# Patient Record
Sex: MALE | Race: WHITE | URBAN - METROPOLITAN AREA
[De-identification: names, ages, dates, MRNs, and addresses within clinical notes are randomized per-mention and may not be internally consistent; named-entity substitution may affect disease eponyms.]

---

## 2017-12-04 ENCOUNTER — NEW PATIENT (OUTPATIENT)
Dept: URBAN - METROPOLITAN AREA CLINIC 44 | Facility: CLINIC | Age: 83
End: 2017-12-04
Payer: MEDICARE

## 2017-12-04 DIAGNOSIS — H35.3211 EXUDATIVE MACULAR DEGENERATION, WITH ACTIVE CHOROIDAL NEOVASCULARIZATION, RIGHT EYE: Primary | ICD-10-CM

## 2017-12-04 DIAGNOSIS — H35.3122 NONEXUDATIVE MACULAR DEGENERATION, INTERMEDIATE DRY STAGE, LEFT EYE: ICD-10-CM

## 2017-12-04 PROCEDURE — 92134 CPTRZ OPH DX IMG PST SGM RTA: CPT | Performed by: OPHTHALMOLOGY

## 2017-12-04 PROCEDURE — 92004 COMPRE OPH EXAM NEW PT 1/>: CPT | Performed by: OPHTHALMOLOGY

## 2017-12-04 ASSESSMENT — INTRAOCULAR PRESSURE
OD: 13
OS: 14

## 2018-07-24 ENCOUNTER — OFFICE VISIT (OUTPATIENT)
Dept: NEUROLOGY | Facility: CLINIC | Age: 83
End: 2018-07-24
Payer: MEDICARE

## 2018-07-24 VITALS
DIASTOLIC BLOOD PRESSURE: 88 MMHG | HEART RATE: 70 BPM | HEIGHT: 72 IN | BODY MASS INDEX: 25.47 KG/M2 | WEIGHT: 188 LBS | RESPIRATION RATE: 16 BRPM | SYSTOLIC BLOOD PRESSURE: 138 MMHG

## 2018-07-24 DIAGNOSIS — G47.33 OBSTRUCTIVE SLEEP APNEA SYNDROME: Primary | ICD-10-CM

## 2018-07-24 PROCEDURE — 99214 OFFICE O/P EST MOD 30 MIN: CPT | Mod: PO | Performed by: NURSE PRACTITIONER

## 2018-07-24 PROCEDURE — 99214 OFFICE O/P EST MOD 30 MIN: CPT | Performed by: NURSE PRACTITIONER

## 2018-07-24 ASSESSMENT — ENCOUNTER SYMPTOMS
SORE THROAT: 0
CHILLS: 0
ABDOMINAL PAIN: 0
COUGH: 0
DIZZINESS: 1
PHOTOPHOBIA: 1
DYSPHORIC MOOD: 1
SHORTNESS OF BREATH: 1
HYPERACTIVE: 1
FATIGUE: 1
SLEEP DISTURBANCE: 1
APNEA: 1
ARTHRALGIAS: 1
PALPITATIONS: 0
FEVER: 0
BRUISES/BLEEDS EASILY: 1
BACK PAIN: 1
EYE PAIN: 0
ACTIVITY CHANGE: 1

## 2018-07-24 NOTE — PROGRESS NOTES
Sleep Progress Note    07/24/18  11:24 AM    Chief Complaint   Patient presents with   • Sleep Apnea     Gets supplies from [facility].    HPI:  Sleep study date:  July 15, 2016  Severity: Moderate to Severe  AHI: 29.2  Minimum O2 saturation: 69%  Machine type: CPAP  Mask: Nasal Pillows with a chinstrap    Joseph Wong is a 83 y.o. male who returns to the clinic today in follow-up for obstructive sleep apnea. It is noted that the patient underwent initial sleep study secondary to complaints of excessive daytime fatigability, somnolence, and nonrestorative sleep.  He states that he would nap on a daily basis secondary to fatigue.  He has a known past medical history positive for macular degeneration, coronary artery disease, GERD, hypertension, and vertigo.  Sleep study was carried out on July 15, 2016 and demonstrated moderate to severe obstructive sleep apnea with an AHI of 29.2 and a minimum oxygen saturation of 69%. Following sleep study the patient was placed on CPAP with a set pressure of 10 cm H2O and 2 L of oxygen at night.    At today’s appointment the patient denies any complaints. He states that he is tolerating CPAP well. He is currently utilizing nasal pillows with the use of a chinstrap. He denies complaint with this.  He reports that he is going to bed at approximately 8:30 PM and awakening around 6 AM.  He reports he is sleeping soundly throughout the night.  Daytime fatigue is under good control.  He will however take an occasional afternoon nap for about an hour.  His compliance report demonstrates 97% usage over 4 hours. On average he is using his CPAP 8 hours and 22 minutes a night. AHI is well controlled at 1.9.  Overall, from a sleep standpoint the patient is doing well. His questions were addressed and answered.     Of note, [place] Sleepiness Scale was completed in the office today with a total score of 12/24.    Histories:    Medical:    Past Medical History:   Diagnosis Date   • CAD (coronary  artery disease)    • Elevated cholesterol    • GERD (gastroesophageal reflux disease)    • Hiatal hernia    • Hypertension    • Macular degeneration    • Metabolic syndrome    • Vertigo          Family:    Family History   Problem Relation Age of Onset   • Hypertension Father    • Stroke Father    • Sleep apnea Brother          Social:    Social History     Social History   • Marital status:      Spouse name: N/A   • Number of children: N/A   • Years of education: N/A     Occupational History   • Not on file.     Social History Main Topics   • Smoking status: Former Smoker     Quit date: 1973   • Smokeless tobacco: Never Used   • Alcohol use Not on file   • Drug use: Unknown   • Sexual activity: Not on file     Other Topics Concern   • Not on file     Social History Narrative   • No narrative on file       Allergies:  No Known Allergies    Current Medications:    Current Outpatient Prescriptions   Medication Sig Dispense Refill   • amLODIPine (NORVASC) 5 mg tablet 1 tab daily 90 0   • atorvastatin (LIPITOR) 10 mg tablet 1 tab daily 90 0   • losartan-hydrochlorothiazide (HYZAAR) 100-25 mg per tablet 1 tab daily 90 0   • potassium chloride (KLOR-CON) 10 mEq CR tablet 1 tab  0     No current facility-administered medications for this visit.        Review of Systems   Constitutional: Positive for activity change ('decreased stamina') and fatigue (mild). Negative for chills and fever.   HENT: Positive for tinnitus. Negative for ear pain and sore throat.    Eyes: Positive for photophobia and visual disturbance (secondary to cataracts). Negative for pain.   Respiratory: Positive for apnea (well-controlled on CPAP) and shortness of breath (with exertion). Negative for cough.         Sleep:  Denies snoring, unusual movements, or abnormal dreaming.  Denies issues with restless leg symptomology. Reports normal breathing.  Mild residual daytime fatigue.   Cardiovascular: Negative for chest pain, palpitations and  leg swelling.   Gastrointestinal: Negative for abdominal pain.   Musculoskeletal: Positive for arthralgias (Knee pain), back pain (secondary to shingles) and gait problem.   Neurological: Positive for dizziness (Known history of vertigo). Negative for syncope.   Hematological: Bruises/bleeds easily.   Psychiatric/Behavioral: Positive for dysphoric mood and sleep disturbance. The patient is hyperactive.    All other systems reviewed and are negative.      OBJECTIVE    VS/Weight:  /88 (BP Location: Left arm, Patient Position: Sitting, Cuff Size: Reg)   Pulse 70   Resp 16   Ht 1.829 m (6')   Wt 85.3 kg (188 lb)   BMI 25.50 kg/m²        Results of Compliance Report:  Pressure: 10cm H2O  AHI: 1.9  Usage over 4 hours: 97%  Average hourly usage: 8:22   O2: Yes 2L      Physical Exam   Constitutional: He is oriented to person, place, and time. He appears well-developed and well-nourished. No distress.   HENT:   Head: Normocephalic and atraumatic.   Eyes: Pupils are equal, round, and reactive to light.   Neck: Neck supple.   Cardiovascular: Normal rate, regular rhythm and normal heart sounds.    No murmur heard.  Pulmonary/Chest: Effort normal and breath sounds normal.   Neurological: He is alert and oriented to person, place, and time.   Skin: He is not diaphoretic.       Assessment:  1.  Patient underwent sleep study on July 15, 2016 and was found to have moderate to severe obstructive sleep apnea with an AHI of 29.2 and a minimum oxygen saturation of 69%.  He is currently on CPAP with a set pressure of 10cm H2O and 2L of oxygen at noc.    2.  [place] Sleepiness Scale was completed in the office today with a total score of 12/24.  3.  Macular degeneration.  4.  Coronary artery disease.  5.  GERD.  6.  Hypertension.  7.  History of vertigo.    Plan:  1.  Patient is currently using and benefiting from current CPAP and oxygen therapy.  He tolerates CPAP well.  There is no need to make any changes in regards to his  settings.  He is noted to be compliant with 97% usage over 4 hours.  AHI is well-controlled at 1.9. Overall, from a sleep standpoint the patient is noted to be stable.  2.  He was counseled on what to look for if pressure is too high or too low.  3.  The patient will be provided with a prescription to obtain new CPAP supplies today.  4.  He will follow-up with his primary care provider for general medical needs.  5.  He is to stay active both physically and mentally.  6.  He will follow-up in 1 year, sooner as needed.  His questions were addressed and answered.  7.  The diagnostic assessment has been reviewed.  Patient has expressed a good understanding of the assessment and recommendations from today's visit.  There are no apparent barriers to understanding this information.  His questions were addressed.  He has been advised to contact this office or the answering service with questions or concerns that may arise.  Patient has been advised to follow-up with his primary care provider for general medical needs.  A total of 30 minutes was required for this visit.  Greater than 50% of this time was spent in direct face-to-face communication with the patient in order to provide counseling and coordination of care in regards to the treatment and management of obstructive sleep apnea.      A voice recognition program was used to aid in documentation of the record. Sometimes words are not printed exactly as they were spoken. While efforts were made to carefully edit and correct any inaccuracies, some may be present; please take these into context. Please contact the provider if areas are identified.       EMILY CROWLEY, CNP

## 2018-10-02 ENCOUNTER — HOSPITAL ENCOUNTER (OUTPATIENT)
Dept: FLUOROSCOPY | Facility: CLINIC | Age: 83
Discharge: HOME | End: 2018-10-02
Attending: ANESTHESIOLOGY

## 2018-10-02 ENCOUNTER — OFFICE VISIT (OUTPATIENT)
Dept: PAIN MEDICINE | Facility: CLINIC | Age: 83
End: 2018-10-02
Attending: ANESTHESIOLOGY
Payer: MEDICARE

## 2018-10-02 VITALS
OXYGEN SATURATION: 92 % | SYSTOLIC BLOOD PRESSURE: 151 MMHG | DIASTOLIC BLOOD PRESSURE: 88 MMHG | RESPIRATION RATE: 18 BRPM | HEART RATE: 76 BPM

## 2018-10-02 DIAGNOSIS — M47.816 FACET ARTHROPATHY, LUMBAR: Primary | ICD-10-CM

## 2018-10-02 PROCEDURE — 6360000200 HC RX 636 W HCPCS (ALT 250 FOR IP)

## 2018-10-02 PROCEDURE — 2550000100 HC RX 255

## 2018-10-02 PROCEDURE — 64493 INJ PARAVERT F JNT L/S 1 LEV: CPT | Mod: 50

## 2018-10-02 PROCEDURE — 64494 INJ PARAVERT F JNT L/S 2 LEV: CPT | Mod: 50

## 2018-10-02 RX ORDER — TRIAMCINOLONE ACETONIDE 40 MG/ML
40 INJECTION, SUSPENSION INTRA-ARTICULAR; INTRAMUSCULAR ONCE
Status: COMPLETED | OUTPATIENT
Start: 2018-10-02 | End: 2018-10-02

## 2018-10-02 RX ORDER — LIDOCAINE HYDROCHLORIDE 10 MG/ML
30 INJECTION, SOLUTION EPIDURAL; INFILTRATION; INTRACAUDAL; PERINEURAL ONCE
Status: COMPLETED | OUTPATIENT
Start: 2018-10-02 | End: 2018-10-02

## 2018-10-02 RX ORDER — ASPIRIN 81 MG/1
81 TABLET ORAL DAILY
COMMUNITY
End: 2022-09-02 | Stop reason: ALTCHOICE

## 2018-10-02 RX ADMIN — TRIAMCINOLONE ACETONIDE 40 MG: 40 INJECTION, SUSPENSION INTRA-ARTICULAR; INTRAMUSCULAR at 15:28

## 2018-10-02 RX ADMIN — LIDOCAINE HYDROCHLORIDE 12 ML: 10 INJECTION, SOLUTION EPIDURAL; INFILTRATION; INTRACAUDAL; PERINEURAL at 15:25

## 2018-10-02 NOTE — PATIENT INSTRUCTIONS
Patient Education     Facet Syndrome  Facet syndrome is a condition in which joints (facet joints) that connect the bones of the spine (vertebrae) become damaged. Facet joints help the spine move, and they usually wear down (degenerate) or become inflamed as you age. This can cause pain and stiffness in the neck (cervical facet syndrome) or in the lower back (lumbar facet syndrome).  When a facet joint becomes damaged, a vertebra may slip forward, out of its normal place in the spine. Damage to a facet joint can also damage nerves near the spine, which can cause tingling or weakness in the arms or legs. Facet syndrome can make it difficult to turn the head or bend backward without pain. This condition typically gets worse over time.  What are the causes?  Common causes of this condition include:  · Age-related inflammation of the facet joints (arthritis) that may create extra bone on the joint surface (bone spurs).  · Age-related decrease in space between the vertebrae (disk degeneration and cartilage degeneration).  · Repetitive stress on the spine, such as repetitive twisting of the back.  · Injury (trauma) to the back or neck.    What increases the risk?  The following factors may make you more likely to develop this condition:  · Playing contact sports.  · Doing activities or sports that involve repetitive twisting motions or repetitive heavy lifting.  · Having poor back strength and flexibility.  · Having another back or spine condition, such as scoliosis.    What are the signs or symptoms?  Symptoms of facet syndrome may include:  · An ache in the neck or lower back. This may get worse when you twist or arch your back, or when you look up.  · Stiffness in the neck or lower back.  · Numbness, tingling, or weakness in the arms or legs.    Symptoms of cervical facet syndrome may include:  · Headache.  · Pain at the back of the head.  · Pain in the shoulder blades.    Symptoms of lumbar facet syndrome may  include pain in any of the following areas:  · Groin.  · Thighs.  · Lower back.  · Buttocks.  · Hips.    How is this diagnosed?  This condition may be diagnosed based on:  · Your symptoms.  · Your medical history.  · A physical exam.  · Imaging tests, such as:  ? X-rays.  ? MRI.  · A procedure in which medicines to numb the area (local anesthetic) and medicines to reduce inflammation (steroids) are injected into your affected joint (facet joint block).    How is this treated?  Treatment for this condition may include:  · Stopping or modifying activities that make your condition worse.  · Medicines that help reduce pain and inflammation.  · Steroid injections to help reduce severe pain.  · Physical therapy.  · Radiofrequency ablation. This is a surgical procedure that uses high-frequency radio waves to block signals from affected nerves.  · Surgery to stabilize your spine or to take pressure off your nerves. This is rare.    Follow these instructions at home:  Activity  · Rest your neck and back as told by your health care provider.  · Return to your normal activities as told by your health care provider. Ask your health care provider what activities are safe for you.  · If physical therapy was prescribed, do exercises as told by your health care provider.  General instructions  · Take over-the-counter and prescription medicines only as told by your health care provider.  · Do not drive or operate heavy machinery while taking prescription pain medicines.  · Do not use any tobacco products, such as cigarettes, chewing tobacco, and e-cigarettes. Tobacco can delay bone healing. If you need help quitting, ask your health care provider.  · Use good posture throughout your daily activities. Good posture means that your spine is in its natural S-curve position (your spine is neutral), your shoulders are pulled back slightly, and your head is not forward.  · Keep all follow-up visits as told by your health care provider.  This is important.  Contact a health care provider if:  · You have symptoms that get worse or do not improve in 2-4 weeks of treatment.  · You have numbness or weakness in any part of your body.  · You lose control over your bladder or bowel function.  This information is not intended to replace advice given to you by your health care provider. Make sure you discuss any questions you have with your health care provider.  Document Released: 12/18/2006 Document Revised: 01/18/2017 Document Reviewed: 08/29/2016  [employer] Interactive Patient Education © 2018 [employer]       Patient Education     Facet Joint Block  The facet joints connect the bones of the spine (vertebrae). They make it possible for you to bend, twist, and make other movements with your spine. They also keep you from bending too far, twisting too far, and making other excessive movements.  A facet joint block is a procedure where a numbing medicine (anesthetic) is injected into a facet joint. Often, a type of anti-inflammatory medicine called a steroid is also injected. A facet joint block may be done to diagnose neck or back pain. If the pain gets better after a facet joint block, it means the pain is probably coming from the facet joint. If the pain does not get better, it means the pain is probably not coming from the facet joint. A facet joint block may also be done to relieve neck or back pain caused by an inflamed facet joint. A facet joint block is only done to relieve pain if the pain does not improve with other methods, such as medicine, exercise programs, and physical therapy.  Tell a health care provider about:  · Any allergies you have.  · All medicines you are taking, including vitamins, herbs, eye drops, creams, and over-the-counter medicines.  · Any problems you or family members have had with anesthetic medicines.  · Any blood disorders you have.  · Any surgeries you have had.  · Any medical conditions you have.  · Whether you are  pregnant or may be pregnant.  What are the risks?  Generally, this is a safe procedure. However, problems may occur, including:  · Bleeding.  · Injury to a nerve near the injection site.  · Pain at the injection site.  · Weakness or numbness in areas controlled by nerves near the injection site.  · Infection.  · Temporary fluid retention.  · Allergic reactions to medicines or dyes.  · Injury to other structures or organs near the injection site.    What happens before the procedure?  · Follow instructions from your health care provider about eating or drinking restrictions.  · Ask your health care provider about:  ? Changing or stopping your regular medicines. This is especially important if you are taking diabetes medicines or blood thinners.  ? Taking medicines such as aspirin and ibuprofen. These medicines can thin your blood. Do not take these medicines before your procedure if your health care provider instructs you not to.  · Do not take any new dietary supplements or medicines without asking your health care provider first.  · Plan to have someone take you home after the procedure.  What happens during the procedure?  · You may need to remove your clothing and dress in an open-back gown.  · The procedure will be done while you are lying on an X-ray table. You will most likely be asked to lie on your stomach, but you may be asked to lie in a different position if an injection will be made in your neck.  · Machines will be used to monitor your oxygen levels, heart rate, and blood pressure.  · If an injection will be made in your neck, an IV tube will be inserted into one of your veins. Fluids and medicine will flow directly into your body through the IV tube.  · The area over the facet joint where the injection will be made will be cleaned with soap. The surrounding skin will be covered with clean drapes.  · A numbing medicine (local anesthetic) will be applied to your skin. Your skin may sting or burn for a  moment.  · A video X-ray machine (fluoroscopy) will be used to locate the joint. In some cases, a CT scan may be used.  · A contrast dye may be injected into the facet joint area to help locate the joint.  · When the joint is located, an anesthetic will be injected into the joint through the needle.  · Your health care provider will ask you whether you feel pain relief. If you do feel relief, a steroid may be injected to provide pain relief for a longer period of time. If you do not feel relief or feel only partial relief, additional injections of an anesthetic may be made in other facet joints.  · The needle will be removed.  · Your skin will be cleaned.  · A bandage (dressing) will be applied over each injection site.  The procedure may vary among health care providers and hospitals.  What happens after the procedure?  · You will be observed for 15-30 minutes before being allowed to go home.  This information is not intended to replace advice given to you by your health care provider. Make sure you discuss any questions you have with your health care provider.  Document Released: 05/08/2008 Document Revised: 01/18/2017 Document Reviewed: 09/12/2016  [employer] Interactive Patient Education © 2018 [employer]       Patient Education     Facet Joint Block, Care After  Refer to this sheet in the next few weeks. These instructions provide you with information about caring for yourself after your procedure. Your health care provider may also give you more specific instructions. Your treatment has been planned according to current medical practices, but problems sometimes occur. Call your health care provider if you have any problems or questions after your procedure.  What can I expect after the procedure?  After the procedure, it is common to have:  · Some tenderness over the injection sites for 2 days after the procedure.  · A temporary increase in blood sugar if you have diabetes.    Follow these instructions at  home:  · Keep track of the amount of pain relief you feel and how long it lasts.  · Take over-the-counter and prescription medicines only as told by your health care provider. You may need to limit pain medicine within the first 4-6 hours after the procedure.  · Remove your bandages (dressings) the morning after the procedure.  · For the first 24 hours after the procedure:  ? Do not apply heat near or over the injection sites.  ? Do not take a bath or soak in water, such as in a pool or lake.  ? Do not drive or operate heavy machinery unless approved by your health care provider.  ? Avoid activities that require a lot of energy.  · If the injection site is tender, try applying ice to the area. To do this:  ? Put ice in a plastic bag.  ? Place a towel between your skin and the bag.  ? Leave the ice on for 20 minutes, 2-3 times a day.  · Keep all follow-up visits as told by your health care provider. This is important.  Contact a health care provider if:  · Fluid is coming from an injection site.  · There is significant bleeding or swelling at an injection site.  · You have diabetes and your blood sugar is above 180 mg/dL.  Get help right away if:  · You have a fever.  · You have worsening pain or swelling around an injection site.  · There are red streaks around an injection site.  · You develop severe pain that is not controlled by your medicines.  · You develop a headache, stiff neck, nausea, or vomiting.  · Your eyes become very sensitive to light.  · You have weakness, paralysis, or tingling in your arms or legs that was not present before the procedure.  · You have difficulty urinating or breathing.  This information is not intended to replace advice given to you by your health care provider. Make sure you discuss any questions you have with your health care provider.  Document Released: 12/04/2013 Document Revised: 05/03/2017 Document Reviewed: 09/12/2016  [employer] Interactive Patient Education © 2018 [employer]

## 2018-10-02 NOTE — PROGRESS NOTES
Procedures  Date of service: 10/2/2018    Procedure: Bilateral lumbar 4, 5, sacral 1 medial branch nerve block    Preprocedure diagnosis:  1.  DJD lumbosacral spine  2.  Lumbar facet syndrome    Postprocedure diagnosis:  1.  Same  2.  Same    Complications: None    Findings: Successful block under direct fluroscopy    Anesthesia: Local    Risk and benefits were gone over with the patient including risk of infection, bleeding, or nerve irritation of the spine.  Alternatives include surgical options, physical therapy, psychotherapy and medication management.          MRI/x-ray  Exam:   MRI of the lumbosacral spine without contrast from 09/04/2018.     Clinical History:  Left sciatic nerve pain;          Comparison(s):  Lumbar spine x-rays from 8/22/2014     Technique:   Sagittal and axial T2 and T1 and sagittal STIR sequences were obtained through the lumbosacral spine.     Findings:   No fractures are identified. There is degenerative spondylosis at L2-3. Conus medullaris ends in normal position. The cysts are present in both kidneys. These are not well evaluated.     T12-L1: Mild disc space narrowing is present. Small amount of disc material extends into the left ventrolateral spinal canal and causes a mild spinal stenosis.     At L1-2, mild degenerative disc and mild facet changes are present but no significant spinal or foraminal stenosis.     At L2-3, disc space narrowings present with degenerative endplate changes and mild degenerative facet changes. These contribute to a mild spinal stenosis and a mild bilateral foraminal narrowing. This may be contacting the exiting L2 nerve roots bilaterally.     At L3-4, mild disc space narrowing and mild facet arthrosis is present. Contributing to a mild spinal and a mild bilateral foraminal narrowing.     At L4-5, mild degenerative disc and facet changes but no significant spinal or foraminal stenosis.     At L5-S1, no significant spinal or foraminal  stenosis.     IMPRESSION:  IMPRESSION:  1. Multilevel degenerative disc changes with mild degrees of spinal and foraminal stenosis. These are best seen at T12-L1, L1-L2 and L2-L3.            RN Assessment:  Pain Score: 2 (9/2)  Pain Type: Chronic pain  Pain Location: Back  Pain Radiating Towards: groin right  Pain Descriptors: Pounding  Pain Frequency: Intermittent  Pain Onset: Sudden  Clinical Progression: Not changed  Effect of Pain on Daily Activities: standing up  Pain Interventions: Home medication, Cold applied, Rest    History: Patient is a 83-year-old gentleman with 3-month history of chronic low back pain.  The picture is somewhat confusing is that he had shingles in the same area wrapping around his right groin, that resolved and he thought this was pain from the shingles.  Further workup by primary care shows he does have arthritic changes consistent with facet disease.  He is referred for medial nerve branch block see if he may be a candidate for radiofrequency.    He has not done physical therapy yet.  He will decide whether he wants to do that as he is not sure.    Over-the-counter medications including aspirin has been tried.  Ice and heat.    Patient going to [place] for the winter in a month.  I told him we will do our best to try and get a second injection.  Also he can consider physical therapy while in [place].    He states when he stands up for prolonged periods of time it is a burning pain in the small of the back.  Does not radiate down the legs.  Affects all quality of life.  Is retired, hard for him to enjoy social activity and do his chores at home.  Is moderate severe nature.    Physical exam:  Heart Rate:  [78] 78  Resp:  [20] 20  BP: (150)/(89) 150/89  Breath sounds clear to auscultation  Heart rate and rhythm are regular  Paravertebral tenderness and lumbar 5 sacral 1 junction.  Worse with extension.  Negative straight leg raises bilaterally          Procedure: Consent obtained.   Patient brought to the fluoroscopy suite and positioned in a prone position.  Timeout procedure completed      Betadine prepped and draped in a sterile fashion.  Utilizing fluoroscopy and an AP view the target zone for the bilateral lumbar 4, 5, sacral 1 medial branch nerve was identified.  Utilizing a 22-gauge 3-1/2 inch spinal needle the target areas were entered.  Contrast (omnipaque) demonstrated adequate spread and no intravascular uptake.  A diagnostic solution consisting of 40 mg of Kenalog and 3 milliliters of 1% lidocaine was divided equally between the injection sites.     Patient tolerated procedure very well.  20 minutes post procedure patient reports greater than 90 percent relief of pain.  On physical examination patient has 0 pain to palpation and with hyperextension.    Plan: Follow-up within approximately 1 month for repeat second diagnostic test block, levels bilateral lumbar 4, lumbar 5, sacral 1

## 2018-10-08 DIAGNOSIS — M47.816 SPONDYLOSIS OF LUMBAR REGION WITHOUT MYELOPATHY OR RADICULOPATHY: Primary | ICD-10-CM

## 2018-10-18 ENCOUNTER — HOSPITAL ENCOUNTER (OUTPATIENT)
Dept: FLUOROSCOPY | Facility: CLINIC | Age: 83
Discharge: HOME | End: 2018-10-18

## 2018-10-18 ENCOUNTER — PROCEDURE VISIT (OUTPATIENT)
Dept: PAIN MEDICINE | Facility: CLINIC | Age: 83
End: 2018-10-18
Attending: ANESTHESIOLOGY
Payer: MEDICARE

## 2018-10-18 VITALS
OXYGEN SATURATION: 93 % | DIASTOLIC BLOOD PRESSURE: 99 MMHG | SYSTOLIC BLOOD PRESSURE: 170 MMHG | HEART RATE: 66 BPM | RESPIRATION RATE: 16 BRPM

## 2018-10-18 DIAGNOSIS — M47.816 SPONDYLOSIS OF LUMBAR REGION WITHOUT MYELOPATHY OR RADICULOPATHY: ICD-10-CM

## 2018-10-18 PROCEDURE — 6360000200 HC RX 636 W HCPCS (ALT 250 FOR IP)

## 2018-10-18 PROCEDURE — 64494 INJ PARAVERT F JNT L/S 2 LEV: CPT | Mod: 50

## 2018-10-18 PROCEDURE — 2550000100 HC RX 255

## 2018-10-18 PROCEDURE — 64493 INJ PARAVERT F JNT L/S 1 LEV: CPT | Mod: 50

## 2018-10-18 RX ORDER — BUPIVACAINE HYDROCHLORIDE 5 MG/ML
30 INJECTION, SOLUTION EPIDURAL; INTRACAUDAL ONCE
Status: COMPLETED | OUTPATIENT
Start: 2018-10-18 | End: 2018-10-18

## 2018-10-18 RX ORDER — TRIAMCINOLONE ACETONIDE 40 MG/ML
40 INJECTION, SUSPENSION INTRA-ARTICULAR; INTRAMUSCULAR ONCE
Status: COMPLETED | OUTPATIENT
Start: 2018-10-18 | End: 2018-10-18

## 2018-10-18 RX ORDER — LIDOCAINE HYDROCHLORIDE 10 MG/ML
30 INJECTION, SOLUTION EPIDURAL; INFILTRATION; INTRACAUDAL; PERINEURAL ONCE
Status: COMPLETED | OUTPATIENT
Start: 2018-10-18 | End: 2018-10-18

## 2018-10-18 RX ADMIN — LIDOCAINE HYDROCHLORIDE 30 ML: 10 INJECTION, SOLUTION EPIDURAL; INFILTRATION; INTRACAUDAL; PERINEURAL at 09:04

## 2018-10-18 RX ADMIN — TRIAMCINOLONE ACETONIDE 40 MG: 40 INJECTION, SUSPENSION INTRA-ARTICULAR; INTRAMUSCULAR at 09:10

## 2018-10-18 RX ADMIN — BUPIVACAINE HYDROCHLORIDE 30 ML: 5 INJECTION, SOLUTION EPIDURAL; INTRACAUDAL at 09:09

## 2018-10-18 ASSESSMENT — PAIN - FUNCTIONAL ASSESSMENT: PAIN_FUNCTIONAL_ASSESSMENT: 0-10

## 2018-10-18 ASSESSMENT — PAIN DESCRIPTION - DESCRIPTORS: DESCRIPTORS: SHARP

## 2018-10-18 NOTE — PROGRESS NOTES
Date of service:10/18/2018    Procedure: Bilateral Lumbar L4, L5, and S1 (anatomic needle location) medial branch nerve block    Preprocedure diagnosis:  1. Lumbar Facet Syndrome  2. Lumbar DJD    Postprocedure diagnosis:  1. Lumbar Facet Syndrome  2. Lumbar DJD    Complications: None    Findings: Successful block    Anesthesia: Local    History: Joseph Wong is a very pleasant 83-year-old male who presents to the clinic today with long-standing low back and hip pain which has significantly benefited from his prior facet injections dating, 10/2/2018.  There is somewhat confused from reporting his pain relief, and is only able to relate to the amount of pain that he has now as compared to what he used to have which she states is approximately 50%.  However when asked about how his pain is the rest of the day he states that nearly 100% of his pain is gone throughout the rest of the day and it used to be an 8 or 9 out of 10.  When this is taken into account, Joseph does meet the criteria for 80% pain relief.  More so later on in the day, Josehp is able to participate in his usual activities including walking for durations greater than 20 minutes and standing for durations greater than 10 minutes.  He is able to transfer on and off the toilet and stand long enough to prepare a meal for himself.  Although some of the relief has faded, Joseph does report excellent relief from his last facet injections at at least 50%.  Joseph would like to proceed with further facet injections and start up with physical therapy for lumbar stabilization when possible.  He would like to see Joseph through the end of his radiofrequency ablation procedures before prescribing any new physical therapy.    Prior imaging was reviewed.  A summary of the most recent MRI lumbar spine dating 8/28/2018, is as follows:    Impression:  1.  Extensive degenerative disc disease with endplate irregularities at L2-3.  2.  Possible nerve root compression on the  right at L2.  3.  Mild facet arthrosis throughout the lumbar spine    Procedure: Consent obtained.  Patient brought to the fluoroscopy suite and positioned in a prone position.  Timeout procedure completed.  Back Betadine prepped and draped in a sterile fashion.  Utilizing fluoroscopy and an AP view the target zone for the Bilateral Lumbar L4 medial branch nerve was identified.  Utilizing a 22-gauge 3-1/2 inch spinal needle the target areas were entered.  Contrast demonstrated adequate spread and no intravascular uptake.  A diagnostic solution consisting of 40 mg of Kenalog and 2 milliliters of 0.5% bupivacaine was divided equally between the injection sites.  Patient tolerated procedure very well.  20 minutes post procedure patient reports greater than 80 percent relief of pain.  On physical examination patient has decreased pain to palpation and with hyperextension.    Plan: Follow up in 2-3 weeks for RFA letter with nurse practitioner.  Given the amount of relief that Joseph has had from today's injection and the prior injection, please verify that he wants to proceed with radiofrequency ablation.

## 2018-10-18 NOTE — PATIENT INSTRUCTIONS
"Call when you return to set up an appointment to talk with a nurse Practitioner about Radiofrequency.  Please check your blood pressure when you get home and call your primary  Doctor if it is high. Have a happy birthday and a great time \"[place]\" :)    Patient Education     Facet Joint Block  The facet joints connect the bones of the spine (vertebrae). They make it possible for you to bend, twist, and make other movements with your spine. They also keep you from bending too far, twisting too far, and making other excessive movements.  A facet joint block is a procedure where a numbing medicine (anesthetic) is injected into a facet joint. Often, a type of anti-inflammatory medicine called a steroid is also injected. A facet joint block may be done to diagnose neck or back pain. If the pain gets better after a facet joint block, it means the pain is probably coming from the facet joint. If the pain does not get better, it means the pain is probably not coming from the facet joint. A facet joint block may also be done to relieve neck or back pain caused by an inflamed facet joint. A facet joint block is only done to relieve pain if the pain does not improve with other methods, such as medicine, exercise programs, and physical therapy.  Tell a health care provider about:  · Any allergies you have.  · All medicines you are taking, including vitamins, herbs, eye drops, creams, and over-the-counter medicines.  · Any problems you or family members have had with anesthetic medicines.  · Any blood disorders you have.  · Any surgeries you have had.  · Any medical conditions you have.  · Whether you are pregnant or may be pregnant.  What are the risks?  Generally, this is a safe procedure. However, problems may occur, including:  · Bleeding.  · Injury to a nerve near the injection site.  · Pain at the injection site.  · Weakness or numbness in areas controlled by nerves near the injection " site.  · Infection.  · Temporary fluid retention.  · Allergic reactions to medicines or dyes.  · Injury to other structures or organs near the injection site.    What happens before the procedure?  · Follow instructions from your health care provider about eating or drinking restrictions.  · Ask your health care provider about:  ? Changing or stopping your regular medicines. This is especially important if you are taking diabetes medicines or blood thinners.  ? Taking medicines such as aspirin and ibuprofen. These medicines can thin your blood. Do not take these medicines before your procedure if your health care provider instructs you not to.  · Do not take any new dietary supplements or medicines without asking your health care provider first.  · Plan to have someone take you home after the procedure.  What happens during the procedure?  · You may need to remove your clothing and dress in an open-back gown.  · The procedure will be done while you are lying on an X-ray table. You will most likely be asked to lie on your stomach, but you may be asked to lie in a different position if an injection will be made in your neck.  · Machines will be used to monitor your oxygen levels, heart rate, and blood pressure.  · If an injection will be made in your neck, an IV tube will be inserted into one of your veins. Fluids and medicine will flow directly into your body through the IV tube.  · The area over the facet joint where the injection will be made will be cleaned with soap. The surrounding skin will be covered with clean drapes.  · A numbing medicine (local anesthetic) will be applied to your skin. Your skin may sting or burn for a moment.  · A video X-ray machine (fluoroscopy) will be used to locate the joint. In some cases, a CT scan may be used.  · A contrast dye may be injected into the facet joint area to help locate the joint.  · When the joint is located, an anesthetic will be injected into the joint through the  needle.  · Your health care provider will ask you whether you feel pain relief. If you do feel relief, a steroid may be injected to provide pain relief for a longer period of time. If you do not feel relief or feel only partial relief, additional injections of an anesthetic may be made in other facet joints.  · The needle will be removed.  · Your skin will be cleaned.  · A bandage (dressing) will be applied over each injection site.  The procedure may vary among health care providers and hospitals.  What happens after the procedure?  · You will be observed for 15-30 minutes before being allowed to go home.  This information is not intended to replace advice given to you by your health care provider. Make sure you discuss any questions you have with your health care provider.  Document Released: 05/08/2008 Document Revised: 01/18/2017 Document Reviewed: 09/12/2016  [employer] Interactive Patient Education © 2018 [employer]       Patient Education     Facet Joint Block, Care After  Refer to this sheet in the next few weeks. These instructions provide you with information about caring for yourself after your procedure. Your health care provider may also give you more specific instructions. Your treatment has been planned according to current medical practices, but problems sometimes occur. Call your health care provider if you have any problems or questions after your procedure.  What can I expect after the procedure?  After the procedure, it is common to have:  · Some tenderness over the injection sites for 2 days after the procedure.  · A temporary increase in blood sugar if you have diabetes.    Follow these instructions at home:  · Keep track of the amount of pain relief you feel and how long it lasts.  · Take over-the-counter and prescription medicines only as told by your health care provider. You may need to limit pain medicine within the first 4-6 hours after the procedure.  · Remove your bandages (dressings)  the morning after the procedure.  · For the first 24 hours after the procedure:  ? Do not apply heat near or over the injection sites.  ? Do not take a bath or soak in water, such as in a pool or lake.  ? Do not drive or operate heavy machinery unless approved by your health care provider.  ? Avoid activities that require a lot of energy.  · If the injection site is tender, try applying ice to the area. To do this:  ? Put ice in a plastic bag.  ? Place a towel between your skin and the bag.  ? Leave the ice on for 20 minutes, 2-3 times a day.  · Keep all follow-up visits as told by your health care provider. This is important.  Contact a health care provider if:  · Fluid is coming from an injection site.  · There is significant bleeding or swelling at an injection site.  · You have diabetes and your blood sugar is above 180 mg/dL.  Get help right away if:  · You have a fever.  · You have worsening pain or swelling around an injection site.  · There are red streaks around an injection site.  · You develop severe pain that is not controlled by your medicines.  · You develop a headache, stiff neck, nausea, or vomiting.  · Your eyes become very sensitive to light.  · You have weakness, paralysis, or tingling in your arms or legs that was not present before the procedure.  · You have difficulty urinating or breathing.  This information is not intended to replace advice given to you by your health care provider. Make sure you discuss any questions you have with your health care provider.  Document Released: 12/04/2013 Document Revised: 05/03/2017 Document Reviewed: 09/12/2016  [employer] Interactive Patient Education © 2018 [employer]

## 2019-08-20 ENCOUNTER — OFFICE VISIT (OUTPATIENT)
Dept: NEUROLOGY | Facility: CLINIC | Age: 84
End: 2019-08-20
Payer: MEDICARE

## 2019-08-20 VITALS
HEART RATE: 59 BPM | DIASTOLIC BLOOD PRESSURE: 88 MMHG | SYSTOLIC BLOOD PRESSURE: 158 MMHG | BODY MASS INDEX: 26.18 KG/M2 | WEIGHT: 193 LBS

## 2019-08-20 DIAGNOSIS — G47.33 OBSTRUCTIVE SLEEP APNEA SYNDROME: Primary | ICD-10-CM

## 2019-08-20 PROCEDURE — 99214 OFFICE O/P EST MOD 30 MIN: CPT | Performed by: NURSE PRACTITIONER

## 2019-08-20 PROCEDURE — G0463 HOSPITAL OUTPT CLINIC VISIT: HCPCS | Performed by: NURSE PRACTITIONER

## 2019-08-20 ASSESSMENT — ENCOUNTER SYMPTOMS
BRUISES/BLEEDS EASILY: 1
SHORTNESS OF BREATH: 1
EYE PAIN: 0
DIAPHORESIS: 0
LIGHT-HEADEDNESS: 1
FEVER: 0
SEIZURES: 0
HEADACHES: 0
SORE THROAT: 0
NERVOUS/ANXIOUS: 0
UNEXPECTED WEIGHT CHANGE: 0
APNEA: 1
WEAKNESS: 0
BACK PAIN: 1
FATIGUE: 1
COUGH: 0
TREMORS: 0
SPEECH DIFFICULTY: 0
ARTHRALGIAS: 1
HYPERACTIVE: 1
DIZZINESS: 1
CHILLS: 0
DYSPHORIC MOOD: 1
PALPITATIONS: 0
CONFUSION: 1
SINUS PRESSURE: 0
AGITATION: 0
HALLUCINATIONS: 0
ABDOMINAL PAIN: 0

## 2019-08-20 NOTE — PROGRESS NOTES
Sleep Progress Note    08/20/19  2:01 PM    Chief Complaint   Patient presents with   • Sleep Apnea     Gets supplies from [facility].    HPI:  Sleep study date:  July 15, 2016  Severity: Moderate to Severe  AHI: 29.2  Minimum O2 saturation: 69%  Machine type: CPAP  Mask: Nasal Pillows with a chinstrap    Joseph Wong is a 84 y.o. male who returns to the clinic today in follow-up for obstructive sleep apnea. It is noted that the patient underwent initial sleep study secondary to complaints of excessive daytime fatigability, somnolence, and nonrestorative sleep.  He states that he would nap on a daily basis secondary to fatigue.  He has a known past medical history positive for macular degeneration, coronary artery disease, GERD, hypertension, and vertigo.  Sleep study was carried out on July 15, 2016 and demonstrated moderate to severe obstructive sleep apnea with an AHI of 29.2 and a minimum oxygen saturation of 69%. Following sleep study the patient was placed on CPAP with a set pressure of 10 cm H2O and 2 L of oxygen at night.    At today’s appointment the patient denies any complaints. He states that he is tolerating CPAP well. He is currently utilizing nasal pillows with the use of a chinstrap. He denies complaint with this.  He reports that he is going to bed at approximately 8:30 PM and awakening around 5:30 AM.  He reports he is sleeping soundly throughout the night.  Daytime fatigue is under good control.  He will however take an occasional afternoon nap for about an hour.  His compliance report demonstrates 100% usage over 4 hours. On average he is using his CPAP 9 hours and 24 minutes a night. AHI is well controlled at 3.6.  Overall, from a sleep standpoint the patient is doing well. His questions were addressed and answered.     Of note, [place] Sleepiness Scale was completed in the office today with a total score of 7/24.    Histories:    Medical:    Past Medical History:   Diagnosis Date   • CAD (coronary  artery disease)    • Elevated cholesterol    • GERD (gastroesophageal reflux disease)    • Hiatal hernia    • History of shingles    • Hypertension    • Macular degeneration    • Metabolic syndrome    • Vertigo          Family:    Family History   Problem Relation Age of Onset   • Hypertension Father    • Stroke Father    • Sleep apnea Brother          Social:    Social History     Socioeconomic History   • Marital status:      Spouse name: Not on file   • Number of children: Not on file   • Years of education: Not on file   • Highest education level: Not on file   Occupational History   • Not on file   Social Needs   • Financial resource strain: Not on file   • Food insecurity:     Worry: Not on file     Inability: Not on file   • Transportation needs:     Medical: Not on file     Non-medical: Not on file   Tobacco Use   • Smoking status: Former Smoker     Last attempt to quit: 1973     Years since quittin.6   • Smokeless tobacco: Never Used   Substance and Sexual Activity   • Alcohol use: Not on file   • Drug use: Not on file   • Sexual activity: Not on file   Lifestyle   • Physical activity:     Days per week: Not on file     Minutes per session: Not on file   • Stress: Not on file   Relationships   • Social connections:     Talks on phone: Not on file     Gets together: Not on file     Attends Anabaptism service: Not on file     Active member of club or organization: Not on file     Attends meetings of clubs or organizations: Not on file     Relationship status: Not on file   • Intimate partner violence:     Fear of current or ex partner: Not on file     Emotionally abused: Not on file     Physically abused: Not on file     Forced sexual activity: Not on file   Other Topics Concern   • Not on file   Social History Narrative   • Not on file       Allergies:  No Known Allergies    Current Medications:    Current Outpatient Medications   Medication Sig Dispense Refill   • aspirin 81 mg EC tablet Take 81  mg by mouth daily.     • amLODIPine (NORVASC) 5 mg tablet 1 tab daily 90 0   • atorvastatin (LIPITOR) 10 mg tablet 1 tab daily 90 0   • losartan-hydrochlorothiazide (HYZAAR) 100-25 mg per tablet 1 tab daily 90 0   • potassium chloride (KLOR-CON) 10 mEq CR tablet 1 tab  0     No current facility-administered medications for this visit.        Review of Systems   Constitutional: Positive for fatigue (mild). Negative for chills, diaphoresis, fever and unexpected weight change.   HENT: Positive for tinnitus. Negative for congestion, ear pain, hearing loss, postnasal drip, sinus pressure and sore throat.    Eyes: Positive for visual disturbance (secondary to cataracts). Negative for pain.        Macular degeneration   Respiratory: Positive for apnea (well-controlled on CPAP) and shortness of breath (with exertion). Negative for cough.         Sleep:  Denies snoring, unusual movements, or abnormal dreaming.  Denies issues with restless leg symptomology. Reports normal breathing.  Mild residual daytime fatigue.   Cardiovascular: Negative for chest pain, palpitations and leg swelling.   Gastrointestinal: Negative for abdominal pain.   Musculoskeletal: Positive for arthralgias (Knee pain) and back pain (currently doing injections).   Neurological: Positive for dizziness (Known history of vertigo) and light-headedness. Negative for tremors, seizures, syncope, speech difficulty, weakness and headaches.   Hematological: Bruises/bleeds easily.   Psychiatric/Behavioral: Positive for confusion and dysphoric mood. Negative for agitation and hallucinations. The patient is hyperactive. The patient is not nervous/anxious.    All other systems reviewed and are negative.      OBJECTIVE    VS/Weight:  /88 (BP Location: Right arm, Patient Position: Sitting, Cuff Size: Reg)   Pulse 59   Wt 87.5 kg (193 lb)   BMI 26.18 kg/m²       Results of Compliance Report:  Pressure: 10cm H2O  AHI: 3.6  Usage over 4 hours: 100%  Average  hourly usage: 9:24   O2: Yes 2L      Physical Exam  Constitutional:       General: He is not in acute distress.     Appearance: He is well-developed. He is not diaphoretic.   HENT:      Head: Normocephalic and atraumatic.   Eyes:      Pupils: Pupils are equal, round, and reactive to light.   Neck:      Musculoskeletal: Neck supple.   Cardiovascular:      Rate and Rhythm: Normal rate and regular rhythm.      Heart sounds: Normal heart sounds. No murmur.   Pulmonary:      Effort: Pulmonary effort is normal.      Breath sounds: Normal breath sounds.   Neurological:      Mental Status: He is alert and oriented to person, place, and time.       Assessment:  1.  Patient underwent sleep study on July 15, 2016 and was found to have moderate to severe obstructive sleep apnea with an AHI of 29.2 and a minimum oxygen saturation of 69%.  He is currently on CPAP with a set pressure of 10cm H2O and 2L of oxygen at noc.    2.  [place] Sleepiness Scale was completed in the office today with a total score of 7/24.  3.  Macular degeneration.  4.  Coronary artery disease.  5.  GERD.  6.  Hypertension.  7.  History of vertigo.    Plan:  1.  Patient is currently using and benefiting from current CPAP and oxygen therapy.  He tolerates CPAP well.  There is no need to make any changes in regards to his settings.  He is noted to be compliant with 100% usage over 4 hours.  AHI is well-controlled at 3.6. Overall, from a sleep standpoint the patient is noted to be stable.  2.  He was counseled on what to look for if pressure is too high or too low.  3.  The patient will be provided with a prescription to obtain new CPAP supplies today.  4.  He will follow-up with his primary care provider for general medical needs.  5.  He is to stay active both physically and mentally.  6.  He will follow-up in 1 year, sooner as needed.  His questions were addressed and answered.  7.  The diagnostic assessment has been reviewed.  Patient has expressed a good  understanding of the assessment and recommendations from today's visit.  There are no apparent barriers to understanding this information.  His questions were addressed.  He has been advised to contact this office or the answering service with questions or concerns that may arise.  Patient has been advised to follow-up with his primary care provider for general medical needs.  A total of 30 minutes was required for this visit.  Greater than 50% of this time was spent in direct face-to-face communication with the patient in order to provide counseling and coordination of care in regards to the treatment and management of obstructive sleep apnea.      A voice recognition program was used to aid in documentation of the record. Sometimes words are not printed exactly as they were spoken. While efforts were made to carefully edit and correct any inaccuracies, some may be present; please take these into context. Please contact the provider if areas are identified.       EMILY CROWLEY, CNP

## 2020-08-21 ENCOUNTER — TELEPHONE - BILLABLE (OUTPATIENT)
Dept: NEUROLOGY | Facility: CLINIC | Age: 85
End: 2020-08-21
Payer: MEDICARE

## 2020-08-21 DIAGNOSIS — G47.33 OBSTRUCTIVE SLEEP APNEA SYNDROME: Primary | ICD-10-CM

## 2020-08-21 PROCEDURE — 99442 *INACTIVE DO NOT USE* PR PHYS/QHP TELEPHONE EVALUATION 11-20 MIN: CPT | Mod: 95 | Performed by: NURSE PRACTITIONER

## 2020-08-21 RX ORDER — METOPROLOL SUCCINATE 25 MG/1
25 TABLET, EXTENDED RELEASE ORAL EVERY MORNING
Status: ON HOLD | COMMUNITY
End: 2024-01-09

## 2020-08-21 RX ORDER — IRBESARTAN AND HYDROCHLOROTHIAZIDE 300; 12.5 MG/1; MG/1
1 TABLET, FILM COATED ORAL DAILY
COMMUNITY
Start: 2020-06-24 | End: 2023-03-02 | Stop reason: HOSPADM

## 2020-08-21 ASSESSMENT — ENCOUNTER SYMPTOMS
FATIGUE: 1
SHORTNESS OF BREATH: 1
WEAKNESS: 0
DIAPHORESIS: 0
ARTHRALGIAS: 1
AGITATION: 0
APNEA: 1
CHILLS: 0
FEVER: 0
SPEECH DIFFICULTY: 0
SORE THROAT: 0
TREMORS: 0
LIGHT-HEADEDNESS: 1
UNEXPECTED WEIGHT CHANGE: 0
SEIZURES: 0
EYE PAIN: 0
BRUISES/BLEEDS EASILY: 1
BACK PAIN: 1
MYALGIAS: 1
NERVOUS/ANXIOUS: 0
ABDOMINAL PAIN: 0
SINUS PRESSURE: 0
DIZZINESS: 1
HEADACHES: 0
CONFUSION: 1
COUGH: 0
DYSPHORIC MOOD: 1
HYPERACTIVE: 1
HALLUCINATIONS: 0
PALPITATIONS: 0

## 2020-08-21 NOTE — PROGRESS NOTES
Sleep Progress Note    08/21/20  9:23 AM    Per discussion with EMILY CROWLEY CNP, Joseph, has verbally consented to be treated via a telephone based visit: Yes.  I spent 20 minutes with the patient today with greater than 50% in counseling and/or coordination of care in regards to the treatment and management of obstructive sleep apnea.  Patient Location: Home  Provider Location: Clinic  Technology used by Provider: Phone       Chief Complaint   Patient presents with   • Sleep Apnea       Gets supplies from [facility].    HPI:  Sleep study date:  July 15, 2016  Severity: Moderate to Severe  AHI: 29.2  Minimum O2 saturation: 69%  Machine type: CPAP  Mask: Nasal Pillows     Joseph Wong is a 85 y.o. male whom I spoke on the phone with in regards to his known diagnosis of obstructive sleep apnea. It is noted that the patient was initially referred to sleep medicine secondary to complaints of excessive daytime fatigability, somnolence, and nonrestorative sleep.  He states that he would nap on a daily basis secondary to fatigue.  He has a known past medical history positive for macular degeneration, coronary artery disease, GERD, hypertension, and vertigo.  He underwent polysomnogram on July 15, 2016 which demonstrated moderate to severe obstructive sleep apnea with an AHI of 29.2 and a minimum oxygen saturation of 69%. Following sleep study the patient was placed on CPAP with a set pressure of 10 cm H2O and 2 L of oxygen at night.  Of note, the patient and his wife decided to self-discontinue his oxygen.  They didn't feel as though it was neceassary. We did have a long discussion about this today and they were educated on the importance of using the CPAP machine in conujction to oxygen therapy to keep his oxygen levels above 88% while sleeping.  They report understanding and are planning on re-initiating oxygen therapy tonight.       Joseph reports that he tolerates CPAP therapy well. He is currently utilizing nasal  pillows, he denies complaint with this.  He reports that he is going to bed at approximately 8:30 PM and awakening around 6:30 AM.  He reports he is sleeping soundly throughout the night.  Daytime fatigue is under good control.  He will however take an occasional afternoon nap for about an hour.  His compliance report demonstrates 100% usage over 4 hours. On average he is using his CPAP 9 hours and 1 minute a night. AHI is well controlled at 3.  Overall, from a sleep standpoint the patient is doing well. His questions were addressed and answered.     Of note, [place] Sleepiness Scale was completed in the office today with a total score of 5/24.    Histories:    Medical:    Past Medical History:   Diagnosis Date   • CAD (coronary artery disease)    • Elevated cholesterol    • GERD (gastroesophageal reflux disease)    • Hiatal hernia    • History of shingles    • Hypertension    • Macular degeneration    • Metabolic syndrome    • Vertigo          Family:    Family History   Problem Relation Age of Onset   • Hypertension Father    • Stroke Father    • Sleep apnea Brother          Social:    Social History     Socioeconomic History   • Marital status:      Spouse name: Not on file   • Number of children: Not on file   • Years of education: Not on file   • Highest education level: Not on file   Occupational History   • Not on file   Social Needs   • Financial resource strain: Not on file   • Food insecurity     Worry: Not on file     Inability: Not on file   • Transportation needs     Medical: Not on file     Non-medical: Not on file   Tobacco Use   • Smoking status: Former Smoker     Quit date:      Years since quittin.6   • Smokeless tobacco: Never Used   Substance and Sexual Activity   • Alcohol use: Not on file   • Drug use: Not on file   • Sexual activity: Not on file   Lifestyle   • Physical activity     Days per week: Not on file     Minutes per session: Not on file   • Stress: Not on file    Relationships   • Social connections     Talks on phone: Not on file     Gets together: Not on file     Attends Sabianist service: Not on file     Active member of club or organization: Not on file     Attends meetings of clubs or organizations: Not on file     Relationship status: Not on file   • Intimate partner violence     Fear of current or ex partner: Not on file     Emotionally abused: Not on file     Physically abused: Not on file     Forced sexual activity: Not on file   Other Topics Concern   • Not on file   Social History Narrative   • Not on file       Allergies:  No Known Allergies    Current Medications:    Current Outpatient Medications   Medication Sig Dispense Refill   • irbesartan-hydrochlorothiazide (AVALIDE) 300-12.5 mg per tablet Take 1 tablet by mouth daily     • aspirin 81 mg EC tablet Take 81 mg by mouth daily.     • amLODIPine (NORVASC) 5 mg tablet 1 tab daily 90 0   • atorvastatin (LIPITOR) 10 mg tablet 1 tab daily 90 0   • potassium chloride (KLOR-CON) 10 mEq CR tablet 1 tab  0     No current facility-administered medications for this visit.        Review of Systems   Constitutional: Positive for fatigue (mild). Negative for chills, diaphoresis, fever and unexpected weight change.   HENT: Positive for tinnitus. Negative for congestion, ear pain, hearing loss, postnasal drip, sinus pressure and sore throat.    Eyes: Positive for visual disturbance (secondary to cataracts). Negative for pain.        Macular degeneration   Respiratory: Positive for apnea (well-controlled on CPAP) and shortness of breath (with exertion). Negative for cough.         Sleep:  Denies snoring, unusual movements, or abnormal dreaming.  Denies issues with restless leg symptomology. Reports normal breathing.  Mild residual daytime fatigue.   Cardiovascular: Negative for chest pain, palpitations and leg swelling.   Gastrointestinal: Negative for abdominal pain.   Musculoskeletal: Positive for arthralgias (Knee  pain), back pain and myalgias.   Neurological: Positive for dizziness (Known history of vertigo) and light-headedness. Negative for tremors, seizures, syncope, speech difficulty, weakness and headaches.   Hematological: Bruises/bleeds easily.   Psychiatric/Behavioral: Positive for confusion and dysphoric mood. Negative for agitation and hallucinations. The patient is hyperactive. The patient is not nervous/anxious.    All other systems reviewed and are negative.      OBJECTIVE    Results of Compliance Report:  Pressure: 10cm H2O  AHI: 3.0  Usage over 4 hours: 100%  Average hourly usage: 9:1   O2: Yes 2L      Assessment:  1.  Patient underwent sleep study on July 15, 2016 and was found to have moderate to severe obstructive sleep apnea with an AHI of 29.2 and a minimum oxygen saturation of 69%.  He is currently on CPAP with a set pressure of 10cm H2O and 2L of oxygen at [facility].    2.  [place] Sleepiness Scale was completed in the office today with a total score of 5/24.  3.  Macular degeneration.  4.  Coronary artery disease.  5.  GERD.  6.  Hypertension.  7.  History of vertigo.    Plan:  1.  Patient is currently using and benefiting from current CPAP and oxygen therapy.  He tolerates CPAP well.  There is no need to make any changes in regards to his settings.  He is noted to be compliant with 100% usage over 4 hours.  AHI is well-controlled at 3.0. Overall, from a sleep standpoint the patient is noted to be stable.  He was counseled on what to look for if pressure is too high or too low.  2.  Of note, the patient and his wife decided to self-discontinue his oxygen.  They didn't feel as though it was neceassary. We did have a long discussion about this today and they were educated on the importance of using the CPAP machine in conujction to oxygen therapy to keep his oxygen levels above 88% while sleeping.  They report understanding and are planning on re-initiating oxygen therapy tonight.     3.  The patient will be  provided with a prescription to obtain new CPAP supplies today.  4.  He will follow-up with his primary care provider for general medical needs.  5.  He is to stay active both physically and mentally.  6.  He will follow-up in 1 year, sooner as needed.  His questions were addressed and answered.  7.  The diagnostic assessment has been reviewed.  Patient has expressed a good understanding of the assessment and recommendations from today's visit.  There are no apparent barriers to understanding this information.  His questions were addressed.  He has been advised to contact this office or the answering service with questions or concerns that may arise.  Patient has been advised to follow-up with his primary care provider for general medical needs.        A voice recognition program was used to aid in documentation of the record. Sometimes words are not printed exactly as they were spoken. While efforts were made to carefully edit and correct any inaccuracies, some may be present; please take these into context. Please contact the provider if areas are identified.       EMILY CROWLEY, CNP

## 2021-04-21 ENCOUNTER — TELEPHONE (OUTPATIENT)
Dept: PAIN MEDICINE | Facility: HOSPITAL | Age: 86
End: 2021-04-21

## 2021-04-21 NOTE — TELEPHONE ENCOUNTER
Patient was last seen 10/2018 and had B)L4,5,S1 facet injections done at that time. Patient's wife called today requesting to have an appointment for patient for increase in low back pain and wonders if x-rays should be done.   Scheduled with Adina on 4/28 to evaluate patient's increased back pain and need for x-rays.

## 2021-04-27 NOTE — PROGRESS NOTES
"Patient ID: Joseph Wong is a 86 y.o. male    Subjective     Chief complaint:   Low back pain, SI pain    HPI:  Joseph is an 85 y/o male here today with complaints of low back pain. He reports that when he sits, it does not bother him.  He also reports that when he lays in bed it does not bother him and is able to sleep without pain.  However he reports when he goes from sitting to standing he has a \"stabbing pain\".  He reports that this does go away after some time but it is worse with any movement or activity.    He reports that he is always had back pain dating back to several years ago.  He has previously had medial nerve branch blocks in 2018 to his bilateral lumbar spine.  He reports that he does not think he had a lot of relief however he is unable to recall since [place] did go [place] and did not follow-up at that time.    He is taking ibuprofen for his low back pain.   Denies any change in bowel or bladder or any incontinence.      He reports that he is fairly active and walks to stay in shape.  He denies the pain radiating down his legs.  Reports that his only pain with movement.  He does report that it has severely worsened since he came back to [place], and over the last few months.  He is wondering about x-rays today.    Pain Assessment: 0-10  Pain Score: 0 - No pain(best 0, worst 9)  Pain Type: Chronic pain  Pain Location: Back  Pain Radiating Towards: denies  Pain Descriptors: Jabbing, Sharp, Stabbing  Pain Frequency: Constant/continuous  Interference on Function: most activities  Pain Onset: Gradual  Clinical Progression: Gradually worsening  Aggravating Factors: Walking, Standing  Pain Interventions: Rest    Past Medical History  Past Medical History:   Diagnosis Date   • CAD (coronary artery disease)    • Elevated cholesterol    • GERD (gastroesophageal reflux disease)    • Hiatal hernia    • History of shingles    • Hypertension    • Macular degeneration    • Metabolic syndrome    • " Vertigo        Past Surgical History   Past Surgical History:   Procedure Laterality Date   • OTHER SURGICAL HISTORY Left 08/2016    quad tendon repair   • TONSILLECTOMY     • TOTAL ANKLE ARTHROPLASTY Right    • TOTAL HIP ARTHROPLASTY Right    • TOTAL HIP ARTHROPLASTY Left    • TRANSURETHRAL RESECTION OF PROSTATE       Social History    Joseph Wong  reports that he quit smoking about 48 years ago. He has never used smokeless tobacco.    Family History:  family history includes Hypertension in his father; Sleep apnea in his brother; Stroke in his father.    Allergies:  No Known Allergies    Medications:     Current Outpatient Medications:   •  atorvastatin (LIPITOR) 10 mg tablet, Take 10 mg by mouth daily, Disp: , Rfl:   •  irbesartan-hydrochlorothiazide (AVALIDE) 300-12.5 mg per tablet, Take 1 tablet by mouth daily, Disp: , Rfl:   •  metoprolol succinate XL (TOPROL-XL) 25 mg 24 hr tablet, Take 25 mg by mouth daily, Disp: , Rfl:   •  vit C/E/Zn/coppr/lutein/zeaxan (PRESERVISION AREDS-2 ORAL), Take by mouth 2 (two) times a day, Disp: , Rfl:   •  KRILL OIL ORAL, Take by mouth daily, Disp: , Rfl:   •  aspirin 81 mg EC tablet, Take 81 mg by mouth daily., Disp: , Rfl:   •  amLODIPine (NORVASC) 5 mg tablet, Take 5 mg by mouth daily  , Disp: 90, Rfl: 0  •  potassium chloride (KLOR-CON) 10 mEq CR tablet, Take 20 mEq by mouth 2 (two) times a day  , Disp: 180, Rfl: 0    Review of Systems:  Review of Systems   Constitutional: Positive for activity change.   HENT: Negative.    Respiratory: Negative for cough, shortness of breath and wheezing.    Cardiovascular: Negative for chest pain.   Gastrointestinal: Negative for constipation and diarrhea.   Endocrine: Negative.    Genitourinary: Negative.    Musculoskeletal: Positive for arthralgias, back pain and gait problem.   Skin: Negative.    Neurological: Positive for weakness.   Hematological: Negative.    Psychiatric/Behavioral: Negative for sleep disturbance. The patient is not  nervous/anxious.      Objective     Vital signs:  Vitals:    04/28/21 1316 04/28/21 1318   BP:  153/88   BP Location:  Left arm   Patient Position:  Sitting   Cuff Size:  Regular Adult   Pulse:  67   Resp:  18   SpO2: 94%       Imaging:  Lane Velazco MD  [phone] 9/4/2018       Narrative & Impression     Exam:   MRI of the lumbosacral spine without contrast from 09/04/2018.     Clinical History:  Left sciatic nerve pain;          Comparison(s):  Lumbar spine x-rays from 8/22/2014     Technique:   Sagittal and axial T2 and T1 and sagittal STIR sequences were obtained through the lumbosacral spine.     Findings:   No fractures are identified. There is degenerative spondylosis at L2-3. Conus medullaris ends in normal position. The cysts are present in both kidneys. These are not well evaluated.     T12-L1: Mild disc space narrowing is present. Small amount of disc material extends into the left ventrolateral spinal canal and causes a mild spinal stenosis.     At L1-2, mild degenerative disc and mild facet changes are present but no significant spinal or foraminal stenosis.     At L2-3, disc space narrowings present with degenerative endplate changes and mild degenerative facet changes. These contribute to a mild spinal stenosis and a mild bilateral foraminal narrowing. This may be contacting the exiting L2 nerve roots bilaterally.     At L3-4, mild disc space narrowing and mild facet arthrosis is present. Contributing to a mild spinal and a mild bilateral foraminal narrowing.     At L4-5, mild degenerative disc and facet changes but no significant spinal or foraminal stenosis.     At L5-S1, no significant spinal or foraminal stenosis.     IMPRESSION:  IMPRESSION:  1. Multilevel degenerative disc changes with mild degrees of spinal and foraminal stenosis. These are best seen at T12-L1, L1-L2 and L2-L3.            Last Resulted: 09/04/18 11:11        Exam:  Physical Exam  Constitutional:       General: He is not in  acute distress.     Appearance: Normal appearance. He is not ill-appearing.      Comments: Ambulating without assistive devices.   HENT:      Head: Normocephalic and atraumatic.   Pulmonary:      Effort: Pulmonary effort is normal.   Musculoskeletal:         General: Tenderness present.      Lumbar back: Spasms, tenderness and bony tenderness present. Decreased range of motion.        Back:       Comments: Lumbosacral pain with facet loading.  Lower leg strength 4-5 bilateral.  Distal sensation intact.   Skin:     General: Skin is warm.      Capillary Refill: Capillary refill takes less than 2 seconds.   Neurological:      Mental Status: He is alert and oriented to person, place, and time.   Psychiatric:         Mood and Affect: Mood normal.         Behavior: Behavior normal.         Thought Content: Thought content normal.       Assessment:  1. DDD (degenerative disc disease), lumbar  X-ray lumbar spine complete 4 or more views, Ambulatory referral to Physical Therapy   2. Facet arthropathy, lumbar  X-ray lumbar spine complete 4 or more views, Ambulatory referral to Physical Therapy   3. Spinal stenosis of lumbar region without neurogenic claudication  X-ray lumbar spine complete 4 or more views, Ambulatory referral to Physical Therapy   4. Primary osteoarthritis of right knee     5. Primary osteoarthritis of left knee  Ambulatory referral to Orthopedic Surgery      Plan/Discussion:  After discussion with patient, review of symptoms, exam as well as review of prior imaging I do feel that it is worth getting x-rays today to assess his back since his pain has increased over the last few months.    Do believe it is the facet arthropathy that is causing his pain.  It does not radiating down his legs at this time.  I do feel that we should repeat the medial nerve branch blocks.  We did discuss and review this.    I also sent an order for physical therapy to work on range of motion and core strength.  This physical  therapy order was sent to send dog rehabilitation.    He is to continue at home Tylenol and ibuprofen for pain control.  We did discuss the importance of the multimodal approach.  His spouse was present on exam and they were agreeable with today's plan of care.  I will let them know the results of the x-ray and will follow-up post medial nerve branch blocks.    Today's plan was a combined effort between the patient and myself. The patient understood the plan, verbally consented, and agreed to participate. An After Visit Summary regarding today's office visit was given to the patient.     I have reviewed the patient's current medications using all immediate resources available.    A total of 30 minutes was required for this visit. Greater than 50% of this time was spent in direct face to face communication with the patient and/or surrogate in order to provide counseling regarding his pain and treatment plans.    Dragon voice recognition program was used to aid in this documentation which might generate inaccuracies despite efforts made to avoid them.  Please take it into context and contact the provider with any questions.    Adina Lopes, CNP

## 2021-04-28 ENCOUNTER — HOSPITAL ENCOUNTER (OUTPATIENT)
Dept: RADIOLOGY | Facility: HOSPITAL | Age: 86
Discharge: HOME | End: 2021-04-28
Payer: MEDICARE

## 2021-04-28 ENCOUNTER — OFFICE VISIT (OUTPATIENT)
Dept: PAIN MEDICINE | Facility: HOSPITAL | Age: 86
End: 2021-04-28
Payer: MEDICARE

## 2021-04-28 VITALS
SYSTOLIC BLOOD PRESSURE: 153 MMHG | DIASTOLIC BLOOD PRESSURE: 88 MMHG | OXYGEN SATURATION: 94 % | RESPIRATION RATE: 18 BRPM | HEART RATE: 67 BPM

## 2021-04-28 DIAGNOSIS — M17.11 PRIMARY OSTEOARTHRITIS OF RIGHT KNEE: ICD-10-CM

## 2021-04-28 DIAGNOSIS — M48.061 SPINAL STENOSIS OF LUMBAR REGION WITHOUT NEUROGENIC CLAUDICATION: ICD-10-CM

## 2021-04-28 DIAGNOSIS — M17.12 PRIMARY OSTEOARTHRITIS OF LEFT KNEE: ICD-10-CM

## 2021-04-28 DIAGNOSIS — M51.369 DDD (DEGENERATIVE DISC DISEASE), LUMBAR: Primary | ICD-10-CM

## 2021-04-28 DIAGNOSIS — M47.816 FACET ARTHROPATHY, LUMBAR: ICD-10-CM

## 2021-04-28 DIAGNOSIS — M62.838 MUSCLE SPASM: ICD-10-CM

## 2021-04-28 PROCEDURE — G0463 HOSPITAL OUTPT CLINIC VISIT: HCPCS

## 2021-04-28 PROCEDURE — 72110 X-RAY EXAM L-2 SPINE 4/>VWS: CPT

## 2021-04-28 RX ORDER — ATORVASTATIN CALCIUM 10 MG/1
10 TABLET, FILM COATED ORAL DAILY
Status: ON HOLD | COMMUNITY
Start: 2021-03-12 | End: 2023-03-02 | Stop reason: SDUPTHER

## 2021-04-28 ASSESSMENT — ENCOUNTER SYMPTOMS
ENDOCRINE NEGATIVE: 1
WEAKNESS: 1
BACK PAIN: 1
SLEEP DISTURBANCE: 0
ARTHRALGIAS: 1
CONSTIPATION: 0
NERVOUS/ANXIOUS: 0
COUGH: 0
SHORTNESS OF BREATH: 0
HEMATOLOGIC/LYMPHATIC NEGATIVE: 1
DIARRHEA: 0
WHEEZING: 0
ACTIVITY CHANGE: 1

## 2021-04-28 ASSESSMENT — PAIN - FUNCTIONAL ASSESSMENT: PAIN_FUNCTIONAL_ASSESSMENT: 0-10

## 2021-04-28 ASSESSMENT — PAIN DESCRIPTION - DESCRIPTORS: DESCRIPTORS: JABBING;SHARP;STABBING

## 2021-04-28 NOTE — PATIENT INSTRUCTIONS
Facet Syndrome       Facet syndrome is a condition in which joints (facet joints) that connect the bones of the spine (vertebrae) become damaged. Facet joints help the spine move, and they wear down (degenerate) or become inflamed as a person gets older. This can cause pain and stiffness in the neck (cervical facet syndrome) or in the lower back (lumbar facet syndrome).  When a facet joint becomes damaged, a vertebra may slip forward, out of its normal place in the spine. Damage to a facet joint can also damage nerves near the spine, which can cause tingling or weakness in the arms or legs. Facet syndrome can make it difficult to turn the head or bend backward without pain. This condition usually gets worse over time.  What are the causes?  Common causes of this condition include:  · Age-related inflammation of the facet joints (arthritis) that may create extra bone on the joint surface (bone spurs).  · Age-related decrease in space between the vertebrae (disk degeneration and cartilage degeneration).  · Repetitive stress on the spine, such as repetitive twisting of the back.  · Injury to the back or neck.  · Poor posture.  · Being overweight or obese.  What increases the risk?  The following factors may make you more likely to develop this condition:  · Playing contact sports.  · Doing activities or sports that involve repetitive twisting motions or repetitive heavy lifting.  · Having poor back strength and flexibility.  · Having another back or spine condition, such as scoliosis.  What are the signs or symptoms?  Symptoms of facet syndrome may include:  · An ache in the neck or lower back. This may get worse when you twist or arch your back, or when you look up.  · Stiffness in the neck or lower back.  · Numbness, tingling, or weakness in the arms or legs.  Symptoms of cervical facet syndrome may include:  · Headache.  · Pain in the back of the head.  · Pain in the shoulder blades.  Symptoms of lumbar facet  syndrome may include pain in any of the following areas:  · Groin.  · Thighs.  · Lower back.  · Buttocks.  · Hips.  How is this diagnosed?  This condition may be diagnosed based on:  · Your symptoms.  · Your medical history.  · A physical exam.  · Imaging tests, such as:  ? X-rays.  ? MRI.  · A procedure in which medicines to numb the area (local anesthetic) and medicines to reduce inflammation (steroids) are injected into your affected joint (facet joint block). This helps to diagnose and may relieve pain.  How is this treated?  This condition may be treated by:  · Stopping or modifying activities that make your condition worse.  · Taking medicines that help reduce pain and inflammation.  · Having steroid injections to help reduce severe pain.  · Doing physical therapy.  · Following a weight-control plan.  · Having a procedure called radiofrequency ablation. This is a surgical procedure that uses high-frequency radio waves to block signals from affected nerves.  · Having surgery to stabilize your spine or to take pressure off your nerves. This is rare.  Follow these instructions at home:  Medicines  · Take over-the-counter and prescription medicines only as told by your health care provider.  · Ask your health care provider if the medicine prescribed to you:  ? Requires you to avoid driving or using heavy machinery.  ? Can cause constipation. You may need to take actions to prevent or treat constipation, such as:  § Drink enough fluid to keep your urine pale yellow.  § Take over-the-counter or prescription medicines.  § Eat foods that are high in fiber, such as beans, whole grains, and fresh fruits and vegetables.  § Limit foods that are high in fat and processed sugars, such as fried or sweet foods.  Activity  · Rest your neck and back as told by your health care provider.  · Return to your normal activities as told by your health care provider. Ask your health care provider what activities are safe for you.  · If  physical therapy was prescribed, do exercises as told by your health care provider.  General instructions       · Do not use any products that contain nicotine or tobacco, such as cigarettes, e-cigarettes, and chewing tobacco. These can delay healing. If you need help quitting, ask your health care provider.  · Use good posture throughout your daily activities. Good posture means that your spine is in its natural S-curve position (your spine is neutral), your shoulders are pulled back slightly, and your head is not forward.  · Maintain a healthy weight. Follow instructions from your health care provider for weight control. These may include dietary restrictions.  · Keep all follow-up visits as told by your health care provider. This is important.  Contact a health care provider if you have:  · Symptoms that get worse or do not improve in 2-4 weeks of treatment.  · New symptoms.  Get help right away if you:  · Have numbness or weakness in any part of your body.  · Lose control over your bladder or bowel functions.  Summary  · Facet syndrome is a condition in which joints (facet joints) that connect the bones of the spine (vertebrae) become damaged. This can cause pain and stiffness in the neck (cervical facet syndrome) or in the lower back (lumbar facet syndrome).  · Rest your neck and back as told by your health care provider.  · If physical therapy was prescribed, do exercises as told by your health care provider.  · Take over-the-counter and prescription medicines only as told by your health care provider.  · Contact a health care provider if you have symptoms that get worse or do not improve in 2-4 weeks of treatment, or if you have new symptoms.  This information is not intended to replace advice given to you by your health care provider. Make sure you discuss any questions you have with your health care provider.  Document Released: 12/18/2006 Document Revised: 11/26/2019 Document Reviewed: 12/01/2019  [employer]  Patient Education © 2020 [employer]     Facet Joint Block  The facet joints connect the bones of the spine (vertebrae). They make it possible for you to bend, twist, and make other movements with your spine. They also keep you from bending too far, twisting too far, and making other extreme movements.  A facet joint block is a procedure in which a numbing medicine (anesthetic) is injected into a facet joint. In many cases, an anti-inflammatory medicine (steroid) is also injected. A facet joint block may be done:  · To diagnose neck or back pain. If the pain gets better after a facet joint block, it means the pain is probably coming from the facet joint. If the pain does not get better, it means the pain is probably not coming from the facet joint.  · To relieve neck or back pain that is caused by an inflamed facet joint.  A facet joint block is only done to relieve pain if the pain does not improve with other methods, such as medicine, exercise programs, and physical therapy.  Tell a health care provider about:  · Any allergies you have.  · All medicines you are taking, including vitamins, herbs, eye drops, creams, and over-the-counter medicines.  · Any problems you or family members have had with anesthetic medicines.  · Any blood disorders you have.  · Any surgeries you have had.  · Any medical conditions you have or have had.  · Whether you are pregnant or may be pregnant.  What are the risks?  Generally, this is a safe procedure. However, problems may occur, including:  · Bleeding.  · Injury to a nerve near the injection site.  · Pain at the injection site.  · Weakness or numbness in areas controlled by nerves near the injection site.  · Infection.  · Temporary fluid retention.  · Allergic reactions to medicines or dyes.  · Injury to other structures or organs near the injection site.  What happens before the procedure?  Medicines  Ask your health care provider about:  · Changing or stopping your regular  medicines. This is especially important if you are taking diabetes medicines or blood thinners.  · Taking medicines such as aspirin and ibuprofen. These medicines can thin your blood. Do not take these medicines unless your health care provider tells you to take them.  · Taking over-the-counter medicines, vitamins, herbs, and supplements.    What happens during the procedure?       · You will put on a hospital gown.  · You will lie on your stomach on an X-ray table. You may be asked to lie in a different position if an injection will be made in your neck.  · Machines will be used to monitor your oxygen levels, heart rate, and blood pressure.  · Your skin will be cleaned.  · If an injection will be made in your neck, an IV will be inserted into one of your veins. Fluids and medicine will flow directly into your body through the IV.  · A numbing medicine (local anesthetic) will be applied to your skin. Your skin may sting or burn for a moment.  · A video X-ray machine (fluoroscopy) will be used to find the joint. In some cases, a CT scan may be used.  · A contrast dye may be injected into the facet joint area to help find the joint.  · When the joint is located, an anesthetic will be injected into the joint through the needle.  · Your health care provider will ask you whether you feel pain relief.  ? If you feel relief, a steroid may be injected to provide pain relief for a longer period of time.  ? If you do not feel relief or feel only partial relief, additional injections of an anesthetic may be made in other facet joints.  · The needle will be removed.  · Your skin will be cleaned.  · A bandage (dressing) will be applied over each injection site.  The procedure may vary among health care providers and hospitals.  What happens after the procedure?  · Your blood pressure, heart rate, breathing rate, and blood oxygen level will be monitored until you leave the hospital or clinic.  · You will lie down and rest for a  period of time.  Summary  · A facet joint block is a procedure in which a numbing medicine (anesthetic) is injected into a facet joint. An anti-inflammatory medicine (stereoid) may also be injected.  · Follow instructions from your health care provider about medicines and eating and drinking before the procedure.  · Do not use any products that contain nicotine or tobacco for at least 4-6 weeks before the procedure.  · You will lie on your stomach for the procedure, but you may be asked to lie in a different position if an injection will be made in your neck.  · When the joint is located, an anesthetic will be injected into the joint through the needle.  This information is not intended to replace advice given to you by your health care provider. Make sure you discuss any questions you have with your health care provider.  Document Released: 05/08/2008 Document Revised: 04/09/2020 Document Reviewed: 11/22/2019  [employer] Patient Education © 2020 [employer]

## 2021-04-29 ENCOUNTER — TELEPHONE (OUTPATIENT)
Dept: PAIN MEDICINE | Facility: HOSPITAL | Age: 86
End: 2021-04-29

## 2021-04-29 NOTE — TELEPHONE ENCOUNTER
I called the mobile phone number today, and reached Alessandra who was not at home to give the phone to Joseph.  I told her that I wanted to give Joseph his xray results, and she said they will call back.  His results show some degenerative changes, and for now Adina wants to continue with the plan of the facet/MNB injection scheduled 6/15.

## 2021-05-10 DIAGNOSIS — M47.816 FACET ARTHROPATHY, LUMBAR: Primary | ICD-10-CM

## 2021-05-11 ENCOUNTER — TELEPHONE (OUTPATIENT)
Dept: ADMINISTRATIVE | Facility: HOSPITAL | Age: 86
End: 2021-05-11

## 2021-05-12 ENCOUNTER — HOSPITAL ENCOUNTER (OUTPATIENT)
Dept: RADIOLOGY | Facility: HOSPITAL | Age: 86
Discharge: HOME | End: 2021-05-12
Payer: MEDICARE

## 2021-05-12 ENCOUNTER — CONSULT (OUTPATIENT)
Dept: ORTHOPEDIC SURGERY | Facility: CLINIC | Age: 86
End: 2021-05-12
Payer: MEDICARE

## 2021-05-12 VITALS — SYSTOLIC BLOOD PRESSURE: 149 MMHG | OXYGEN SATURATION: 92 % | DIASTOLIC BLOOD PRESSURE: 88 MMHG | HEART RATE: 62 BPM

## 2021-05-12 DIAGNOSIS — M17.12 PRIMARY OSTEOARTHRITIS OF LEFT KNEE: ICD-10-CM

## 2021-05-12 DIAGNOSIS — M25.562 LEFT KNEE PAIN, UNSPECIFIED CHRONICITY: Primary | ICD-10-CM

## 2021-05-12 PROCEDURE — 73564 X-RAY EXAM KNEE 4 OR MORE: CPT | Mod: 26,LT | Performed by: ORTHOPAEDIC SURGERY

## 2021-05-12 PROCEDURE — 73564 X-RAY EXAM KNEE 4 OR MORE: CPT | Mod: LT

## 2021-05-12 PROCEDURE — G0463 HOSPITAL OUTPT CLINIC VISIT: HCPCS | Performed by: ORTHOPAEDIC SURGERY

## 2021-05-12 PROCEDURE — 99203 OFFICE O/P NEW LOW 30 MIN: CPT | Performed by: ORTHOPAEDIC SURGERY

## 2021-05-12 ASSESSMENT — PAIN - FUNCTIONAL ASSESSMENT: PAIN_FUNCTIONAL_ASSESSMENT: NO/DENIES PAIN

## 2021-05-12 NOTE — PROGRESS NOTES
New Patient Office Note  Chief Complaint:  Left knee pain    HPI/ Subjective:  The patient is 89-year-old male seen this morning for complaint of left knee pain.  He reports he been having some pain in his knee over the past several years.  He had a quadriceps rupture about 5 or 6 years ago which underwent surgical repair.  He underwent extensive physical therapy at that point to regain his range of motion.  He tries to stay quite active but is limited some by his pain.  His pain is not severe and does not keep him from performing activities.  He is more limited at this point by his macular degeneration and chronic low back pain.  He actually feels like his left leg is stronger than his right leg.  He does not report night pain.    Review of Systems: See HPI    No Known Allergies  Vitals:    21 0938   BP:    Pulse:    SpO2: 92%     Past Medical History:   Diagnosis Date   • CAD (coronary artery disease)    • Elevated cholesterol    • GERD (gastroesophageal reflux disease)    • Hiatal hernia    • History of shingles    • Hypertension    • Macular degeneration    • Metabolic syndrome    • Vertigo      Social History     Socioeconomic History   • Marital status:      Spouse name: Not on file   • Number of children: Not on file   • Years of education: Not on file   • Highest education level: Not on file   Occupational History   • Not on file   Tobacco Use   • Smoking status: Former Smoker     Quit date: 1973     Years since quittin.3   • Smokeless tobacco: Never Used   Substance and Sexual Activity   • Alcohol use: Not on file   • Drug use: Not on file   • Sexual activity: Not on file   Other Topics Concern   • Not on file   Social History Narrative   • Not on file     Social Determinants of Health     Financial Resource Strain:    • Difficulty of Paying Living Expenses:    Food Insecurity:    • Worried About Running Out of Food in the Last Year:    • Ran Out of Food in the Last Year:    Transportation  Needs:    • Lack of Transportation (Medical):    • Lack of Transportation (Non-Medical):    Physical Activity:    • Days of Exercise per Week:    • Minutes of Exercise per Session:    Stress:    • Feeling of Stress :    Social Connections:    • Frequency of Communication with Friends and Family:    • Frequency of Social Gatherings with Friends and Family:    • Attends Yarsanism Services:    • Active Member of Clubs or Organizations:    • Attends Club or Organization Meetings:    • Marital Status:    Intimate Partner Violence:    • Fear of Current or Ex-Partner:    • Emotionally Abused:    • Physically Abused:    • Sexually Abused:      Family History   Problem Relation Age of Onset   • Hypertension Father    • Stroke Father    • Sleep apnea Brother      Past Surgical History:   Procedure Laterality Date   • OTHER SURGICAL HISTORY Left 08/2016    quad tendon repair   • QUADRICEPS REPAIR Left 2015   • TONSILLECTOMY     • TOTAL ANKLE ARTHROPLASTY Right    • TOTAL HIP ARTHROPLASTY Right    • TOTAL HIP ARTHROPLASTY Left    • TRANSURETHRAL RESECTION OF PROSTATE       Current Outpatient Medications   Medication Sig Dispense Refill   • atorvastatin (LIPITOR) 10 mg tablet Take 10 mg by mouth daily     • irbesartan-hydrochlorothiazide (AVALIDE) 300-12.5 mg per tablet Take 1 tablet by mouth daily     • metoprolol succinate XL (TOPROL-XL) 25 mg 24 hr tablet Take 25 mg by mouth daily     • vit C/E/Zn/coppr/lutein/zeaxan (PRESERVISION AREDS-2 ORAL) Take by mouth 2 (two) times a day     • KRILL OIL ORAL Take by mouth daily     • aspirin 81 mg EC tablet Take 81 mg by mouth daily.     • amLODIPine (NORVASC) 5 mg tablet Take 5 mg by mouth daily   90 0   • potassium chloride (KLOR-CON) 10 mEq CR tablet Take 20 mEq by mouth 2 (two) times a day   180 0     No current facility-administered medications for this visit.       Physical Exam  Ortho Exam  General: Elderly male, no acute distress, alert and oriented x3.    Psychiatric:  Appropriate mood and affect.    Orthopedic: There is no effusion in the left knee.  He does have large rimming osteophytes across the medial joint line.  He does maintain full active extension.  He has a small bump at the inferior aspect of his patella which I suspect is from previous quadriceps repair.  He has 1-2+ medial lateral laxity but minimal pain with stressing both medial and lateral compartments.  He has a 1+ Lachman's.  Distal motor and sensory exam and skin were intact.    No results found.    Orders Placed This Encounter   Procedures   • X-ray knee 4 or more views left     AP, lateral, PA flexion, sunrise     Order Specific Question:   Reading provider?     Answer:   Ordering Provider   • Ambulatory referral to Physical Therapy     Please call patient to schedule     Standing Status:   Future     Standing Expiration Date:   5/26/2022     Referral Priority:   Routine     Referral Type:   Physical Therapy     Referral Reason:   Specialty Services Required     Referral Location:   [facility]     Requested Specialty:   Physical Therapy     Number of Visits Requested:   11   X-rays of the left knee show complete loss of medial joint space subacromial sclerosis and osteophyte formation.    CBC:    BMP:No results found for: GLUCOSE, CALCIUM, NA, K, CO2, CL, BUN, CREATININE, ANIONGAP    Assessment/ Plan:    Assessment: Osteoarthritis left knee    Plan: I discussed the findings at length with the patient and his wife who was present.  We went over the x-ray findings.  At this point there would like to try to avoid knee replacement if possible.  He is currently undergoing physical therapy at some drug rehabilitation for balance issues.  Advised him that oftentimes strength in the quadriceps on the affected side can give him some relief and improved function.  Will refer him back to sign dog for quadricep strengthening exercises of his left leg.  I will plan to see the patient back in 6 weeks for  further evaluation.    Discussed signs and symptoms of when to return to clinic including redness, fever, drainage, increase in pain, decrease in sensation, or with any questions or concerns.  If we are not open they are to go to urgent care or ER.  Office info provided.  They were agreeable with today's plan of care.    A voice recognition program was used to aid in documentation of this record. Sometimes words are not printed exactly as they were spoken.  While efforts were made to carefully edit and correct any inaccuracies, some errors may be present; please take these into context.  Please contact the provider's office if you have any questions or concerns.

## 2021-05-13 ENCOUNTER — TELEPHONE (OUTPATIENT)
Dept: ADMINISTRATIVE | Facility: HOSPITAL | Age: 86
End: 2021-05-13

## 2021-05-13 NOTE — TELEPHONE ENCOUNTER
Sari with [facility] called. Were there supposed to be new orders sent to [facility] by Dr Su for this patient for PT etc? Patient's wife thought there should be so Sari was just checking.  Please call [facility] at [phone].  Thank you

## 2021-09-09 ENCOUNTER — OFFICE VISIT (OUTPATIENT)
Dept: NEUROLOGY | Facility: CLINIC | Age: 86
End: 2021-09-09
Payer: MEDICARE

## 2021-09-09 VITALS
BODY MASS INDEX: 26.74 KG/M2 | HEART RATE: 61 BPM | DIASTOLIC BLOOD PRESSURE: 91 MMHG | SYSTOLIC BLOOD PRESSURE: 167 MMHG | WEIGHT: 191 LBS | HEIGHT: 71 IN

## 2021-09-09 DIAGNOSIS — G47.33 OBSTRUCTIVE SLEEP APNEA SYNDROME: Primary | ICD-10-CM

## 2021-09-09 PROCEDURE — 99214 OFFICE O/P EST MOD 30 MIN: CPT | Performed by: NURSE PRACTITIONER

## 2021-09-09 PROCEDURE — G0463 HOSPITAL OUTPT CLINIC VISIT: HCPCS | Performed by: NURSE PRACTITIONER

## 2021-09-09 ASSESSMENT — ENCOUNTER SYMPTOMS
CONFUSION: 1
EYE PAIN: 0
HALLUCINATIONS: 0
CHILLS: 0
BRUISES/BLEEDS EASILY: 1
JOINT SWELLING: 1
DIZZINESS: 1
SORE THROAT: 0
ARTHRALGIAS: 1
SPEECH DIFFICULTY: 0
MYALGIAS: 1
LIGHT-HEADEDNESS: 1
TREMORS: 0
FATIGUE: 1
SINUS PRESSURE: 0
COUGH: 0
DIAPHORESIS: 0
HEADACHES: 0
ABDOMINAL PAIN: 0
AGITATION: 0
SEIZURES: 0
FEVER: 0
BACK PAIN: 1
UNEXPECTED WEIGHT CHANGE: 0
DYSPHORIC MOOD: 1
APNEA: 1
NERVOUS/ANXIOUS: 0
WEAKNESS: 0
PALPITATIONS: 0

## 2021-09-09 NOTE — PROGRESS NOTES
Sleep Progress Note    09/09/21  2:00 PM    Chief Complaint   Patient presents with   • Sleep Apnea     Gets supplies from [facility].     HPI:  Sleep study date:  July 15, 2016  Severity: Moderate to Severe  AHI: 29.2  Minimum O2 saturation: 69%  Machine type: CPAP  Mask: Nasal Pillows     Joseph Wong is a 86 y.o. male who returns to the clinic today in follow-up regarding his known diagnosis of obstructive sleep apnea. It is noted that the patient was initially referred to sleep medicine secondary to complaints of excessive daytime fatigability, somnolence, and nonrestorative sleep.  He states that he would nap on a daily basis secondary to fatigue.  He has a known past medical history positive for macular degeneration, coronary artery disease, GERD, hypertension, and vertigo.  He underwent polysomnogram on July 15, 2016 which demonstrated moderate to severe obstructive sleep apnea with an AHI of 29.2 and a minimum oxygen saturation of 69%. Following sleep study the patient was placed on CPAP with a set pressure of 10 cm H2O and 2 L of oxygen at night.      Joseph reports that he tolerates CPAP therapy well. He is currently utilizing nasal pillows.  He states that this is comfortable for him.  He is going to bed at approximately 8 PM and awakening around 7 AM.  He reports he is sleeping soundly throughout the night. He does enjoy a 30 minute nap in the afternoon.  His compliance report demonstrates 100% usage over 4 hours. On average he is using his CPAP 9 hours and 40 minutes a night. AHI is well controlled at 3.3.  Overall, from a sleep standpoint the patient is doing well. His questions were addressed and answered.     Of note, [place] Sleepiness Scale was completed in the office today with a total score of 9/24.    Histories:    Medical:    Past Medical History:   Diagnosis Date   • CAD (coronary artery disease)    • Elevated cholesterol    • GERD (gastroesophageal reflux disease)    • Hiatal hernia    • History of  shingles    • Hypertension    • Macular degeneration    • Metabolic syndrome    • Vertigo          Family:    Family History   Problem Relation Age of Onset   • Hypertension Father    • Stroke Father    • Sleep apnea Brother          Social:    Social History     Tobacco Use   • Smoking status: Former Smoker     Quit date:      Years since quittin.7   • Smokeless tobacco: Never Used   Vaping Use   • Vaping Use: Never used   Substance Use Topics   • Alcohol use: Yes     Alcohol/week: 1.0 standard drink     Types: 1 Shots of liquor per week   • Drug use: Not on file        Allergies:  No Known Allergies    Current Medications:    Current Outpatient Medications   Medication Sig Dispense Refill   • atorvastatin (LIPITOR) 10 mg tablet Take 10 mg by mouth daily     • irbesartan-hydrochlorothiazide (AVALIDE) 300-12.5 mg per tablet Take 1 tablet by mouth daily     • metoprolol succinate XL (TOPROL-XL) 25 mg 24 hr tablet Take 25 mg by mouth daily     • vit C/E/Zn/coppr/lutein/zeaxan (PRESERVISION AREDS-2 ORAL) Take by mouth 2 (two) times a day     • KRILL OIL ORAL Take by mouth daily     • aspirin 81 mg EC tablet Take 81 mg by mouth daily.     • amLODIPine (NORVASC) 5 mg tablet Take 5 mg by mouth daily   90 0   • potassium chloride (KLOR-CON) 10 mEq CR tablet Take 20 mEq by mouth 2 (two) times a day   180 0     No current facility-administered medications for this visit.       Review of Systems   Constitutional: Positive for fatigue (mild). Negative for chills, diaphoresis, fever and unexpected weight change.   HENT: Positive for tinnitus. Negative for congestion, ear pain, hearing loss, postnasal drip, sinus pressure and sore throat.    Eyes: Positive for visual disturbance (secondary to cataracts). Negative for pain.        Macular degeneration   Respiratory: Positive for apnea (well-controlled on CPAP). Negative for cough.         Sleep:  Denies snoring, unusual movements, or abnormal dreaming.  Denies issues  "with restless leg symptomology. Reports normal breathing.  Mild residual daytime fatigue.   Cardiovascular: Negative for chest pain, palpitations and leg swelling.   Gastrointestinal: Negative for abdominal pain.   Musculoskeletal: Positive for arthralgias (Knee pain), back pain, gait problem, joint swelling and myalgias.   Neurological: Positive for dizziness (Known history of vertigo) and light-headedness. Negative for tremors, seizures, syncope, speech difficulty, weakness and headaches.   Hematological: Bruises/bleeds easily.   Psychiatric/Behavioral: Positive for confusion and dysphoric mood. Negative for agitation and hallucinations. The patient is not nervous/anxious.    All other systems reviewed and are negative.      OBJECTIVE    VS/Weight:  /91 (BP Location: Left arm, Patient Position: Sitting, Cuff Size: Regular Adult)   Pulse 61   Ht 1.803 m (5' 11\")   Wt 86.6 kg (191 lb)   BMI 26.64 kg/m²       Results of Compliance Report:  Pressure: 10cm H2O  AHI: 3.3  Usage over 4 hours: 100%  Average hourly usage: 9:40   O2: Yes 2L      Physical Exam  Constitutional:       General: He is not in acute distress.     Appearance: He is well-developed. He is not diaphoretic.   HENT:      Head: Normocephalic and atraumatic.   Eyes:      Pupils: Pupils are equal, round, and reactive to light.   Cardiovascular:      Rate and Rhythm: Normal rate and regular rhythm.      Heart sounds: Normal heart sounds.   Pulmonary:      Effort: Pulmonary effort is normal.      Breath sounds: Normal breath sounds.   Musculoskeletal:      Cervical back: Neck supple.   Neurological:      Mental Status: He is alert and oriented to person, place, and time.         Assessment:  1.  Patient underwent sleep study on July 15, 2016 and was found to have moderate to severe obstructive sleep apnea with an AHI of 29.2 and a minimum oxygen saturation of 69%.  He is currently on CPAP with a set pressure of 10cm H2O and 2L of oxygen at noc.  "   2.  [place] Sleepiness Scale was completed in the office today with a total score of 9/24.  3.  Macular degeneration.  4.  Coronary artery disease.  5.  GERD.  6.  Hypertension.  7.  History of vertigo.    Plan:  1.  Patient is currently using and benefiting from both CPAP and oxygen therapy.  He tolerates CPAP well.  There is no need to make any changes in regards to his settings.  He is noted to be compliant with 100% usage over 4 hours.  AHI is well-controlled at 3.3. Overall, from a sleep standpoint the patient is noted to be stable.  2.  He was counseled on what to look for if pressure is too high or too low.  3.  The patient will be provided with a prescription to obtain new CPAP supplies today.  4.  He will follow-up with his primary care provider for general medical needs.  5.  He is to stay active both physically and mentally.  6.  He will follow-up in 1 year, sooner as needed.  His questions were addressed and answered.  7.  The diagnostic assessment has been reviewed.  Patient has expressed a good understanding of the assessment and recommendations from today's visit.  There are no apparent barriers to understanding this information.  His questions were addressed.  He has been advised to contact this office or the answering service with questions or concerns that may arise.  Patient has been advised to follow-up with his primary care provider for general medical needs.  A total of 30 minutes was required for this visit.  Greater than 50% of this time was spent in direct face-to-face communication with the patient in order to provide counseling and coordination of care in regards to the treatment and management of obstructive sleep apnea.      A voice recognition program was used to aid in documentation of the record. Sometimes words are not printed exactly as they were spoken. While efforts were made to carefully edit and correct any inaccuracies, some may be present; please take these into context.  Please contact the provider if areas are identified.       EMILY CROWLEY, CNP

## 2021-10-25 ENCOUNTER — TELEPHONE (OUTPATIENT)
Dept: UROLOGY | Facility: CLINIC | Age: 86
End: 2021-10-25

## 2021-10-25 NOTE — TELEPHONE ENCOUNTER
Caller would like to discuss (a) referral     Patient: Joseph Wong    Caller Name Maribel [email or web address]     Callback Number:  [phone]    Best Availability: as soon as possible    Additional Info: Calling to schedule referral.      I advised caller of a callback by 24-48 hours.

## 2021-11-08 ENCOUNTER — APPOINTMENT (OUTPATIENT)
Dept: LAB | Facility: CLINIC | Age: 86
End: 2021-11-08
Payer: MEDICARE

## 2021-11-08 ENCOUNTER — CONSULT (OUTPATIENT)
Dept: UROLOGY | Facility: CLINIC | Age: 86
End: 2021-11-08
Payer: MEDICARE

## 2021-11-08 VITALS
BODY MASS INDEX: 26.74 KG/M2 | OXYGEN SATURATION: 91 % | HEART RATE: 67 BPM | WEIGHT: 191 LBS | TEMPERATURE: 98 F | RESPIRATION RATE: 20 BRPM | SYSTOLIC BLOOD PRESSURE: 170 MMHG | DIASTOLIC BLOOD PRESSURE: 93 MMHG | HEIGHT: 71 IN

## 2021-11-08 DIAGNOSIS — R97.20 ELEVATED PROSTATE SPECIFIC ANTIGEN (PSA): ICD-10-CM

## 2021-11-08 DIAGNOSIS — R97.20 ELEVATED PROSTATE SPECIFIC ANTIGEN (PSA): Primary | ICD-10-CM

## 2021-11-08 DIAGNOSIS — R39.12 WEAK URINARY STREAM: ICD-10-CM

## 2021-11-08 LAB
CREAT SERPL-MCNC: 0.87 MG/DL (ref 0.7–1.3)
GFR SERPL CREATININE-BSD FRML MDRD: 78 ML/MIN/1.73M*2
SPECIMEN VOL ?TM UR: 126 ML

## 2021-11-08 PROCEDURE — 99214 OFFICE O/P EST MOD 30 MIN: CPT | Performed by: NURSE PRACTITIONER

## 2021-11-08 PROCEDURE — G0463 HOSPITAL OUTPT CLINIC VISIT: HCPCS | Performed by: NURSE PRACTITIONER

## 2021-11-08 PROCEDURE — G0463 HOSPITAL OUTPT CLINIC VISIT: HCPCS | Mod: 25 | Performed by: NURSE PRACTITIONER

## 2021-11-08 PROCEDURE — 36415 COLL VENOUS BLD VENIPUNCTURE: CPT

## 2021-11-08 PROCEDURE — 82565 ASSAY OF CREATININE: CPT

## 2021-11-08 PROCEDURE — 51798 US URINE CAPACITY MEASURE: CPT | Performed by: NURSE PRACTITIONER

## 2021-11-08 RX ORDER — TAMSULOSIN HYDROCHLORIDE 0.4 MG/1
0.4 CAPSULE ORAL DAILY
Qty: 90 CAPSULE | Refills: 3 | Status: SHIPPED | OUTPATIENT
Start: 2021-11-08 | End: 2021-12-20 | Stop reason: ALTCHOICE

## 2021-11-08 ASSESSMENT — PAIN SCALES - GENERAL: PAINLEVEL: 0-NO PAIN

## 2021-11-08 NOTE — PROGRESS NOTES
"[facility]  Urology      HPI:  Joseph Wong is a 87 y.o. male here to establish care for elevated PSA.  PSA at [facility] with the patient's PCP on 6/28/2021 was 6.69.  Repeat PSA 9/30/2021 was 7.18.  Patient is not aware of any family history of prostate cancer.  He states he has been told he has a elevated PSA for many years, although denies previous prostate biopsy or MRI.  No prior urologic surgeries.  Urinary symptom wise he states for years he has been dealing with a slow stream, sensation of incomplete emptying, hesitancy, stop and go stream, double voiding, urgency with incontinence, and nocturia x2.  Denies dysuria, abdominal pain, flank pain, fever or gross hematuria.  AUA today is 26.  He is not on any prostate medications or supplements.  He does struggle with occasional dizziness and is already on 3 hypertension medications.  No known incidences of hypotension.        The following have been reviewed and updated as appropriate for this visit:  Allergies, Medications, Past Medical, Surgical and Social History, Problem List    Physical Exam:      /93 (BP Location: Right arm, Patient Position: Sitting, Cuff Size: Long Adult)   Pulse 67   Temp 36.7 °C (98 °F) (Temporal)   Resp 20   Ht 1.803 m (5' 11\")   Wt 86.6 kg (191 lb)   SpO2 91%   BMI 26.64 kg/m²   Physical Exam  Vitals and nursing note reviewed.   Constitutional:       General: He is not in acute distress.     Appearance: Normal appearance.   HENT:      Head: Normocephalic and atraumatic.   Pulmonary:      Effort: Pulmonary effort is normal. No respiratory distress.   Genitourinary:     Comments: Prostate: Prostate is enlarged, right side larger than left, smooth without tenderness or nodules.  Musculoskeletal:         General: Normal range of motion.      Comments: Uses a cane for ambulation   Skin:     General: Skin is warm and dry.   Neurological:      Mental Status: He is alert.      Comments: Significantly decreased visual acuity "   Psychiatric:         Mood and Affect: Mood normal.         Behavior: Behavior normal.           Results:    Lab-  -PSA 9/30/2021: 7.18  -PSA 6/28/2021: 6.69        Radiology-  -PVR bladder scan 11/8/2021: 126 mL        Assessment & Plan:  1.  Elevated PSA, uncertain prognosis  2.  BPH with weak urinary stream  3.  Mild incomplete bladder emptying  -Discussed PSA value from 6/28/2021 and 9/30/2021 with the patient  -GLADIS to findings were discussed with the patient  -PVR bladder scan from today was discussed with the patient  -We discussed PSA testing and the controversies, it's normal role and reasons it can be elevated including infection, inflammation, recent ejaculation, massage or digital rectal exam, prostate enlargement and prostate cancer. I discussed that the only way to know if cancer is present or not is by performing ultrasound with needle biopsy.  -US with biopsy discussed with risks including, infection, bleeding and pain.  We discussed the risk of not proceeding with biopsy is the possibility he does have cancer and a delay in diagnosis and treatment may allow cancer to grow and or spread.    -Patient given option to proceed with US with biopsy, have 3 TMRI or repeat PSA in 3-6 months after the last test. The patient elects to proceed with 3T MRI and repeat PSA 6 weeks from now.   -May consider proceeding forth with prostate biopsy after results of 3T MRI can be reviewed.  -Start Flomax 0.4 mg daily.  However I did discuss with the patient and his wife primary side effects with Flomax as dizziness which he already struggles with.  If patient develops dizziness that is affecting his quality of life he should discontinue Flomax.   -Follow-up in 6 weeks with PSA prior  -Patient and his wife are in agreement with the above plan and questions were answered.        ---------------------------------  Do Vargas CNP  11/08/21 10:09 AM

## 2021-11-13 ENCOUNTER — HOSPITAL ENCOUNTER (OUTPATIENT)
Dept: MRI IMAGING | Facility: HOSPITAL | Age: 86
Discharge: HOME | End: 2021-11-13
Payer: MEDICARE

## 2021-11-13 DIAGNOSIS — R97.20 ELEVATED PROSTATE SPECIFIC ANTIGEN (PSA): ICD-10-CM

## 2021-11-13 PROCEDURE — A9579 GAD-BASE MR CONTRAST NOS,1ML: HCPCS

## 2021-11-13 PROCEDURE — 2550000100 HC RX 255

## 2021-11-13 PROCEDURE — G1004 CDSM NDSC: HCPCS

## 2021-11-13 RX ADMIN — GADOTERIDOL 20 ML: 279.3 INJECTION, SOLUTION INTRAVENOUS at 10:58

## 2021-11-18 NOTE — PROGRESS NOTES
Call was placed to the patient. I discussed these results with him and his wife.  At this time they would like to proceed as previously discussed with repeat PSA prior to our next follow-up appointment which is already scheduled for 12/20/2021.  He has been taking Flomax 0.4 mg for approximately 1 week now, unfortunately he has not seen a whole lot of improvement in his urinary symptoms.  Prior to Flomax he did experience some dizziness and is on several blood pressure medications.  Dizziness does not seem to be any worse since starting this, per his report.  May consider increasing this to 0.8 mg at his follow-up visit.

## 2021-12-20 ENCOUNTER — APPOINTMENT (OUTPATIENT)
Dept: LAB | Facility: CLINIC | Age: 86
End: 2021-12-20
Payer: MEDICARE

## 2021-12-20 ENCOUNTER — OFFICE VISIT (OUTPATIENT)
Dept: UROLOGY | Facility: CLINIC | Age: 86
End: 2021-12-20
Payer: MEDICARE

## 2021-12-20 VITALS
SYSTOLIC BLOOD PRESSURE: 138 MMHG | BODY MASS INDEX: 26.74 KG/M2 | WEIGHT: 191 LBS | DIASTOLIC BLOOD PRESSURE: 90 MMHG | TEMPERATURE: 98.4 F | HEART RATE: 75 BPM | OXYGEN SATURATION: 94 % | HEIGHT: 71 IN

## 2021-12-20 DIAGNOSIS — R97.20 ELEVATED PROSTATE SPECIFIC ANTIGEN (PSA): Primary | ICD-10-CM

## 2021-12-20 DIAGNOSIS — R39.12 WEAK URINARY STREAM: ICD-10-CM

## 2021-12-20 DIAGNOSIS — R97.20 ELEVATED PROSTATE SPECIFIC ANTIGEN (PSA): ICD-10-CM

## 2021-12-20 LAB
PSA FREE MFR SERPL: 16 %
PSA FREE SERPL-MCNC: 1.4 NG/ML
PSA SERPL-MCNC: 8.54 NG/ML (ref 0–4)
SPECIMEN VOL ?TM UR: 128 ML

## 2021-12-20 PROCEDURE — 99213 OFFICE O/P EST LOW 20 MIN: CPT | Performed by: NURSE PRACTITIONER

## 2021-12-20 PROCEDURE — 51798 US URINE CAPACITY MEASURE: CPT | Performed by: NURSE PRACTITIONER

## 2021-12-20 PROCEDURE — 84154 ASSAY OF PSA FREE: CPT

## 2021-12-20 PROCEDURE — G0463 HOSPITAL OUTPT CLINIC VISIT: HCPCS | Performed by: NURSE PRACTITIONER

## 2021-12-20 PROCEDURE — 36415 COLL VENOUS BLD VENIPUNCTURE: CPT

## 2021-12-20 RX ORDER — DOXAZOSIN 4 MG/1
4 TABLET ORAL NIGHTLY
Qty: 30 TABLET | Refills: 2 | Status: SHIPPED | OUTPATIENT
Start: 2021-12-20 | End: 2022-03-21 | Stop reason: ALTCHOICE

## 2021-12-20 RX ORDER — PERPHENAZINE 2 MG
TABLET ORAL
COMMUNITY
End: 2021-12-20 | Stop reason: SDUPTHER

## 2021-12-20 RX ORDER — ACETAMINOPHEN 500 MG
1000 TABLET ORAL EVERY 6 HOURS PRN
Status: ON HOLD | COMMUNITY
Start: 2021-09-10 | End: 2023-02-26

## 2021-12-20 ASSESSMENT — PAIN SCALES - GENERAL: PAINLEVEL: 0-NO PAIN

## 2021-12-20 NOTE — PATIENT INSTRUCTIONS
Finasteride or Proscar is the med that shrinks the prostate and take 3-6 months to work. Call if you want to start this.     Cardura or Doxazosin dilates the urinary tubes to pee better. Side effect is dizziness. Works within 1 week. May quit if not effective or dizziness.

## 2021-12-20 NOTE — PROGRESS NOTES
"[facility]  Urology      HPI:  Joseph Wong is a 87 y.o. male previously evaluated for elevated PSA and BPH symptoms, here for follow-up on the above. PSA at [facility] with the patient's PCP on 6/28/2021 was 6.69.  Repeat PSA 9/30/2021 was 7.18.    At Joseph's last visit 11/8/2021 he elected to undergo prostate MRI and repeat PSA testing.  Unfortunately MRI completed on 11/13/2021 was very limited secondary to bilateral total hip arthroplasties.  BPH symptoms included a slow stream, sensation of incomplete emptying, hesitancy, double voiding, urgency, and nocturia x2, with AUA-SS of 26.  He was started on Flomax 0.4 mg without improvement in symptoms.  No increase in baseline dizziness while on Flomax.  Unfortunately PSA value from today has not yet returned.  Patient is not eager to undergo prostate biopsy unless today's value comes back significant elevated.  Additionally, he is undergoing left total knee arthroplasty on 1/3/2022.  Reports his BPH symptoms are the same as at last visit and in fact AUA-SS today is the same, at 26.  However quality of life is 3.      The following have been reviewed and updated as appropriate for this visit:  Allergies, Medications, Past Medical, Surgical and Social History, Problem List    Physical Exam:      /90 (BP Location: Left arm, Patient Position: Sitting, Cuff Size: Long Adult) Comment (Cuff Size): manual  Pulse 75   Temp 36.9 °C (98.4 °F) (Temporal)   Ht 1.803 m (5' 11\")   Wt 86.6 kg (191 lb)   SpO2 94%   BMI 26.64 kg/m²   Physical Exam  Vitals and nursing note reviewed.   Constitutional:       General: He is not in acute distress.     Appearance: Normal appearance.   HENT:      Head: Normocephalic and atraumatic.   Eyes:      Comments: Decreased visual acuity.   Pulmonary:      Effort: Pulmonary effort is normal. No respiratory distress.   Musculoskeletal:      Comments: Uses a cane for ambulation   Neurological:      General: No focal deficit present.      " Mental Status: He is alert. Mental status is at baseline.   Psychiatric:         Mood and Affect: Mood normal.         Thought Content: Thought content normal.           Results:    Lab-  -PSA free and total 12/20/2021: Pending  -PSA 9/30/2021: 7.18  -PSA 6/28/2021: 6.69           Radiology-    -PVR bladder scan 12/20/2021: 128 mL    -Prostate MRI 11/13/2021:  1. Exam is severely limited due to the presence of bilateral total hip arthroplasties and inability to run certain sequences such as diffusion which are important for identifying suspicious tissue. Without this there is no obvious suspicion within the transitional zone. There are numerous BPH nodules of varying signal intensities. Extremely limited for peripheral zone evaluation.  2. No specific focus for biopsy can be identified.    -PVR bladder scan 11/8/2021: 126 mL        Assessment & Plan:  1.  Elevated PSA, uncertain prognosis  2.  BPH with weak urinary stream  3.  Mild incomplete emptying  -PVR bladder scan from today was reviewed with the patient  -Prostate MRI from 11/13/2021 was reviewed with the patient  -PSA trend was reviewed with the patient  -PSA from today is pending, will call patient with results  -Patient and his wife would like to continue with active surveillance of PSA and hold off on biopsy unless PSA significantly elevates.  -With patient's mixed quality of life regarding his BPH symptoms he is not overly eager to trial medications  -Discussed trialing a different alpha-blocker and/or Proscar.  -He would like to trial Cardura 4 mg daily.  -If he develops dizziness with this or it is ineffective he will contact our office.  -May consider Proscar at his follow-up visit  -Follow-up in 3 months with PSA and PVR, sooner if needed  -Patient and his spouse are in agreement with the above plan and questions were answered.        ---------------------------------  Do Vargas CNP  12/20/21 10:23 AM

## 2022-03-15 ENCOUNTER — TELEPHONE (OUTPATIENT)
Dept: UROLOGY | Facility: CLINIC | Age: 87
End: 2022-03-15
Payer: MEDICARE

## 2022-03-15 NOTE — TELEPHONE ENCOUNTER
LVM for patient or SAEID wife to call to schedule PSA to be completed prior to appointment. Alessandra SAEID spouse returned my call and patient is scheduled for our lab to complete PSA.

## 2022-03-17 ENCOUNTER — APPOINTMENT (OUTPATIENT)
Dept: LAB | Facility: CLINIC | Age: 87
End: 2022-03-17
Payer: MEDICARE

## 2022-03-17 DIAGNOSIS — R97.20 ELEVATED PROSTATE SPECIFIC ANTIGEN (PSA): ICD-10-CM

## 2022-03-17 LAB — PSA SERPL-MCNC: 7.75 NG/ML (ref 0–4)

## 2022-03-17 PROCEDURE — 36415 COLL VENOUS BLD VENIPUNCTURE: CPT

## 2022-03-17 PROCEDURE — 84153 ASSAY OF PSA TOTAL: CPT

## 2022-03-21 ENCOUNTER — OFFICE VISIT (OUTPATIENT)
Dept: UROLOGY | Facility: CLINIC | Age: 87
End: 2022-03-21
Payer: MEDICARE

## 2022-03-21 ENCOUNTER — TELEPHONE (OUTPATIENT)
Dept: UROLOGY | Facility: CLINIC | Age: 87
End: 2022-03-21
Payer: MEDICARE

## 2022-03-21 VITALS
TEMPERATURE: 98.8 F | BODY MASS INDEX: 26.74 KG/M2 | RESPIRATION RATE: 16 BRPM | SYSTOLIC BLOOD PRESSURE: 144 MMHG | HEART RATE: 68 BPM | WEIGHT: 191 LBS | DIASTOLIC BLOOD PRESSURE: 81 MMHG | OXYGEN SATURATION: 93 % | HEIGHT: 71 IN

## 2022-03-21 DIAGNOSIS — R97.20 ELEVATED PROSTATE SPECIFIC ANTIGEN (PSA): ICD-10-CM

## 2022-03-21 DIAGNOSIS — R39.12 WEAK URINARY STREAM: Primary | ICD-10-CM

## 2022-03-21 LAB — SPECIMEN VOL ?TM UR: 128 ML

## 2022-03-21 PROCEDURE — 51798 US URINE CAPACITY MEASURE: CPT | Performed by: NURSE PRACTITIONER

## 2022-03-21 PROCEDURE — 99213 OFFICE O/P EST LOW 20 MIN: CPT | Performed by: NURSE PRACTITIONER

## 2022-03-21 PROCEDURE — G0463 HOSPITAL OUTPT CLINIC VISIT: HCPCS | Performed by: NURSE PRACTITIONER

## 2022-03-21 RX ORDER — CEPHRADINE 250 MG
CAPSULE ORAL
COMMUNITY
End: 2023-01-14

## 2022-03-21 RX ORDER — DOXAZOSIN 8 MG/1
8 TABLET ORAL NIGHTLY
Qty: 30 TABLET | Refills: 11 | Status: SHIPPED | OUTPATIENT
Start: 2022-03-21 | End: 2022-08-05 | Stop reason: SDUPTHER

## 2022-03-21 ASSESSMENT — PAIN SCALES - GENERAL: PAINLEVEL: 0-NO PAIN

## 2022-03-21 NOTE — PROGRESS NOTES
UROLOGY EXAM NOTE      HPI:  Joseph Rivera is a 87 y.o. male seen in followup regarding BPH and elevated PSA.    Patient was last in the urology clinic on 12/20/2021 where he was initiated on doxazosin 4 mg daily.  His AUA-SS at this visit was 26 and his bladder scan PVR was 126 cc.  He previously underwent a prostate MRI on 11/13/2021 but the exam was limited due to his bilateral total hip arthroplasties.    Patient reports that he did start the doxazosin 4 mg but did not feel it was working so he did stop this medication after a few days. He denies any side effects with the doxazosin. He is still reporting voiding symptoms of urinary frequency, sense of incomplete bladder emptying, a weak urinary stream with intermittency.  He denies any real urinary urgency, pushing/straining to begin his urination, dysuria, or hematuria.  He has never taken Flomax.      His repeat PSA resulted at 7.75 which has decreased from 8.54 and is still considered elevated.    His AUA-SS is 22, his QOL is 3 and his bladder scan PVR is 128 cc.    Additional PSAs:  9/30/2021: 7.18  6/28/2021: 6.69    Results for JOSEPH RIVERA (MRN [id number]) as of 3/21/2022 08:54   Ref. Range 12/20/2021 10:12 3/17/2022 10:56   PSA Latest Ref Range: 0.00 - 4.00 ng/mL 8.54 (H) 7.75 (H)       Medications:  Current Outpatient Medications   Medication Sig Dispense Refill   • omega 3-dha-epa-fish oil 250-500-1,000 mg capsule Take by mouth     • acetaminophen (TYLENOL) 500 mg tablet 1,000 mg as needed       • atorvastatin (LIPITOR) 10 mg tablet Take 10 mg by mouth daily     • irbesartan-hydrochlorothiazide (AVALIDE) 300-12.5 mg per tablet Take 1 tablet by mouth daily     • metoprolol succinate XL (TOPROL-XL) 25 mg 24 hr tablet Take 25 mg by mouth daily     • vit C/E/Zn/coppr/lutein/zeaxan (PRESERVISION AREDS-2 ORAL) Take by mouth 2 (two) times a day     • KRILL OIL ORAL Take by mouth daily     • aspirin 81 mg EC tablet Take 81 mg by mouth daily.     • amLODIPine  (NORVASC) 5 mg tablet Take 5 mg by mouth daily   90 0   • doxazosin (CARDURA) 8 mg tablet Take 1 tablet (8 mg total) by mouth nightly 30 tablet 11   • potassium chloride (KLOR-CON) 10 mEq CR tablet Take 20 mEq by mouth 2 (two) times a day   180 0     No current facility-administered medications for this visit.       Allergies:  No Known Allergies    Past Medical History:  Past Medical History:   Diagnosis Date   • CAD (coronary artery disease)    • Elevated cholesterol    • GERD (gastroesophageal reflux disease)    • Hiatal hernia    • History of shingles    • Hypertension    • Macular degeneration    • Metabolic syndrome    • Vertigo        Past Surgical History:  Past Surgical History:   Procedure Laterality Date   • OTHER SURGICAL HISTORY Left 2016    quad tendon repair   • QUADRICEPS REPAIR Left    • TONSILLECTOMY     • TOTAL ANKLE ARTHROPLASTY Left    • TOTAL HIP ARTHROPLASTY Right    • TOTAL HIP ARTHROPLASTY Left    • TOTAL KNEE ARTHROPLASTY  januauary 2022   • TRANSURETHRAL RESECTION OF PROSTATE         Family History:    Family History   Problem Relation Age of Onset   • Hypertension Father    • Stroke Father    • Sleep apnea Brother        Social History:  Social History     Tobacco Use   • Smoking status: Former Smoker     Quit date: 1965     Years since quittin.2   • Smokeless tobacco: Never Used   Substance Use Topics   • Alcohol use: Yes     Alcohol/week: 1.0 standard drink     Types: 1 Shots of liquor per week       ROS:      Constitutional:  Patient denies fever, chills  Eyes:  Denies change in visual acuity.   Immunologic:  Denies hives.   HENT:  Denies sinus problems.   Respiratory:  Denies cough, shortness of breath.   Cardiovascular:  Denies chest pain   GI:  Denies abdominal pain.   :  As noted in the HPI.   Musculoskeletal:  Denies back pain.   Integument:  Denies rash, itching.   Neurologic:  Denies sensory changes.   Endocrine:  Denies temperature intolerance.    Lymphatic:  Denies weight loss.         Physical Exam:    Physical Exam  Vitals and nursing note reviewed.   Constitutional:       General: He is not in acute distress.     Appearance: Normal appearance. He is not ill-appearing.   HENT:      Head: Normocephalic and atraumatic.   Pulmonary:      Effort: Pulmonary effort is normal.   Musculoskeletal:         General: Normal range of motion.      Cervical back: Normal range of motion and neck supple.   Skin:     General: Skin is warm and dry.      Capillary Refill: Capillary refill takes less than 2 seconds.   Neurological:      General: No focal deficit present.      Mental Status: He is alert and oriented to person, place, and time. Mental status is at baseline.                  Assessment and Plan:    Diagnoses and all orders for this visit:    Weak urinary stream  -     POCT Bladder Scan  -     doxazosin (CARDURA) 8 mg tablet; Take 1 tablet (8 mg total) by mouth nightly    Elevated prostate specific antigen (PSA)  -     PSA diagnostic Blood, Venous; Future          We discussed the doxazosin and how he may not have given this enough time to work as sometimes it can take a few weeks for this medication to be fully effective and he was on the lower dose.  Since he did not have any side effects to this medication we will go ahead and reinitiate this medication at 4 mg daily however they were requesting to initiated at 8 mg daily since he took the 4 mg without any effects prior.  We discussed this and he will attempt to take doxazosin 8 mg daily and should he have any side effects with this dose, he will cut it in half and take 4 mg daily for a couple weeks and as long as he feels he is tolerating it well, he can attempt to increase it back to the 8 mg daily.    We discussed his elevated PSA again and he would like to avoid a prostate biopsy as long as possible.  We discussed that the prostate MRI he underwent was inconclusive due to his bilateral hip arthroplasties  and discussed the trend of his PSA.  We discussed that while his PSA trend does not guarantee there is no cancer present, his PSA is not continuing to rise and it may be reasonable to continue to monitor his PSA if he is not ready to move forward with a prostate biopsy.  We did discuss that his PSA is still considered elevated and we could not guarantee that there is not an underlying prostate cancer present and the only way to know for sure if there is a prostate cancer present is to proceed with a prostate biopsy.  Patient and his wife both state understanding and would like to hold off on the prostate biopsy for now and continue to watch his PSA trend.    We will have him follow-up in 3 months with an AUA, PVR, and a PSA prior.  If at this time his PSA remains stable, we will go ahead and begin to spread out his PSA checks.      Electronically signed by: Jessica Elise CNP, 3/22/2022         Disclaimer:  This dictation was created using voice recognition software.  I have made every reasonable attempt to avoid errors, but this document may contain an error not identified before finalizing.  If the error changes the accuracy of the document, I would appreciate it being brought to my attention.

## 2022-03-21 NOTE — TELEPHONE ENCOUNTER
Called Alessandra back. Let her know that would be fine for him to have the PSA drawn at [facility]. Order has been sent there. She verbalized understanding and had no other questions or concerns at this time.

## 2022-03-21 NOTE — TELEPHONE ENCOUNTER
Caller would like to discuss (a) order     Patient: Joseph Wong    Caller Name (Last and first, relation/role): Alessandra-wife    Callback Number: [phone]    Best Availability: as soon as possible    Additional Info: Alessandra called stating patient is to complete a PSA before his next visit in July. Stated he has his annual visit with his PCP on 7/5/22 at [facility] and is wondering if PSA can be completed then . Please call to advise, thank you.     I advised caller of a callback by 24-48 hours.

## 2022-07-07 ENCOUNTER — TELEPHONE (OUTPATIENT)
Dept: UROLOGY | Facility: CLINIC | Age: 87
End: 2022-07-07
Payer: MEDICARE

## 2022-07-07 NOTE — TELEPHONE ENCOUNTER
Patient wife stated they talked with PCP n and they stated that the psa levels were good so they wanted to push out appointment due to that.

## 2022-08-05 ENCOUNTER — OFFICE VISIT (OUTPATIENT)
Dept: UROLOGY | Facility: CLINIC | Age: 87
End: 2022-08-05
Payer: MEDICARE

## 2022-08-05 VITALS
OXYGEN SATURATION: 93 % | HEART RATE: 75 BPM | TEMPERATURE: 99.2 F | RESPIRATION RATE: 16 BRPM | BODY MASS INDEX: 26.74 KG/M2 | DIASTOLIC BLOOD PRESSURE: 58 MMHG | SYSTOLIC BLOOD PRESSURE: 117 MMHG | WEIGHT: 191 LBS | HEIGHT: 71 IN

## 2022-08-05 DIAGNOSIS — R39.12 WEAK URINARY STREAM: ICD-10-CM

## 2022-08-05 DIAGNOSIS — R97.20 ELEVATED PROSTATE SPECIFIC ANTIGEN (PSA): ICD-10-CM

## 2022-08-05 LAB — SPECIMEN VOL ?TM UR: 74 ML

## 2022-08-05 PROCEDURE — G0463 HOSPITAL OUTPT CLINIC VISIT: HCPCS | Performed by: NURSE PRACTITIONER

## 2022-08-05 PROCEDURE — 99213 OFFICE O/P EST LOW 20 MIN: CPT | Performed by: NURSE PRACTITIONER

## 2022-08-05 PROCEDURE — 51798 US URINE CAPACITY MEASURE: CPT | Performed by: NURSE PRACTITIONER

## 2022-08-05 RX ORDER — DOXAZOSIN 8 MG/1
8 TABLET ORAL NIGHTLY
Qty: 90 TABLET | Refills: 4 | Status: SHIPPED | OUTPATIENT
Start: 2022-08-05 | End: 2023-03-02 | Stop reason: HOSPADM

## 2022-08-05 ASSESSMENT — PAIN SCALES - GENERAL: PAINLEVEL: 0-NO PAIN

## 2022-08-05 NOTE — PROGRESS NOTES
UROLOGY EXAM NOTE    8/5/2022    HPI:  Joseph Rivera is a 87 y.o. male seen in followup regarding BPH and elevated PSA.    Patient was last seen in urology clinic on 3/21/2022 where he had stopped his doxazosin because he did not feel it was working and his AUA-SS was 22 and his bladder scan PVR is 128 cc.  His PSA was stable at 7.75 and he preferred to continue to avoid a prostate biopsy.    Patient is not sure he has had any true benefit or improvement in his symptoms with the doxazosin and is reporting urinary frequency as the most bothersome symptom for him.  However upon further discussion he reports voiding approximately every 2 hours during the day and has nocturia of 0-2.  We discussed normal voiding and expectations and he reports that he used to be able to go all day without voiding and we discussed how often you should void for optimal bladder health.      His most recent PSA has decreased since his last visit and is now 7.26.  His AUA-SS is 22 and his bladder scan PVR 74 cc.      Additional PSAs:  7/5/2022: 7.26  9/30/2021: 7.18  6/28/2021: 6.69    Results for JOSEPH RIVERA (MRN [id number]) as of 8/5/2022 08:55   Ref. Range 12/20/2021 10:12 3/17/2022 10:56   PSA Latest Ref Range: 0.00 - 4.00 ng/mL 8.54 (H) 7.75 (H)       Medications:  Current Outpatient Medications   Medication Sig Dispense Refill   • omega 3-dha-epa-fish oil 250-500-1,000 mg capsule Take by mouth     • atorvastatin (LIPITOR) 10 mg tablet Take 10 mg by mouth daily     • irbesartan-hydrochlorothiazide (AVALIDE) 300-12.5 mg per tablet Take 1 tablet by mouth daily     • metoprolol succinate XL (TOPROL-XL) 25 mg 24 hr tablet Take 25 mg by mouth daily     • vit C/E/Zn/coppr/lutein/zeaxan (PRESERVISION AREDS-2 ORAL) Take by mouth 2 (two) times a day     • amLODIPine (NORVASC) 5 mg tablet Take 5 mg by mouth daily   90 0   • potassium chloride (KLOR-CON) 10 mEq CR tablet Take 20 mEq by mouth 2 (two) times a day   180 0   • doxazosin (CARDURA) 8 mg  tablet Take 1 tablet (8 mg total) by mouth nightly 90 tablet 4   • acetaminophen (TYLENOL) 500 mg tablet 1,000 mg as needed       • KRILL OIL ORAL Take by mouth daily     • aspirin 81 mg EC tablet Take 81 mg by mouth daily.       No current facility-administered medications for this visit.       Allergies:  No Known Allergies    Past Medical History:  Past Medical History:   Diagnosis Date   • CAD (coronary artery disease)    • Elevated cholesterol    • GERD (gastroesophageal reflux disease)    • Hiatal hernia    • History of shingles    • Hypertension    • Macular degeneration    • Metabolic syndrome    • Vertigo        Past Surgical History:  Past Surgical History:   Procedure Laterality Date   • OTHER SURGICAL HISTORY Left 2016    quad tendon repair   • QUADRICEPS REPAIR Left    • TONSILLECTOMY     • TOTAL ANKLE ARTHROPLASTY Left    • TOTAL HIP ARTHROPLASTY Right    • TOTAL HIP ARTHROPLASTY Left    • TOTAL KNEE ARTHROPLASTY  2022   • TRANSURETHRAL RESECTION OF PROSTATE         Family History:    Family History   Problem Relation Age of Onset   • Hypertension Father    • Stroke Father    • Sleep apnea Brother        Social History:  Social History     Tobacco Use   • Smoking status: Former Smoker     Quit date: 1965     Years since quittin.6   • Smokeless tobacco: Never Used   Substance Use Topics   • Alcohol use: Yes     Alcohol/week: 1.0 standard drink     Types: 1 Shots of liquor per week       ROS:    Constitutional:  Patient denies fever, chills  Eyes:  Denies change in visual acuity.   Immunologic:  Denies hives.   HENT:  Denies sinus problems.   Respiratory:  Denies cough, shortness of breath.   Cardiovascular:  Denies chest pain   GI:  Denies abdominal pain.   :  As noted in the HPI.   Musculoskeletal: Positive for back pain.   Integument:  Denies rash, itching.   Neurologic:  Denies sensory changes.   Endocrine:  Denies temperature intolerance.   Lymphatic:  Denies weight  loss.           Physical Exam:    Physical Exam  Vitals and nursing note reviewed.   Constitutional:       General: He is not in acute distress.     Appearance: Normal appearance. He is not ill-appearing.   HENT:      Head: Normocephalic and atraumatic.   Pulmonary:      Effort: Pulmonary effort is normal.   Musculoskeletal:         General: Normal range of motion.      Cervical back: Normal range of motion and neck supple.   Skin:     General: Skin is warm and dry.      Capillary Refill: Capillary refill takes less than 2 seconds.   Neurological:      General: No focal deficit present.      Mental Status: He is alert and oriented to person, place, and time. Mental status is at baseline.              Assessment and Plan:    Diagnoses and all orders for this visit:    Elevated prostate specific antigen (PSA)  -     POCT Bladder Scan  -     PSA diagnostic Blood, Venous; Future  -     PSA diagnostic Blood, Venous; Future    Weak urinary stream  -     POCT Bladder Scan  -     doxazosin (CARDURA) 8 mg tablet; Take 1 tablet (8 mg total) by mouth nightly        We discussed his voiding symptoms and that if the doxazosin is not controlling his symptoms well enough, we could add finasteride on.  At this time he wishes to stick with the doxazosin for now and does not want to change or add any medications at this time.    We also discussed his PSA which has gone down again since his last visit and is now 7.26.  We discussed how this is still considered elevated and we cannot guarantee that there is not a prostate cancer present and recommend a repeat PSA in approximately 6 months and they were agreeable as long as they did not have to come into the clinic to follow-up afterwards.  They did agree to a 1 year follow-up with a PSA prior.  They report that they will have a follow-up with their primary care next July and would like to have a PSA done with the lab work done by their PCP so these PSA orders will be sent over to their  PCPs office to have drawn.    Follow-up in 1 year with AUA, PVR, and PSA done prior at their PCPs office.  We will obtain a repeat PSA in 6 months and contact him with results.    Electronically signed by: Jessica Elise CNP, 8/5/2022         Disclaimer:  This dictation was created using voice recognition software.  I have made every reasonable attempt to avoid errors, but this document may contain an error not identified before finalizing.  If the error changes the accuracy of the document, I would appreciate it being brought to my attention.

## 2022-09-02 ENCOUNTER — OFFICE VISIT (OUTPATIENT)
Dept: NEUROLOGY | Facility: CLINIC | Age: 87
End: 2022-09-02
Payer: MEDICARE

## 2022-09-02 VITALS
DIASTOLIC BLOOD PRESSURE: 78 MMHG | HEART RATE: 66 BPM | SYSTOLIC BLOOD PRESSURE: 135 MMHG | WEIGHT: 182 LBS | HEIGHT: 71 IN | BODY MASS INDEX: 25.48 KG/M2

## 2022-09-02 DIAGNOSIS — G47.33 OBSTRUCTIVE SLEEP APNEA SYNDROME: Primary | ICD-10-CM

## 2022-09-02 PROCEDURE — 99214 OFFICE O/P EST MOD 30 MIN: CPT | Performed by: NURSE PRACTITIONER

## 2022-09-02 PROCEDURE — G0463 HOSPITAL OUTPT CLINIC VISIT: HCPCS | Performed by: NURSE PRACTITIONER

## 2022-09-02 RX ORDER — METFORMIN HYDROCHLORIDE 500 MG/1
500 TABLET ORAL 2 TIMES DAILY WITH MEALS
COMMUNITY
End: 2024-01-09 | Stop reason: HOSPADM

## 2022-09-02 RX ORDER — TRAMADOL HYDROCHLORIDE 50 MG/1
50-100 TABLET ORAL EVERY 6 HOURS PRN
COMMUNITY
Start: 2022-07-26 | End: 2023-01-14 | Stop reason: ALTCHOICE

## 2022-09-02 ASSESSMENT — ENCOUNTER SYMPTOMS
FEVER: 0
CONFUSION: 1
AGITATION: 0
LIGHT-HEADEDNESS: 1
CHILLS: 0
SPEECH DIFFICULTY: 0
DIZZINESS: 1
EYE PAIN: 0
BACK PAIN: 1
FATIGUE: 1
SEIZURES: 0
PALPITATIONS: 0
COUGH: 0
UNEXPECTED WEIGHT CHANGE: 0
HALLUCINATIONS: 0
ABDOMINAL PAIN: 0
TREMORS: 0
BRUISES/BLEEDS EASILY: 1
SINUS PRESSURE: 0
JOINT SWELLING: 1
SORE THROAT: 0
DYSPHORIC MOOD: 1
APNEA: 1
HEADACHES: 0
DIAPHORESIS: 0
WEAKNESS: 1
MYALGIAS: 1
ARTHRALGIAS: 1
NERVOUS/ANXIOUS: 0

## 2022-09-02 NOTE — PROGRESS NOTES
Sleep Progress Note    09/02/22  10:16 AM    Primary Diagnosis:  Obstructive sleep apnea syndrome [G47.33]    Chief Complaint   Patient presents with    Sleep Apnea       Gets supplies from [facility].     HPI:  Sleep study date:  July 15, 2016  Severity: Moderate to Severe  AHI: 29.2  Minimum O2 saturation: 69%  Machine type: CPAP  Mask: Nasal Pillows     Joseph Wong is a 87 y.o. male who returns to the clinic today in follow-up regarding his known diagnosis of obstructive sleep apnea. It is noted that the patient was initially referred to sleep medicine secondary to complaints of excessive daytime fatigability, somnolence, and nonrestorative sleep.  He states that he would nap on a daily basis secondary to fatigue.  He has a known past medical history positive for macular degeneration, coronary artery disease, GERD, hypertension, and vertigo.  He underwent polysomnogram on July 15, 2016 which demonstrated moderate to severe obstructive sleep apnea with an AHI of 29.2 and a minimum oxygen saturation of 69%. Following sleep study the patient was placed on CPAP with a set pressure of 10 cm H2O and 2 L of oxygen at night.      At today's appointment the patient reports that he tolerates Pap therapy without difficulty.  He is currently utilizing a full facemask and he states that this is comfortable for him.  He is going to bed at approximately 8 PM and awakening around 7:30 AM to start his day.  He denies any issues with the initiation or maintenance of sleep.  He does take a nap in the late afternoon for approximately 1 hour.  His compliance report demonstrates 100% usage over 4 hours with an average nightly use of 11 hours and 9 minutes.  AHI is well-controlled at 2.7.  Overall, from an apneic standpoint the patient is noted to be doing well.  His questions were addressed and answered.    Of note, [place] Sleepiness Scale was completed in the office today with a total score of 6/24.    Histories:    Medical:    Past  Medical History:   Diagnosis Date    CAD (coronary artery disease)     Elevated cholesterol     GERD (gastroesophageal reflux disease)     Hiatal hernia     History of shingles     Hypertension     Macular degeneration     Metabolic syndrome     Vertigo          Family:    Family History   Problem Relation Age of Onset    Hypertension Father     Stroke Father     Sleep apnea Brother          Social:    Social History     Tobacco Use    Smoking status: Former     Types: Cigarettes     Quit date: 1965     Years since quittin.7    Smokeless tobacco: Never   Vaping Use    Vaping Use: Never used   Substance Use Topics    Alcohol use: Yes     Alcohol/week: 1.0 standard drink     Types: 1 Shots of liquor per week        Allergies:  No Known Allergies    Current Medications:    Current Outpatient Medications   Medication Sig Dispense Refill    metFORMIN (GLUCOPHAGE) 500 mg tablet Take 1,000 mg by mouth daily      traMADoL (ULTRAM 50) 50 mg tablet Take  mg by mouth every 6 (six) hours as needed      doxazosin (CARDURA) 8 mg tablet Take 1 tablet (8 mg total) by mouth nightly 90 tablet 4    omega 3-dha-epa-fish oil 250-500-1,000 mg capsule Take by mouth      acetaminophen (TYLENOL) 500 mg tablet 1,000 mg as needed        atorvastatin (LIPITOR) 10 mg tablet Take 10 mg by mouth daily      irbesartan-hydrochlorothiazide (AVALIDE) 300-12.5 mg per tablet Take 1 tablet by mouth daily      metoprolol succinate XL (TOPROL-XL) 25 mg 24 hr tablet Take 25 mg by mouth daily      vit C/E/Zn/coppr/lutein/zeaxan (PRESERVISION AREDS-2 ORAL) Take by mouth 2 (two) times a day      KRILL OIL ORAL Take by mouth daily      amLODIPine (NORVASC) 5 mg tablet Take 5 mg by mouth daily   90 0    potassium chloride (KLOR-CON) 10 mEq CR tablet Take 20 mEq by mouth 2 (two) times a day   180 0     No current facility-administered medications for this visit.       Review of Systems   Constitutional:  Positive for fatigue. Negative for chills,  "diaphoresis, fever and unexpected weight change.   HENT:  Positive for hearing loss and tinnitus. Negative for congestion, ear pain, postnasal drip, sinus pressure and sore throat.    Eyes:  Positive for visual disturbance (secondary to cataracts). Negative for pain.        Macular degeneration   Respiratory:  Positive for apnea (well-controlled on pap therapy). Negative for cough.         Sleep:  Denies snoring, unusual movements, or abnormal dreaming.  Denies issues with restless leg symptomology. Reports normal breathing.  Mild residual daytime fatigue.   Cardiovascular:  Negative for chest pain, palpitations and leg swelling.   Gastrointestinal:  Negative for abdominal pain.   Musculoskeletal:  Positive for arthralgias, back pain, gait problem, joint swelling and myalgias.   Neurological:  Positive for dizziness, weakness and light-headedness. Negative for tremors, seizures, syncope, speech difficulty and headaches.   Hematological:  Bruises/bleeds easily.   Psychiatric/Behavioral:  Positive for confusion and dysphoric mood. Negative for agitation and hallucinations. The patient is not nervous/anxious.    All other systems reviewed and are negative.      OBJECTIVE    VS/Weight:  /78 (BP Location: Left arm, Patient Position: Sitting, Cuff Size: Regular Adult)   Pulse 66   Ht 1.803 m (5' 11\")   Wt 82.6 kg (182 lb)   BMI 25.38 kg/m²       Results of Compliance Report:  Pressure: 10cm H2O  AHI: 2.7  Usage over 4 hours: 100%  Average hourly usage: 11:9   O2: 2L      Physical Exam  Constitutional:       General: He is not in acute distress.     Appearance: He is well-developed. He is not diaphoretic.   HENT:      Head: Normocephalic.   Cardiovascular:      Rate and Rhythm: Normal rate and regular rhythm.      Heart sounds: Normal heart sounds. No murmur heard.  Pulmonary:      Effort: Pulmonary effort is normal.      Breath sounds: Normal breath sounds.   Neurological:      Mental Status: He is alert and " oriented to person, place, and time.       Assessment:  1.  Patient underwent sleep study on July 15, 2016 and was found to have moderate to severe obstructive sleep apnea with an AHI of 29.2 and a minimum oxygen saturation of 69%.  He is currently on CPAP with a set pressure of 10cm H2O and 2L of oxygen at noc.    2.  [place] Sleepiness Scale was completed in the office today with a total score of 6/24.  3.  Macular degeneration.  4.  Coronary artery disease.  5.  GERD.  6.  Hypertension.  7.  History of vertigo.    Plan:  1.  Patient is currently using and benefiting from both CPAP and oxygen therapy.  He tolerates CPAP well.  There is no need to make any changes in regards to his settings.  He is noted to be compliant with 100% usage over 4 hours.  AHI is well-controlled at 2.7. Overall, from a sleep standpoint the patient is noted to be stable.  2.  He was counseled on what to look for if pressure is too high or too low.  3.  The patient will be provided with a prescription to obtain new CPAP supplies today.  4.  He will follow-up with his primary care provider for general medical needs.  5.  He is to stay active both physically and mentally.  6.  He will follow-up in 1 year, sooner as needed.  His questions were addressed and answered.  7.  The diagnostic assessment has been reviewed.  Patient has expressed a good understanding of the assessment and recommendations from today's visit.  There are no apparent barriers to understanding this information.  His questions were addressed.  He has been advised to contact this office or the answering service with questions or concerns that may arise.  Patient has been advised to follow-up with his primary care provider for general medical needs.  On this date of service 30 minutes of total time was spent in evaluation and management on this encounter.      A voice recognition program was used to aid in documentation of the record. Sometimes words are not printed exactly  as they were spoken. While efforts were made to carefully edit and correct any inaccuracies, some may be present; please take these into context. Please contact the provider if areas are identified.       EMILY CROWLEY NP

## 2022-09-27 ENCOUNTER — ANCILLARY PROCEDURE (OUTPATIENT)
Dept: NEUROLOGY | Facility: CLINIC | Age: 87
End: 2022-09-27
Payer: MEDICARE

## 2022-09-27 VITALS — DIASTOLIC BLOOD PRESSURE: 78 MMHG | SYSTOLIC BLOOD PRESSURE: 150 MMHG

## 2022-09-27 DIAGNOSIS — M62.81 MUSCLE WEAKNESS (GENERALIZED): ICD-10-CM

## 2022-09-27 DIAGNOSIS — R29.898 LEFT LEG WEAKNESS: Primary | ICD-10-CM

## 2022-09-27 PROCEDURE — 95910 NRV CNDJ TEST 7-8 STUDIES: CPT | Performed by: PHYSICAL MEDICINE & REHABILITATION

## 2022-09-27 PROCEDURE — 95910 NRV CNDJ TEST 7-8 STUDIES: CPT | Mod: 26 | Performed by: PHYSICAL MEDICINE & REHABILITATION

## 2022-09-27 PROCEDURE — 95886 MUSC TEST DONE W/N TEST COMP: CPT | Mod: 26 | Performed by: PHYSICAL MEDICINE & REHABILITATION

## 2022-09-27 PROCEDURE — 95886 MUSC TEST DONE W/N TEST COMP: CPT | Performed by: PHYSICAL MEDICINE & REHABILITATION

## 2022-09-27 NOTE — PROGRESS NOTES
"[facility]       Subjective      Joseph Wong is a 87 y.o. male who presents for:     Chief Complaint   Patient presents with    Consult     EMG/NCV       CC: Left leg weakness.    1. Onset: January 2022, started after surgery to repair quadriceps tendon \"tear\".   2. Location: Left quads.  3. Duration: constant.  4. Character: weakness, knee sabas, has to use wheelchair for longer distances.  5. Exacerbating: Nothing.   6. Relieving: Nothing.  7. Timing: no temp assoc.   8. Severity:  Impairs ability to get in/out of bed, walk, perform household chores, enjoy hobbies.          Except as noted in HPI, 10 systems reviewed and remainder as in HPI and otherwise negative.      Contents of this note were reviewed with the patient and verified it was correct to the best of the patient's recollection.      No Known Allergies  Current Outpatient Medications   Medication Sig Dispense Refill    metFORMIN (GLUCOPHAGE) 500 mg tablet Take 1,000 mg by mouth daily      traMADoL (ULTRAM 50) 50 mg tablet Take  mg by mouth every 6 (six) hours as needed      doxazosin (CARDURA) 8 mg tablet Take 1 tablet (8 mg total) by mouth nightly 90 tablet 4    omega 3-dha-epa-fish oil 250-500-1,000 mg capsule Take by mouth      acetaminophen (TYLENOL) 500 mg tablet 1,000 mg as needed        atorvastatin (LIPITOR) 10 mg tablet Take 10 mg by mouth daily      irbesartan-hydrochlorothiazide (AVALIDE) 300-12.5 mg per tablet Take 1 tablet by mouth daily      metoprolol succinate XL (TOPROL-XL) 25 mg 24 hr tablet Take 25 mg by mouth daily      vit C/E/Zn/coppr/lutein/zeaxan (PRESERVISION AREDS-2 ORAL) Take by mouth 2 (two) times a day      KRILL OIL ORAL Take by mouth daily      amLODIPine (NORVASC) 5 mg tablet Take 5 mg by mouth daily   90 0    potassium chloride (KLOR-CON) 10 mEq CR tablet Take 20 mEq by mouth 2 (two) times a day   180 0     No current facility-administered medications for this visit.     Past Medical " History:   Diagnosis Date    CAD (coronary artery disease)     Elevated cholesterol     GERD (gastroesophageal reflux disease)     Hiatal hernia     History of shingles     Hypertension     Macular degeneration     Metabolic syndrome     Vertigo      Past Surgical History:   Procedure Laterality Date    OTHER SURGICAL HISTORY Left 2016    quad tendon repair    QUADRICEPS REPAIR Left     TONSILLECTOMY      TOTAL ANKLE ARTHROPLASTY Left     TOTAL HIP ARTHROPLASTY Right     TOTAL HIP ARTHROPLASTY Left     TOTAL KNEE ARTHROPLASTY  januauary 2022    TRANSURETHRAL RESECTION OF PROSTATE       Family History   Problem Relation Age of Onset    Hypertension Father     Stroke Father     Sleep apnea Brother        Family history reviewed and non-contributory to this visit.      Social History     Socioeconomic History    Marital status:    Tobacco Use    Smoking status: Former     Types: Cigarettes     Quit date: 1965     Years since quittin.7    Smokeless tobacco: Never   Vaping Use    Vaping Use: Never used   Substance and Sexual Activity    Alcohol use: Yes     Alcohol/week: 1.0 standard drink     Types: 1 Shots of liquor per week     Social history reviewed and no pertinent changes recommended.        Objective     PHYSICAL EXAM:  /78   GEN: well nourished. No apparent distress.  PSYCH: normal affect.  HEENT: normocephalic. Sclerae anicteric.  NECK: trachea midline.  CARD: distal pulses intact.  PULM: non-labored.  ABD: non-distended.  EXTREM: Multiple joint deformities of fingers/hands and as noted below.  SKIN: warm and dry, normal turgor.  NEURO: Normal coherent speech.  MSK: Left quad atrophy. Obvious divot immediately superior to left patella. L knee extensor lag noted. At full active extension he is approximately 20 degrees short of full extension based on visual reckoning (no goni used).            Assessment/Plan   ASSESSMENT AND PLAN     Diagnoses and all orders for this  visit:    Left leg weakness    Muscle weakness (generalized)  -     EMG and NCV  - EMG/NCS today, see procedure note and scanned report for traces, nerve conduction values, and EMG summary.           Study findings were reviewed with the patient.             A voice recognition program was used to aid in documentation of this record. Sometimes words are not presented exactly as they were spoken.  While efforts were made to carefully edit and correct any inaccuracies, some errors may be present.  Please take this into context.  Please contact the provider if errors are identified.      Sami Forman, DO  9/27/2022

## 2023-01-14 ENCOUNTER — APPOINTMENT (OUTPATIENT)
Dept: CT IMAGING | Facility: HOSPITAL | Age: 88
DRG: 690 | End: 2023-01-14
Payer: MEDICARE

## 2023-01-14 ENCOUNTER — HOSPITAL ENCOUNTER (INPATIENT)
Facility: HOSPITAL | Age: 88
LOS: 2 days | Discharge: HOME HEALTH | DRG: 690 | End: 2023-01-16
Attending: EMERGENCY MEDICINE | Admitting: FAMILY MEDICINE
Payer: MEDICARE

## 2023-01-14 ENCOUNTER — APPOINTMENT (OUTPATIENT)
Dept: RADIOLOGY | Facility: HOSPITAL | Age: 88
DRG: 690 | End: 2023-01-14
Payer: MEDICARE

## 2023-01-14 DIAGNOSIS — R53.1 WEAKNESS: ICD-10-CM

## 2023-01-14 DIAGNOSIS — M25.562 LEFT KNEE PAIN, UNSPECIFIED CHRONICITY: ICD-10-CM

## 2023-01-14 DIAGNOSIS — N39.0 UTI (URINARY TRACT INFECTION): ICD-10-CM

## 2023-01-14 DIAGNOSIS — R41.82 ALTERED MENTAL STATUS: Primary | ICD-10-CM

## 2023-01-14 DIAGNOSIS — J96.01 ACUTE RESPIRATORY FAILURE WITH HYPOXIA (CMS/HCC): ICD-10-CM

## 2023-01-14 DIAGNOSIS — E87.6 HYPOKALEMIA: ICD-10-CM

## 2023-01-14 LAB
ALBUMIN SERPL-MCNC: 3.6 G/DL (ref 3.5–5.3)
ALP SERPL-CCNC: 87 U/L (ref 45–115)
ALT SERPL-CCNC: 10 U/L (ref 7–52)
ANION GAP SERPL CALC-SCNC: 11 MMOL/L (ref 3–11)
AST SERPL-CCNC: 20 U/L
BACTERIA #/AREA URNS HPF: ABNORMAL /HPF
BASOPHILS # BLD AUTO: 0.1 10*3/UL
BASOPHILS NFR BLD AUTO: 0.5 % (ref 0–2)
BILIRUB SERPL-MCNC: 1.41 MG/DL (ref 0.2–1.4)
BILIRUB UR QL: NEGATIVE
BUN SERPL-MCNC: 17 MG/DL (ref 7–25)
CALCIUM ALBUM COR SERPL-MCNC: 8.8 MG/DL (ref 8.6–10.3)
CALCIUM SERPL-MCNC: 8.5 MG/DL (ref 8.6–10.3)
CHLORIDE SERPL-SCNC: 96 MMOL/L (ref 98–107)
CLARITY UR: CLEAR
CO2 SERPL-SCNC: 26 MMOL/L (ref 21–32)
COLOR UR: YELLOW
CREAT SERPL-MCNC: 0.92 MG/DL (ref 0.7–1.3)
EOSINOPHIL # BLD AUTO: 0 10*3/UL
EOSINOPHIL NFR BLD AUTO: 0.1 % (ref 0–3)
ERYTHROCYTE [DISTWIDTH] IN BLOOD BY AUTOMATED COUNT: 14.4 % (ref 11.5–15)
FLUAV RNA NPH QL NAA+NON-PROBE: NEGATIVE
FLUBV RNA NPH QL NAA+NON-PROBE: NEGATIVE
GFR SERPL CREATININE-BSD FRML MDRD: 81 ML/MIN/1.73M*2
GLUCOSE BLDC GLUCOMTR-SCNC: 151 MG/DL (ref 70–105)
GLUCOSE BLDC GLUCOMTR-SCNC: 153 MG/DL (ref 70–105)
GLUCOSE SERPL-MCNC: 159 MG/DL (ref 70–105)
GLUCOSE UR QL: NEGATIVE MG/DL
HCT VFR BLD AUTO: 38.6 % (ref 38–50)
HGB BLD-MCNC: 13.4 G/DL (ref 13.2–17.2)
HGB UR QL: ABNORMAL
KETONES UR-MCNC: NEGATIVE MG/DL
LACTATE BLDV-SCNC: 1.4 MMOL/L (ref 0.5–1.99)
LEUKOCYTE ESTERASE UR QL STRIP: ABNORMAL
LYMPHOCYTES # BLD AUTO: 0.5 10*3/UL
LYMPHOCYTES NFR BLD AUTO: 4.5 % (ref 15–47)
MCH RBC QN AUTO: 32 PG (ref 29–34)
MCHC RBC AUTO-ENTMCNC: 34.8 G/DL (ref 32–36)
MCV RBC AUTO: 92 FL (ref 82–97)
MONOCYTES # BLD AUTO: 1.2 10*3/UL
MONOCYTES NFR BLD AUTO: 9.6 % (ref 5–13)
NEUTROPHILS # BLD AUTO: 10.5 10*3/UL
NEUTROPHILS NFR BLD AUTO: 85.3 % (ref 46–70)
NITRITE UR QL: NEGATIVE
PH UR: 6 PH
PLATELET # BLD AUTO: 187 10*3/UL (ref 130–350)
PMV BLD AUTO: 7.5 FL (ref 6.9–10.8)
POTASSIUM SERPL-SCNC: 3.1 MMOL/L (ref 3.5–5.1)
PROT SERPL-MCNC: 6.3 G/DL (ref 6–8.3)
PROT UR STRIP-MCNC: 30 MG/DL
RBC # BLD AUTO: 4.2 10*6/ΜL (ref 4.1–5.8)
RBC #/AREA URNS HPF: ABNORMAL /HPF
RSV RNA NPH QL NAA+NON-PROBE: NEGATIVE
SARS-COV-2 RNA RESP QL NAA+PROBE: NEGATIVE
SODIUM SERPL-SCNC: 133 MMOL/L (ref 135–145)
SP GR UR: 1.02 (ref 1–1.03)
SQUAMOUS #/AREA URNS HPF: ABNORMAL /HPF
TSH SERPL DL<=0.05 MIU/L-ACNC: 3.53 UIU/ML (ref 0.34–4.82)
UROBILINOGEN UR-MCNC: 2 MG/DL
WBC # BLD AUTO: 12.3 10*3/UL (ref 3.7–9.6)
WBC #/AREA URNS HPF: ABNORMAL /HPF

## 2023-01-14 PROCEDURE — 85025 COMPLETE CBC W/AUTO DIFF WBC: CPT | Performed by: EMERGENCY MEDICINE

## 2023-01-14 PROCEDURE — 84443 ASSAY THYROID STIM HORMONE: CPT | Performed by: FAMILY MEDICINE

## 2023-01-14 PROCEDURE — 6360000200 HC RX 636 W HCPCS (ALT 250 FOR IP): Mod: JZ | Performed by: EMERGENCY MEDICINE

## 2023-01-14 PROCEDURE — 93005 ELECTROCARDIOGRAM TRACING: CPT | Performed by: EMERGENCY MEDICINE

## 2023-01-14 PROCEDURE — 99285 EMERGENCY DEPT VISIT HI MDM: CPT | Performed by: EMERGENCY MEDICINE

## 2023-01-14 PROCEDURE — 70450 CT HEAD/BRAIN W/O DYE: CPT | Mod: ME

## 2023-01-14 PROCEDURE — 2590000100 HC RX 259: Performed by: FAMILY MEDICINE

## 2023-01-14 PROCEDURE — 96374 THER/PROPH/DIAG INJ IV PUSH: CPT

## 2023-01-14 PROCEDURE — 2580000300 HC RX 258: Performed by: EMERGENCY MEDICINE

## 2023-01-14 PROCEDURE — 96361 HYDRATE IV INFUSION ADD-ON: CPT

## 2023-01-14 PROCEDURE — 99222 1ST HOSP IP/OBS MODERATE 55: CPT | Mod: AI | Performed by: FAMILY MEDICINE

## 2023-01-14 PROCEDURE — 6370000100 HC RX 637 (ALT 250 FOR IP): Performed by: FAMILY MEDICINE

## 2023-01-14 PROCEDURE — 80053 COMPREHEN METABOLIC PANEL: CPT | Performed by: EMERGENCY MEDICINE

## 2023-01-14 PROCEDURE — 83605 ASSAY OF LACTIC ACID: CPT | Performed by: EMERGENCY MEDICINE

## 2023-01-14 PROCEDURE — 81001 URINALYSIS AUTO W/SCOPE: CPT | Performed by: EMERGENCY MEDICINE

## 2023-01-14 PROCEDURE — 51798 US URINE CAPACITY MEASURE: CPT

## 2023-01-14 PROCEDURE — 82947 ASSAY GLUCOSE BLOOD QUANT: CPT | Mod: QW

## 2023-01-14 PROCEDURE — 71045 X-RAY EXAM CHEST 1 VIEW: CPT

## 2023-01-14 PROCEDURE — 87040 BLOOD CULTURE FOR BACTERIA: CPT | Performed by: EMERGENCY MEDICINE

## 2023-01-14 PROCEDURE — 36415 COLL VENOUS BLD VENIPUNCTURE: CPT | Performed by: EMERGENCY MEDICINE

## 2023-01-14 PROCEDURE — 87637 SARSCOV2&INF A&B&RSV AMP PRB: CPT | Performed by: EMERGENCY MEDICINE

## 2023-01-14 PROCEDURE — (BLANK) HC ROOM PRIVATE

## 2023-01-14 PROCEDURE — 4A0D7LZ MEASUREMENT OF URINARY VOLUME, VIA NATURAL OR ARTIFICIAL OPENING: ICD-10-PCS | Performed by: FAMILY MEDICINE

## 2023-01-14 PROCEDURE — 87088 URINE BACTERIA CULTURE: CPT | Performed by: EMERGENCY MEDICINE

## 2023-01-14 RX ORDER — SODIUM CHLORIDE, SODIUM LACTATE, POTASSIUM CHLORIDE, AND CALCIUM CHLORIDE .6; .31; .03; .02 G/100ML; G/100ML; G/100ML; G/100ML
1000 INJECTION, SOLUTION INTRAVENOUS ONCE
Status: COMPLETED | OUTPATIENT
Start: 2023-01-14 | End: 2023-01-14

## 2023-01-14 RX ORDER — DEXTROSE 50 % IN WATER (D50W) INTRAVENOUS SYRINGE
15-30
Status: DISCONTINUED | OUTPATIENT
Start: 2023-01-14 | End: 2023-01-16 | Stop reason: HOSPADM

## 2023-01-14 RX ORDER — TRAZODONE HYDROCHLORIDE 50 MG/1
25 TABLET ORAL NIGHTLY PRN
Status: DISCONTINUED | OUTPATIENT
Start: 2023-01-14 | End: 2023-01-16 | Stop reason: HOSPADM

## 2023-01-14 RX ORDER — ALUMINUM HYDROXIDE, MAGNESIUM HYDROXIDE, AND SIMETHICONE 1200; 120; 1200 MG/30ML; MG/30ML; MG/30ML
30 SUSPENSION ORAL 3 TIMES DAILY PRN
Status: DISCONTINUED | OUTPATIENT
Start: 2023-01-14 | End: 2023-01-16 | Stop reason: HOSPADM

## 2023-01-14 RX ORDER — FLUOXETINE HYDROCHLORIDE 40 MG/1
40 CAPSULE ORAL EVERY EVENING
Status: ON HOLD | COMMUNITY
End: 2024-01-09

## 2023-01-14 RX ORDER — INSULIN ASPART 100 [IU]/ML
0-15 INJECTION, SOLUTION INTRAVENOUS; SUBCUTANEOUS
Status: DISCONTINUED | OUTPATIENT
Start: 2023-01-14 | End: 2023-01-16 | Stop reason: HOSPADM

## 2023-01-14 RX ORDER — ACETAMINOPHEN 325 MG/1
325-650 TABLET ORAL EVERY 4 HOURS PRN
Status: DISCONTINUED | OUTPATIENT
Start: 2023-01-14 | End: 2023-01-16 | Stop reason: HOSPADM

## 2023-01-14 RX ORDER — POLYETHYLENE GLYCOL (3350) 17 G/17G
17 POWDER, FOR SOLUTION ORAL DAILY
Status: DISCONTINUED | OUTPATIENT
Start: 2023-01-14 | End: 2023-01-16 | Stop reason: HOSPADM

## 2023-01-14 RX ORDER — SODIUM CHLORIDE 0.9 % (FLUSH) 0.9 %
3 SYRINGE (ML) INJECTION AS NEEDED
Status: DISCONTINUED | OUTPATIENT
Start: 2023-01-14 | End: 2023-01-16 | Stop reason: HOSPADM

## 2023-01-14 RX ORDER — SULFAMETHOXAZOLE AND TRIMETHOPRIM 800; 160 MG/1; MG/1
1 TABLET ORAL 2 TIMES DAILY
COMMUNITY
Start: 2023-01-13 | End: 2023-01-16 | Stop reason: HOSPADM

## 2023-01-14 RX ORDER — ONDANSETRON 4 MG/1
4 TABLET, FILM COATED ORAL EVERY 6 HOURS PRN
Status: DISCONTINUED | OUTPATIENT
Start: 2023-01-14 | End: 2023-01-16 | Stop reason: HOSPADM

## 2023-01-14 RX ORDER — CEFTRIAXONE 1 G/1
1000 INJECTION, POWDER, FOR SOLUTION INTRAMUSCULAR; INTRAVENOUS EVERY 24 HOURS
Status: DISCONTINUED | OUTPATIENT
Start: 2023-01-15 | End: 2023-01-16 | Stop reason: HOSPADM

## 2023-01-14 RX ORDER — ATORVASTATIN CALCIUM 10 MG/1
10 TABLET, FILM COATED ORAL DAILY
Status: DISCONTINUED | OUTPATIENT
Start: 2023-01-14 | End: 2023-01-16 | Stop reason: HOSPADM

## 2023-01-14 RX ORDER — ONDANSETRON HYDROCHLORIDE 2 MG/ML
4 INJECTION, SOLUTION INTRAVENOUS EVERY 6 HOURS PRN
Status: DISCONTINUED | OUTPATIENT
Start: 2023-01-14 | End: 2023-01-16 | Stop reason: HOSPADM

## 2023-01-14 RX ORDER — DOXAZOSIN 4 MG/1
8 TABLET ORAL NIGHTLY
Status: DISCONTINUED | OUTPATIENT
Start: 2023-01-14 | End: 2023-01-16 | Stop reason: HOSPADM

## 2023-01-14 RX ORDER — AMLODIPINE BESYLATE 5 MG/1
5 TABLET ORAL DAILY
Status: DISCONTINUED | OUTPATIENT
Start: 2023-01-15 | End: 2023-01-16 | Stop reason: HOSPADM

## 2023-01-14 RX ORDER — DEXTROSE 40 %
15 GEL (GRAM) ORAL
Status: DISCONTINUED | OUTPATIENT
Start: 2023-01-14 | End: 2023-01-16 | Stop reason: HOSPADM

## 2023-01-14 RX ORDER — METOPROLOL SUCCINATE 25 MG/1
25 TABLET, EXTENDED RELEASE ORAL DAILY
Status: DISCONTINUED | OUTPATIENT
Start: 2023-01-15 | End: 2023-01-16 | Stop reason: HOSPADM

## 2023-01-14 RX ORDER — CEFTRIAXONE 2 G/1
2000 INJECTION, POWDER, FOR SOLUTION INTRAMUSCULAR; INTRAVENOUS ONCE
Status: COMPLETED | OUTPATIENT
Start: 2023-01-14 | End: 2023-01-14

## 2023-01-14 RX ORDER — ENOXAPARIN SODIUM 100 MG/ML
40 INJECTION SUBCUTANEOUS
Status: DISCONTINUED | OUTPATIENT
Start: 2023-01-14 | End: 2023-01-16 | Stop reason: HOSPADM

## 2023-01-14 RX ADMIN — ATORVASTATIN CALCIUM 10 MG: 10 TABLET, FILM COATED ORAL at 20:52

## 2023-01-14 RX ADMIN — SODIUM CHLORIDE, POTASSIUM CHLORIDE, SODIUM LACTATE AND CALCIUM CHLORIDE 1000 ML: 600; 310; 30; 20 INJECTION, SOLUTION INTRAVENOUS at 18:16

## 2023-01-14 RX ADMIN — CEFTRIAXONE 2000 MG: 2 INJECTION, POWDER, FOR SOLUTION INTRAMUSCULAR; INTRAVENOUS at 19:09

## 2023-01-14 RX ADMIN — DOXAZOSIN 8 MG: 4 TABLET ORAL at 20:52

## 2023-01-14 RX ADMIN — POLYETHYLENE GLYCOL 3350 17 G: 17 POWDER, FOR SOLUTION ORAL at 20:56

## 2023-01-14 ASSESSMENT — ACTIVITIES OF DAILY LIVING (ADL)
ASSISTIVE_DEVICES: WALKER
ADEQUATE_TO_COMPLETE_ADL: NO

## 2023-01-14 NOTE — ED PROVIDER NOTES
HPI:  Chief Complaint   Patient presents with    Fall     Multiple falls today dx bladder infection has been taking abx x 24hrs  new onset confusion. 100.2 f for ems. Denied injury to ems.      HPI  Patient here with confusion and multiple falls.  Was diagnosed with a UTI yesterday and given Bactrim.  Today patient's mental status has worsened, wife says that he recognizes her but does not know what is going on otherwise.  Had 2 falls today, she says these were minor where he fell from a chair onto the floor and did not hit his head or hurt anything.  No loss of consciousness.  Patient is denying any pain complaints.  No nausea or vomiting.  Also currently on 2 L of oxygen nasal cannula, this is a new requirement for him.  Has not noted any shortness of breath or chest pain.    HISTORY:  Past Medical History:   Diagnosis Date    CAD (coronary artery disease)     Elevated cholesterol     GERD (gastroesophageal reflux disease)     Hiatal hernia     History of shingles     Hypertension     Macular degeneration     Metabolic syndrome     Vertigo        Past Surgical History:   Procedure Laterality Date    OTHER SURGICAL HISTORY Left 2016    quad tendon repair    QUADRICEPS REPAIR Left     TONSILLECTOMY      TOTAL ANKLE ARTHROPLASTY Left     TOTAL HIP ARTHROPLASTY Right     TOTAL HIP ARTHROPLASTY Left     [facility]    TRANSURETHRAL RESECTION OF PROSTATE         Family History   Problem Relation Age of Onset    Hypertension Father     Stroke Father     Sleep apnea Brother        Social History     Tobacco Use    Smoking status: Former     Types: Cigarettes     Quit date: 1965     Years since quittin.0    Smokeless tobacco: Never   Vaping Use    Vaping Use: Never used   Substance Use Topics    Alcohol use: Yes     Alcohol/week: 1.0 standard drink     Types: 1 Shots of liquor per week       ROS:    Pertinent positives and negatives as documented per HPI, otherwise  noncontributory    PHYSICAL EXAM:  Physical Exam  Vitals and nursing note reviewed.   Constitutional:       Appearance: He is not toxic-appearing or diaphoretic.   HENT:      Head: Normocephalic and atraumatic.      Nose: Nose normal.      Mouth/Throat:      Mouth: Mucous membranes are moist.      Pharynx: Oropharynx is clear.   Eyes:      Extraocular Movements: Extraocular movements intact.      Conjunctiva/sclera: Conjunctivae normal.   Cardiovascular:      Rate and Rhythm: Normal rate and regular rhythm.      Pulses: Normal pulses.   Pulmonary:      Effort: Pulmonary effort is normal. No respiratory distress.      Breath sounds: Rhonchi present.   Abdominal:      General: There is no distension.      Palpations: Abdomen is soft.   Musculoskeletal:         General: Normal range of motion.      Cervical back: Normal range of motion.   Skin:     General: Skin is warm and dry.   Neurological:      Mental Status: He is alert.      Comments: Patient moving all extremities equally, no focal neurological deficits, oriented to person and place but thinks it is the year 1963   Psychiatric:         Mood and Affect: Mood normal.         Behavior: Behavior normal.          Labs Reviewed   COMPREHENSIVE METABOLIC PANEL - Abnormal       Result Value    Sodium 133 (*)     Potassium 3.1 (*)     Chloride 96 (*)     CO2 26      Anion Gap 11      BUN 17      Creatinine 0.92      Glucose 159 (*)     Calcium 8.5 (*)     AST 20      ALT (SGPT) 10      Alkaline Phosphatase 87      Total Protein 6.3      Albumin 3.6      Total Bilirubin 1.41 (*)     Corrected Calcium 8.8      eGFR 81      Narrative:     Calculation based on the 2021 Chronic Kidney Disease Epidemiology Collaboration (CKD-EPI) equation refit without adjustment for race.   CBC WITH AUTO DIFFERENTIAL - Abnormal    WBC 12.3 (*)     RBC 4.20      Hemoglobin 13.4      Hematocrit 38.6      MCV 92.0      MCH 32.0      MCHC 34.8      RDW 14.4      Platelets 187      MPV 7.5       Neutrophils% 85.3 (*)     Lymphocytes% 4.5 (*)     Monocytes% 9.6      Eosinophils% 0.1      Basophils% 0.5      ANC (auto diff) 10.50      Lymphocytes Absolute 0.50      Monocytes Absolute 1.20      Eosinophils Absolute 0.00      Basophils Absolute 0.10     URINALYSIS DIPSTICK REFLEX TO CULTURE FOR USE WITH MICROSCOPIC PANEL - Abnormal    Color, Urine Yellow      Clarity, Urine Clear      pH, Urine 6.0      Specific Gravity, Urine 1.020      Protein, Urine 30 (*)     Glucose, Urine Negative      Ketones, Urine Negative      Blood, Urine Trace (*)     Nitrite, Urine Negative      Bilirubin, Urine Negative      Leukocytes, Urine Small (*)     Urobilinogen, Urine 2.0 (*)    URINALYSIS MICROSCOPIC, REFLEX CULTURE - Abnormal    RBC, Urine 0-2      WBC, Urine 5-9 (*)     Squamous Epithelial, Urine 0-4      Bacteria, Urine Few     SARS/INFLUENZA/RSV QUADRUPLEX PCR - Normal    Influenza A Screen by PCR Negative      Influenza B Screen by PCR Negative      COVID-19 PCR Negative      Respiratory Syncytial Virus Screen by PCR Negative      Narrative:     Negative results do not preclude SARS-CoV-2, influenza A virus, influenza B virus and/or RSV infection and should not be used as the sole basis for treatment or other patient management decisions.  Negative results must be combined with clinical presentation, patient history, and/or epidemiological information.    Positive results are indicative of the presence of RNA from the identified virus; clinical correlation with patient history and other diagnostic information is necessary to determine patient infection status.    Testing was performed using the Xpert Xpress SARS-CoV-2/Flu/RSV RT-PCR assay ([employer]) on the GeneXpert system.  This test has received FDA Emergency Use Authorization (EUA) and performance characteristics have been verified by [employer].  Fact sheets for this Emergency Use Authorization (EUA) assay can be found at the following  links:  For Patients:   https://www.fda.gov/media/757596/download  For Healthcare Providers:  https://www.fda.gov/media/236308/download       POCT LACTIC ACID (LACTATE) - Normal    POC Lactate 1.40     URINE CULTURE   BLOOD CULTURES, 2 SETS    Narrative:     The following orders were created for panel order Blood culture, 2 sets.  Procedure                               Abnormality         Status                     ---------                               -----------         ------                     Blood culture, 1 set[[id number]]                                                         Blood culture, 1 set[[id number]]                                                           Please view results for these tests on the individual orders.   BLOOD CULTURE   BLOOD CULTURE   URINALYSIS WITH MICROSCOPIC, REFLEX CULTURE    Narrative:     The following orders were created for panel order Urinalysis w/microscopic, reflex culture Urine, Clean Catch.  Procedure                               Abnormality         Status                     ---------                               -----------         ------                     Urinalysis, Dip (part 1 o...[[id number]]  Abnormal            Final result               Urinalysis, Micro (part 2...[[id number]]  Abnormal            Final result                 Please view results for these tests on the individual orders.       XR chest portable 1 view   Final Result   IMPRESSION:   1.  No acute cardiopulmonary abnormality.   2.  Mild cardiomegaly. No evidence of heart failure.   3.  Large hiatal hernia, unchanged.         CT head without IV contrast   Final Result   IMPRESSION:   1.  No acute intracranial pathology by CT.   2.  Moderate presumed sequelae of chronic small vessel ischemic disease and prior ischemia, stable.   3.  Moderate diffuse brain parenchymal volume loss.          ED Medication Administration from 01/14/2023 1637 to 01/14/2023 1900         Date/Time Order Dose Route Action  Action by     01/14/2023 1816 [facility] LR bolus 1,000 mL 1,000 mL intravenous New Bag/New Syringe LORETTA Miles            PROCEDURES:  Procedures    ED COURSE:            Sepsis Quality Bundle      MDM:     Joseph Wong is a 88 y.o. male here for AMS.  Reviewed outside and prior visits and charts.  Patient here with confusion, has had multiple ground-level falls.  No evidence for any trauma.  He is conversant but disoriented.  Also on a new oxygen requirement with 2 L nasal cannula.  Denies any cough or fevers.  Chest x-ray my read with some cardiomegaly that is stable, no signs of any clear bacterial consolidation or effusions.  Radiology read coincides.  Also obtained CT head, this is unremarkable.  Low suspicion for stroke.    Lactate is normal, no signs of sepsis, aside from the hypoxia he has had stable vital signs.  No significant electrolyte abnormalities, negative respiratory panel.  Does have some symptoms and signs of UTI so have started ceftriaxone.  Considering his increased confusion, weakness, oxygen requirement have consulted hospital medicine Dr. Garcia has currently agreed to admit patient.      CLINICAL IMPRESSION:  Final diagnoses:   [R41.82] Altered mental status   [J96.01] Acute respiratory failure with hypoxia (CMS/HCC) (HCC)   [N39.0] UTI (urinary tract infection)   [E87.6] Hypokalemia         A voice recognition program was used to aid in medical record documentation. Some words may be printed not exactly as they were spoken. Efforts were made to carefully edit and correct any inaccuracies; however, some errors may be present.         Ramon Payton DO  01/14/23 [id number]

## 2023-01-15 LAB
ALBUMIN SERPL-MCNC: 3.3 G/DL (ref 3.5–5.3)
ALP SERPL-CCNC: 77 U/L (ref 45–115)
ALT SERPL-CCNC: 9 U/L (ref 7–52)
ANION GAP SERPL CALC-SCNC: 8 MMOL/L (ref 3–11)
AST SERPL-CCNC: 20 U/L
BASOPHILS # BLD AUTO: 0.1 10*3/UL
BASOPHILS NFR BLD AUTO: 0.6 % (ref 0–2)
BILIRUB SERPL-MCNC: 1.11 MG/DL (ref 0.2–1.4)
BUN SERPL-MCNC: 13 MG/DL (ref 7–25)
CALCIUM ALBUM COR SERPL-MCNC: 8.7 MG/DL (ref 8.6–10.3)
CALCIUM SERPL-MCNC: 8.1 MG/DL (ref 8.6–10.3)
CHLORIDE SERPL-SCNC: 99 MMOL/L (ref 98–107)
CO2 SERPL-SCNC: 26 MMOL/L (ref 21–32)
CREAT SERPL-MCNC: 0.8 MG/DL (ref 0.7–1.3)
EOSINOPHIL # BLD AUTO: 0.1 10*3/UL
EOSINOPHIL NFR BLD AUTO: 1.1 % (ref 0–3)
ERYTHROCYTE [DISTWIDTH] IN BLOOD BY AUTOMATED COUNT: 14.2 % (ref 11.5–15)
GFR SERPL CREATININE-BSD FRML MDRD: 86 ML/MIN/1.73M*2
GLUCOSE BLDC GLUCOMTR-SCNC: 121 MG/DL (ref 70–105)
GLUCOSE BLDC GLUCOMTR-SCNC: 137 MG/DL (ref 70–105)
GLUCOSE BLDC GLUCOMTR-SCNC: 139 MG/DL (ref 70–105)
GLUCOSE BLDC GLUCOMTR-SCNC: 156 MG/DL (ref 70–105)
GLUCOSE SERPL-MCNC: 126 MG/DL (ref 70–105)
HCT VFR BLD AUTO: 35.1 % (ref 38–50)
HGB BLD-MCNC: 12.5 G/DL (ref 13.2–17.2)
LYMPHOCYTES # BLD AUTO: 1.2 10*3/UL
LYMPHOCYTES NFR BLD AUTO: 11.7 % (ref 15–47)
MCH RBC QN AUTO: 32.7 PG (ref 29–34)
MCHC RBC AUTO-ENTMCNC: 35.7 G/DL (ref 32–36)
MCV RBC AUTO: 91.7 FL (ref 82–97)
MONOCYTES # BLD AUTO: 1 10*3/UL
MONOCYTES NFR BLD AUTO: 10.6 % (ref 5–13)
NEUTROPHILS # BLD AUTO: 7.5 10*3/UL
NEUTROPHILS NFR BLD AUTO: 76 % (ref 46–70)
PLATELET # BLD AUTO: 160 10*3/UL (ref 130–350)
PMV BLD AUTO: 7.4 FL (ref 6.9–10.8)
POTASSIUM SERPL-SCNC: 3 MMOL/L (ref 3.5–5.1)
PROT SERPL-MCNC: 5.6 G/DL (ref 6–8.3)
RBC # BLD AUTO: 3.83 10*6/ΜL (ref 4.1–5.8)
SODIUM SERPL-SCNC: 133 MMOL/L (ref 135–145)
WBC # BLD AUTO: 9.8 10*3/UL (ref 3.7–9.6)

## 2023-01-15 PROCEDURE — 36415 COLL VENOUS BLD VENIPUNCTURE: CPT | Performed by: FAMILY MEDICINE

## 2023-01-15 PROCEDURE — 6370000100 HC RX 637 (ALT 250 FOR IP): Performed by: FAMILY MEDICINE

## 2023-01-15 PROCEDURE — 80053 COMPREHEN METABOLIC PANEL: CPT | Performed by: FAMILY MEDICINE

## 2023-01-15 PROCEDURE — 94660 CPAP INITIATION&MGMT: CPT | Performed by: DENTIST

## 2023-01-15 PROCEDURE — 6360000200 HC RX 636 W HCPCS (ALT 250 FOR IP): Mod: JZ | Performed by: FAMILY MEDICINE

## 2023-01-15 PROCEDURE — 2590000100 HC RX 259: Performed by: FAMILY MEDICINE

## 2023-01-15 PROCEDURE — 99232 SBSQ HOSP IP/OBS MODERATE 35: CPT | Performed by: FAMILY MEDICINE

## 2023-01-15 PROCEDURE — 97162 PT EVAL MOD COMPLEX 30 MIN: CPT | Mod: GP

## 2023-01-15 PROCEDURE — 85025 COMPLETE CBC W/AUTO DIFF WBC: CPT | Performed by: FAMILY MEDICINE

## 2023-01-15 PROCEDURE — 51798 US URINE CAPACITY MEASURE: CPT

## 2023-01-15 PROCEDURE — 82947 ASSAY GLUCOSE BLOOD QUANT: CPT | Mod: QW

## 2023-01-15 PROCEDURE — (BLANK) HC ROOM PRIVATE

## 2023-01-15 RX ORDER — POTASSIUM CHLORIDE 750 MG/1
20 TABLET, FILM COATED, EXTENDED RELEASE ORAL 2 TIMES DAILY WITH MEALS
Status: CANCELLED | OUTPATIENT
Start: 2023-01-15

## 2023-01-15 RX ORDER — POTASSIUM CHLORIDE 750 MG/1
40 TABLET, FILM COATED, EXTENDED RELEASE ORAL ONCE
Status: COMPLETED | OUTPATIENT
Start: 2023-01-15 | End: 2023-01-15

## 2023-01-15 RX ORDER — FLUOXETINE HYDROCHLORIDE 20 MG/1
20 CAPSULE ORAL DAILY
Status: DISCONTINUED | OUTPATIENT
Start: 2023-01-15 | End: 2023-01-16 | Stop reason: HOSPADM

## 2023-01-15 RX ORDER — POTASSIUM CHLORIDE 750 MG/1
20 TABLET, FILM COATED, EXTENDED RELEASE ORAL 2 TIMES DAILY WITH MEALS
Status: DISCONTINUED | OUTPATIENT
Start: 2023-01-15 | End: 2023-01-16 | Stop reason: HOSPADM

## 2023-01-15 RX ADMIN — ATORVASTATIN CALCIUM 10 MG: 10 TABLET, FILM COATED ORAL at 08:29

## 2023-01-15 RX ADMIN — ENOXAPARIN SODIUM 40 MG: 100 INJECTION SUBCUTANEOUS at 14:25

## 2023-01-15 RX ADMIN — DOXAZOSIN 8 MG: 4 TABLET ORAL at 21:53

## 2023-01-15 RX ADMIN — TRAZODONE HYDROCHLORIDE 25 MG: 50 TABLET ORAL at 00:27

## 2023-01-15 RX ADMIN — POLYETHYLENE GLYCOL 3350 17 G: 17 POWDER, FOR SOLUTION ORAL at 08:29

## 2023-01-15 RX ADMIN — HYDROCHLOROTHIAZIDE 1 DOSE: 12.5 TABLET ORAL at 08:28

## 2023-01-15 RX ADMIN — FLUOXETINE 20 MG: 20 CAPSULE ORAL at 08:29

## 2023-01-15 RX ADMIN — AMLODIPINE BESYLATE 5 MG: 5 TABLET ORAL at 08:29

## 2023-01-15 RX ADMIN — POTASSIUM CHLORIDE 40 MEQ: 750 TABLET, FILM COATED, EXTENDED RELEASE ORAL at 08:29

## 2023-01-15 RX ADMIN — METOPROLOL SUCCINATE 25 MG: 25 TABLET, EXTENDED RELEASE ORAL at 08:29

## 2023-01-15 RX ADMIN — CEFTRIAXONE SODIUM 1000 MG: 1 INJECTION, POWDER, FOR SOLUTION INTRAMUSCULAR; INTRAVENOUS at 18:00

## 2023-01-15 RX ADMIN — INSULIN ASPART 1 UNITS: 100 INJECTION, SOLUTION INTRAVENOUS; SUBCUTANEOUS at 17:29

## 2023-01-15 NOTE — INTERDISCIPLINARY/THERAPY
[address] [facility] [address]  [phone]          Physical Therapy Initial Evaluation     Date of Service: 01/15/23    Patient Name: Joseph Wong  Referring Provider: KARSTEN MEZA    Onset Date: 1/14/2023  SOC Date: 01/15/23  Certification Period: 02/14/23    HICN: [id number]    Primary Medical Diagnosis: Altered mental status [R41.82]     Treatment Diagnosis: Impaired muscle performance     Pattern 5A: Primary Prevention/Risk Reduction for Loss of Balance and Falling        Subjective   Prior to entering the room, RN verbalizes that patient is medically stable and appropriate for skilled PT evaluation at this time. MD is currently working with the patient; MD verbalizes patient is medically stable and appropriate for PT evaluation, along with weaning off supplemental oxygen. During conversation with Dr. Skelton, Care Management enters the room and works with patient prior to initial evaluation.     Upon arrival Joseph is supine in bed with head of bed minimally elevated, utilizing 2L of oxygen via nasal canal. He is agreeable to skilled PT evaluation at this time. Following the session, patient is seated in the recliner; all needs met with call light within reach, chair alarm activated and CNA working with patient.    CNA enters the room with front wheeled walker after the initiation of the evaluation and is present throughout the evaluation; she provides assist throughout and monitors oxygen saturation.     His wife, Alessandra, is present throughout the duration of today's evaluation; she is active with subjective information gathering.     HISTORY OF CURRENT COMPLAINT: Joseph is a 88-year-old male with a medical diagnosis of altered mental status and UTI; he [facility] on 1/14/2023 for confusion and weakness.     PRIOR LEVEL OF FUNCTION:   Utilizing a front wheeled walker; Alessandra reports that he does not remain proximal to walker with ambulation.   Patient does not  utilize supplemental oxygen during the day; CPAP at night   Alessandra verbalizes spot checking his oxygen saturation throughout the day     SOCIAL/OCCUPATIONAL/RECREATIONAL:  Home Environment: patient lives with his wife in a duplex; 1 stair to enter      Pain: no pain verbalizes throughout the session  Pain Assessment Scale  Pain Scale: 0-10  0-10 Pain Score: 0 - No pain  Patient's Stated Pain Goal: No pain  Has Pain Adversely Affected Your Usual Activity, Sleep, Mood or Stress in the Last 24 Hours?: No         Past Medical History:   Diagnosis Date    CAD (coronary artery disease)     Elevated cholesterol     GERD (gastroesophageal reflux disease)     Hiatal hernia     History of shingles     Hypertension     Macular degeneration     Metabolic syndrome     Vertigo          Current Facility-Administered Medications:     FLUoxetine (PROzac) capsule 20 mg, 20 mg, oral, Daily, Denisha Wheeler MD, 20 mg at 01/15/23 0829    potassium chloride (KLOR-CON) CR tablet 20 mEq, 20 mEq, oral, 2x daily with meals, Denisha Wheeler MD    amLODIPine (NORVASC) tablet 5 mg, 5 mg, oral, Daily, Beau Garcia MD, 5 mg at 01/15/23 0829    atorvastatin (LIPITOR) tablet 10 mg, 10 mg, oral, Daily, Beau Garcia MD, 10 mg at 01/15/23 0829    doxazosin (CARDURA) tablet 8 mg, 8 mg, oral, Nightly, Beau Garcia MD, 8 mg at 01/14/23 2052    irbesartan (AVAPRO) 300 mg, hydroCHLOROthiazide (HYDRODIURIL) 12.5 mg for AVALIDE 300/12.5, , oral, Daily, Beau Garcia MD, 1 Dose at 01/15/23 0828    metoprolol succinate XL (TOPROL-XL) 24 hr tablet 25 mg, 25 mg, oral, Daily, Beau Garcia MD, 25 mg at 01/15/23 0829    Insert peripheral IV, , , Once **AND** Maintain IV access, , , Until discontinued **AND** Saline lock IV, , , Once **AND** sodium chloride flush 3 mL, 3 mL, intravenous, PRN, Beau Garcia MD    enoxaparin (LOVENOX) syringe 40 mg, 40 mg, subcutaneous, Daily at 1400, Beau Garcia MD    acetaminophen (TYLENOL) tablet  "325-650 mg, 325-650 mg, oral, q4h PRN, Beau Garcia MD    traZODone (DESYREL) tablet 25 mg, 25 mg, oral, Nightly PRN, Beau Garcia MD, 25 mg at 01/15/23 0027    polyethylene glycol (GLYCOLAX) powder 17 g, 17 g, oral, Daily, Beau Garcia MD, 17 g at 01/15/23 0829    ondansetron (ZOFRAN) tablet 4 mg, 4 mg, oral, q6h PRN **OR** ondansetron (ZOFRAN) injection 4 mg, 4 mg, intravenous, q6h PRN, Beau Garcia MD    alum-mag hydroxide-simeth (MAALOX ADVANCED) suspension 30 mL, 30 mL, oral, 3x daily PRN, Beau Garcia MD    cefTRIAXone (ROCEPHIN) IV Push 1,000 mg, 1,000 mg, intravenous, q24h, Beau Garcia MD    dextrose 50 % in water (D50W) syringe 15-30 mL, 15-30 mL, intravenous, q15 min PRN, Beau Garcia MD    dextrose (GLUTOSE) 40 % gel 15.2 g, 15.2 g, oral, q15 min PRN, Beau Garcia MD    glucagon (GLUCAGEN) injection 1 mg, 1 mg, intramuscular, q15 min PRN **OR** glucagon (GLUCAGEN) injection 1 mg, 1 mg, subcutaneous, q15 min PRN, Beau Garcia MD    insulin aspart U-100 (NovoLOG) injection pen (correction dose) 0-15 Units, 0-15 Units, subcutaneous, Insulin: 4x daily with meals, Beau Garcia MD    ALLERGIES:  Patient has no known allergies.    CURRENT ASSISTIVE OR ADAPTIVE EQUIPMENT:  Alessandra verbalizes that Joseph is utilizing a front wheeled walker at baseline; front wheeled walker utilized during today's session.   Oneil Fall Risk Score: 100  FALL RISK Interventions: gait belt will be utilized PRN during therapy session with higher level balance activities to prevent fall and optimize patient safety.      DIAGNOSTIC TESTING: CT Head (1/14/2023)  IMPRESSION: Per Shubham Kwong MD,   \"1.  No acute intracranial pathology by CT.  2.  Moderate presumed sequelae of chronic small vessel ischemic disease and prior ischemia, stable.  3.  Moderate diffuse brain parenchymal volume loss.\"  ACTIVITIES LIMITED BY PATIENT COMPLAINT: none formally stated by patient.     PATIENT'S GOALS FOR THERAPY:   Alessandra " verbalizes that Joseph needs to improve ability to safely ambulation prior to DC.        Objective     FINDINGS:    Cognition: Joseph is alert; however, is unable to consistently follow one-step commands throughout the session.     Bed mobility: supine>sit was performed with SBA, utilizing the hand rail for support.     Transfers: sit<>stand transfers were performed from the edge of bed x2 repetitions during the session; MIN A x1 and CGA x1 with the front wheel walker anterior   Patient perform stand>sit at the recliner with lack of eccentric control and appropriate UE support for ultimate safe navigation.   Patient braces his B LE against the bed for support with standing; able to take 2-3 steps forward and statically stand for 2 minutes with CGA x 2   Balance: patient stands statically for 2 minutes with CGA x2, gait belt donned, and FWW anterior with no significant balance deficit     Ambulation: 5 m total   Patient ambulates within the room within initial MOD A x1, gait belt donned, and FWW; however, patient demonstrates one episode of R LE knee buckling requiring MAX A x 1 for re-correction while remaining upright. Two person assist needed with turning and backing into recliner, as patient demonstrates short, shuffling steps with significant R-sided trunk lean and flexed posture. Continual MOD A x1 was required to remain proximal to the front wheeled walker.     Stairs: not assessed during today's session     Activity Tolerance: supplemental oxygen was weaned off during today's session   Gradually decreased in the seated position at edge of bed; removed cannula   Consistently remained in the low 90%   Max: 95%   Low: 86% with seated ROM/strength testing in which patient rebounds with slow, controlled breathing     Therapeutic Exercise: 5 minutes total; exercises were recited to his wife, demonstrating carry-over. Exercises written on the white board and encouraged to perform throughout the day to maintain  strength/mobility  Seated Marching: alternating x15 repetitions   Seated Ankle Pumps: B LE; x20 repetitions       LOWER EXTREMITY ROM: functionally and formally assessed   Alessandra verbalizes that he previously orthopedic surgery on L knee ~6 years ago.    During seated ROM assessment, Joseph requires consistent verbal cues to remain seated upright; CNA provides posterior MIN A x1, as Joseph demonstrates significant posterior lean   AROM (L) AROM (R)   Hip Flexion within normal limits within normal limits   Knee Flexion within normal limits within normal limits   Knee Extension within normal limits within normal limits   Ankle Dorsiflexion within normal limits within normal limits   Ankle Plantarflexion within normal limits within normal limits       LOWER EXTREMITY STRENGTH: patient demonstrates functional LE strength at the hip, ankle, and knee; he is limited with seated balance during assessment    Left Right    Hip Flexion 4/5 4/5   Knee Extension 4/5 4/5   Ankle Dorsiflexion 4/5 4/5                                                                                                            EDUCATION: Joseph and Alessandra were educated on the role of skilled PT within the acute care setting; provided initial exercise program.  Discussed with Alessandra the need for continued strength/mobility training prior to DC; she expresses concerns with care taking and Joseph's safety with DC to home.     Time Calculation  Start Time: 1225  Stop Time: 1300  Time Calculation (min): 35 min    Therapeutic Interventions (Time Spent in Minutes)  Therapeutic Exercise: 5         PT Untimed Charges - Quick List (Time Spent in Minutes)  PT Eval Moderate Complexity: 30         EVALUATION:  Moderate Complexity: Expanded review of medical/therapy records, 3-5 performance deficits, and detailed assessments with several treatment options with minimal to moderate modifications needed.        INITIAL TREATMENT:  Seated Therapeutic Exercise: alternating  marching; ankle pumps         ASSESSMENT:  Joseph is an 88-year-old male, evaluated int he ED for confusion and weakness after 2 reported falls within the home that occurred in the bathroom; he presents with corresponding impairments in functional strength, mobility, and dynamic balance.  This patient would benefit from continued skilled PT while in the acute care setting to improve functional strength, mobility and activity tolerance.    Rehab Potential:    Fair      Barriers to outcome: N/A       GOALS:        Multi-Disciplinary Problems (from Physical Therapy)      Active Problems       Problem: Mobility  Start Date: 01/15/23      Goal Start Date Expected End Date End Date    LTG - Patient will ambulate household distance 01/15/23 02/14/23 --    Goal Details: With CGA x1 and a front wheeled walker         Goal Start Date Expected End Date End Date    LTG - Patient will be able to go up and down a curb/step with the appropriate device 01/15/23 02/14/23 --    Goal Details: Front wheeled walker; CGA x 1                Problem: TRANSFERS  Start Date: 01/15/23      Goal Start Date Expected End Date End Date    STG - Patient will transfer to and from sit to stand 01/15/23 02/14/23 --    Goal Details: With appropriate use of B UE for safe navigation and CGA x 1.                 Problem: Balance  Start Date: 01/15/23      Goal Start Date Expected End Date End Date    STG - Maintains dynamic sitting balance without upper extremity support 01/15/23 02/14/23 --    Goal Details: With SBA; while performing ADLs.                                 PLAN:  It is recommended that the client attend rehabilitative therapy for up to 3 visits per week with an expected duration through Certification Period: 02/14/23.  Interventions during the course of treatment may include any combination of the following:  Therapeutic exercise, therapeutic activity, manual therapy,, gait training, , neuromuscular re-education,, and equipment  evaluation/education,.     Recommendation  Recommendations for Therapy: Continue skilled therapy    Plan Comment: functional strength, balance, and gait training    Thank you for allowing us to share in the care of this patient. If you have any questions, recommendations, or further concerns regarding this patient, please feel free to contact us at [address] [place] [phone]  Dept: [phone]      Signed by: Jamie Mentzer, PT 1/15/2023  1:57 PM      * I have reviewed the plan of care and certify a continuing need for medically necessary services    Co-signed by:_________________________ Date and Time:________________

## 2023-01-15 NOTE — PLAN OF CARE
Problem: Safety Adult - Fall  Goal: Free from fall injury  Description: INTERVENTIONS:    Inpatient - Please reference Cares/Safety Flowsheet under Oneil Fall Risk for interventions.  Pediatrics - Please reference Peds Daily Cares/Safety Flowsheet under [facility] Pediatric Fall Assessment Fall Bundle for interventions  LD/OB - Please reference OB Shift Screening Flowsheet under OB Fall Risk for interventions.  Outcome: Progressing     Problem: Neurosensory - Adult  Goal: Achieves stable or improved neurological status  Description: INTERVENTIONS  1. Assess for and report changes in neurological status  2. Initiate measures to prevent increased intracranial pressure  3. Maintain blood pressure and fluid volume within ordered parameters to optimize cerebral perfusion and minimize risk of hemorrhage  4. Monitor temperature, glucose, and sodium. Initiate appropriate interventions as ordered  Outcome: Progressing  Goal: Absence of seizures  Description: INTERVENTIONS  1. Monitor for seizure activity  2. Administer anti-seizure medications as ordered  3. Monitor neurological status  4. Assist patient in avoiding triggers, if identified  Outcome: Progressing  Goal: Remains free of injury related to seizures activity  Description: INTERVENTIONS  1. Maintain airway, patient safety  and administer oxygen as ordered  2. Monitor patient for seizure activity, document and report duration and description of seizure to provider  3. If seizure occurs, turn patient to side and suction secretions as needed  4. Reorient patient post seizure  5. Seizure pads on all 4 side rails  6. Instruct patient/family to notify RN of any seizure activity  7. Instruct patient/family to call for assistance with activity based on assessment  Outcome: Progressing  Goal: Achieves maximal functionality and self care  Description: INTERVENTIONS  1. Monitor swallowing and airway patency with patient fatigue and changes in neurological status  2. Encourage  and assist patient to increase activity and self care with guidance from PT/OT  3. Encourage visually impaired, hearing impaired and aphasic patients to use assistive/communication devices  Outcome: Progressing     Problem: Respiratory - Adult  Goal: Achieves optimal ventilation and oxygenation  Description: INTERVENTIONS:  1. Assess for changes in respiratory status  2. Assess for changes in mentation and behavior  3. Position to facilitate oxygenation and minimize respiratory effort  4. Oxygen supplementation based on oxygen saturation or ABGs  5. Assess patient's ability to cough effectively  6. Encourage broncho-pulmonary hygiene including cough, deep breathe  7. Assess the need for suctioning   8. Assess and instruct to report SOB or any respiratory difficulty  9. Respiratory Therapy support as indicated, including medications and treatment.  Outcome: Progressing  Goal: Achieves optimal ventilation and oxygenation with noninvasive CPAP/BiLEVEL support  Description: INTERVENTIONS:  1. Provide education to patient/family about rationale and expected outcomes associated with therapy  2. Position patient to facilitate optimal ventilation/oxygenation status and minimize respiratory effort  3. Position patient to reduce aspiration risk, elevate head of bed at least 35 degrees or higher, as applicable  4. Assess effectiveness of therapy on ventilation/oxygenation status based on oxygen saturation and/or arterial blood gases, as indicated  5. Assess patient for changes in respiratory and physiological status  6. Auscultate breath sounds and assess chest excursion, as indicated  7. Assess patient for changes in mentation and behavior  8. Routinely monitor equipment for proper performance and settings  9. Assess and monitor skin condition, in relationship to the respiratory interface  10. Assure equipment alarm volume is adequate for the patient's environment  11. Immediately respond to equipment alarm, to assess patient  and/or cause for alarm  12. Follow universal infection control/hospital policy(ies)/standards  Outcome: Progressing     Problem: Genitourinary - Adult  Goal: Absence of urinary retention  Description: INTERVENTIONS:  1. Assess patient’s ability to void and empty bladder.  2. Monitor intake/output and perform bladder scan if indicated.  3. Place urinary catheter per order and initiate and maintain CAUTI bundle.  4. Administer medications to alleviate retention as needed.  5. Discuss catheterization for long term situations as appropriate.    Outcome: Progressing  Goal: Urinary catheter remains patent  Description: INTERVENTIONS:  1. Assess patency of urinary catheter.  2. Irrigate catheter per order if indicated and notify provider if unable to irrigate.  3. Assess need for a larger catheter size or a 3-way catheter for continuous bladder irrigation.  Outcome: Progressing  Goal: Absence of Urinary Infection  Description: INTERVENTIONS:  1. Assess urinary status for burning, urgency, frequency, and pain  2. Assess urine for color, cloudiness, odor, and amount  3. Monitor intake/output  4. Monitor lab values  5. Obtain urinalysis as ordered  6. Assess for neurological status changes    Outcome: Progressing     Problem: Pain - Adult  Goal: Verbalizes/displays adequate comfort level or baseline comfort level  Description: INTERVENTIONS:  1. Encourage patient to monitor pain and request interventions  2. Assess pain using the appropriate pain scale  3. Administer analgesics based on type and severity of pain and evaluate response  4. Educate/Implement non-pharmacological measures as appropriate and evaluate response  5. Consider cultural, developmental and social influences on pain and pain management  6. Notify Provider if interventions unsuccessful or patient reports new pain  Outcome: Progressing     Problem: Infection - Adult  Goal: Absence of infection during hospitalization  Description: INTERVENTIONS:  1. Assess  and monitor for signs and symptoms of infection  2. Monitor lab/diagnostic results  3. Monitor all insertion sites/wounds/incisions  4. Monitor secretions for changes in amount and color  5. Administer medications as ordered  6. Educate and encourage patient and family to use good hand hygiene technique  7. Identify and educate in appropriate isolation precautions for identified infection/condition  Outcome: Progressing     Problem: Safety Adult  Goal: Patient will remain safe during hospitalization  Description: INTERVENTIONS    1. Assess patient for fall risk and implement interventions if needed  2. Use safe transport techniques  3. Assess patient using the appropriate Harvey skin assessment scale  4. Assess patient for risk of aspiration  5. Assess patient for risk of elopement  6. Assess patient for risk of suicide  Outcome: Progressing     Problem: Daily Care  Goal: Daily care needs are met  Description: INTERVENTIONS:   1. Assess and monitor skin integrity  2. Identify patients at risk for skin breakdown on admission and per policy  3. Assess and monitor ability to perform self care and identify potential discharge needs  4. Assess skin integrity/risk for skin breakdown  5. Assist patient with activities of daily living as needed  6. Encourage independent activity per ability   7. Provide mouth care   8. Include patient/family/caregiver in decisions related to daily care   Outcome: Progressing     Problem: Knowledge Deficit  Goal: Patient/family/caregiver demonstrates understanding of disease process, treatment plan, medications, and discharge instructions  Description: INTERVENTIONS:   1. Complete learning assessment and assess knowledge base  2. Provide teaching at level of understanding   3. Provide teaching via preferred learning methods  Outcome: Progressing     Problem: Discharge Barriers  Goal: Patient's discharge needs are met  Description: INTERVENTIONS:  1. Assess patient for self-management  skills  2. Encourage participation in management  3. Identify potential discharge barriers on admission and throughout hospital stay  4. Involve patient/family/caregiver in discharge planning process  5. Collaborate with case management/ for discharge needs  Outcome: Progressing

## 2023-01-15 NOTE — INTERDISCIPLINARY/THERAPY
Case Management Admission Note    Phone # [phone]    Living Situation: Spouse/significant other Private residence            ADLs: Independent  Stairs: Yes 1  HME/CPAP: CPAP, Front Wheeled Walker, 4-Wheeled Walker   CPAP Equipment Vendor: [employer]  Oxygen: No      Home Health:No     Current Resources: None      Diabetes/supplies: Yes  PCP: Malena Mcclain MD  Funding: Medicare, [facility] [employer]   Pharmacy:[id number] - [address] AT [address] [facility]    Support Person: Primary Emergency Contact: Alessandra Wong, Home Phone: [phone], Mobile Phone: [phone], Relation: Spouse     Needs transportation assistance at DC: No     Discharge Needs/Barriers:  Not stable for dc. Avasys at bedside for impulsiveness. Continue IV Push abx for UTI. PT/OT to eval.  Narrative: Chart reviewed, met with pt and his wife Alessandra at bedside and introduced self and CM role. Alessandra reported Joseph is normally very independent at home and that having falls was out of the ordinary for him. She reported he is home bound and doesn't drive. Discussed PT/OT eval and hopefully as he recovers we would plan for a home DC if he is able, vs short term stay for rehab if he's unable to mobilize. They stated understanding and agreement with plan of care.  Dispo: Home

## 2023-01-15 NOTE — NURSING END OF SHIFT
Nursing End of Shift Summary:    Patient: Joseph Wong  MRN: [id number]  : 10/20/1934, Age: 88 y.o.    Location: [address] [place] [address]    Nursing Goals  Clinical Goals for the Shift: provide safety and comfort    Narrative Summary of Progress Toward Clinical Goals:  Patient is an admit from ED oriented only to self.  Maintained with 2 liters of oxygen saturating to 91 - 94%. Patient is getting impulsive around  midnight and trying to get out on his bed and not easily redirectable, Avasys placed on his room, fall measures rendered at all times. Patient is incontinent of urine, last bladder scan at 0530 shows 216 ml.    Barriers to Goals/Nursing Concerns:  Yes - confusion    New Patient or Family Concerns/Issues:  No    Shift Summary:   Significant Events & Communications to Providers (last 12 hours)       Last 5 Values    No documentation.                 Oxygen Usage (last 12 hours)       Last 5 Values    No documentation.                 Mobility (last 12 hours)       Last 5 Values       Row Name 23 0240             Mobility    Activity -- Turn --      Level of Assistance -- Moderate assist, patient does 50-74% --      Length of Time in Chair (min) -- 0 --      Distance Ambulated (feet) -- 0 Feet --      Distance Ambulated (Meters Calculated) -- 0 Meters --      Patient Position Supine Supine Supine      Turning Turns self Turns self Turns self      Distance Ambulated (Meters Calculated)(Do Not Use) -- 0 Feet --                    Urethral Catheter       Active Urethral Catheter       None                  Active Lines       Active Central venous catheter / Peripherally inserted central catheter / Implantable Port / Hemodialysis catheter / Midline Catheter       None                  Infusing Medications   Medication Dose Last Rate     PRN Medications   Medication Dose Last Admin    sodium chloride  3 mL      acetaminophen  325-650 mg      traZODone  25 mg 25 mg at 01/15/23 0027     ondansetron  4 mg      Or    ondansetron  4 mg      alum-mag hydroxide-simeth  30 mL      dextrose 50 % in water (D50W)  15-30 mL      dextrose  15.2 g      glucagon  1 mg      Or    glucagon  1 mg

## 2023-01-15 NOTE — H&P
Patient Name: Joseph Wong  YOB: 1934  Age: 88 y.o.  Medical Record Number: [id number]  Primary Physician: Malena Mcclain MD  Admission Date: 1/14/2023  Date of service: 1/14/2023   Site of Service: [place], [place]  Code Status: No Order    Chief complaint: Fall (Multiple falls today dx bladder infection has been taking abx x 24hrs  new onset confusion. 100.2 f for ems. Denied injury to ems. )      ADMISSION ASSESSMENT AND PLAN:     Active Hospital problems:  UTI associated delirium   -Confusion and weakness started on January 12.  UTI diagnosed by Dr. Becker on January 13 (positive leuk, cloudy, greater than 100 WBCs per high-power field).  Started on Bactrim without improvement.     Acute hypoxemic respiratory failure   -Requiring 2 L of nasal cannula.  Saturating 92 to 94%    Falls  Hypertension  Hyperlipidemia  Non-insulin-dependent diabetes  BPH with elevated PSA   -Follows with urology    Plan:   -Ceftriaxone 1 g daily  -Urine cultures  1 L LR bolus  Bladder scans (every shift) with straight cath if necessary  Low-dose sliding scale insulin with hypoglycemia precautions and every 4 blood glucose with meals    Resume home dosing of: Metoprolol, irbesartan-hydrochlorothiazide, doxazosin, atorvastatin, amlodipine  Discontinue/hold home: Metformin, potassium, vitamins.     Sepsis Quality Bundle:   Concern for sepsis/infection at 1/14/2023 7:28 PM      Lactate: Lactate < 2- No repeat required  Blood Culture(s): Blood culture(s) obtained prior to antibiotics  Antibiotic(s): Less than 3 hours  IVF(s): Not indicated (no hypotension and/or lactate <4)     DVT Prophylaxis: Lovenox    HISTORY OF PRESENT ILLNESS:     Joseph Wong is a 88 y.o. male, with a past medical history of BPH and elevated PSA, hypertension, hyperlipidemia, non-insulin-dependent diabetes presenting as a 2-day history of weakness and delirium.    Patient lives at home with his wife.  Approximately 48 hours  ago the wife noticed he was slightly confused and weak.  She reports that typically he ambulates with a walker but he was having trouble getting out of bed.  She took him to their PCP, Dr. Becker, and a UTI was diagnosed.  I did review the paperwork and it demonstrated positive leukocytes, cloudy in appearance and greater than 100,000 WBCs per hpf.  Patient was started on Bactrim.  Patient was having increased urinary incontinence.  Patient denies dysuria but perhaps more frequency.    Unfortunately patient continued to worsen despite the Bactrim.  He had a fall with subsequent bruising on his right buttocks and right low back.  His wife does not believe he hit his head and it was a relatively slow to the ground fall.  Patient has not been consuming p.o. intake adequately.  She reports he had some soup and not able to drink much fluids secondary to lack of appetite.  No fevers or chills.    In regards to the patient's breathing he has no upper respiratory symptomology.  From his standpoint he has no shortness of breath or chest pain.  He has a's history of smoking but has not smoked for over 5 decades and reports relatively low intake.  He was around secondhand smoke.  Never needed oxygen in the past.    The history is largely provided by the patient's wife.  The patient was alert and cooperative but only orientated to self and place.  He was unable to guess what year it was.  He did collaborate the wife's history and was in agreement.  CODE STATUS was discussed and the patient opted for DNR.      Past Medical History:   Diagnosis Date    CAD (coronary artery disease)     Elevated cholesterol     GERD (gastroesophageal reflux disease)     Hiatal hernia     History of shingles     Hypertension     Macular degeneration     Metabolic syndrome     Vertigo           Past Surgical History:   Procedure Laterality Date    OTHER SURGICAL HISTORY Left 08/2016    quad tendon repair    QUADRICEPS REPAIR Left 2015     TONSILLECTOMY      TOTAL ANKLE ARTHROPLASTY Left     TOTAL HIP ARTHROPLASTY Right     TOTAL HIP ARTHROPLASTY Left     TOTAL KNEE ARTHROPLASTY  januauary 2022    TRANSURETHRAL RESECTION OF PROSTATE           Social History:    Joseph Wong  reports that he quit smoking about 58 years ago. He has never used smokeless tobacco. He reports current alcohol use of about 1.0 standard drink per week.    Family History:  family history includes Hypertension in his father; Sleep apnea in his brother; Stroke in his father.      Allergies:  No Known Allergies    Medications:   (Not in a hospital admission)        Review of Systems    Review of Systems   Constitutional: Negative.    HENT: Negative.    Eyes: Negative.    Respiratory: Negative.    Cardiovascular: Negative.    Gastrointestinal: Negative.  Endocrine: Negative.    Genitourinary: Frequency and incontinence.    Musculoskeletal: Negative.    Skin: Negative.    Allergic/Immunologic: Negative.    Neurological: Negative.    Hematological: Negative.    Psychiatric/Behavioral: Increased confusion.         Vital signs:  Patient consented to be examined    Temp:  [37.6 °C (99.7 °F)] 37.6 °C (99.7 °F)  Heart Rate:  [80] 80  Resp:  [18] 18  SpO2:  [93 %] 93 %  BP: (168)/(99) 168/99  SpO2/FiO2 Ratio Using Approximate FiO2 (%):  [332.1] 332.1  Estimated P/F Ratio Using Approximate FiO2 (%):  [287.3] 287.3  Body mass index is 24.69 kg/m².    Temp (24hrs), Av.6 °C (99.7 °F), Min:37.6 °C (99.7 °F), Max:37.6 °C (99.7 °F)    ED Triage Vitals [23 1641]   Weight 80.3 kg (177 lb 0.5 oz)     Weights (Current Encounter Only) (last 180 days)       Date/Time Weight    23 1641 80.3 kg (177 lb 0.5 oz)                 Physical Exam    Vitals and nursing note reviewed.   Constitutional:       Appearance: well-developed.   HENT:      Head: Normocephalic and atraumatic.   Cardiovascular:      Rate and Rhythm: Normal rate and regular rhythm.      Heart sounds: Normal heart sounds.    Pulmonary:      Effort: Pulmonary effort is normal.      Breath sounds: Normal breath sounds.   Abdominal:      General: No significant distention.     Palpations: Abdomen is soft.  No CVA tenderness  Musculoskeletal:         General: Normal range of motion.      Cervical back: Normal range of motion and neck supple.   Skin:     General: Skin is warm and dry.  There is some superficial bruising over the left buttocks and left lower back.  Neurological:      Mental Status: alert and oriented to person, place, and time.   Psychiatric:         Behavior: Behavior normal.         Thought Content: Thought content normal.       Labs:     I have reviewed all the lab results.    DIAGNOSTIC DATA: (Personally reviewed)     Results for orders placed or performed during the hospital encounter of 01/14/23   SARS/Influenza/RSV Quadruplex PCR ([facility] ONLY)    Specimen: Nasopharynx; Swab   Result Value Ref Range    Influenza A Screen by PCR Negative Negative    Influenza B Screen by PCR Negative Negative    COVID-19 PCR Negative Negative    Respiratory Syncytial Virus Screen by PCR Negative Negative   Comprehensive metabolic panel Blood, Venous   Result Value Ref Range    Sodium 133 (L) 135 - 145 mmol/L    Potassium 3.1 (L) 3.5 - 5.1 MMOL/L    Chloride 96 (L) 98 - 107 mmol/L    CO2 26 21 - 32 mmol/L    Anion Gap 11 3 - 11 mmol/L    BUN 17 7 - 25 mg/dL    Creatinine 0.92 0.70 - 1.30 mg/dL    Glucose 159 (H) 70 - 105 mg/dL    Calcium 8.5 (L) 8.6 - 10.3 mg/dL    AST 20 <40 U/L    ALT (SGPT) 10 7 - 52 U/L    Alkaline Phosphatase 87 45 - 115 U/L    Total Protein 6.3 6.0 - 8.3 g/dL    Albumin 3.6 3.5 - 5.3 g/dL    Total Bilirubin 1.41 (H) 0.20 - 1.40 mg/dL    Corrected Calcium 8.8 8.6 - 10.3 mg/dL    eGFR 81 >60 mL/min/1.73m*2   CBC w/auto differential Blood, Venous   Result Value Ref Range    WBC 12.3 (H) 3.7 - 9.6 10*3/uL    RBC 4.20 4.10 - 5.80 10*6/µL    Hemoglobin 13.4 13.2 - 17.2 g/dL    Hematocrit 38.6 38.0 - 50.0 %    MCV 92.0  82.0 - 97.0 fL    MCH 32.0 29.0 - 34.0 pg    MCHC 34.8 32.0 - 36.0 g/dL    RDW 14.4 11.5 - 15.0 %    Platelets 187 130 - 350 10*3/uL    MPV 7.5 6.9 - 10.8 fL    Neutrophils% 85.3 (H) 46.0 - 70.0 %    Lymphocytes% 4.5 (L) 15.0 - 47.0 %    Monocytes% 9.6 5.0 - 13.0 %    Eosinophils% 0.1 0.0 - 3.0 %    Basophils% 0.5 0.0 - 2.0 %    ANC (auto diff) 10.50 10*3/UL    Lymphocytes Absolute 0.50 10*3/uL    Monocytes Absolute 1.20 10*3/uL    Eosinophils Absolute 0.00 10*3/uL    Basophils Absolute 0.10 10*3/uL   POCT lactic acid (lactate) Blood, Venous   Result Value Ref Range    POC Lactate 1.40 0.50 - 1.99 MMOL/L   Urinalysis, Dip (part 1 of panel) Urine, Clean Catch    Specimen: Urine, Clean Catch   Result Value Ref Range    Color, Urine Yellow Yellow    Clarity, Urine Clear Clear    pH, Urine 6.0 5.0 - 8.0 PH    Specific Gravity, Urine 1.020 1.003 - 1.030    Protein, Urine 30 (A) Negative MG/DL    Glucose, Urine Negative Negative MG/DL    Ketones, Urine Negative Negative MG/DL    Blood, Urine Trace (A) Negative    Nitrite, Urine Negative Negative    Bilirubin, Urine Negative Negative    Leukocytes, Urine Small (A) Negative    Urobilinogen, Urine 2.0 (A) <2.0 mg/dL   Urinalysis, Micro (part 2 of panel) Urine, Clean Catch    Specimen: Urine, Clean Catch   Result Value Ref Range    RBC, Urine 0-2 None seen, 0-2, Negative /HPF    WBC, Urine 5-9 (A) 0 - 4 /HPF    Squamous Epithelial, Urine 0-4 None Seen-9 /HPF    Bacteria, Urine Few None seen, Few /HPF       IMAGING: (Personally Reviewed)   CT head without IV contrast    Result Date: 1/14/2023  Narrative: EXAM: CT HEAD WO IV CONTRAST 01/14/2023 CLINICAL HISTORY:  Mental status change  unknown cause TECHNIQUE: Helical axial imaging was performed through the head. Orbital-meatal angled axial reconstructions with coronal and sagittal reformations were constructed and reviewed. No intravenous contrast. Dose reduction technique utilized; automatic/anatomic modulation of X-ray tube  current (Auto mA). COMPARISON: Head CT 5/5/2022. FINDINGS: No acute brain parenchymal or extra-axial hemorrhage. No mass effect or midline shift. Moderate diffuse brain parenchymal volume loss. Ventricles and cisterns are commensurate in size with the cerebral sulci. No hydrocephalus. Moderate scattered and confluent nonspecific foci of cerebral white matter hypoattenuation, most likely representing sequelae of chronic small vessel ischemic disease. Chronic lacunar infarcts in the left basal ganglia, unchanged. No evidence of acute or evolving territorial ischemia by CT. Intracranial atherosclerosis. Calvarium is intact without evidence of acute fracture. Mild right maxillary sinus mucosal thickening. Paranasal sinuses and mastoid air cells are otherwise clear. Orbits and globes are unremarkable.     Impression: IMPRESSION: 1.  No acute intracranial pathology by CT. 2.  Moderate presumed sequelae of chronic small vessel ischemic disease and prior ischemia, stable. 3.  Moderate diffuse brain parenchymal volume loss.    XR chest portable 1 view    Result Date: 1/14/2023  Narrative: EXAM: DX CHEST PORTABLE 1 VW 01/14/2023 CLINICAL HISTORY:  Shortness of breath TECHNIQUE: Single portable upright AP view of the chest. COMPARISON: Chest x-ray 12/14/2021. FINDINGS: Heart: Stable mild cardiomegaly. Lungs: No focal pulmonary consolidation. Elevation of the right hemidiaphragm. No evidence of heart failure. Pleural Spaces: No pleural effusion. No pneumothorax. Bones: No acute osseous abnormality. Tubes, Devices, Other: Large hiatal hernia.     Impression: IMPRESSION: 1.  No acute cardiopulmonary abnormality. 2.  Mild cardiomegaly. No evidence of heart failure. 3.  Large hiatal hernia, unchanged.               BP Readings from Last 3 Encounters:   01/14/23 168/99   09/27/22 150/78   09/02/22 135/78     Lab Results   Component Value Date    NEUTROABS 10.50 01/14/2023     No results found for: CKTOTAL, CKMB, CKMBINDEX,  TROPONINI  No results found for: [id number]    No results found for: DDIMER  No results found for: HGBA1C  Lab Results   Component Value Date    CREATININE 0.92 01/14/2023     No results found for: SEDRATE  No results found for: CRP  No results found for: HEPAIGM, HEPBIGM, HEPCAB, HEPBSAB, HEPBSAG  No components found for: HIVCOMBO  No results found for: IRON, TIBC, FERRITIN  No results found for: CHOL  No results found for: HDL  No results found for: LDLCALC  No results found for: TRIG  No results found for: OCCULTBLD  Lab Results   Component Value Date    CALCIUM 8.5 (L) 01/14/2023     No results found for: HCGQUAL, HCGPREGUR  No results found for: INR, PT    No results found for: TSH         XR chest portable 1 view   Final Result   IMPRESSION:   1.  No acute cardiopulmonary abnormality.   2.  Mild cardiomegaly. No evidence of heart failure.   3.  Large hiatal hernia, unchanged.         CT head without IV contrast   Final Result   IMPRESSION:   1.  No acute intracranial pathology by CT.   2.  Moderate presumed sequelae of chronic small vessel ischemic disease and prior ischemia, stable.   3.  Moderate diffuse brain parenchymal volume loss.              Microbiology Results (last 21 days)       Procedure Component Value - Date/Time    Blood culture, 1 set [[id number]]     Lab Status: No result Specimen: Blood, Venous     Blood culture, 2 sets [[id number]]     Lab Status: No result Specimen: Blood, Venous     Narrative:      The following orders were created for panel order Blood culture, 2 sets.  Procedure                               Abnormality         Status                     ---------                               -----------         ------                     Blood culture, 1 set[[id number]]                                                         Blood culture, 1 set[[id number]]                                                           Please view results for these tests on the individual orders.    Blood culture,  1 set [[id number]]     Lab Status: No result Specimen: Blood, Venous     Urine culture [[id number]] Collected: 01/14/23 1812    Lab Status: In process Specimen: Urine, Clean Catch Updated: 01/14/23 1830    SARS/Influenza/RSV Quadruplex PCR (H/[facility] ONLY) [[id number]]  (Normal) Collected: 01/14/23 1718    Lab Status: Final result Specimen: Swab from Nasopharynx Updated: 01/14/23 1801     Influenza A Screen by PCR Negative     Influenza B Screen by PCR Negative     COVID-19 PCR Negative     Respiratory Syncytial Virus Screen by PCR Negative    Narrative:      Negative results do not preclude SARS-CoV-2, influenza A virus, influenza B virus and/or RSV infection and should not be used as the sole basis for treatment or other patient management decisions.  Negative results must be combined with clinical presentation, patient history, and/or epidemiological information.    Positive results are indicative of the presence of RNA from the identified virus; clinical correlation with patient history and other diagnostic information is necessary to determine patient infection status.    Testing was performed using the Xpert Xpress SARS-CoV-2/Flu/RSV RT-PCR assay ([employer]) on the GeneXpert system.  This test has received FDA Emergency Use Authorization (EUA) and performance characteristics have been verified by [employer].  Fact sheets for this Emergency Use Authorization (EUA) assay can be found at the following links:  For Patients:   https://www.fda.gov/media/327440/download  For Healthcare Providers:  https://www.fda.gov/media/939426/download                      Assessment/Plan      Active Problems:  No Active Problems: There are no active problems currently on the Problem List. Please update the Problem List and refresh.                Code Status: No Order        Time spent on this encounter: greater than 60 minutes were spent with over 50% on counseling and coordination of care with the patient, nursing and  others involved with care.      Beau Garcia MD  Hospitalist Service       A voice recognition program was used to aid in documentation of this record.  Sometimes words are not printed exactly as they were spoken.  While efforts were made to carefully edit and correct any inaccuracies, some errors may be present; please take these into context.  Please contact the provider if errors are identified.

## 2023-01-15 NOTE — PROGRESS NOTES
Daily Progress Note       LOS: 1 day     Subjective     Joseph is an 88-year-old male with a past medical history significant for BPH, elevated PSA, hypertension, hyperlipidemia, non-insulin-dependent diabetes who presented to the [facility] ER on 1/14 for confusion and weakness.  Patient's wife was present when he was evaluated in the emergency room and stated that he typically ambulates with a walker but was having some trouble getting out of bed.  She took him into see their PCP Dr. Becker and a UTI was diagnosed.  It showed greater than 100,000 WBC, and patient was started on Bactrim.  He was also having increased urinary incontinence.  He himself denied dysuria but did admit to frequency.  Unfortunately, he continued to worsen despite the Bactrim and then had a fall with bruising on his right buttocks and right lower back.  His wife did not believe he hit his head and he did not lose consciousness.  Joseph also has not been consuming much in the way of oral intake the last couple of days.  He denied having fevers or chills.  He also denied any upper respiratory symptoms although he does have a history of smoking, remote.  He has never needed oxygen in the past.  Labs on admission included a negative PCR for influenza, COVID and RSV, sodium and potassium of 133/3.1, BUN and creatinine 17/0.92, normal LFTs, total bilirubin 1.41, WBC 12.3, hemoglobin 13.4, 85% neutrophils, lactic acid 1.4, repeat urinalysis done showing RBCs and WBCs, few bacteria.  A CT head showed nothing acute.  Moderate presumed sequela of chronic small vessel disease and prior ischemia as well as moderate diffuse brain parenchymal volume loss.  A chest x-ray showed no acute cardiopulmonary abnormality and mild cardiomegaly with no evidence of heart failure.  Blood cultures were obtained and are pending.    Interval History: has no complaint of pain, per nursing staff is confused still this am. AVASYS in the room. Bladder scan 250,  incontinent in briefs but some retention.     Objective     Vital signs in last 24 hours:  Temp:  [36.6 °C (97.9 °F)-37.6 °C (99.7 °F)] 36.7 °C (98 °F)  Heart Rate:  [71-83] 73  Resp:  [18-20] 18  SpO2:  [91 %-96 %] 92 %  BP: (137-168)/() 137/89  SpO2/FiO2 Ratio Using Approximate FiO2 (%):  [328.6-342.9] 328.6  Estimated P/F Ratio Using Approximate FiO2 (%):  [284.4-296] 284.4    Intake/Output last 3 shifts:  I/O last 3 completed shifts:  In: 150 [P.O.:150]  Out: -   Intake/Output this shift:  No intake/output data recorded.    Physical Exam  Vitals and nursing note reviewed.   Constitutional:       Appearance: Normal appearance.   HENT:      Head: Normocephalic and atraumatic.   Cardiovascular:      Rate and Rhythm: Normal rate and regular rhythm.      Pulses: Normal pulses.      Heart sounds: Normal heart sounds. No murmur heard.  Pulmonary:      Effort: Pulmonary effort is normal. No respiratory distress.      Breath sounds: Normal breath sounds.   Abdominal:      General: Bowel sounds are normal. There is no distension.      Palpations: Abdomen is soft.      Tenderness: There is no abdominal tenderness.   Musculoskeletal:      Cervical back: Neck supple.      Right lower leg: No edema.      Left lower leg: No edema.   Neurological:      Mental Status: He is disoriented.       Medications:    Current Facility-Administered Medications:     amLODIPine (NORVASC) tablet 5 mg, 5 mg, oral, Daily, Beau Garcia MD    atorvastatin (LIPITOR) tablet 10 mg, 10 mg, oral, Daily, Beau Garcia MD, 10 mg at 01/14/23 2052    doxazosin (CARDURA) tablet 8 mg, 8 mg, oral, Nightly, Beau Garcia MD, 8 mg at 01/14/23 2052    irbesartan (AVAPRO) 300 mg, hydroCHLOROthiazide (HYDRODIURIL) 12.5 mg for AVALIDE 300/12.5, , oral, Daily, Beau Garcia MD    metoprolol succinate XL (TOPROL-XL) 24 hr tablet 25 mg, 25 mg, oral, Daily, Beau Garcia MD    Insert peripheral IV, , , Once **AND** Maintain IV access, , , Until  discontinued **AND** Saline lock IV, , , Once **AND** sodium chloride flush 3 mL, 3 mL, intravenous, PRN, Beau Garcia MD    enoxaparin (LOVENOX) syringe 40 mg, 40 mg, subcutaneous, Daily at 1400, Beau Garcia MD    acetaminophen (TYLENOL) tablet 325-650 mg, 325-650 mg, oral, q4h PRN, Beau Garcia MD    traZODone (DESYREL) tablet 25 mg, 25 mg, oral, Nightly PRN, Beau Garcia MD, 25 mg at 01/15/23 0027    polyethylene glycol (GLYCOLAX) powder 17 g, 17 g, oral, Daily, Beau Garcia MD, 17 g at 01/14/23 2056    ondansetron (ZOFRAN) tablet 4 mg, 4 mg, oral, q6h PRN **OR** ondansetron (ZOFRAN) injection 4 mg, 4 mg, intravenous, q6h PRN, Beau Garcia MD    alum-mag hydroxide-simeth (MAALOX ADVANCED) suspension 30 mL, 30 mL, oral, 3x daily PRN, Beau Garcia MD    cefTRIAXone (ROCEPHIN) IV Push 1,000 mg, 1,000 mg, intravenous, q24h, Beau Garcia MD    dextrose 50 % in water (D50W) syringe 15-30 mL, 15-30 mL, intravenous, q15 min PRN, Beau Garcia MD    dextrose (GLUTOSE) 40 % gel 15.2 g, 15.2 g, oral, q15 min PRN, Beau Garcia MD    glucagon (GLUCAGEN) injection 1 mg, 1 mg, intramuscular, q15 min PRN **OR** glucagon (GLUCAGEN) injection 1 mg, 1 mg, subcutaneous, q15 min PRN, Beau Garcia MD    insulin aspart U-100 (NovoLOG) injection pen (correction dose) 0-15 Units, 0-15 Units, subcutaneous, Insulin: 4x daily with meals, Beau Garcia MD    Labs:  CBC:   Lab Results   Component Value Date    WBC 9.8 (H) 01/15/2023    RBC 3.83 (L) 01/15/2023    HGB 12.5 (L) 01/15/2023    HCT 35.1 (L) 01/15/2023     01/15/2023     CMP:   Lab Results   Component Value Date     (L) 01/15/2023    K 3.0 (L) 01/15/2023    CL 99 01/15/2023    CO2 26 01/15/2023    GLUCOSE 126 (H) 01/15/2023    CREATININE 0.80 01/15/2023    CALCIUM 8.1 (L) 01/15/2023    ALBUMIN 3.3 (L) 01/15/2023    ALKPHOS 77 01/15/2023    BILITOT 1.11 01/15/2023    ALT 9 01/15/2023    AST 20 01/15/2023    BUN 13 01/15/2023    ANIONGAP  8 01/15/2023     Coagulation: No results found for: PT, INR, APTT  ABGs: No results found for: PHART, PHCAP, PHVEN, PO2, PO2ART, PO2CAP, ZRE1CUY, SNO8CLF, PCO2, BASEEXCESS    Imaging:  reviewed    Principal Problem:    Altered mental status        Plan:    UTI   Confusion/weakness  Decreased p.o. intake  Leukocytosis  -Patient presented initially to outside PCP, diagnosed with UTI, placed on Bactrim but subsequently became more delirious, decreased p.o. intake and had a fall at home  -Upon arrival he was noted to be hypoxic in the ER requiring 2 L to keep O2 sats greater than 92%, confused, leukocytosis  -Patient given Rocephin 1 g, urine cultures pending, 1 L fluid bolus, bladder scans every shift  -Lactic acid was normal which requires no further repeats, blood cultures pending, WBC trending down  - PT/OT consulted, he uses FWW at baseline    Hypokalemia  Hyponatremia  - replacing K  - patient takes klor con at home 20mEq bid    Hypertension  Hyperlipidemia  BPH, elevated PSA  -Continued on home medications including metoprolol, irbesartan hydrochlorothiazide, doxazosin, atorvastatin, amlodipine  -Does follow outpatient with urology    Depression  - restarted home prozac    Non-insulin-dependent diabetes mellitus  -Holding metformin, placed on sliding scale insulin    DVT prophylaxis: Lovenox  CODE STATUS: DNR  Disposition: To be determined based on his mental status, improving oxygenation, etc.    Denisha Wheeler MD  7:05 AM, 1/15/2023.    Denisha Wheeler MD

## 2023-01-15 NOTE — MEDICATION HISTORY SPECIALIST NOTES
[facility] ED-225    CSN: [id number]  : [id number]    Information sources:  EPIC  Complete Dispense Report  Go Reconcile    Allergies verified.    Patient interviewed in person who provided all history. Patient verified medications and provided last doses. Verified with complete dispense report.     Discontinued medications:  Fish oil - does not take  Tramadol - does not take    Profile was checked for  medications. Reviewed previously removed medication history section for removed medications and reasoning. If applicable reasoning will be listed in discrepancies.

## 2023-01-16 ENCOUNTER — TELEPHONE (OUTPATIENT)
Dept: UROLOGY | Facility: CLINIC | Age: 88
End: 2023-01-16
Payer: MEDICARE

## 2023-01-16 VITALS
HEIGHT: 71 IN | RESPIRATION RATE: 16 BRPM | WEIGHT: 177.03 LBS | TEMPERATURE: 97.3 F | HEART RATE: 84 BPM | SYSTOLIC BLOOD PRESSURE: 122 MMHG | OXYGEN SATURATION: 92 % | DIASTOLIC BLOOD PRESSURE: 79 MMHG | BODY MASS INDEX: 24.78 KG/M2

## 2023-01-16 PROBLEM — N30.00 ACUTE CYSTITIS WITHOUT HEMATURIA: Status: ACTIVE | Noted: 2023-01-16

## 2023-01-16 LAB
ANION GAP SERPL CALC-SCNC: 9 MMOL/L (ref 3–11)
BACTERIA UR CULT: ABNORMAL
BUN SERPL-MCNC: 17 MG/DL (ref 7–25)
CALCIUM SERPL-MCNC: 8.4 MG/DL (ref 8.6–10.3)
CHLORIDE SERPL-SCNC: 99 MMOL/L (ref 98–107)
CO2 SERPL-SCNC: 29 MMOL/L (ref 21–32)
CREAT SERPL-MCNC: 0.82 MG/DL (ref 0.7–1.3)
EOSINOPHIL # BLD MANUAL: 0.2 10*3/UL
EOSINOPHIL NFR BLD MANUAL: 2 % (ref 0–3)
ERYTHROCYTE [DISTWIDTH] IN BLOOD BY AUTOMATED COUNT: 14.2 % (ref 11.5–15)
GFR SERPL CREATININE-BSD FRML MDRD: 85 ML/MIN/1.73M*2
GLUCOSE BLDC GLUCOMTR-SCNC: 143 MG/DL (ref 70–105)
GLUCOSE BLDC GLUCOMTR-SCNC: 145 MG/DL (ref 70–105)
GLUCOSE SERPL-MCNC: 151 MG/DL (ref 70–105)
HCT VFR BLD AUTO: 39.1 % (ref 38–50)
HGB BLD-MCNC: 13.8 G/DL (ref 13.2–17.2)
LYMPHOCYTES # BLD MANUAL: 1.8 10*3/UL
LYMPHOCYTES NFR BLD MANUAL: 19 % (ref 15–47)
MCH RBC QN AUTO: 32.6 PG (ref 29–34)
MCHC RBC AUTO-ENTMCNC: 35.3 G/DL (ref 32–36)
MCV RBC AUTO: 92.5 FL (ref 82–97)
MONOCYTES # BLD MANUAL: 0.8 10*3/UL
MONOCYTES NFR BLD MANUAL: 9 % (ref 5–13)
NEUTROPHILS # BLD MANUAL: 6.58 10*3/UL
NEUTS SEG # BLD MANUAL: 6.6 10*3/UL
NEUTS SEG NFR BLD MANUAL: 70 % (ref 46–70)
PLATELET # BLD AUTO: 227 10*3/UL (ref 130–350)
PLATELET CLUMP BLD QL SMEAR: NORMAL
PLATELET MORPHOLOGY IN BLOOD: NORMAL
PMV BLD AUTO: 7.8 FL (ref 6.9–10.8)
POTASSIUM SERPL-SCNC: 3 MMOL/L (ref 3.5–5.1)
RBC # BLD AUTO: 4.23 10*6/ΜL (ref 4.1–5.8)
RBC MORPH BLD: NORMAL
SODIUM SERPL-SCNC: 137 MMOL/L (ref 135–145)
TOTAL CELLS COUNTED BLD: 100 CELLS
WBC # BLD AUTO: 9.4 10*3/UL (ref 3.7–9.6)
WBC NRBC COR # BLD: 9.4 10*3/UL

## 2023-01-16 PROCEDURE — 94660 CPAP INITIATION&MGMT: CPT | Performed by: DENTIST

## 2023-01-16 PROCEDURE — 82947 ASSAY GLUCOSE BLOOD QUANT: CPT | Mod: QW

## 2023-01-16 PROCEDURE — 6360000200 HC RX 636 W HCPCS (ALT 250 FOR IP): Mod: JZ | Performed by: FAMILY MEDICINE

## 2023-01-16 PROCEDURE — 80048 BASIC METABOLIC PNL TOTAL CA: CPT | Performed by: FAMILY MEDICINE

## 2023-01-16 PROCEDURE — 97165 OT EVAL LOW COMPLEX 30 MIN: CPT

## 2023-01-16 PROCEDURE — 2590000100 HC RX 259: Performed by: FAMILY MEDICINE

## 2023-01-16 PROCEDURE — 6370000100 HC RX 637 (ALT 250 FOR IP): Performed by: FAMILY MEDICINE

## 2023-01-16 PROCEDURE — 36415 COLL VENOUS BLD VENIPUNCTURE: CPT | Performed by: FAMILY MEDICINE

## 2023-01-16 PROCEDURE — 99239 HOSP IP/OBS DSCHRG MGMT >30: CPT | Performed by: FAMILY MEDICINE

## 2023-01-16 PROCEDURE — 97530 THERAPEUTIC ACTIVITIES: CPT | Mod: GP,CQ

## 2023-01-16 PROCEDURE — 85025 COMPLETE CBC W/AUTO DIFF WBC: CPT | Performed by: FAMILY MEDICINE

## 2023-01-16 RX ORDER — POTASSIUM CHLORIDE 750 MG/1
30 TABLET, EXTENDED RELEASE ORAL 2 TIMES DAILY
Qty: 180 TABLET | Refills: 0 | Status: ON HOLD | OUTPATIENT
Start: 2023-01-16 | End: 2023-02-26

## 2023-01-16 RX ADMIN — AMLODIPINE BESYLATE 5 MG: 5 TABLET ORAL at 08:14

## 2023-01-16 RX ADMIN — CEFTRIAXONE SODIUM 1000 MG: 1 INJECTION, POWDER, FOR SOLUTION INTRAMUSCULAR; INTRAVENOUS at 14:03

## 2023-01-16 RX ADMIN — ATORVASTATIN CALCIUM 10 MG: 10 TABLET, FILM COATED ORAL at 08:14

## 2023-01-16 RX ADMIN — FLUOXETINE 20 MG: 20 CAPSULE ORAL at 08:14

## 2023-01-16 RX ADMIN — ENOXAPARIN SODIUM 40 MG: 100 INJECTION SUBCUTANEOUS at 14:03

## 2023-01-16 RX ADMIN — METOPROLOL SUCCINATE 25 MG: 25 TABLET, EXTENDED RELEASE ORAL at 08:14

## 2023-01-16 RX ADMIN — HYDROCHLOROTHIAZIDE 1 DOSE: 12.5 TABLET ORAL at 08:14

## 2023-01-16 RX ADMIN — POTASSIUM CHLORIDE 20 MEQ: 750 TABLET, FILM COATED, EXTENDED RELEASE ORAL at 08:14

## 2023-01-16 RX ADMIN — POLYETHYLENE GLYCOL 3350 17 G: 17 POWDER, FOR SOLUTION ORAL at 08:13

## 2023-01-16 NOTE — INTERDISCIPLINARY/THERAPY
Case Management Discharge Note    Phone # [phone]    Discharge Disposition: home      Needs transportation assistance at DC: no    Specialty Referrals: yes          Active Ambulatory Referrals   (From admission, onward)                 Start     Ordered    01/16/23 0000  Ambulatory referral to Home Health        Comments: Please evaluate Joseph Wong for admission to Home Health.    Special Instructions:   Adm Dx:  UTI; Falls at home  PMH:  CAD; gerd; hiatal hernia; HTN; HLD; vertigo;BPH; DM; depression; mac degeneration    The primary reason for home health care is Development of an in-home therapy program and/or for weakness. Homebound criteria are met because: Unsteady gait, poor balance.   Question Answer Comment   Requested Start of Care Date Within 48 hours    Disciplines Requested Skilled Nursing    Disciplines Requested Physical Therapy    Disciplines Requested Occupational Therapy    Services Requested Skilled Assessment    Services Requested Strengthening/Exercise    Services Requested Functional Safety Training    Services Requested ADL Training        01/16/23 1411                    Support System Notified: yes     Narrative: Joseph Wong is a 88 y.o. male, with a past medical history of BPH and elevated PSA, hypertension, hyperlipidemia, non-insulin-dependent diabetes presenting as a 2-day history of weakness and delirium.     Patient lives at home with his wife.  Approximately 48 hours ago the wife noticed he was slightly confused and weak.  She reports that typically he ambulates with a walker but he was having trouble getting out of bed.  She took him to their PCP, Dr. Becker, and a UTI was diagnosed.   Patient participated in PT/OT with  [facility] therapy team staff and returned to baseline. Patient will discharge home with his wife. Patient has been scheduled with his urologist for 8/7/2023. Patient will retrieve medications from [facility] in [place], [place].   Patient was referred to  Home Health for additional therapy services. JOSÉ MIGUEL coordinated care with Michelle CAVAZOS RN Home health nurse for arrangement and assessment.

## 2023-01-16 NOTE — DISCHARGE SUMMARY
HOSPITALIST DISCHARGE SUMMARY    Date of Service: 1/16/2023    Admission Date: 1/14/2023  Discharge Date: 01/16/23    Admitting Physician: Beau Garcia MD  Discharging Hospitalist Team:Denisha Wheeler MD    PCP: Malena Mcclain MD    DISCHARGE DIAGNOSIS   Principal Problem:    Altered mental status  Active Problems:    Acute cystitis without hematuria      Primary Discharge Diagnosis  Acute delerium/weakness secondary to below  E. Coli UTI/Leukocytosis, resolved  Hypokalemia, chronic, adjusted  Hyponatremia, resolved  Hypertension  Hyperlipidemia  Elevated PSA with BPH  Depression  Non-insulin-dependent diabetes mellitus  Acute respiratory distress in ED, as evidenced by shortness of breath, tachypnea, resolved    Consultants: none    Imaging Studies / Procedures   CT head without IV contrast    Result Date: 1/14/2023  Narrative: EXAM: CT HEAD WO IV CONTRAST 01/14/2023 CLINICAL HISTORY:  Mental status change  unknown cause TECHNIQUE: Helical axial imaging was performed through the head. Orbital-meatal angled axial reconstructions with coronal and sagittal reformations were constructed and reviewed. No intravenous contrast. Dose reduction technique utilized; automatic/anatomic modulation of X-ray tube current (Auto mA). COMPARISON: Head CT 5/5/2022. FINDINGS: No acute brain parenchymal or extra-axial hemorrhage. No mass effect or midline shift. Moderate diffuse brain parenchymal volume loss. Ventricles and cisterns are commensurate in size with the cerebral sulci. No hydrocephalus. Moderate scattered and confluent nonspecific foci of cerebral white matter hypoattenuation, most likely representing sequelae of chronic small vessel ischemic disease. Chronic lacunar infarcts in the left basal ganglia, unchanged. No evidence of acute or evolving territorial ischemia by CT. Intracranial atherosclerosis. Calvarium is intact without evidence of acute fracture. Mild right maxillary sinus mucosal thickening.  Paranasal sinuses and mastoid air cells are otherwise clear. Orbits and globes are unremarkable.     Impression: IMPRESSION: 1.  No acute intracranial pathology by CT. 2.  Moderate presumed sequelae of chronic small vessel ischemic disease and prior ischemia, stable. 3.  Moderate diffuse brain parenchymal volume loss.    XR chest portable 1 view    Result Date: 1/14/2023  Narrative: EXAM: DX CHEST PORTABLE 1 VW 01/14/2023 CLINICAL HISTORY:  Shortness of breath TECHNIQUE: Single portable upright AP view of the chest. COMPARISON: Chest x-ray 12/14/2021. FINDINGS: Heart: Stable mild cardiomegaly. Lungs: No focal pulmonary consolidation. Elevation of the right hemidiaphragm. No evidence of heart failure. Pleural Spaces: No pleural effusion. No pneumothorax. Bones: No acute osseous abnormality. Tubes, Devices, Other: Large hiatal hernia.     Impression: IMPRESSION: 1.  No acute cardiopulmonary abnormality. 2.  Mild cardiomegaly. No evidence of heart failure. 3.  Large hiatal hernia, unchanged.        Pertinent Laboratory Studies      Results from last 4 days   Lab Units 01/16/23  0745 01/15/23  0515 01/14/23  1711   WBC AUTO 10*3/uL 9.4 9.8* 12.3*   HEMOGLOBIN g/dL 13.8 12.5* 13.4   HEMATOCRIT % 39.1 35.1* 38.6   PLATELETS AUTO 10*3/uL 227 160 187     Results from last 4 days   Lab Units 01/16/23  0745 01/15/23  0515 01/14/23  1711   SODIUM mmol/L 137 133* 133*   POTASSIUM MMOL/L 3.0* 3.0* 3.1*   CHLORIDE mmol/L 99 99 96*   CO2 mmol/L 29 26 26   BUN mg/dL 17 13 17   CREATININE mg/dL 0.82 0.80 0.92   CALCIUM mg/dL 8.4* 8.1* 8.5*   ALBUMIN g/dL  --  3.3* 3.6   TOTAL PROTEIN g/dL  --  5.6* 6.3   BILIRUBIN TOTAL mg/dL  --  1.11 1.41*   ALK PHOS U/L  --  77 87   ALT U/L  --  9 10   AST U/L  --  20 20   GLUCOSE mg/dL 151* 126* 159*       Lab Results   Component Value Date    CALCIUM 8.4 (L) 01/16/2023             Results from last 4 days   Lab Units 01/14/23  1711   TSH uIU/ml 3.527       Exam  Temp:  [36.3 °C (97.3 °F)-37.4  °C (99.3 °F)] 36.3 °C (97.3 °F)  Heart Rate:  [67-84] 84  Resp:  [16-24] 16  SpO2:  [90 %-94 %] 92 %  BP: (117-145)/(78-91) 122/79    Physical Exam  Vitals reviewed.   Constitutional:       Appearance: He is normal weight.   HENT:      Head: Normocephalic and atraumatic.   Cardiovascular:      Rate and Rhythm: Normal rate and regular rhythm.      Pulses: Normal pulses.      Heart sounds: Normal heart sounds.   Pulmonary:      Effort: Pulmonary effort is normal. No respiratory distress.      Breath sounds: Normal breath sounds.   Abdominal:      General: Bowel sounds are normal. There is no distension.      Palpations: Abdomen is soft.      Tenderness: There is no abdominal tenderness.   Musculoskeletal:      Cervical back: Neck supple.      Right lower leg: No edema.      Left lower leg: No edema.   Neurological:      General: No focal deficit present.      Mental Status: He is alert and oriented to person, place, and time. Mental status is at baseline.   Psychiatric:         Mood and Affect: Mood normal.         Behavior: Behavior normal.       Hospital Course  oJseph Wong is an 88 y.o. male with a PMHx significant for BPH, elevated PSA, hypertension, hyperlipidemia, non-insulin-dependent diabetes who presented to the \[facility]\"" ER on 1/14 for confusion and weakness.  Patient's wife was present when he was evaluated in the emergency room and stated that he typically ambulates with a walker but was having some trouble getting out of bed.  She took him into see their PCP Dr. Becker and a UTI was diagnosed.  It showed greater than 100,000 WBC, and patient was started on Bactrim.  He was also having increased urinary incontinence.  He himself denied dysuria but did admit to frequency.  Unfortunately, he continued to worsen despite the Bactrim and then had a fall with bruising on his right buttocks and right lower back.  His wife did not believe he hit his head and he did not lose consciousness.  Joseph also  has not been consuming much in the way of oral intake the last couple of days.  He denied having fevers or chills.  He also denied any upper respiratory symptoms although he does have a history of smoking, remote.  He has never needed oxygen in the past.  Labs on admission included a negative PCR for influenza, COVID and RSV, sodium and potassium of 133/3.1, BUN and creatinine 17/0.92, normal LFTs, total bilirubin 1.41, WBC 12.3, hemoglobin 13.4, 85% neutrophils, lactic acid 1.4, repeat urinalysis done showing RBCs and WBCs, few bacteria.  A CT head showed nothing acute.  Moderate presumed sequela of chronic small vessel disease and prior ischemia as well as moderate diffuse brain parenchymal volume loss.  A chest x-ray showed no acute cardiopulmonary abnormality and mild cardiomegaly with no evidence of heart failure.  Blood cultures were obtained.   Joseph was admitted to inpatient status, placed on IV Rocephin and a urine culture was sent.  This culture has been finalized as E. coli, and he has had 3 IV doses of Rocephin, this is considered fully treated.  With regard to his confusion and delirium, this has improved over the last 24 hours immensely.  He initially did not have much of an appetite but over the last day and 2 days has been eating quite well.  Physical therapy was consulted for his weakness and falls at home, and today they have evaluated him feeling that he could go home with home health.  He seems to be at his baseline, using a front wheeled walker and mildly weak however able to ambulate short distances in his home.  His blood cultures are so far negative, and his white blood cell count has trended down to normal.  With regard to his low potassium, patient takes 20 mEq twice daily at home, but this may not be enough as his potassium level has been 3.0 here.  I have increased his home potassium dose to 30 mEq twice daily with a recheck in 3 days time.  Home health could draw this, and his regular  physician should check this and follow and titrate medication as needed.  His sodium did come up from around 1 33-1 37, although he admits he does not eat much sodium at home either.  He is not very good at drinking water, and needs to do better at this.  His chronic issues including hypertension, hyperlipidemia, BPH are chronic and stable.  He has been urinating well while here, and was continued on all of his home medications.  He does have a follow-up appointment with urology in a couple of months.  The below instructions were gone over with him and his wife at the bedside on the day of discharge.  Home health orders have been acute.  He will be discharged home, and is medically safe to do so.      DNR   Condition on Discharge.  Stable        Discharge medication list        CHANGE how you take these medications        Instructions   potassium chloride 10 mEq CR tablet  What changed: how much to take   Take 3 tablets (30 mEq total) by mouth 2 (two) times a day            CONTINUE taking these medications        Instructions   acetaminophen 500 mg tablet  Commonly known as: TYLENOL      amLODIPine 5 mg tablet  Commonly known as: NORVASC      atorvastatin 10 mg tablet  Commonly known as: LIPITOR      doxazosin 8 mg tablet  Commonly known as: CARDURA   Take 1 tablet (8 mg total) by mouth nightly     FLUoxetine 20 mg capsule  Commonly known as: PROzac      irbesartan-hydrochlorothiazide 300-12.5 mg per tablet  Commonly known as: AVALIDE      KRILL OIL ORAL      metFORMIN 500 mg tablet  Commonly known as: GLUCOPHAGE      metoprolol succinate XL 25 mg 24 hr tablet  Commonly known as: TOPROL-XL      PRESERVISION AREDS-2 ORAL             STOP taking these medications      sulfamethoxazole-trimethoprim 800-160 mg per tablet  Commonly known as: BACTRIM DS,SEPTRA DS               Where to Get Your Medications        [id number] - [place], [place] - [address]  [address]  [address]  [address], [place] [address]      Phone: [phone]   potassium chloride 10 mEq CR tablet         Discharge Instructions and Follow-up  01 - Home or Self-Care  Diet will be regular.   Activity will be as tolerated.    Follow up: Call to schedule an appointment with Malena Mcclain MD in the next 7-10 days  Patient was given the following instructions at discharge.  His wife is at the bedside.    You are being discharged from the hospital.  As we have discussed, I am increasing your potassium from 40 mEq daily to 60 mEq daily, split as before to twice daily dosing.  You should have a follow-up BMP to check your potassium on Thursday or Friday of this week.  Home health can draw this.  You have completed all of your antibiotics needed in the hospital with the IV medications.  You do not need to take the home antibiotics that are oral.  Please follow-up with your primary care provider regarding your potassium levels and further titration of this medication.  Low or high values can cause cardiac complications.  You have been referred to home health, they will contact you for the first appointment.       Time spent coordinating discharge: 40 min, of which > 25 mins on was spent on coordinating care & Counseling.    Signed:  Denisha Wheeler MD    A voice recognition program was used to aid in documentation of this record. Sometimes words are not printed exactly as they were spoken.  While efforts were made to carefully edit and correct any inaccuracies, some areas may be present; please take these into context.  Please contact the provider if areas are identified.

## 2023-01-16 NOTE — NURSING END OF SHIFT
Nursing End of Shift Summary:    Patient: Joseph Wong  MRN: [id number]  : 10/20/1934, Age: 88 y.o.    Location: [address] [place] [address]    Nursing Goals  Clinical Goals for the Shift: Maintain safety and comfort, monitor vital signs    Narrative Summary of Progress Toward Clinical Goals:  Patient continues to be intermittently confused and requires avasys. Fall precautions in place, bed alarm on, fall bundle in room. Patient very impulsive and attempts to get out of bed without using call light.     Barriers to Goals/Nursing Concerns:  No    New Patient or Family Concerns/Issues:  No    Shift Summary:   Significant Events & Communications to Providers (last 12 hours)       Last 5 Values    No documentation.                 Oxygen Usage (last 12 hours)       Last 5 Values       Row Name 01/15/23 0800                   Oxygen Weaning Trial by Nursing    Is Patient on Room Air OR on the Same Amount of O2 as at Home? No                      Mobility (last 12 hours)       Last 5 Values       Row Name 01/15/23 0537 01/15/23 0800 01/15/23 0807 01/15/23 1200 01/15/23 1534       Mobility    Activity Turn Turn -- Turn --    Level of Assistance Moderate assist, patient does 50-74% -- -- -- --    Length of Time in Chair (min) -- 0 -- 0 --    Distance Ambulated (feet) -- 0 Feet -- 0 Feet --    Distance Ambulated (Meters Calculated) -- 0 Meters -- 0 Meters --    Patient Position Supine -- Supine -- Supine    Turning -- Turns self -- Turns self --    Distance Ambulated (Meters Calculated)(Do Not Use) -- 0 Feet -- 0 Feet --                  Urethral Catheter       Active Urethral Catheter       None                  Active Lines       Active Central venous catheter / Peripherally inserted central catheter / Implantable Port / Hemodialysis catheter / Midline Catheter       None                  Infusing Medications   Medication Dose Last Rate     PRN Medications   Medication Dose Last Admin    sodium chloride  3 mL      acetaminophen  325-650  mg      traZODone  25 mg 25 mg at 01/15/23 0027    ondansetron  4 mg      Or    ondansetron  4 mg      alum-mag hydroxide-simeth  30 mL      dextrose 50 % in water (D50W)  15-30 mL      dextrose  15.2 g      glucagon  1 mg      Or    glucagon  1 mg

## 2023-01-16 NOTE — TELEPHONE ENCOUNTER
Patient was in the hospital for 2 days for a UTI and patients wife wanted to know if they needed to be seen or not. She did request to reschedule to a sooner appointment so we rescheduled to April. Patient prefers nothing earlier than 10 am. She would like a nurse to call her to see if they should really be seen earlier or not.    Give wife, Alessandra a call at [phone].     Informed of 48 hour call back policy.

## 2023-01-16 NOTE — TELEPHONE ENCOUNTER
Caller would like to schedule an earlier appt for the pt has she states that the pt is currently having problems and he is unable to wait until 8/2023     Patient: Joseph Wong    Caller Name:: Alessandra Wong    Callback Number: [phone]    Best Availability: as soon as possible    I advised caller of a callback by 24-48 hours.

## 2023-01-16 NOTE — PLAN OF CARE
Problem: Safety Adult - Fall  Goal: Free from fall injury  Description: INTERVENTIONS:    Inpatient - Please reference Cares/Safety Flowsheet under [facility] Fall Risk for interventions.  Pediatrics - Please reference Peds Daily Cares/Safety Flowsheet under [facility] Pediatric Fall Assessment Fall Bundle for interventions  LD/OB - Please reference OB Shift Screening Flowsheet under OB Fall Risk for interventions.  Outcome: Progressing     Problem: Pain - Adult  Goal: Verbalizes/displays adequate comfort level or baseline comfort level  Description: INTERVENTIONS:  1. Encourage patient to monitor pain and request interventions  2. Assess pain using the appropriate pain scale  3. Administer analgesics based on type and severity of pain and evaluate response  4. Educate/Implement non-pharmacological measures as appropriate and evaluate response  5. Consider cultural, developmental and social influences on pain and pain management  6. Notify Provider if interventions unsuccessful or patient reports new pain  Outcome: Progressing     Problem: Safety Adult  Goal: Patient will remain safe during hospitalization  Description: INTERVENTIONS    1. Assess patient for fall risk and implement interventions if needed  2. Use safe transport techniques  3. Assess patient using the appropriate Harvey skin assessment scale  4. Assess patient for risk of aspiration  5. Assess patient for risk of elopement  6. Assess patient for risk of suicide  Outcome: Progressing     Problem: Daily Care  Goal: Daily care needs are met  Description: INTERVENTIONS:   1. Assess and monitor skin integrity  2. Identify patients at risk for skin breakdown on admission and per policy  3. Assess and monitor ability to perform self care and identify potential discharge needs  4. Assess skin integrity/risk for skin breakdown  5. Assist patient with activities of daily living as needed  6. Encourage independent activity per ability   7. Provide mouth care   8.  Include patient/family/caregiver in decisions related to daily care   Outcome: Progressing     Problem: Knowledge Deficit  Goal: Patient/family/caregiver demonstrates understanding of disease process, treatment plan, medications, and discharge instructions  Description: INTERVENTIONS:   1. Complete learning assessment and assess knowledge base  2. Provide teaching at level of understanding   3. Provide teaching via preferred learning methods  Outcome: Progressing

## 2023-01-16 NOTE — PLAN OF CARE
Problem: Safety Adult - Fall  Goal: Free from fall injury  Description: INTERVENTIONS:    Inpatient - Please reference Cares/Safety Flowsheet under [facility] Fall Risk for interventions.  Pediatrics - Please reference Peds Daily Cares/Safety Flowsheet under [facility] Pediatric Fall Assessment Fall Bundle for interventions  LD/OB - Please reference OB Shift Screening Flowsheet under OB Fall Risk for interventions.  1/16/2023 1428 by Otto Dodson RN  Outcome: Adequate for Discharge  1/16/2023 1306 by Otto Dodson RN  Outcome: Progressing     Problem: Neurosensory - Adult  Goal: Achieves stable or improved neurological status  Description: INTERVENTIONS  1. Assess for and report changes in neurological status  2. Initiate measures to prevent increased intracranial pressure  3. Maintain blood pressure and fluid volume within ordered parameters to optimize cerebral perfusion and minimize risk of hemorrhage  4. Monitor temperature, glucose, and sodium. Initiate appropriate interventions as ordered  1/16/2023 1428 by Otto Dodson RN  Outcome: Adequate for Discharge  1/16/2023 1306 by Otto Dodson RN  Outcome: Progressing  Goal: Absence of seizures  Description: INTERVENTIONS  1. Monitor for seizure activity  2. Administer anti-seizure medications as ordered  3. Monitor neurological status  4. Assist patient in avoiding triggers, if identified  1/16/2023 1428 by Otto Dodson RN  Outcome: Adequate for Discharge  1/16/2023 1306 by Otto Dodson RN  Outcome: Progressing  Goal: Remains free of injury related to seizures activity  Description: INTERVENTIONS  1. Maintain airway, patient safety  and administer oxygen as ordered  2. Monitor patient for seizure activity, document and report duration and description of seizure to provider  3. If seizure occurs, turn patient to side and suction secretions as needed  4. Reorient patient post seizure  5. Seizure pads on all 4 side rails  6. Instruct patient/family to  notify RN of any seizure activity  7. Instruct patient/family to call for assistance with activity based on assessment  1/16/2023 1428 by Otto Dodson RN  Outcome: Adequate for Discharge  1/16/2023 1306 by Otto Dodson RN  Outcome: Progressing  Goal: Achieves maximal functionality and self care  Description: INTERVENTIONS  1. Monitor swallowing and airway patency with patient fatigue and changes in neurological status  2. Encourage and assist patient to increase activity and self care with guidance from PT/OT  3. Encourage visually impaired, hearing impaired and aphasic patients to use assistive/communication devices  1/16/2023 1428 by Otto Dodson RN  Outcome: Adequate for Discharge  1/16/2023 1306 by Otto Dodson RN  Outcome: Progressing     Problem: Respiratory - Adult  Goal: Achieves optimal ventilation and oxygenation  Description: INTERVENTIONS:  1. Assess for changes in respiratory status  2. Assess for changes in mentation and behavior  3. Position to facilitate oxygenation and minimize respiratory effort  4. Oxygen supplementation based on oxygen saturation or ABGs  5. Assess patient's ability to cough effectively  6. Encourage broncho-pulmonary hygiene including cough, deep breathe  7. Assess the need for suctioning   8. Assess and instruct to report SOB or any respiratory difficulty  9. Respiratory Therapy support as indicated, including medications and treatment.  1/16/2023 1428 by Otto Dodson RN  Outcome: Adequate for Discharge  1/16/2023 1306 by Otto Dodson RN  Outcome: Progressing  Goal: Achieves optimal ventilation and oxygenation with noninvasive CPAP/BiLEVEL support  Description: INTERVENTIONS:  1. Provide education to patient/family about rationale and expected outcomes associated with therapy  2. Position patient to facilitate optimal ventilation/oxygenation status and minimize respiratory effort  3. Position patient to reduce aspiration risk, elevate head of bed at least 35  degrees or higher, as applicable  4. Assess effectiveness of therapy on ventilation/oxygenation status based on oxygen saturation and/or arterial blood gases, as indicated  5. Assess patient for changes in respiratory and physiological status  6. Auscultate breath sounds and assess chest excursion, as indicated  7. Assess patient for changes in mentation and behavior  8. Routinely monitor equipment for proper performance and settings  9. Assess and monitor skin condition, in relationship to the respiratory interface  10. Assure equipment alarm volume is adequate for the patient's environment  11. Immediately respond to equipment alarm, to assess patient and/or cause for alarm  12. Follow universal infection control/hospital policy(ies)/standards  1/16/2023 1428 by Otto Dodson RN  Outcome: Adequate for Discharge  1/16/2023 1306 by Otto Dodson RN  Outcome: Progressing     Problem: Genitourinary - Adult  Goal: Absence of urinary retention  Description: INTERVENTIONS:  1. Assess patient’s ability to void and empty bladder.  2. Monitor intake/output and perform bladder scan if indicated.  3. Place urinary catheter per order and initiate and maintain CAUTI bundle.  4. Administer medications to alleviate retention as needed.  5. Discuss catheterization for long term situations as appropriate.    1/16/2023 1428 by Otto Dodson RN  Outcome: Adequate for Discharge  1/16/2023 1306 by Otto Dodson RN  Outcome: Progressing  Goal: Urinary catheter remains patent  Description: INTERVENTIONS:  1. Assess patency of urinary catheter.  2. Irrigate catheter per order if indicated and notify provider if unable to irrigate.  3. Assess need for a larger catheter size or a 3-way catheter for continuous bladder irrigation.  1/16/2023 1428 by Otto Dodson RN  Outcome: Adequate for Discharge  1/16/2023 1306 by Otto Dodson RN  Outcome: Progressing  Goal: Absence of Urinary Infection  Description: INTERVENTIONS:  1. Assess  urinary status for burning, urgency, frequency, and pain  2. Assess urine for color, cloudiness, odor, and amount  3. Monitor intake/output  4. Monitor lab values  5. Obtain urinalysis as ordered  6. Assess for neurological status changes    1/16/2023 1428 by Otto Dodson RN  Outcome: Adequate for Discharge  1/16/2023 1306 by Otto Dodson RN  Outcome: Progressing     Problem: Pain - Adult  Goal: Verbalizes/displays adequate comfort level or baseline comfort level  Description: INTERVENTIONS:  1. Encourage patient to monitor pain and request interventions  2. Assess pain using the appropriate pain scale  3. Administer analgesics based on type and severity of pain and evaluate response  4. Educate/Implement non-pharmacological measures as appropriate and evaluate response  5. Consider cultural, developmental and social influences on pain and pain management  6. Notify Provider if interventions unsuccessful or patient reports new pain  1/16/2023 1428 by Otto Dodson RN  Outcome: Adequate for Discharge  1/16/2023 1306 by Otto Dodson RN  Outcome: Progressing     Problem: Infection - Adult  Goal: Absence of infection during hospitalization  Description: INTERVENTIONS:  1. Assess and monitor for signs and symptoms of infection  2. Monitor lab/diagnostic results  3. Monitor all insertion sites/wounds/incisions  4. Monitor secretions for changes in amount and color  5. Administer medications as ordered  6. Educate and encourage patient and family to use good hand hygiene technique  7. Identify and educate in appropriate isolation precautions for identified infection/condition  1/16/2023 1428 by Otto Dodson RN  Outcome: Adequate for Discharge  1/16/2023 1306 by Otto Dodson RN  Outcome: Progressing     Problem: Safety Adult  Goal: Patient will remain safe during hospitalization  Description: INTERVENTIONS    1. Assess patient for fall risk and implement interventions if needed  2. Use safe transport  techniques  3. Assess patient using the appropriate Harvey skin assessment scale  4. Assess patient for risk of aspiration  5. Assess patient for risk of elopement  6. Assess patient for risk of suicide  1/16/2023 1428 by Otto Dodson RN  Outcome: Adequate for Discharge  1/16/2023 1306 by Otto Dodson RN  Outcome: Progressing     Problem: Daily Care  Goal: Daily care needs are met  Description: INTERVENTIONS:   1. Assess and monitor skin integrity  2. Identify patients at risk for skin breakdown on admission and per policy  3. Assess and monitor ability to perform self care and identify potential discharge needs  4. Assess skin integrity/risk for skin breakdown  5. Assist patient with activities of daily living as needed  6. Encourage independent activity per ability   7. Provide mouth care   8. Include patient/family/caregiver in decisions related to daily care   1/16/2023 1428 by Otto Dodson RN  Outcome: Adequate for Discharge  1/16/2023 1306 by Otto Dodson RN  Outcome: Progressing     Problem: Knowledge Deficit  Goal: Patient/family/caregiver demonstrates understanding of disease process, treatment plan, medications, and discharge instructions  Description: INTERVENTIONS:   1. Complete learning assessment and assess knowledge base  2. Provide teaching at level of understanding   3. Provide teaching via preferred learning methods  1/16/2023 1428 by Otto Dodson RN  Outcome: Adequate for Discharge  1/16/2023 1306 by Otto Dodson RN  Outcome: Progressing     Problem: Discharge Barriers  Goal: Patient's discharge needs are met  Description: INTERVENTIONS:  1. Assess patient for self-management skills  2. Encourage participation in management  3. Identify potential discharge barriers on admission and throughout hospital stay  4. Involve patient/family/caregiver in discharge planning process  5. Collaborate with case management/ for discharge needs  1/16/2023 1428 by Otto Dodson  RN  Outcome: Adequate for Discharge  1/16/2023 1306 by Otto Dodson RN  Outcome: Progressing     Problem: Mobility  Goal: LTG - Patient will ambulate household distance  Description: With CGA x1 and a front wheeled walker   1/16/2023 1428 by Otto Dodson RN  Outcome: Adequate for Discharge  1/16/2023 1306 by Otto Dodson RN  Outcome: Progressing  Goal: LTG - Patient will be able to go up and down a curb/step with the appropriate device  Description: Front wheeled walker; CGA x 1  1/16/2023 1428 by Otto Dodson RN  Outcome: Adequate for Discharge  1/16/2023 1306 by Otto Dodson RN  Outcome: Progressing     Problem: TRANSFERS  Goal: STG - Patient will transfer to and from sit to stand  Description: With appropriate use of B UE for safe navigation and CGA x 1.   1/16/2023 1428 by Otto Dodson RN  Outcome: Adequate for Discharge  1/16/2023 1306 by Otto Dodson RN  Outcome: Progressing     Problem: Balance  Goal: STG - Maintains dynamic sitting balance without upper extremity support  Description: With SBA; while performing ADLs.   1/16/2023 1428 by Otto Dodson RN  Outcome: Adequate for Discharge  1/16/2023 1306 by Otto Dodson RN  Outcome: Progressing     Problem: Dressings Lower Extremities  Goal: LTG - Patient will demonstrate use of appropriate intervention to ensure safe dressing of lower extremities  Description: Patient will complete lower body dressing utilizing adaptive equipment from Edge of Bed Setup assistance   1/16/2023 1428 by Otto Dodson RN  Outcome: Adequate for Discharge  1/16/2023 1306 by Otto Dodson RN  Outcome: Progressing     Problem: Transfers  Goal: LTG-Patient will complete all functional transfers  Description: Patient will complete all functional transfers utilizing adaptive device stand by assistance   1/16/2023 1428 by Otto Dodson RN  Outcome: Adequate for Discharge  1/16/2023 1306 by tOto Dodson RN  Outcome: Progressing

## 2023-01-16 NOTE — DISCHARGE INSTRUCTIONS
You are being discharged from the hospital.  As we have discussed, I am increasing your potassium from 40 mEq daily to 60 mEq daily, split as before to twice daily dosing.  You should have a follow-up BMP to check your potassium on Thursday or Friday of this week.  Home health can draw this.  You have completed all of your antibiotics needed in the hospital with the IV medications.  You do not need to take the home antibiotics that are oral.  Please follow-up with your primary care provider regarding your potassium levels and further titration of this medication.  Low or high values can cause cardiac complications.  You have been referred to home health, they will contact you for the first appointment.

## 2023-01-16 NOTE — INTERDISCIPLINARY/THERAPY
[address] [facility] [address]  [phone]      Occupational Therapy Initial Evaluation     Date of Service: 01/16/23    Patient Name: Joseph Wong  Referring Provider: KARSTEN MEZA    Onset Date: 1/14/2023    Certification Date: 03/17/23     HICN: [id number]    Primary Medical Diagnosis: Altered mental status [R41.82]     Treatment Diagnosis:   Decreased Activities of daily living's and mobility  Subjective     HISTORY OF CURRENT COMPLAINT:   Joseph is an 88-year-old male with a past medical history significant for BPH, elevated PSA, hypertension, hyperlipidemia, non-insulin-dependent diabetes who presented to the [facility] ER on 1/14 for confusion and weakness.  Patient's wife was present when he was evaluated in the emergency room and stated that he typically ambulates with a walker but was having some trouble getting out of bed.  She took him into see their PCP Dr. Beckre and a UTI was diagnosed.    PRIOR LEVEL OF FUNCTION:   Patient lives at home with spouse. Independent Activities of daily living's utilizing four wheel walker for mobility. Requires assistance in Instrumental activities of daily living tasks.     SOCIAL/OCCUPATIONAL/RECREATIONAL:  Does not drive    Pain:  Pain Assessment Scale  Pain Scale: 0-10  0-10 Pain Score: 0 - No pain  Patient's Stated Pain Goal: No pain  Has Pain Adversely Affected Your Usual Activity, Sleep, Mood or Stress in the Last 24 Hours?: No     Past Medical History:   Diagnosis Date    CAD (coronary artery disease)     Elevated cholesterol     GERD (gastroesophageal reflux disease)     Hiatal hernia     History of shingles     Hypertension     Macular degeneration     Metabolic syndrome     Vertigo          Current Facility-Administered Medications:     FLUoxetine (PROzac) capsule 20 mg, 20 mg, oral, Daily, Karsten Meza MD, 20 mg at 01/16/23 0814    potassium chloride (KLOR-CON) CR tablet 20 mEq, 20 mEq, oral, 2x daily with meals,  Denisha Wheeler MD, 20 mEq at 01/16/23 0814    amLODIPine (NORVASC) tablet 5 mg, 5 mg, oral, Daily, Beau Garcia MD, 5 mg at 01/16/23 0814    atorvastatin (LIPITOR) tablet 10 mg, 10 mg, oral, Daily, Beau Garcia MD, 10 mg at 01/16/23 0814    doxazosin (CARDURA) tablet 8 mg, 8 mg, oral, Nightly, Beau Garcia MD, 8 mg at 01/15/23 2153    irbesartan (AVAPRO) 300 mg, hydroCHLOROthiazide (HYDRODIURIL) 12.5 mg for AVALIDE 300/12.5, , oral, Daily, Beau Garcia MD, 1 Dose at 01/16/23 0814    metoprolol succinate XL (TOPROL-XL) 24 hr tablet 25 mg, 25 mg, oral, Daily, Beau Garcia MD, 25 mg at 01/16/23 0814    Insert peripheral IV, , , Once **AND** Maintain IV access, , , Until discontinued **AND** Saline lock IV, , , Once **AND** sodium chloride flush 3 mL, 3 mL, intravenous, PRN, Beau Garcia MD    enoxaparin (LOVENOX) syringe 40 mg, 40 mg, subcutaneous, Daily at 1400, Beau Garcia MD, 40 mg at 01/15/23 1425    acetaminophen (TYLENOL) tablet 325-650 mg, 325-650 mg, oral, q4h PRN, Beau Garcia MD    traZODone (DESYREL) tablet 25 mg, 25 mg, oral, Nightly PRN, Beau Garcia MD, 25 mg at 01/15/23 0027    polyethylene glycol (GLYCOLAX) powder 17 g, 17 g, oral, Daily, Beau Garcia MD, 17 g at 01/16/23 0813    ondansetron (ZOFRAN) tablet 4 mg, 4 mg, oral, q6h PRN **OR** ondansetron (ZOFRAN) injection 4 mg, 4 mg, intravenous, q6h PRN, Beau Garcia MD    alum-mag hydroxide-simeth (MAALOX ADVANCED) suspension 30 mL, 30 mL, oral, 3x daily PRN, Beau Garcia MD    cefTRIAXone (ROCEPHIN) IV Push 1,000 mg, 1,000 mg, intravenous, q24h, Beau Garcia MD, 1,000 mg at 01/15/23 1800    dextrose 50 % in water (D50W) syringe 15-30 mL, 15-30 mL, intravenous, q15 min PRN, Beau Garcia MD    dextrose (GLUTOSE) 40 % gel 15.2 g, 15.2 g, oral, q15 min PRN, Beau Garcia MD    glucagon (GLUCAGEN) injection 1 mg, 1 mg, intramuscular, q15 min PRN **OR** glucagon (GLUCAGEN) injection 1 mg, 1 mg, subcutaneous,  q15 min PRN, Beau Garcia MD    insulin aspart U-100 (NovoLOG) injection pen (correction dose) 0-15 Units, 0-15 Units, subcutaneous, Insulin: 4x daily with meals, Beau Garcia MD, 1 Units at 01/15/23 0188    ALLERGIES:  Patient has no known allergies.    CURRENT ASSISTIVE OR ADAPTIVE EQUIPMENT:  Front wheeled walker   Oneil Fall Risk Score: 100  FALL RISK Interventions: Fall risk bundle    DIAGNOSTIC TESTING:  Imaging, labs  ACTIVITIES LIMITED BY PATIENT COMPLAINT: No complaints    PATIENT'S GOALS FOR THERAPY:   No goals voiced.       Therapy Treatment Diagnosis: Altered mental status    RN ok'd OT services. Pt found supine in bed and agreeable to therapy. Gait belt donned for mobility. Pt left in chair w/ call light in reach, needs met, and alarm on.    Co-treat: No    Family/Caregiver Present: No             Objective     FINDINGS:  Precautions: Other Precautions: Dementia, fall    Cognition: Impaired, dementia at baseline. Somewhat irritable this morning, able to redirect with cues and encouragement. Verbal cues for sequencing/safety during functional tasks.     Bed Mobility: Minimum assistance     Transfers: contact guard assist sit to stand using front wheeled walker     Mobility: Ambulated 8 meters using front wheeled walker contact guard assist     Balance: Impaired     ADLs:  Dressing: Setup assistance to don bilateral socks   Grooming: Setup assistance   Bathing: N/A  Toileting: N/A  Feeding: Setup assistance     Upper Extremity Assessment:  Hand Dominance: right     ROM:   Right upper extremity: within functional limits   Left upper extremity: within functional limits     Strength Testing:  Right upper extremity: within functional limits   Left upper extremity: within functional limits     Sensation: bilateral upper extremities intact    Vision/Visual History: No visual changes noted    Additional Activities: Vital signs stable room air              Ambulation 1  Ambulation Distance (meters): 8  meters                                EDUCATION:    Education Documentation  ADL training, taught by SHAUNA Rene at 1/16/2023 12:34 PM.  Learner: Patient  Readiness: Acceptance  Method: Explanation  Response: Verbalizes Understanding    Precautions, taught by SHAUNA Rene at 1/16/2023 12:34 PM.  Learner: Patient  Readiness: Acceptance  Method: Explanation  Response: Verbalizes Understanding    Education Comments  No comments found.          Time Calculation  Start Time: 0702  Stop Time: 0715  Time Calculation (min): 13 min     TIME CALCULATION         OT Untimed Charges - Quick List (Time Spent in Minutes)  OT Eval Low Complexity: 13       EVALUATION:  Low complexity: Brief history review, 1-3 performance deficits, and problem focused assessments with limited number of treatment options, modifications not necessary.       INITIAL TREATMENT:  Evaluation          ASSESSMENT:  Patient presents with decreased activity tolerance and overall functional strength with impaired cognition, requiring generalized supervision for safety.  This patient would benefit from skilled Occupational Therapy to maximize independence.    Rehab Potential:    Good    Barriers to outcome:None       GOALS:        Multi-Disciplinary Problems (from Occupational Therapy)      Active Problems       Problem: Dressings Lower Extremities  Start Date: 01/16/23      Goal Start Date Expected End Date End Date    LTG - Patient will demonstrate use of appropriate intervention to ensure safe dressing of lower extremities 01/16/23 03/17/23 --    Goal Details: Patient will complete lower body dressing utilizing adaptive equipment from Edge of Bed Setup assistance                 Problem: Transfers  Start Date: 01/16/23      Goal Start Date Expected End Date End Date    LTG-Patient will complete all functional transfers 01/16/23 03/17/23 --    Goal Details: Patient will complete all functional transfers utilizing adaptive device stand by assistance                                PLAN:  It is recommended that the client attend rehabilitative therapy for up to 5 visits per week with an expected duration through Certification Date: 03/17/23.  Interventions during the course of treatment may include any combination of the following:  ADL retraining  Therapeutic activity  Therapeutic exercise  UE strengthening/ROM  Endurance training  Cognitive skills development  Patient/family training  Equipment evaluation/education  Fine motor coordination activities  Compensatory technique education    Recommendation  Recommendation: Continue skilled therapy    Plan Comment: Transfer/mobility, grooming at sink    Thank you for allowing us to share in the care of this patient. If you have any questions, recommendations, or further concerns regarding this patient, please feel free to contact us at [address] [place] [phone]  Dept: [phone]      Signed by: SHAUNA Rene 1/16/2023  12:39 PM      * I have reviewed the plan of care and certify a continuing need for medically necessary services    Co-signed by:_________________________ Date and Time:________________

## 2023-01-16 NOTE — INTERDISCIPLINARY/THERAPY
/ [facility]   [phone]  Notified by CM Pt may benefit from [facility].   Chart reviewed.  Met with pt and his spouse Alessandra at bedside-   Discussed benefits of home health. Patient is agreeable to referral being placed - choosing [facility]  [phone]  TC placed with new referral- spoke with [facility]- they are able to accept the referral.  They have Epic access and will FU to obtain their own signed orders + notes.     Adm Dx:  UTI; recent Falls at Home  PMH:  CAD; gerd; hiatal hernia; HTN; HLD; vertigo; BPH; DM; depression; mac degeneration    Payor source:  Medicare  PCP/able to follow:  Malena Mcclain MD  [phone]  - order faxed to PCP if applicable:  Skilled need:  Nsg, PT/OT  Homebound criteria met/ patient agreeable:  Preference Form: [facility]  - order faxed to agency if applicable:  Validated Pt's Discharging address:  [address] [place] [address]  Validated phone number:  Cell:  [phone]  DME needs/Preference Form:  Medicare/gov Star ratings provided:  Order queued for Physician sign: yes  Caregiver/support person(s) present at home: Pt lives with his spouse Alessandra.  Son Robert lives locally and able to asst as well.

## 2023-01-16 NOTE — INTERDISCIPLINARY/THERAPY
[address] [facility] [address]  [phone]      Physical Therapy Daily Treatment Note     Date of Service: 1/16/2023  Patient Name: Joseph Wong  Visit Number: 2   Start of Care Date: 01/15/23  Certification Period: 02/14/23    Subjective   Therapy Treatment Diagnosis: generalized deconditioning; dizziness    Other Precautions: fal risk; impaired vision    RN ok'd PT services. Pt found the chair and agreeable to PT. Gait belt donned for mobility. Pt left in the chair with call light in reach, needs met, and alarm on.    CO-TREAT: no    FALL RISK Interventions: Fall Bundle     Pain:   0-10 Pain Score: 0 - No pain        Objective   Cognition: pleasant and agreeable.      Bed Mobility: NA   Comments:      Transfers: standby assist   Transfer Type: sit<>Stand from recliner with FRONT WHEELED WALKER   Comments: Pt stood from the recliner without physical assist but needed increased time and effort.   He performed the 30 sec sit<>stand test using his UE to assist. He performed 5 sit<>stands in 30 seconds. This is below average for his sex and age indicating LE weakness.      Ambulation: standby assist/CONTACT GUARD ASSIST   Distance: 30 meters   Assistive Device: FRONT WHEELED WALKER   Quality of Gait: slow, mildly unsteady gait with small steps. Verbal cues to keep the walker close. No loss of balance but some difficulty navigating the walker around obstacles which is likely related to his poor vision.   He was tired after walking.     Stairs: NA    Balance: impaired, needs FRONT WHEELED WALKER and supervision     Activity Tolerance: fatigues fairly easily     Time Calculation  Start Time: 1018  Stop Time: 1032  Time Calculation (min): 14 min   Therapeutic Interventions (Time Spent in Minutes)  Therapeutic Activity: 14     Assessment:  Pt was standby assist to CONTACT GUARD ASSIST with mobility for safety due to impaired balance and strength. He needs the FRONT WHEELED WALKER and supervision for  mobility.     Plan for Next Session:  PT Frequency: 2-3x/wk  Plan Comment: 1 assist, 30 meters with walker, balance, strengthening    Thank you for allowing us to share in the care of this patient. If you have any questions, recommendations, or further concerns regarding this patient, please feel free to contact me at [address] [place] [phone]  Dept: [phone].  Signed by: Xiang Lindsey PTA  1/16/2023  1:07 PM

## 2023-01-16 NOTE — NURSING END OF SHIFT
Nursing End of Shift Summary:    Patient: Joseph Wong  MRN: [id number]  : 10/20/1934, Age: 88 y.o.    Location: [address] [place] [address]    Nursing Goals  Clinical Goals for the Shift: Maintain safety and comfort, monitor vital signs    Narrative Summary of Progress Toward Clinical Goals:  Mental status improving tonight. Patient urinary retention minimal with 69 left in bladder after voiding 225ml.     No complaint of pain. Wore Cpap for a while last night. Patient rested well.    Bedside report given to oncoming nurse who verbalizes understanding with no further questions.     Barriers to Goals/Nursing Concerns:  No    New Patient or Family Concerns/Issues:  No    Shift Summary:   Significant Events & Communications to Providers (last 12 hours)       Last 5 Values    No documentation.                 Oxygen Usage (last 12 hours)       Last 5 Values       Row Name 01/15/23 2032                   Oxygen Weaning Trial by Nursing    Is Patient on Room Air OR on the Same Amount of O2 as at Home? Yes                      Mobility (last 12 hours)       Last 5 Values       Row Name 01/15/23 1534 01/15/23 1711 01/15/23 2200 01/15/23 2349          Mobility    Activity -- Bathroom privileges Bathroom privileges --     Length of Time in Chair (min) -- 0 0 --     Distance Ambulated (feet) -- 20 Feet 0 Feet --     Distance Ambulated (Meters Calculated) -- 6.1 Meters 0 Meters --     Patient Position Supine -- -- Supine     Turning -- Turns self Turns self --     Distance Ambulated (Meters Calculated)(Do Not Use) -- 6.1 Feet 0 Feet --                   Urethral Catheter       Active Urethral Catheter       None                  Active Lines       Active Central venous catheter / Peripherally inserted central catheter / Implantable Port / Hemodialysis catheter / Midline Catheter       None                  Infusing Medications   Medication Dose Last Rate     PRN Medications   Medication Dose Last Admin    sodium chloride  3 mL       acetaminophen  325-650 mg      traZODone  25 mg 25 mg at 01/15/23 0027    ondansetron  4 mg      Or    ondansetron  4 mg      alum-mag hydroxide-simeth  30 mL      dextrose 50 % in water (D50W)  15-30 mL      dextrose  15.2 g      glucagon  1 mg      Or    glucagon  1 mg

## 2023-01-16 NOTE — PLAN OF CARE

## 2023-01-19 LAB
BACTERIA BLD CULT: NORMAL
BACTERIA BLD CULT: NORMAL

## 2023-02-25 ENCOUNTER — HOSPITAL ENCOUNTER (INPATIENT)
Facility: HOSPITAL | Age: 88
LOS: 5 days | Discharge: NURSING, REHAB OR LONG-TERM CARE | DRG: 064 | End: 2023-03-02
Attending: EMERGENCY MEDICINE | Admitting: INTERNAL MEDICINE
Payer: MEDICARE

## 2023-02-25 ENCOUNTER — APPOINTMENT (OUTPATIENT)
Dept: CT IMAGING | Facility: HOSPITAL | Age: 88
DRG: 064 | End: 2023-02-25
Payer: MEDICARE

## 2023-02-25 ENCOUNTER — APPOINTMENT (OUTPATIENT)
Dept: RADIOLOGY | Facility: HOSPITAL | Age: 88
DRG: 064 | End: 2023-02-25
Payer: MEDICARE

## 2023-02-25 DIAGNOSIS — Z91.89 HIGH RISK FOR SEPSIS: ICD-10-CM

## 2023-02-25 DIAGNOSIS — R53.1 WEAKNESS: Primary | ICD-10-CM

## 2023-02-25 DIAGNOSIS — I63.9 CEREBROVASCULAR ACCIDENT (CVA), UNSPECIFIED MECHANISM (CMS/HCC): ICD-10-CM

## 2023-02-25 DIAGNOSIS — N39.0 UTI (URINARY TRACT INFECTION): ICD-10-CM

## 2023-02-25 LAB
ALBUMIN SERPL-MCNC: 3.6 G/DL (ref 3.5–5.3)
ALP SERPL-CCNC: 106 U/L (ref 45–115)
ALT SERPL-CCNC: 14 U/L (ref 7–52)
ANION GAP SERPL CALC-SCNC: 10 MMOL/L (ref 3–11)
AST SERPL-CCNC: 16 U/L
B PARAP IS1001 DNA NPH QL NAA+NON-PROBE: NEGATIVE
B PERT.PT PRMT NPH QL NAA+NON-PROBE: NEGATIVE
BACTERIA #/AREA URNS HPF: ABNORMAL /HPF
BASOPHILS # BLD AUTO: 0.1 10*3/UL
BASOPHILS NFR BLD AUTO: 0.4 % (ref 0–2)
BILIRUB SERPL-MCNC: 0.78 MG/DL (ref 0.2–1.4)
BILIRUB UR QL: NEGATIVE
BUN SERPL-MCNC: 17 MG/DL (ref 7–25)
C PNEUM DNA NPH QL NAA+NON-PROBE: NEGATIVE
CALCIUM ALBUM COR SERPL-MCNC: 9.1 MG/DL (ref 8.6–10.3)
CALCIUM SERPL-MCNC: 8.8 MG/DL (ref 8.6–10.3)
CHLORIDE SERPL-SCNC: 98 MMOL/L (ref 98–107)
CLARITY UR: ABNORMAL
CO2 SERPL-SCNC: 30 MMOL/L (ref 21–32)
COLOR UR: ABNORMAL
CREAT SERPL-MCNC: 0.93 MG/DL (ref 0.7–1.3)
DELTA HIGH SENSITIVITY TROPONIN I, 1 HOUR: 0.1
EOSINOPHIL # BLD AUTO: 0 10*3/UL
EOSINOPHIL NFR BLD AUTO: 0.3 % (ref 0–3)
ERYTHROCYTE [DISTWIDTH] IN BLOOD BY AUTOMATED COUNT: 14.1 % (ref 11.5–15)
FLUAV RNA NPH QL NAA+NON-PROBE: NEGATIVE
FLUBV RNA NPH QL NAA+NON-PROBE: NEGATIVE
GFR SERPL CREATININE-BSD FRML MDRD: 79 ML/MIN/1.73M*2
GLUCOSE SERPL-MCNC: 125 MG/DL (ref 70–105)
GLUCOSE UR QL: NEGATIVE MG/DL
HADV DNA NPH QL NAA+NON-PROBE: NEGATIVE
HCOV 229E RNA NPH QL NAA+NON-PROBE: NEGATIVE
HCOV HKU1 RNA NPH QL NAA+NON-PROBE: NEGATIVE
HCOV NL63 RNA NPH QL NAA+NON-PROBE: NEGATIVE
HCOV OC43 RNA NPH QL NAA+NON-PROBE: NEGATIVE
HCT VFR BLD AUTO: 36.9 % (ref 38–50)
HGB BLD-MCNC: 13 G/DL (ref 13.2–17.2)
HGB UR QL: ABNORMAL
HMPV RNA NPH QL NAA+NON-PROBE: NEGATIVE
HPIV1 RNA NPH QL NAA+NON-PROBE: NEGATIVE
HPIV2 RNA NPH QL NAA+NON-PROBE: NEGATIVE
HPIV3 RNA NPH QL NAA+NON-PROBE: NEGATIVE
HPIV4 RNA NPH QL NAA+NON-PROBE: NEGATIVE
KETONES UR-MCNC: NEGATIVE MG/DL
LACTATE BLDV-SCNC: 0.89 MMOL/L (ref 0.5–1.99)
LEUKOCYTE ESTERASE UR QL STRIP: ABNORMAL
LIPASE SERPL-CCNC: 25 U/L (ref 11–82)
LYMPHOCYTES # BLD AUTO: 1.4 10*3/UL
LYMPHOCYTES NFR BLD AUTO: 12.1 % (ref 15–47)
M PNEUMO DNA NPH QL NAA+NON-PROBE: NEGATIVE
MAGNESIUM SERPL-MCNC: 1.8 MG/DL (ref 1.8–2.4)
MCH RBC QN AUTO: 31.9 PG (ref 29–34)
MCHC RBC AUTO-ENTMCNC: 35.2 G/DL (ref 32–36)
MCV RBC AUTO: 90.6 FL (ref 82–97)
MONOCYTES # BLD AUTO: 1.1 10*3/UL
MONOCYTES NFR BLD AUTO: 9 % (ref 5–13)
NEUTROPHILS # BLD AUTO: 9.3 10*3/UL
NEUTROPHILS NFR BLD AUTO: 78.2 % (ref 46–70)
NITRITE UR QL: NEGATIVE
PH UR: 7 PH
PLATELET # BLD AUTO: 313 10*3/UL (ref 130–350)
PMV BLD AUTO: 6.9 FL (ref 6.9–10.8)
POTASSIUM SERPL-SCNC: 3 MMOL/L (ref 3.5–5.1)
PROCALCITONIN SERPL-MCNC: 0.31 NG/ML
PROT SERPL-MCNC: 6.4 G/DL (ref 6–8.3)
PROT UR STRIP-MCNC: 50 MG/DL
RBC # BLD AUTO: 4.07 10*6/ΜL (ref 4.1–5.8)
RBC #/AREA URNS HPF: >100 /HPF
RSV RNA NPH QL NAA+NON-PROBE: NEGATIVE
RV+EV RNA NPH QL NAA+NON-PROBE: NEGATIVE
SARS-COV-2 RNA NPH QL NAA+NON-PROBE: NEGATIVE
SODIUM SERPL-SCNC: 138 MMOL/L (ref 135–145)
SP GR UR: 1.01 (ref 1–1.03)
SQUAMOUS #/AREA URNS HPF: ABNORMAL /HPF
TROPONIN I SERPL-MCNC: 6 PG/ML
TROPONIN I SERPL-MCNC: 6.1 PG/ML
UROBILINOGEN UR-MCNC: NORMAL MG/DL
WBC # BLD AUTO: 11.9 10*3/UL (ref 3.7–9.6)
WBC #/AREA URNS HPF: ABNORMAL /HPF
WBC CLUMPS #/AREA URNS HPF: ABNORMAL /HPF

## 2023-02-25 PROCEDURE — 99223 1ST HOSP IP/OBS HIGH 75: CPT | Mod: AI | Performed by: INTERNAL MEDICINE

## 2023-02-25 PROCEDURE — 85025 COMPLETE CBC W/AUTO DIFF WBC: CPT | Performed by: EMERGENCY MEDICINE

## 2023-02-25 PROCEDURE — 86140 C-REACTIVE PROTEIN: CPT | Performed by: INTERNAL MEDICINE

## 2023-02-25 PROCEDURE — 70450 CT HEAD/BRAIN W/O DYE: CPT | Mod: MG

## 2023-02-25 PROCEDURE — 6360000200 HC RX 636 W HCPCS (ALT 250 FOR IP): Performed by: EMERGENCY MEDICINE

## 2023-02-25 PROCEDURE — 83690 ASSAY OF LIPASE: CPT | Performed by: EMERGENCY MEDICINE

## 2023-02-25 PROCEDURE — 82607 VITAMIN B-12: CPT | Performed by: INTERNAL MEDICINE

## 2023-02-25 PROCEDURE — 83036 HEMOGLOBIN GLYCOSYLATED A1C: CPT | Performed by: INTERNAL MEDICINE

## 2023-02-25 PROCEDURE — 93005 ELECTROCARDIOGRAM TRACING: CPT | Performed by: EMERGENCY MEDICINE

## 2023-02-25 PROCEDURE — 36415 COLL VENOUS BLD VENIPUNCTURE: CPT | Performed by: EMERGENCY MEDICINE

## 2023-02-25 PROCEDURE — 83735 ASSAY OF MAGNESIUM: CPT | Performed by: EMERGENCY MEDICINE

## 2023-02-25 PROCEDURE — 84100 ASSAY OF PHOSPHORUS: CPT | Performed by: INTERNAL MEDICINE

## 2023-02-25 PROCEDURE — 84484 ASSAY OF TROPONIN QUANT: CPT | Performed by: EMERGENCY MEDICINE

## 2023-02-25 PROCEDURE — 82306 VITAMIN D 25 HYDROXY: CPT | Performed by: INTERNAL MEDICINE

## 2023-02-25 PROCEDURE — 87633 RESP VIRUS 12-25 TARGETS: CPT | Performed by: EMERGENCY MEDICINE

## 2023-02-25 PROCEDURE — 87040 BLOOD CULTURE FOR BACTERIA: CPT | Performed by: EMERGENCY MEDICINE

## 2023-02-25 PROCEDURE — 83880 ASSAY OF NATRIURETIC PEPTIDE: CPT | Performed by: INTERNAL MEDICINE

## 2023-02-25 PROCEDURE — 84145 PROCALCITONIN (PCT): CPT | Performed by: EMERGENCY MEDICINE

## 2023-02-25 PROCEDURE — 84443 ASSAY THYROID STIM HORMONE: CPT | Performed by: INTERNAL MEDICINE

## 2023-02-25 PROCEDURE — 87088 URINE BACTERIA CULTURE: CPT | Performed by: EMERGENCY MEDICINE

## 2023-02-25 PROCEDURE — 6370000100 HC RX 637 (ALT 250 FOR IP): Performed by: EMERGENCY MEDICINE

## 2023-02-25 PROCEDURE — 71045 X-RAY EXAM CHEST 1 VIEW: CPT

## 2023-02-25 PROCEDURE — 83605 ASSAY OF LACTIC ACID: CPT | Performed by: EMERGENCY MEDICINE

## 2023-02-25 PROCEDURE — (BLANK) HC ROOM PRIVATE

## 2023-02-25 PROCEDURE — 80053 COMPREHEN METABOLIC PANEL: CPT | Performed by: EMERGENCY MEDICINE

## 2023-02-25 PROCEDURE — 82550 ASSAY OF CK (CPK): CPT | Performed by: INTERNAL MEDICINE

## 2023-02-25 PROCEDURE — 81001 URINALYSIS AUTO W/SCOPE: CPT | Performed by: EMERGENCY MEDICINE

## 2023-02-25 RX ORDER — DEXTROSE 50 % IN WATER (D50W) INTRAVENOUS SYRINGE
15-30
Status: DISCONTINUED | OUTPATIENT
Start: 2023-02-25 | End: 2023-03-02 | Stop reason: HOSPADM

## 2023-02-25 RX ORDER — ACETAMINOPHEN 325 MG/1
650 TABLET ORAL EVERY 6 HOURS PRN
Status: DISCONTINUED | OUTPATIENT
Start: 2023-02-25 | End: 2023-03-02 | Stop reason: HOSPADM

## 2023-02-25 RX ORDER — INSULIN ASPART 100 [IU]/ML
0-15 INJECTION, SOLUTION INTRAVENOUS; SUBCUTANEOUS
Status: DISCONTINUED | OUTPATIENT
Start: 2023-02-26 | End: 2023-03-02 | Stop reason: HOSPADM

## 2023-02-25 RX ORDER — CEFTRIAXONE 2 G/1
2000 INJECTION, POWDER, FOR SOLUTION INTRAMUSCULAR; INTRAVENOUS EVERY 24 HOURS
Status: DISCONTINUED | OUTPATIENT
Start: 2023-02-26 | End: 2023-02-26

## 2023-02-25 RX ORDER — ONDANSETRON HYDROCHLORIDE 2 MG/ML
4 INJECTION, SOLUTION INTRAVENOUS EVERY 6 HOURS PRN
Status: DISCONTINUED | OUTPATIENT
Start: 2023-02-25 | End: 2023-03-02 | Stop reason: HOSPADM

## 2023-02-25 RX ORDER — POTASSIUM CHLORIDE 750 MG/1
40 TABLET, FILM COATED, EXTENDED RELEASE ORAL ONCE
Status: COMPLETED | OUTPATIENT
Start: 2023-02-25 | End: 2023-02-25

## 2023-02-25 RX ORDER — SODIUM CHLORIDE 9 MG/ML
100 INJECTION, SOLUTION INTRAVENOUS CONTINUOUS
Status: DISCONTINUED | OUTPATIENT
Start: 2023-02-26 | End: 2023-02-26

## 2023-02-25 RX ORDER — CEFTRIAXONE 2 G/1
2000 INJECTION, POWDER, FOR SOLUTION INTRAMUSCULAR; INTRAVENOUS ONCE
Status: COMPLETED | OUTPATIENT
Start: 2023-02-25 | End: 2023-02-25

## 2023-02-25 RX ORDER — ONDANSETRON 4 MG/1
4 TABLET, FILM COATED ORAL EVERY 6 HOURS PRN
Status: DISCONTINUED | OUTPATIENT
Start: 2023-02-25 | End: 2023-03-02 | Stop reason: HOSPADM

## 2023-02-25 RX ORDER — ADHESIVE BANDAGE
30 BANDAGE TOPICAL DAILY PRN
Status: DISCONTINUED | OUTPATIENT
Start: 2023-02-25 | End: 2023-03-02 | Stop reason: HOSPADM

## 2023-02-25 RX ORDER — OXYCODONE HYDROCHLORIDE 5 MG/1
5-10 TABLET ORAL EVERY 4 HOURS PRN
Status: DISCONTINUED | OUTPATIENT
Start: 2023-02-25 | End: 2023-03-02 | Stop reason: HOSPADM

## 2023-02-25 RX ORDER — DEXTROSE 40 %
15 GEL (GRAM) ORAL
Status: DISCONTINUED | OUTPATIENT
Start: 2023-02-25 | End: 2023-03-02 | Stop reason: HOSPADM

## 2023-02-25 RX ORDER — BISACODYL 10 MG/1
10 SUPPOSITORY RECTAL DAILY PRN
Status: DISCONTINUED | OUTPATIENT
Start: 2023-02-25 | End: 2023-03-02 | Stop reason: HOSPADM

## 2023-02-25 RX ORDER — ENOXAPARIN SODIUM 100 MG/ML
40 INJECTION SUBCUTANEOUS
Status: DISCONTINUED | OUTPATIENT
Start: 2023-02-26 | End: 2023-03-02 | Stop reason: HOSPADM

## 2023-02-25 RX ADMIN — POTASSIUM CHLORIDE 40 MEQ: 750 TABLET, FILM COATED, EXTENDED RELEASE ORAL at 23:33

## 2023-02-25 RX ADMIN — CEFTRIAXONE 2000 MG: 2 INJECTION, POWDER, FOR SOLUTION INTRAMUSCULAR; INTRAVENOUS at 23:56

## 2023-02-26 PROBLEM — M15.9 OSTEOARTHRITIS OF MULTIPLE JOINTS: Chronic | Status: ACTIVE | Noted: 2023-02-26

## 2023-02-26 PROBLEM — R53.81 PHYSICAL DECONDITIONING: Status: ACTIVE | Noted: 2023-02-26

## 2023-02-26 PROBLEM — E87.6 HYPOKALEMIA: Status: ACTIVE | Noted: 2023-02-26

## 2023-02-26 LAB
25(OH)D3 SERPL-MCNC: 18 NG/ML (ref 30–100)
ANION GAP SERPL CALC-SCNC: 5 MMOL/L (ref 3–11)
BASOPHILS # BLD AUTO: 0 10*3/UL
BASOPHILS NFR BLD AUTO: 0.4 % (ref 0–2)
BNP SERPL-MCNC: 39 PG/ML (ref 0–100)
BUN SERPL-MCNC: 16 MG/DL (ref 7–25)
CALCIUM SERPL-MCNC: 7.9 MG/DL (ref 8.6–10.3)
CHLORIDE SERPL-SCNC: 103 MMOL/L (ref 98–107)
CK SERPL-CCNC: 47 U/L (ref 39–308)
CO2 SERPL-SCNC: 30 MMOL/L (ref 21–32)
CREAT SERPL-MCNC: 0.77 MG/DL (ref 0.7–1.3)
CRP SERPL-MCNC: 33.6 MG/L
DELTA HIGH SENSITIVITY TROPONIN I, 2 HOUR: 3.9
EOSINOPHIL # BLD AUTO: 0.1 10*3/UL
EOSINOPHIL NFR BLD AUTO: 0.6 % (ref 0–3)
ERYTHROCYTE [DISTWIDTH] IN BLOOD BY AUTOMATED COUNT: 14 % (ref 11.5–15)
EST. AVERAGE GLUCOSE BLD GHB EST-MCNC: 125.5 MG/DL
GFR SERPL CREATININE-BSD FRML MDRD: 87 ML/MIN/1.73M*2
GLUCOSE BLDC GLUCOMTR-SCNC: 119 MG/DL (ref 70–105)
GLUCOSE BLDC GLUCOMTR-SCNC: 129 MG/DL (ref 70–105)
GLUCOSE BLDC GLUCOMTR-SCNC: 135 MG/DL (ref 70–105)
GLUCOSE BLDC GLUCOMTR-SCNC: 138 MG/DL (ref 70–105)
GLUCOSE BLDC GLUCOMTR-SCNC: 189 MG/DL (ref 70–105)
GLUCOSE BLDC GLUCOMTR-SCNC: 189 MG/DL (ref 70–105)
GLUCOSE SERPL-MCNC: 125 MG/DL (ref 70–105)
HBA1C MFR BLD: 6 % (ref 4–6)
HCT VFR BLD AUTO: 35.8 % (ref 38–50)
HGB BLD-MCNC: 12.3 G/DL (ref 13.2–17.2)
LYMPHOCYTES # BLD AUTO: 1.1 10*3/UL
LYMPHOCYTES NFR BLD AUTO: 11 % (ref 15–47)
MCH RBC QN AUTO: 31.4 PG (ref 29–34)
MCHC RBC AUTO-ENTMCNC: 34.4 G/DL (ref 32–36)
MCV RBC AUTO: 91.1 FL (ref 82–97)
MONOCYTES # BLD AUTO: 1 10*3/UL
MONOCYTES NFR BLD AUTO: 10.2 % (ref 5–13)
NEUTROPHILS # BLD AUTO: 7.9 10*3/UL
NEUTROPHILS NFR BLD AUTO: 77.8 % (ref 46–70)
PHOSPHATE SERPL-MCNC: 2.8 MG/DL (ref 2.5–4.9)
PLATELET # BLD AUTO: 282 10*3/UL (ref 130–350)
PMV BLD AUTO: 7.5 FL (ref 6.9–10.8)
POTASSIUM SERPL-SCNC: 3 MMOL/L (ref 3.5–5.1)
RBC # BLD AUTO: 3.93 10*6/ΜL (ref 4.1–5.8)
SODIUM SERPL-SCNC: 138 MMOL/L (ref 135–145)
TROPONIN I SERPL-MCNC: 9.9 PG/ML
TSH SERPL DL<=0.05 MIU/L-ACNC: 3.48 UIU/ML (ref 0.34–4.82)
VIT B12 SERPL-MCNC: 252 PG/ML (ref 180–914)
WBC # BLD AUTO: 10.2 10*3/UL (ref 3.7–9.6)

## 2023-02-26 PROCEDURE — 2580000300 HC RX 258: Performed by: INTERNAL MEDICINE

## 2023-02-26 PROCEDURE — 99285 EMERGENCY DEPT VISIT HI MDM: CPT

## 2023-02-26 PROCEDURE — 6360000200 HC RX 636 W HCPCS (ALT 250 FOR IP): Performed by: INTERNAL MEDICINE

## 2023-02-26 PROCEDURE — 6370000100 HC RX 637 (ALT 250 FOR IP): Performed by: STUDENT IN AN ORGANIZED HEALTH CARE EDUCATION/TRAINING PROGRAM

## 2023-02-26 PROCEDURE — 96374 THER/PROPH/DIAG INJ IV PUSH: CPT

## 2023-02-26 PROCEDURE — 80048 BASIC METABOLIC PNL TOTAL CA: CPT | Performed by: INTERNAL MEDICINE

## 2023-02-26 PROCEDURE — 4A0D7LZ MEASUREMENT OF URINARY VOLUME, VIA NATURAL OR ARTIFICIAL OPENING: ICD-10-PCS | Performed by: STUDENT IN AN ORGANIZED HEALTH CARE EDUCATION/TRAINING PROGRAM

## 2023-02-26 PROCEDURE — 6370000100 HC RX 637 (ALT 250 FOR IP): Performed by: INTERNAL MEDICINE

## 2023-02-26 PROCEDURE — 51798 US URINE CAPACITY MEASURE: CPT

## 2023-02-26 PROCEDURE — 82947 ASSAY GLUCOSE BLOOD QUANT: CPT | Mod: QW

## 2023-02-26 PROCEDURE — 94660 CPAP INITIATION&MGMT: CPT

## 2023-02-26 PROCEDURE — 97161 PT EVAL LOW COMPLEX 20 MIN: CPT | Mod: GP

## 2023-02-26 PROCEDURE — 85025 COMPLETE CBC W/AUTO DIFF WBC: CPT | Performed by: INTERNAL MEDICINE

## 2023-02-26 PROCEDURE — (BLANK) HC ROOM PRIVATE

## 2023-02-26 PROCEDURE — 36415 COLL VENOUS BLD VENIPUNCTURE: CPT | Performed by: INTERNAL MEDICINE

## 2023-02-26 PROCEDURE — 99232 SBSQ HOSP IP/OBS MODERATE 35: CPT | Performed by: STUDENT IN AN ORGANIZED HEALTH CARE EDUCATION/TRAINING PROGRAM

## 2023-02-26 RX ORDER — AMLODIPINE BESYLATE 5 MG/1
5 TABLET ORAL DAILY
Status: DISCONTINUED | OUTPATIENT
Start: 2023-02-26 | End: 2023-03-02 | Stop reason: HOSPADM

## 2023-02-26 RX ORDER — CYANOCOBALAMIN 1000 UG/ML
1000 INJECTION, SOLUTION INTRAMUSCULAR; SUBCUTANEOUS DAILY
Status: COMPLETED | OUTPATIENT
Start: 2023-02-26 | End: 2023-02-28

## 2023-02-26 RX ORDER — TAMSULOSIN HYDROCHLORIDE 0.4 MG/1
0.8 CAPSULE ORAL
Status: DISCONTINUED | OUTPATIENT
Start: 2023-02-26 | End: 2023-02-27

## 2023-02-26 RX ORDER — HYDRALAZINE HYDROCHLORIDE 20 MG/ML
10 INJECTION INTRAMUSCULAR; INTRAVENOUS EVERY 4 HOURS PRN
Status: DISCONTINUED | OUTPATIENT
Start: 2023-02-26 | End: 2023-03-02 | Stop reason: HOSPADM

## 2023-02-26 RX ORDER — FINASTERIDE 5 MG/1
5 TABLET, FILM COATED ORAL DAILY
Status: DISCONTINUED | OUTPATIENT
Start: 2023-02-26 | End: 2023-03-02 | Stop reason: HOSPADM

## 2023-02-26 RX ORDER — ATORVASTATIN CALCIUM 10 MG/1
10 TABLET, FILM COATED ORAL DAILY
Status: DISCONTINUED | OUTPATIENT
Start: 2023-02-26 | End: 2023-03-02 | Stop reason: HOSPADM

## 2023-02-26 RX ORDER — SPIRONOLACTONE 25 MG/1
25 TABLET ORAL DAILY
Status: DISCONTINUED | OUTPATIENT
Start: 2023-02-26 | End: 2023-03-02 | Stop reason: HOSPADM

## 2023-02-26 RX ORDER — IBUPROFEN 200 MG
600 TABLET ORAL EVERY 6 HOURS PRN
COMMUNITY
End: 2023-03-02 | Stop reason: HOSPADM

## 2023-02-26 RX ORDER — SODIUM CHLORIDE 9 MG/ML
25-50 INJECTION, SOLUTION INTRAVENOUS AS NEEDED
Status: DISCONTINUED | OUTPATIENT
Start: 2023-02-26 | End: 2023-03-02 | Stop reason: HOSPADM

## 2023-02-26 RX ORDER — DOXAZOSIN 4 MG/1
8 TABLET ORAL NIGHTLY
Status: DISCONTINUED | OUTPATIENT
Start: 2023-02-26 | End: 2023-02-26

## 2023-02-26 RX ORDER — POTASSIUM CHLORIDE 750 MG/1
40 TABLET, FILM COATED, EXTENDED RELEASE ORAL ONCE
Status: COMPLETED | OUTPATIENT
Start: 2023-02-26 | End: 2023-02-26

## 2023-02-26 RX ORDER — LANOLIN ALCOHOL/MO/W.PET/CERES
1000 CREAM (GRAM) TOPICAL DAILY
Status: DISCONTINUED | OUTPATIENT
Start: 2023-03-01 | End: 2023-03-02 | Stop reason: HOSPADM

## 2023-02-26 RX ORDER — FLUOXETINE 10 MG/1
10 CAPSULE ORAL DAILY
Status: DISCONTINUED | OUTPATIENT
Start: 2023-02-26 | End: 2023-03-01

## 2023-02-26 RX ORDER — POTASSIUM CHLORIDE 750 MG/1
30 TABLET, FILM COATED, EXTENDED RELEASE ORAL 2 TIMES DAILY WITH MEALS
Status: DISCONTINUED | OUTPATIENT
Start: 2023-02-26 | End: 2023-02-26

## 2023-02-26 RX ORDER — POTASSIUM CHLORIDE 750 MG/1
20 TABLET, EXTENDED RELEASE ORAL 2 TIMES DAILY
COMMUNITY
End: 2023-03-02 | Stop reason: HOSPADM

## 2023-02-26 RX ORDER — LANSOPRAZOLE 30 MG/1
30 CAPSULE, DELAYED RELEASE ORAL
Status: DISCONTINUED | OUTPATIENT
Start: 2023-02-26 | End: 2023-03-02 | Stop reason: HOSPADM

## 2023-02-26 RX ORDER — ASPIRIN 325 MG
50000 TABLET, DELAYED RELEASE (ENTERIC COATED) ORAL WEEKLY
Status: DISCONTINUED | OUTPATIENT
Start: 2023-02-26 | End: 2023-03-02 | Stop reason: HOSPADM

## 2023-02-26 RX ORDER — IRBESARTAN 150 MG/1
300 TABLET ORAL DAILY
Status: DISCONTINUED | OUTPATIENT
Start: 2023-02-27 | End: 2023-03-02 | Stop reason: HOSPADM

## 2023-02-26 RX ADMIN — ENOXAPARIN SODIUM 40 MG: 100 INJECTION SUBCUTANEOUS at 03:28

## 2023-02-26 RX ADMIN — AMLODIPINE BESYLATE 5 MG: 5 TABLET ORAL at 08:29

## 2023-02-26 RX ADMIN — ENOXAPARIN SODIUM 40 MG: 100 INJECTION SUBCUTANEOUS at 14:04

## 2023-02-26 RX ADMIN — SODIUM CHLORIDE 25 ML: 9 INJECTION, SOLUTION INTRAVENOUS at 21:18

## 2023-02-26 RX ADMIN — INSULIN ASPART 1 UNITS: 100 INJECTION, SOLUTION INTRAVENOUS; SUBCUTANEOUS at 19:37

## 2023-02-26 RX ADMIN — ATORVASTATIN CALCIUM 10 MG: 10 TABLET, FILM COATED ORAL at 08:29

## 2023-02-26 RX ADMIN — SODIUM CHLORIDE 100 ML/HR: 9 INJECTION, SOLUTION INTRAVENOUS at 03:35

## 2023-02-26 RX ADMIN — DOXAZOSIN 8 MG: 4 TABLET ORAL at 04:42

## 2023-02-26 RX ADMIN — HYDROCHLOROTHIAZIDE 1 DOSE: 12.5 TABLET ORAL at 08:29

## 2023-02-26 RX ADMIN — CYANOCOBALAMIN 1000 MCG: 1000 INJECTION, SOLUTION INTRAMUSCULAR at 04:46

## 2023-02-26 RX ADMIN — POTASSIUM CHLORIDE 40 MEQ: 750 TABLET, EXTENDED RELEASE ORAL at 12:18

## 2023-02-26 RX ADMIN — FINASTERIDE 5 MG: 5 TABLET, FILM COATED ORAL at 12:21

## 2023-02-26 RX ADMIN — TAMSULOSIN HYDROCHLORIDE 0.8 MG: 0.4 CAPSULE ORAL at 18:45

## 2023-02-26 RX ADMIN — LANSOPRAZOLE 30 MG: 30 CAPSULE, DELAYED RELEASE ORAL at 14:04

## 2023-02-26 RX ADMIN — FLUOXETINE 10 MG: 10 CAPSULE ORAL at 08:29

## 2023-02-26 RX ADMIN — CHOLECALCIFEROL CAP 1.25 MG (50000 UNIT) 50000 UNITS: 1.25 CAP at 08:29

## 2023-02-26 RX ADMIN — SPIRONOLACTONE 25 MG: 25 TABLET ORAL at 12:21

## 2023-02-26 RX ADMIN — POTASSIUM CHLORIDE 30 MEQ: 750 TABLET, FILM COATED, EXTENDED RELEASE ORAL at 08:29

## 2023-02-26 RX ADMIN — CEFTRIAXONE 2000 MG: 2 INJECTION, POWDER, FOR SOLUTION INTRAMUSCULAR; INTRAVENOUS at 21:20

## 2023-02-26 ASSESSMENT — ENCOUNTER SYMPTOMS
DECREASED CONCENTRATION: 1
WEAKNESS: 1
DYSURIA: 0
COUGH: 0
BACK PAIN: 0
VOMITING: 0
EYE PAIN: 0
HEADACHES: 0
FEVER: 0
CONFUSION: 1
DIARRHEA: 0
SORE THROAT: 0

## 2023-02-26 ASSESSMENT — ACTIVITIES OF DAILY LIVING (ADL)
ASSISTIVE_DEVICE: WALKER
ADEQUATE_TO_COMPLETE_ADL: YES
PATIENT'S MEMORY ADEQUATE TO SAFELY COMPLETE DAILY ACTIVITIES?: YES

## 2023-02-26 NOTE — ED PROVIDER NOTES
"    HPI:  Chief Complaint   Patient presents with    Fall     Pt brought by ems for fall and weakness pt had a recent uti ekg on arrival          Trauma  Mechanism of injury: Fall     Current symptoms:       Associated symptoms:             Denies back pain, chest pain, headache and vomiting.   Patient is an 88-year-old gentleman who presents with his wife.  She states has been asked extremely weak over the last 2 days.  He has had 2 minor falls.  She states this is what happened when he had a urinary tract infection \"last time in January\".  Patient's wife is the primary historian.  He states he feels \"fine\".  Wife notes that he has been very confused for the last couple of days.  Both times he fell she states he was trying to get up or sit down on a bench in their bedroom.  He was so weak that his legs will start to shake.  He attempted to sit down and missed the bench and landed on his butt.  Did not hit his head.  No loss of conscious.  She does not think he injured anything.  Has not been complaining of any pain.  He has had 2 of these events secondary to weakness.  She feels like the weakness is global.  She has not noticed any facial droop or slurred speech.  Normally he is able to make it to the bathroom and is continent.  Over the last 2 days she had a minute depends because he cannot make it to the bathroom with weakness and has been having incontinence.  No abdominal pain.  No fever.  No nausea or vomiting.  She has not noticed any diarrhea.  Again patient himself has no complaints.  Not normally on oxygen.  Arrives here hypoxic.  No cough or congestion.  No sick contacts.  No fevers.  History limited by his altered mental status.  HISTORY:  Past Medical History:   Diagnosis Date    CAD (coronary artery disease)     Elevated cholesterol     GERD (gastroesophageal reflux disease)     Hiatal hernia     History of shingles     Hypertension     Macular degeneration     Metabolic syndrome     Vertigo  "       Past Surgical History:   Procedure Laterality Date    OTHER SURGICAL HISTORY Left 2016    quad tendon repair    QUADRICEPS REPAIR Left     TONSILLECTOMY      TOTAL ANKLE ARTHROPLASTY Left     TOTAL HIP ARTHROPLASTY Right     TOTAL HIP ARTHROPLASTY Left     TOTAL KNEE ARTHROPLASTY  januauary 2022    TRANSURETHRAL RESECTION OF PROSTATE         Family History   Problem Relation Age of Onset    Hypertension Father     Stroke Father     Sleep apnea Brother        Social History     Tobacco Use    Smoking status: Former     Types: Cigarettes     Quit date: 1965     Years since quittin.1    Smokeless tobacco: Never   Vaping Use    Vaping Use: Never used   Substance Use Topics    Alcohol use: Yes     Alcohol/week: 1.0 standard drink     Types: 1 Shots of liquor per week         ROS:  Review of Systems   Constitutional:  Negative for fever.   HENT:  Negative for congestion, ear pain and sore throat.    Eyes:  Negative for pain.   Respiratory:  Negative for cough.    Cardiovascular:  Negative for chest pain.   Gastrointestinal:  Negative for diarrhea and vomiting.   Genitourinary:  Negative for dysuria.   Musculoskeletal:  Negative for back pain.   Neurological:  Positive for weakness. Negative for headaches.   Psychiatric/Behavioral:  Positive for confusion and decreased concentration.    All other systems reviewed and are negative.    PE:  ED Triage Vitals   Temp Heart Rate Resp BP SpO2   23   (!) 20 °C (68 °F) 71 20 145/93 (!) 87 %      Mean BP (mmHg) Temp Source Heart Rate Source Patient Position BP Location   23 1930 23 -- 23 --   120 Oral  Supine       FiO2 (%)       --                  Physical Exam  Vitals and nursing note reviewed.   Constitutional:       General: He is not in acute distress.     Appearance: He is well-developed. He is ill-appearing. He is not toxic-appearing.   HENT:      Head:  Normocephalic and atraumatic.   Eyes:      Extraocular Movements: Extraocular movements intact.      Conjunctiva/sclera: Conjunctivae normal.      Pupils: Pupils are equal.   Cardiovascular:      Rate and Rhythm: Normal rate and regular rhythm.      Heart sounds: No murmur heard.  Pulmonary:      Effort: Pulmonary effort is normal. No respiratory distress.      Breath sounds: Normal breath sounds. No stridor. No wheezing.   Abdominal:      General: There is no distension.      Palpations: Abdomen is soft.      Tenderness: There is no abdominal tenderness. There is no guarding or rebound.   Musculoskeletal:         General: No tenderness or deformity. Normal range of motion.      Cervical back: Normal range of motion and neck supple.      Right lower leg: No edema.      Left lower leg: No edema.   Skin:     General: Skin is warm and dry.      Capillary Refill: Capillary refill takes less than 2 seconds.   Neurological:      General: No focal deficit present.      Mental Status: He is alert and oriented to person, place, and time.   Psychiatric:         Mood and Affect: Mood normal.         Behavior: Behavior normal.         Thought Content: Thought content normal.         Judgment: Judgment normal.       ED LABS:  Labs Reviewed   CBC WITH AUTO DIFFERENTIAL - Abnormal       Result Value    WBC 11.9 (*)     RBC 4.07 (*)     Hemoglobin 13.0 (*)     Hematocrit 36.9 (*)     MCV 90.6      MCH 31.9      MCHC 35.2      RDW 14.1      Platelets 313      MPV 6.9      Neutrophils% 78.2 (*)     Lymphocytes% 12.1 (*)     Monocytes% 9.0      Eosinophils% 0.3      Basophils% 0.4      ANC (auto diff) 9.30      Lymphocytes Absolute 1.40      Monocytes Absolute 1.10      Eosinophils Absolute 0.00      Basophils Absolute 0.10     COMPREHENSIVE METABOLIC PANEL - Abnormal    Sodium 138      Potassium 3.0 (*)     Chloride 98      CO2 30      Anion Gap 10      BUN 17      Creatinine 0.93      Glucose 125 (*)     Calcium 8.8      AST 16       ALT (SGPT) 14      Alkaline Phosphatase 106      Total Protein 6.4      Albumin 3.6      Total Bilirubin 0.78      Corrected Calcium 9.1      eGFR 79      Narrative:     Calculation based on the 2021 Chronic Kidney Disease Epidemiology Collaboration (CKD-EPI) equation refit without adjustment for race.   URINALYSIS, MICROSCOPIC ONLY - Abnormal    RBC, Urine >100 (*)     WBC, Urine  (*)     WBC Clumps, Urine None seen      Squamous Epithelial, Urine 0-4      Bacteria, Urine Few     URINALYSIS, DIPSTICK ONLY, FOR USE WITH MICROSCOPIC PANEL - Abnormal    Color, Urine LIGHT ORANGE (*)     Clarity, Urine Turbid (*)     Specific Gravity, Urine 1.015      Leukocytes, Urine Large (*)     Nitrite, Urine Negative      Protein, Urine 50 (*)     Ketones, Urine Negative      Urobilinogen, Urine Normal      Bilirubin, Urine Negative      Blood, Urine Moderate (*)     Glucose, Urine Negative      pH, Urine 7.0     C-REACTIVE PROTEIN - Abnormal    CRP 33.6 (*)    LIPASE - Normal    Lipase 25     MAGNESIUM - Normal    Magnesium 1.8     PROCALCITONIN - Normal    Procalcitonin 0.31      Narrative:     Low risk of severe sepsis and/or septic shock.  Concentrations <0.5 ng/mL do not exclude an infection.  It is recommended to retest PCT within 6-24 hours if any concentrations <2.0 ng/mL are obtained.  [employer] PCT is a registered trademark belonging to [employer]  For more information about the [employer] Procalcitonin assay, please see the lab resources menu or chart search [employer] Procalcitonin.   POCT LACTIC ACID (LACTATE) - Normal    POC Lactate 0.89     HIGH SENSITIVE TROPONIN I - Normal    hsTnI 0 Hour 6.0     RESPIRATORY PATHOGEN PANEL, PCR - Normal    Adenovirus Detection by PCR Negative      SARS-COV-2 PCR Negative      Human Metapneumovirus Detection by PCR Negative      Rhinovirus/Enterovirus Detection by PCR Negative      Influenza A  Detection by PCR Negative      Influenza B Detection by PCR Negative      Respiratory  Syncytial Virus Detection by PCR Negative      Bordetella Pertussis Detection by PCR Negative      Chlamydophila Pneumoniae Detection by PCR Negative      Mycoplasma pneumo by PCR Negative      Bordetella Parapertussis Detection by PCR Negative      Coronavirus 229E Detection by PCR Negative      Coronavirus HKU1 Detection by PCR Negative      Coronavirus NL63  Detection by PCR Negative      Coronavirus OC43 Detection by PCR Negative      Parainfluenza 1 Detection by PCR Negative      Parainfluenza 2 Detection by PCR Negative      Parainfluenza 3 Detection by PCR Negative      Parainfluenza 4 Detection by PCR Negative      Narrative:     This assay is performed using the [employer] Respiratory Panel ([employer] Results from this test must be correlated with the clinical history, epidemiological data, and other data available to the clinician evaluating the patient. A negative FilmArray RP result does not exclude the possibility of viral or bacterial infection. Negative test results may occur from the presence of sequence variants in the region targeted by the assay, the presence of inhibitors or an infection caused by an organism not detected by the panel.   HIGH SENSITIVE TROPONIN I, 1 HOUR - Normal    hsTnI 1 hr 6.1      Delta from 0 Hour 0.1     HIGH SENSITIVE TROPONIN I, 2 HOUR - Normal    Delta from 0 Hour 3.9      hsTnI 2 hr 9.9     PHOSPHORUS - Normal    Phosphorus 2.8     CK - Normal    Total CK 47     TSH - Normal    TSH 3.479     URINE CULTURE   BLOOD CULTURES, 2 SETS    Narrative:     The following orders were created for panel order Blood culture, 2 sets.  Procedure                               Abnormality         Status                     ---------                               -----------         ------                     Blood culture, 1 set[[id number]]                              In process                 Blood culture, 1 set[[id number]]                              In process                    Please view results for these tests on the individual orders.   BLOOD CULTURE   BLOOD CULTURE   URINE CULTURE   HIGH SENSITIVE TROPONIN I PANEL (0HR, 1HR, 2HR)    Narrative:     The following orders were created for panel order HS Troponin I Panel (0HR, 1HR, 2HR) Blood, Venous.  Procedure                               Abnormality         Status                     ---------                               -----------         ------                     HS Troponin I[[id number]]                 Normal              Final result               1HR High Sensitive Trop I[[id number]]     Normal              Final result               2HR High Sensitive Tropon...[[id number]]  Normal              Final result                 Please view results for these tests on the individual orders.   URINALYSIS WITH MICROSCOPIC    Narrative:     The following orders were created for panel order Urinalysis w/microscopic Urine, Clean Catch.  Procedure                               Abnormality         Status                     ---------                               -----------         ------                     Urinalysis, microscopic U...[[id number]]  Abnormal            Final result               Urinalysis, dipstick Urin...[[id number]]  Abnormal            Final result                 Please view results for these tests on the individual orders.   HEMOGLOBIN A1C (GLYCOSYLATED)    Hemoglobin A1C 6.0      Estimated Average Glucose 125.5      Narrative:     This assay is FDA cleared and indicated to monitor long-term glucose control in individuals with diabetes mellitus.    ---------American Diabetes Association (ADA)2021----------    Therapeutic goals for glycemic control:   All ages: <7% HbA1C    Falsely low HbA1c results may be observed in patients with clinical conditions that shorten erythrocyte life span or decrease mean erythrocyte age. HbA1c may not accurately reflect glycemic control when clinical conditions that affect erythrocyte  survival are present. Fructosamine may be used as an alternate measurement of glycemic control.     VITAMIN D 25 HYDROXY   VITAMIN B12   CBC WITH AUTO DIFFERENTIAL   BASIC METABOLIC PANEL         ED IMAGES:  XR chest portable 1 view   Final Result   IMPRESSION:   1.  Negative.      CT head without IV contrast   Final Result   IMPRESSION:   1.  There is moderate chronic ischemic white matter change and moderate atrophy. Nothing acute.             ED PROCEDURES:  ECG 12 lead - Syncope    Date/Time: 3/11/2023 5:16 AM  Performed by: Melinda Bullock MD  Authorized by: Melinda Bullock MD     ECG reviewed by ED Physician in the absence of a cardiologist: yes    Previous ECG:     Previous ECG:  Unavailable  Interpretation:     Interpretation: non-specific    Comments:      Normal sinus rhythm, rate 72, diffuse T wave flattening, nonspecific ST-T wave changes.  Does not meet STEMI criteria.  No old for comparison at this time    ED COURSE:  ED Course as of 02/26/23 0107   Sat Feb 25, 2023   2313 Reviewing urine culture from 1/14/2023 showed only less than 10,000 colonies of E. coli but it was resistant to ampicillin and Unasyn.  Susceptible to Rocephin.  We will proceed with Rocephin. [KB]      ED Course User Index  [KB] Melinda Bullock MD     Medical records reviewed, previous culture results reviewed, previous hospitalization for similar symptoms reviewed through January 2023     Continuous telemetry review normal sinus rhythm without dysrhythmia  Sepsis Quality Bundle           MDM:  Medical Decision Making  Patient is a 8-year-old gentleman presents with generalized weakness, history of urinary tract infection causing similar symptoms.  We will proceed with full cardiopulmonary evaluation, none of these episodes sound like syncopal events.  Patient is confused but has no complaints.  Wife primary historian.  We will proceed with sepsis evaluation, infectious evaluation, exclude any injury with a CT head, chest x-ray.  He is  mildly hypoxic.  This is new for him.  No upper respiratory symptoms.  Respiratory PCR will be checked as well    Still awaiting urinalysis.  Patient will be catheter specimen.  Chest x-ray without lobar consolidation.  Head CT shows chronic but nothing acute    Continuous telemetry without dysrhythmia.    Financial and social constraints, proximity to healthcare facilities, and comorbidities were considered in the treatment plan for this patient.      Urinalysis consistent with infection.  Based on previous culture results we will proceed with IV Rocephin.  Blood cultures to be obtained.  No evidence of sepsis at this point but certainly risk for this.  He has generalized weakness.  Not appropriate for outpatient management.  Hospitalist consulted for hospitalization.  Wife agrees with this plan.    DR. [facility]    Final diagnoses:   [N39.0] UTI (urinary tract infection)   [R53.1] Weakness   [Z91.89] High risk for sepsis     2/25/2023 11:49 PM            A voice recognition program was used to aid in documentation of this record.  Sometimes words are not printed exactly as they were spoken.  While efforts were made to carefully edit and correct any inaccuracies, some areas may be present; please take these into context.  Please contact the physician if areas are identified.               Melinda Bullock MD  02/26/23 0107       Melinda Bullock MD  03/11/23 0569

## 2023-02-26 NOTE — PLAN OF CARE
Problem: Safety Adult - Fall  Goal: Free from fall injury  Description: INTERVENTIONS:    Inpatient - Please reference Cares/Safety Flowsheet under Oneil Fall Risk for interventions.  Pediatrics - Please reference Peds Daily Cares/Safety Flowsheet under [facility] Pediatric Fall Assessment Fall Bundle for interventions  LD/OB - Please reference OB Shift Screening Flowsheet under OB Fall Risk for interventions.  Outcome: Progressing     Problem: Knowledge Deficit  Goal: Patient/family/caregiver demonstrates understanding of disease process, treatment plan, medications, and discharge instructions  Description: INTERVENTIONS:   1. Complete learning assessment and assess knowledge base  2. Provide teaching at level of understanding   3. Provide teaching via preferred learning methods  Outcome: Progressing     Problem: Potential for Compromised Skin Integrity  Goal: Skin Integrity is Maintained or Improved  Description: INTERVENTIONS:  1. Assess and monitor skin integrity  2. Collaborate with interdisciplinary team and initiate plans and interventions as needed  3. Alternate a full bath with partial baths for elderly   4. Monitor patient's hygiene practices   5. Collaborate with wound, ostomy, and continence nurse  Outcome: Progressing  Goal: Nutritional status is improving  Description: INTERVENTIONS:  1. Monitor and assess patient for malnutrition (ex- brittle hair, bruises, dry skin, pale skin and conjunctiva, muscle wasting, smooth red tongue, and disorientation)  2. Monitor patient's weight and dietary intake as ordered or per policy  3. Determine patient's food preferences and provide high-protein, high-caloric foods as appropriate  4. Assist patient with eating   5. Allow adequate time for meals   6. Encourage patient to take dietary supplement as ordered   7. Collaborate with dietitian  8. Include patient/family/caregiver in decisions related to nutrition  Outcome: Progressing  Goal: MOBILITY IS MAINTAINED OR  IMPROVED  Description: INTERVENTIONS  1. Collaborate with interdisciplinary team and initiate plan and interventions as ordered (PT/OT)  2. Encourage ambulation  3. Up to chair for meals  4. Monitor for signs of deconditioning  Outcome: Progressing     Problem: Urinary Incontinence  Goal: Perineal skin integrity is maintained or improved  Description: INTERVENTIONS:  1. Assess genitourinary system, perineal skin, labs (urinalysis), and history of incontinence to include past management, aggravating, and alleviating factors  2. Collaborate with interdisciplinary team including wound, ostomy, and continence nurse and initiate plans and interventions as needed  4. Consider urine containment device  5. Apply skin protectant   6. Develop skin care regimen  7. Provide privacy when changing patient's incontinence device to maintain their dignity  Outcome: Progressing     Problem: Pain - Adult  Goal: Verbalizes/displays adequate comfort level or baseline comfort level  Description: INTERVENTIONS:  1. Encourage patient to monitor pain and request interventions  2. Assess pain using the appropriate pain scale  3. Administer analgesics based on type and severity of pain and evaluate response  4. Educate/Implement non-pharmacological measures as appropriate and evaluate response  5. Consider cultural, developmental and social influences on pain and pain management  6. Notify Provider if interventions unsuccessful or patient reports new pain  Outcome: Progressing     Problem: Infection - Adult  Goal: Absence of infection during hospitalization  Description: INTERVENTIONS:  1. Assess and monitor for signs and symptoms of infection  2. Monitor lab/diagnostic results  3. Monitor all insertion sites/wounds/incisions  4. Monitor secretions for changes in amount and color  5. Administer medications as ordered  6. Educate and encourage patient and family to use good hand hygiene technique  7. Identify and educate in appropriate  isolation precautions for identified infection/condition  Outcome: Progressing     Problem: Safety Adult  Goal: Patient will remain safe during hospitalization  Description: INTERVENTIONS    1. Assess patient for fall risk and implement interventions if needed  2. Use safe transport techniques  3. Assess patient using the appropriate Harvey skin assessment scale  4. Assess patient for risk of aspiration  5. Assess patient for risk of elopement  6. Assess patient for risk of suicide  Outcome: Progressing     Problem: Daily Care  Goal: Daily care needs are met  Description: INTERVENTIONS:   1. Assess and monitor skin integrity  2. Identify patients at risk for skin breakdown on admission and per policy  3. Assess and monitor ability to perform self care and identify potential discharge needs  4. Assess skin integrity/risk for skin breakdown  5. Assist patient with activities of daily living as needed  6. Encourage independent activity per ability   7. Provide mouth care   8. Include patient/family/caregiver in decisions related to daily care   Outcome: Progressing     Problem: Discharge Barriers  Goal: Patient's discharge needs are met  Description: INTERVENTIONS:  1. Assess patient for self-management skills  2. Encourage participation in management  3. Identify potential discharge barriers on admission and throughout hospital stay  4. Involve patient/family/caregiver in discharge planning process  5. Collaborate with case management/ for discharge needs  Outcome: Progressing     Problem: Genitourinary - Adult  Goal: Absence of urinary retention  Description: INTERVENTIONS:  1. Assess patient’s ability to void and empty bladder.  2. Monitor intake/output and perform bladder scan if indicated.  3. Place urinary catheter per order and initiate and maintain CAUTI bundle.  4. Administer medications to alleviate retention as needed.  5. Discuss catheterization for long term situations as  appropriate.    Outcome: Progressing  Goal: Urinary catheter remains patent  Description: INTERVENTIONS:  1. Assess patency of urinary catheter.  2. Irrigate catheter per order if indicated and notify provider if unable to irrigate.  3. Assess need for a larger catheter size or a 3-way catheter for continuous bladder irrigation.  Outcome: Progressing  Goal: Absence of Urinary Infection  Description: INTERVENTIONS:  1. Assess urinary status for burning, urgency, frequency, and pain  2. Assess urine for color, cloudiness, odor, and amount  3. Monitor intake/output  4. Monitor lab values  5. Obtain urinalysis as ordered  6. Assess for neurological status changes    Outcome: Progressing     Problem: Metabolic/Fluid and Electrolytes - Adult  Goal: Electrolytes maintained within normal limits  Description: INTERVENTIONS:  1. Monitor labs and assess patient for signs and symptoms of electrolyte imbalances  2. Administer electrolyte replacement as ordered  3. Monitor response to electrolyte replacements, including repeat lab results as appropriate  4. Fluid restriction as ordered  5. Instruct patient on fluid and nutrition restrictions as appropriate  Outcome: Progressing  Goal: Maintain Optimal Renal Function and Hemodynamic Stability  Description: INTERVENTIONS:  1. Monitor labs and assess for signs and symptoms of volume excess or deficit  2. Monitor intake, output and patient weight  3. Monitor urine specific gravity, serum osmolarity and serum sodium as indicated or ordered  4. Monitor response to interventions for patient's volume status, including labs, urine output, blood pressure (other measures as available)  5. Encourage oral intake as appropriate  6. Instruct patient on fluid and nutrition restrictions as appropriate  Outcome: Progressing  Goal: Glucose maintained within prescribed range  Description: INTERVENTIONS:  1. Monitor blood glucose as ordered  2. Assess for signs and symptoms of hyperglycemia and  hypoglycemia  3. Administer ordered medications to maintain glucose within target range  4. Assess barriers to adequate nutritional intake and initiate nutrition consult as needed  5. Assess baseline knowledge and provide education as indicated  6. Monitor exercise as may reduce the requirements for insulin  Outcome: Progressing     Problem: Skin/Tissue Integrity - Adult  Goal: Skin integrity remains intact  Description: INTERVENTIONS  1. Assess and document risk factors for pressure ulcer development  2. Assess and document skin integrity  3. Monitor for areas of redness and/or skin breakdown  4. Initiate pressure ulcer prevention measures as indicated  Outcome: Progressing  Goal: Oral mucous membranes remain intact  Description: INTERVENTIONS  1. Assess oral mucosa and hygiene practices  2. Implement preventative oral hygiene regimen  3. Implement oral medicated treatments as ordered  Outcome: Progressing     Problem: Musculoskeletal - Adult  Goal: Return mobility to safest level of function  Description: INTERVENTIONS:  1. Assess patient stability and activity tolerance for standing, transferring and ambulating w/ or w/o assistive devices  2. Assist with transfers and ambulation using safe practices  3. Ensure adequate protection for wounds/incisions during mobilization  4. Obtain PT/OT and other consults as needed  5. Apply Continuous Passive Motion per order to increase flexion toward goal  6. Instruct patient/family in ordered activity level  Outcome: Progressing  Goal: Maintain proper alignment of affected body part  Description: INTERVENTIONS:  1. Support and protect limb and body alignment per provider order  2. Instruct and reinforce with patient and family use of appropriate assistive device and precautions (e.g. spinal or hip dislocation precautions)  Outcome: Progressing

## 2023-02-26 NOTE — PROGRESS NOTES
[address] [place] [address]  Daily Progress Note  Patient name: Joseph Wong  MRN: [id number]   LOS: 1 day     Subjective   Joseph is eating breakfast this morning. Wife present at bedside. He denies any major complaints or concerns. Wife reports he has had progressive loss of memory in the past, still not evaluated by neurology.      Objective   Vitals:Temp:  [20 °C (68 °F)-36.9 °C (98.5 °F)] 36.5 °C (97.7 °F)  Heart Rate:  [64-73] 65  Resp:  [12-24] 18  SpO2:  [87 %-97 %] 97 %  BP: (144-179)/() 149/91  SpO2/FiO2 Ratio Using Approximate FiO2 (%):  [339.3] 339.3  Estimated P/F Ratio Using Approximate FiO2 (%):  [293.1] 293.1  Physical Exam:   General: NAD  HEENT: No pallor, cyanosis or jaundice. EOMI, PERRLA, moist mucus membranes  Neck: Supple nontender, no JVD  Lungs: Clear to auscultation bilaterally  Heart: Regular rate and rhythm with normal S1 and S2  Abdomen: Soft nontender. normoactive bowel sounds.   Extremities: No clubbing, cyanosis, or edema.  Neurologic: Alert and oriented ×1.  CN II through XII intact.  Nonfocal   Psych: normal mood  Musculoskeletal: no joint tenderness    Results from last 4 days   Lab Units 02/26/23  0746 02/25/23  2151   WBC AUTO 10*3/uL 10.2* 11.9*   HEMOGLOBIN g/dL 12.3* 13.0*   HEMATOCRIT % 35.8* 36.9*   PLATELETS AUTO 10*3/uL 282 313     Results from last 4 days   Lab Units 02/26/23  0746 02/25/23  2257 02/25/23  2151   SODIUM mmol/L 138  --  138   POTASSIUM MMOL/L 3.0*  --  3.0*   CHLORIDE mmol/L 103  --  98   CO2 mmol/L 30  --  30   BUN mg/dL 16  --  17   CREATININE mg/dL 0.77  --  0.93   CALCIUM mg/dL 7.9*  --  8.8   MAGNESIUM mg/dL  --   --  1.8   PHOSPHORUS mg/dL  --  2.8  --    ALBUMIN g/dL  --   --  3.6   TOTAL PROTEIN g/dL  --   --  6.4   BILIRUBIN TOTAL mg/dL  --   --  0.78   ALK PHOS U/L  --   --  106   ALT U/L  --   --  14   AST U/L  --   --  16   GLUCOSE mg/dL 125*  --  125*         Assessment/Plan   Acute on chronic  encephalopathy  Noted to be disoriented this morning. Wife does reports progressive forgetfulness for the past year, which could be d/t dementia.   CT head neg for acute process. Moderate chronic ischemic changes and moderate atrophy  C/w delirium precautions  May consider MRI brain for further investigation    Urinary tract infection   UA suggestive of UTI. C/w ceftriaxone f/u urine culture   Check PVR to evaluate for urinary retention     Hypokalemia   K+ 3.0 this morning. Repleted. Monitor electrolytes  Seems chronic. On K 30 mg BID at home. Plan to discontinue HCTZ and replace with spironolactone     Dizziness   Recurrent falls   Will check orthostat VS as he is on doxazosin   C/w PTOT    CHRONIC PROBLEMS  CAD  Essential hypertension-on amlodipine, irbesartan  Hypercholesterolemia-c/w statin  Non-insulin-dependent diabetes mellitus-c/w low-dose SSI  Obstructive sleep apnea-c/w CPAP  BPH-c/w doxazosin  Depression-c/w Prozac  GERD  History of shingles    DVT prophylaxis: Lovenox 40 mg subcutaneous daily  CODE STATUS: DNR/DNI    Electronically signed by: Sola Lainez MD  2/26/2023  11:22 AM

## 2023-02-26 NOTE — INTERDISCIPLINARY/THERAPY
[facility] [id number]  [phone]      Physical Therapy Initial Evaluation     Date of Service: 02/26/23    Patient Name: Joseph Wong  Referring Provider: BOB SHEFFIELD    Onset Date: patient admitted 2/25/23  SOC Date: 02/26/23  Certification Period: 04/12/23    HICN: [id number]    Primary Medical Diagnosis: Weakness [R53.1]     Treatment Diagnosis: Impaired muscle performance             Subjective   RN ok'd PT session. Patient was in semi snow position in bed pre and post session. He had bed alarm engaged and call light in reach. He needed heavy encouragement to participate in PT session.   Bed alarm was engaged at end of session. His wife was present during session.  PRIOR LEVEL OF FUNCTION:   Wife provided most of history, stating he uses walker however walks poorly and they have wheelchair ordered. They have one stair into home.     SOCIAL/OCCUPATIONAL/RECREATIONAL:  Use of work for the state.     Pain:  Pain Assessment Scale  0-10 Pain Score:  (did not rate or state pain)     Past Medical History:   Diagnosis Date    CAD (coronary artery disease)     Elevated cholesterol     GERD (gastroesophageal reflux disease)     Hiatal hernia     History of shingles     Hypertension     Macular degeneration     Metabolic syndrome     Vertigo          Current Facility-Administered Medications:     cefTRIAXone (ROCEPHIN) 2,000 mg in normal saline 50 mL IVPB - MBP, 2,000 mg, intravenous, q24h, Bob Sheffield MD    hydrALAZINE (APRESOLINE) injection 10 mg, 10 mg, intravenous, q4h PRN, Bob Sheffield MD    amLODIPine (NORVASC) tablet 5 mg, 5 mg, oral, Daily, Bob Sheffield MD, 5 mg at 02/26/23 0829    atorvastatin (LIPITOR) tablet 10 mg, 10 mg, oral, Daily, Bob Sheffield MD, 10 mg at 02/26/23 0829    FLUoxetine (PROzac) capsule 10 mg, 10 mg, oral, Daily, Bob Sheffield MD, 10 mg at 02/26/23 0829    cholecalciferol (VITAMIN D3) capsule 50,000 Units, 50,000 Units, oral, Weekly, Bob Sheffield MD, 50,000  Units at 02/26/23 0829    cyanocobalamin injection 1,000 mcg, 1,000 mcg, intramuscular, Daily, 1,000 mcg at 02/26/23 0446 **FOLLOWED BY** [START ON 3/1/2023] cyanocobalamin tablet 1,000 mcg, 1,000 mcg, oral, Daily, Virgen Sheffield MD    lansoprazole (PREVACID) capsule 30 mg, 30 mg, oral, Daily at 1600, Sola Lainez MD, 30 mg at 02/26/23 1404    spironolactone (ALDACTONE) tablet 25 mg, 25 mg, oral, Daily, Sola Lainez MD, 25 mg at 02/26/23 1221    [START ON 2/27/2023] irbesartan (AVAPRO) tablet 300 mg, 300 mg, oral, Daily, Sola Lainez MD    tamsulosin (FLOMAX) 24 hr capsule 0.8 mg, 0.8 mg, oral, Daily with dinner, Sola Lainez MD    finasteride (PROSCAR) tablet 5 mg, 5 mg, oral, Daily, Sola Lainez MD, 5 mg at 02/26/23 1221    sodium chloride (OCEAN) 0.65 % nasal spray 1 spray, 1 spray, Each Nostril, PRN, Virgen Sheffield MD    sodium chloride-aloe vera (AYR) nasal gel 1 application, 1 application, Each Nostril, PRN, Virgen Sheffield MD    dextrose 50 % in water (D50W) syringe 15-30 mL, 15-30 mL, intravenous, q15 min PRN, Virgen Sheffield MD    dextrose (GLUTOSE) 40 % gel 15.2 g, 15.2 g, oral, q15 min PRN, Virgen Sheffield MD    glucagon (GLUCAGEN) injection 1 mg, 1 mg, intramuscular, q15 min PRN **OR** glucagon (GLUCAGEN) injection 1 mg, 1 mg, subcutaneous, q15 min PRN, Virgen Sheffield MD    acetaminophen (TYLENOL) tablet 650 mg, 650 mg, oral, q6h PRN, Virgen Sheffield MD    oxyCODONE (ROXICODONE) immediate release tablet 5-10 mg, 5-10 mg, oral, q4h PRN, Virgen Sheffield MD    bisacodyL (DULCOLAX) suppository 10 mg, 10 mg, rectal, Daily PRN, Virgen Sheffield MD    magnesium hydroxide (MILK OF MAGNESIA) 400 mg/5 mL oral suspension 30 mL, 30 mL, oral, Daily PRN, Virgen Sheffield MD    ondansetron (ZOFRAN) tablet 4 mg, 4 mg, oral, q6h PRN **OR** ondansetron (ZOFRAN) injection 4 mg, 4 mg, intravenous, q6h PRN, Virgen Sheffield MD    insulin aspart U-100 (NovoLOG) injection pen (correction dose) 0-15 Units, 0-15 Units, subcutaneous, Insulin: 4x  daily with meals, Virgen Sheffield MD    enoxaparin (LOVENOX) syringe 40 mg, 40 mg, subcutaneous, Daily at 1400, Virgen Sheffield MD, 40 mg at 02/26/23 1404    ALLERGIES:  Patient has no known allergies.    CURRENT ASSISTIVE OR ADAPTIVE EQUIPMENT:  Front Wheeled Walker   Oneil Fall Risk Score: 100      DIAGNOSTIC TESTING:  CT of head   ACTIVITIES LIMITED BY PATIENT COMPLAINT: limited in safety and functional mobility    PATIENT'S GOALS FOR THERAPY:   None stated, wife needs patient to be functionally independent to return home       Objective     Co-treat: no    Precautions: Other Precautions: fall - impulsively sits    Bed Mobility: moderate assistance of one   He needed moderate assistance of one  to bring trunk upright after progressing bilateral lower extremities over edge of bed. He entered bed with Minimal assistance of one and boosted up in bed with Maximal assistance of one.     Transfers: moderate assistance of one  He performed sit<>Stand from bed with moderate assistance of one with gait belt donned and Front Wheeled Walker support. He impulsively sat x 2 and needed moderate assistance of two  to maintain safety. He complained of dizziness upon standing however blood pressure was within normal limits.     Ambulation: Minimal assistance of one   H ambulated 4 meters with Front Wheeled Walker and heavy cueing for walker management. He kept walker at arms length during ambulation despite education.                  Ambulation 1  Ambulation Distance (meters): 4 meters                                                                                            EDUCATION:    Education Documentation  Mobility training, taught by Norah Soriano PT at 2/26/2023  3:40 PM.  Learner: Significant Other, Patient  Readiness: Acceptance  Method: Explanation  Response: Needs Reinforcement    Education Comments  No comments found.        Time Calculation  Start Time: 1153  Stop Time: 1210  Time Calculation (min): 17 min               PT Untimed Charges - Quick List (Time Spent in Minutes)  PT Eval Low Complexity: 17         EVALUATION:  Low complexity: Brief history review, 1-3 performance deficits, and problem focused assessments with limited number of treatment options, modifications not necessary.         INITIAL TREATMENT:  none       ASSESSMENT:  Patient presents with significant physical deficits and lack of safety. He appears some what confused which wife also states.  This patient would benefit from progressive skilled physical therapy and mobility with nursing staff.    Rehab Potential:    Fair     Barriers to outcome:confusion, physical ability to care for himself, fall risk        GOALS:        Multi-Disciplinary Problems (from Physical Therapy)      Active Problems       [place] [facility]  [place] Date: 02/26/23      Goal Start Date Expected End Date End Date    LTG - Misc 1 02/26/23 04/12/23 --    Goal Details: Patient will be independent with functional transfers with Front Wheeled Walker support         Goal Start Date Expected End Date End Date    LTG - Misc 2 02/26/23 04/12/23 --    Goal Details: Patient will ambulate 30 meters with Front Wheeled Walker And standby assistance of one         Goal Start Date Expected End Date End Date    LTG - Misc 3 02/26/23 04/12/23 --    Goal Details: Patient will negotiate 1 stair with contact guard assistance of one                                 PLAN:  It is recommended that the client attend rehabilitative therapy for up to six visits per week with an expected duration through Certification Period: 04/12/23.  Interventions during the course of treatment may include any combination of the following:  Therapeutic exercise, therapeutic activity, manual therapy,, gait training, , neuromuscular re-education,, vestibular rehab,, canalith repositioning,, and equipment evaluation/education,.     Recommendation  Recommendations for Therapy: Continue skilled therapy, Safety issues -  physical  Equipment Recommended:  (owns walker)    Plan Comment: PT: 1 assist, 3-5x/wk for bed mobility, transfers, gait traininng with walker, stair training (1)    Thank you for allowing us to share in the care of this patient. If you have any questions, recommendations, or further concerns regarding this patient, please feel free to contact us at [address] [place] [phone]  Dept: [phone]      Signed by: Norah Soriano, PT 2/26/2023  3:42 PM      * I have reviewed the plan of care and certify a continuing need for medically necessary services    Co-signed by:_________________________ Date and Time:________________     Skin normal color for race, warm, dry and intact. No evidence of rash.

## 2023-02-26 NOTE — NURSING END OF SHIFT
Nursing End of Shift Summary:    Patient: Joseph Wong  MRN: [id number]  : 10/20/1934, Age: 88 y.o.    Location: [address] [place] [address]    Nursing Goals       Narrative Summary of Progress Toward Clinical Goals:  Pt came up from ED a little after 0300. Pt safety and comfort maintained. Pt disoriented to time. Pt did not complain of any pain. Pt incontinent at times. Pt intermittently forgetful.    Barriers to Goals/Nursing Concerns:  No    New Patient or Family Concerns/Issues:  No    Shift Summary:   Significant Events & Communications to Providers (last 12 hours)       Last 5 Values    No documentation.                 Oxygen Usage (last 12 hours)       Last 5 Values       Row Name 23 0309                   Oxygen Weaning Trial by Nursing    Is Patient on Room Air OR on the Same Amount of O2 as at Home? No                      Mobility (last 12 hours)       Last 5 Values       Row Name 23 0309             Mobility    Patient Position Supine Supine Supine      Turning -- -- Turns self                    Urethral Catheter       Active Urethral Catheter       None                  Active Lines       Active Central venous catheter / Peripherally inserted central catheter / Implantable Port / Hemodialysis catheter / Midline Catheter       None                  Infusing Medications   Medication Dose Last Rate    sodium chloride 0.9 %  100 mL/hr 100 mL/hr (23 0335)     PRN Medications   Medication Dose Last Admin    hydrALAZINE  10 mg      sodium chloride  1 spray      sodium chloride-aloe vera  1 application      dextrose 50 % in water (D50W)  15-30 mL      dextrose  15.2 g      glucagon  1 mg      Or    glucagon  1 mg      acetaminophen  650 mg      oxyCODONE  5-10 mg      bisacodyL  10 mg      magnesium hydroxide  30 mL      ondansetron  4 mg      Or    ondansetron  4 mg

## 2023-02-26 NOTE — INTERDISCIPLINARY/THERAPY
Case Management Admission Note    Phone # [phone]    Living Situation: Spouse/significant other Private residence            ADLs: Needs Assistance  Stairs: No    HME/CPAP: CPAP, 4-Wheeled Walker, Front Wheeled Walker, Other (Comment) (Ordered a wheelchair)   CPAP Equipment Vendor: [employer]  Oxygen: No      Home Health:No     Current Resources:        Diabetes/supplies: Do you have Diabetes?: Yes  Do you have diabetic supplies?: Yes  PCP: Malena Mcclain MD  Funding: [facility], [facility]  Pharmacy:[id number], [place] - [address] AT SEC OF [facility]    Support Person: Primary Emergency Contact: Alessandra Wong, Home Phone: [phone], Mobile Phone: [phone], Relation: Spouse  Advance Directives (For Healthcare)  Advance Directive: Patient has advance directive, copy in chart  No Advance Directive: Not applicable  Type of Healthcare Directive: Durable power of  for health care  Have you reviewed your Advance Directive and is it valid for this stay?: Not applicable  Information Provided on Healthcare Directives: No  Needs transportation assistance at DC: No, wife to transport      Discharge Needs/Barriers: ADL Limitations, Confusion, Therapy, Mobility Issues  Narrative: SW spoke with wife as pt is normally confused. Pt lives with wife and HH just dc'd pt. Wife says that this isn't the first time that pt has been dc'd from HH and would return to his weak state. Pt would possibly need placement, which wife is aware of. Wife is getting pt a wheelchair.   Dispo: SNF

## 2023-02-26 NOTE — MEDICATION HISTORY SPECIALIST NOTES
"    Health system 101[facility]    CSN: [id number]  : [id number]    Information sources:  EPIC  Complete Dispense Report  Rx meds in Epic are \"house meds\" and have been e-prescribed accordingly.     Allergies verified.    Patient's wife interviewed who provided all history. Patient's wife verified medications and provided last doses. Verified with Complete Dispense Report.    New medications added:  Ibuprofen    Discontinued medications:  Tylenol - patient self discontinued  Preservision Areds 2 - patient self discontinued    Dose changes:  Potassium changed from 30 mEq twice daily to 20 mEq twice daily.    Profile was checked for  medications. Added the following meds back to profile as they had  and subsequently fallen off:  Potassium     Discrepancies:  Potassium - patient's wife states he takes 30 mEq twice daily.    "

## 2023-02-26 NOTE — H&P
Chief Complaint   Patient presents with    Fall     Pt brought by ems for fall and weakness pt had a recent uti ekg on arrival        HPI:  Joseph Wong is an 88 y.o. male with medical history significant for CAD, essential hypertension, hypercholesterolemia, non-insulin-dependent diabetes mellitus, history of metabolic syndrome, macular degeneration, BPH, depression, GERD, shingles, vertigo and hiatal hernia who had presented to the emergency room via EMS following a fall.    Of note, patient was recently admitted to our facility on 1/14/2023 to 1/16/2022 and treated for UTI with delirium.  Urine culture grew E. coli species.  Patient was initially treated with Bactrim as outpatient but due to failed treatment, during admission he was treated with IV Rocephin.  Patient's delirium improved with antibiotic treatment.  He did have a few electrolyte abnormalities including hyponatremia and hypokalemia, which were managed appropriately.  CT scan of the head did not show anything acute except for moderate presumed sequelae of chronic small blood vessel disease and prior ischemia as well as moderately diffused brain parenchyma volume loss.    Patient stated that he did well until about a week ago when he started feeling weak and noticed that his legs will suddenly give out especially in the left when he was walking which resulted in 2 episodes of fall.  Patient initially stated that he did sit on his buttocks and also mentioned that he fell on his face but when I further probed, he sincerely could not remember exactly what happened but he knows that he did not have any injury to his head.  He reported feeling dizzy but denied any loss of consciousness, chest pain, palpitations, nausea or vomiting.  He has not had any abdominal pain, headaches, numbness or tingling in any of his extremities or unilateral weakness.  Patient denied any pain in any of his extremities.  He denied any fever or chills.  He has had some urine  incontinence and admitted today had some burning with urination but has not noticed any blood in his urine.  He has not had any diarrhea or constipation.  He noted that his wife has been having a significant cough but denied having any himself.  Patient denied any swelling in his legs, orthopnea or PND.  He has not had any runny nose, nasal congestion, sore throat or tinnitus.  Patient quit smoking over 40 years ago and drinks a glass of rum once a month but denied any illicit drug use.    In the ER, patient's vital signs were temperature of 36.9 °C, pulse rate of 71 bpm, respiratory rate of 20 with a high of 24, blood pressure of 145/93 mmHg with a high of 151/101 mmHg and saturating initially at 87% that improved to 94% on 2 L of oxygen via nasal cannula.    ECG:   EKG:   .     Labs:  A1c:   Lab Results   Component Value Date    HGBA1C 6.0 02/25/2023     Automated Differential:   Lab Results   Component Value Date    NEUTROABS 9.30 02/25/2023    NEUTROPCT 70 01/16/2023    LYMPHOPCT 12.1 (L) 02/25/2023    LYMPHOABS 1.8 01/16/2023    MONOPCT 9.0 02/25/2023    MONOSABS 1.10 02/25/2023    EOSPCT 0.3 02/25/2023    EOSABS 0.00 02/25/2023    BASOSABS 0.10 02/25/2023    BASOPCT 0.4 02/25/2023     Basic:   Lab Results   Component Value Date     02/25/2023    K 3.0 (L) 02/25/2023    CL 98 02/25/2023    CO2 30 02/25/2023    BUN 17 02/25/2023    CREATININE 0.93 02/25/2023    GLUCOSE 125 (H) 02/25/2023    CALCIUM 8.8 02/25/2023     CBC with Platelet:    Lab Results   Component Value Date    WBC 11.9 (H) 02/25/2023    HGB 13.0 (L) 02/25/2023    HCT 36.9 (L) 02/25/2023     02/25/2023    RBC 4.07 (L) 02/25/2023    MCV 90.6 02/25/2023    MCH 31.9 02/25/2023    MCHC 35.2 02/25/2023    RDW 14.1 02/25/2023    MPV 6.9 02/25/2023     Comp:   Lab Results   Component Value Date     02/25/2023    K 3.0 (L) 02/25/2023    CL 98 02/25/2023    CO2 30 02/25/2023    BUN 17 02/25/2023    CREATININE 0.93 02/25/2023    GLUCOSE  125 (H) 02/25/2023    CALCIUM 8.8 02/25/2023    PROT 6.4 02/25/2023    ALBUMIN 3.6 02/25/2023    AST 16 02/25/2023    ALT 14 02/25/2023    ALKPHOS 106 02/25/2023    BILITOT 0.78 02/25/2023     C-Reactive Protein Screen:   Lab Results   Component Value Date    CRP 33.6 (H) 02/25/2023     Magnesium:   Lab Results   Component Value Date    MG 1.8 02/25/2023     TSH:   Lab Results   Component Value Date    TSH 3.479 02/25/2023     U/A Macroscopic:    Lab Results   Component Value Date    COLORU LIGHT ORANGE (A) 02/25/2023    SPECGRAVU 1.015 02/25/2023    ASCENCION 7.0 02/25/2023    LEUKOCYTESU Large (A) 02/25/2023    NITRITEU Negative 02/25/2023    PROTUR 50 (A) 02/25/2023    GLUCOSEU Negative 02/25/2023    KETONESU Negative 02/25/2023    UROBILINOGEN Normal 02/25/2023    BLOODU Moderate (A) 02/25/2023     Urine:   Lab Results   Component Value Date    URINECX <10,000 CFU/mL Escherichia coli (A) 01/14/2023       Lab Results   Component Value Date    TROPHS 6.0 02/25/2023    JHFSKM1LC 6.1 02/25/2023    HSDELTA1 0.1 02/25/2023    TXHSHS0KU 9.9 02/25/2023    HSDELTA2 3.9 02/25/2023     Lab Results   Component Value Date    BNP 39 02/25/2023     Lab Results   Component Value Date    TSH 3.479 02/25/2023     Lab Results   Component Value Date    CKTOTAL 47 02/25/2023     Lab Results   Component Value Date    POCLACTATE 0.89 02/25/2023     Lab Results   Component Value Date    PCT 0.31 02/25/2023     Lab Results   Component Value Date    CRP 33.6 (H) 02/25/2023       Lab Results   Component Value Date    COVID19 Negative 02/25/2023     Lab Results   Component Value Date    ADENOPCR Negative 02/25/2023    R225TSVW Negative 02/25/2023    JCJC8KXZ Negative 02/25/2023    FIP64XJX Negative 02/25/2023    SKT75GRI Negative 02/25/2023    HMPVPCR Negative 02/25/2023    REPCR Negative 02/25/2023    INFAPCR Negative 02/25/2023    INFBPCR Negative 02/25/2023    RVPP2FLO Negative 02/25/2023    IBMG3WIQ Negative 02/25/2023    ZZJJ3ECC Negative  2023    RSVPCR Negative 2023    BPERTPCR Negative 2023    CPNEPCR Negative 2023    MPNEUMO Negative 2023    BPPERTPCR Negative 2023     XR chest portable 1 view   Final Result   IMPRESSION:   1.  Negative.      CT head without IV contrast   Final Result   IMPRESSION:   1.  There is moderate chronic ischemic white matter change and moderate atrophy. Nothing acute.           Patient received 2 g of ceftriaxone IV x1 dose as well as potassium chloride 40 mEq p.o.    I discussed the patient's case with Dr. Bullock and patient will be admitted for further evaluation and management.      Past Medical History:   Diagnosis Date    CAD (coronary artery disease)     Elevated cholesterol     GERD (gastroesophageal reflux disease)     Hiatal hernia     History of shingles     Hypertension     Macular degeneration     Metabolic syndrome     Vertigo          Past Surgical History:   Procedure Laterality Date    OTHER SURGICAL HISTORY Left 2016    quad tendon repair    QUADRICEPS REPAIR Left     TONSILLECTOMY      TOTAL ANKLE ARTHROPLASTY Left     TOTAL HIP ARTHROPLASTY Right     TOTAL HIP ARTHROPLASTY Left     TOTAL KNEE ARTHROPLASTY  januauary 2022    TRANSURETHRAL RESECTION OF PROSTATE         No current outpatient medications on file.    No Known Allergies    Family History   Problem Relation Age of Onset    Hypertension Father     Stroke Father     Sleep apnea Brother        Social History     Socioeconomic History    Marital status:      Spouse name: Not on file    Number of children: Not on file    Years of education: Not on file    Highest education level: Not on file   Occupational History    Not on file   Tobacco Use    Smoking status: Former     Types: Cigarettes     Quit date: 1965     Years since quittin.1    Smokeless tobacco: Never   Vaping Use    Vaping Use: Never used   Substance and Sexual Activity    Alcohol use: Yes     Alcohol/week: 1.0 standard  "drink     Types: 1 Shots of liquor per week    Drug use: Not on file    Sexual activity: Not on file   Other Topics Concern    Not on file   Social History Narrative    Not on file     Social Determinants of Health     Financial Resource Strain: Not on file   Food Insecurity: Not on file   Transportation Needs: Not on file   Physical Activity: Not on file   Stress: Not on file   Social Connections: Not on file   Intimate Partner Violence: Not on file   Housing Stability: Not on file       Full Code    Review of Systems  14 point review of systems were negative except for above in HPI    /96 (BP Location: Right arm, Patient Position: Supine, Cuff Size: Regular Adult)   Pulse 70   Temp 36.7 °C (98.1 °F) (Temporal)   Resp 18   Ht 1.803 m (5' 11\")   Wt 86.8 kg (191 lb 5.8 oz)   SpO2 95%   BMI 26.69 kg/m²     Physical Exam  General: Pleasant, elderly, lethargic looking,  man lying in bed comfortably and does not seem to be in any acute painful or respiratory distress.  He does look slightly anxious as he keeps rearranging his blankets and nasal cannula.  He is afebrile  HEENT: Atraumatic, Normocephalic, anicteric sclerae, normal conjunctivae, no lid lag, no facial droop, dry mucous membranes,   Neck: Supple, trachea midline, full range of motion, no thyromegaly, no cervical lymphadenopathy  Lungs: No reproducible chest wall tenderness. Normal breath sounds with no intercostal retractions, rales, or wheezes.  Patient has bibasilar crepitations  Cardiovascular: Heart sounds 1 and 2 present, regular.  No murmur present. No rubs or gallops. No JVD or carotid bruit  Abdomen: Soft, non distended.  Nontender. No  hepatosplenomegaly. 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  No noticeable deformity in the left hand.  No pedal swelling bilaterally. Good palpable " pedal pulses bilaterally.  Back : No CVA tenderness, no paraspinal tenderness, no areas of erythema or induration  Skin: Warm, normal turgor.  No rash or ulcers.    Psych: Pleasant, cooperative with normal affect.   Neuro: Alert and Oriented to person and place.  EOMI, BEBETO.  No focal neurological deficits. Speech is clear. Gait not assessed. Moves all extremities.       Assessment/Plan   Principal Problem:    UTI (urinary tract infection)  Active Problems:    Obstructive sleep apnea syndrome    Hypokalemia    Physical deconditioning    Osteoarthritis of multiple joints    Macular degeneration    Uncontrolled hypertension    GERD (gastroesophageal reflux disease)    CAD (coronary artery disease)    Hypercholesterolemia    Vitamin B12 deficiency    Vitamin D deficiency    PRIMARY PROBLEM   UTI  UA had shown moderate blood with greater than 100 RBCs, 50 proteins, large leukocyte esterases with  WBCs and few bacteria.  Will send urine cultures  Will continue patient on ceftriaxone 2 g pending culture results    ASSOCIATED SECONDARY PROBLEMS  Hypokalemia  Potassium of 3  Patient did receive potassium chloride in the ER  Will continue to monitor with BMP and supplement accordingly    Physical deconditioning  Likely due to infection with UTI  History of metabolic syndrome  PT/OT  Fall precautions  Will send vitamin B12 level as well as vitamin D 25-hydroxy levels and replete accordingly    Vitamin D deficiency  Vitamin D 25-hydroxy of 18  Will supplement with cholecalciferol 50,000 units for the next 8 to 12 weeks with outpatient follow-up with PCP    Vitamin B12 deficiency  Vitamin B12 level of 252  Will start patient on cyanocobalamin 1000 mcg IM x3 doses followed by po    CHRONIC PROBLEMS  CAD  Stable    Essential hypertension  Uncontrolled, patient seems anxious.  Will reconcile patient's home medications once medication list is updated.  Will give hydralazine 10 mg for systolic blood pressures greater than 170  bpm    Hypercholesterolemia  Will reconcile statins once medication list updated    Non-insulin-dependent diabetes mellitus  Will get a hemoglobin A1c level  Will hold metformin for now  Will start patient on low correctional scale insulin  Blood glucose monitoring    Obstructive sleep apnea  Will order for CPAP at night    BPH  Will reconcile doxazosin once medication list updated    Depression  Will reconcile Prozac but at the lower dose of 10 mg    GERD  Does not seem to be on any treatment for now  Will start patient on PPIs    History of shingles  No evidence of rash at this time    History of vertigo   Patient does report some dizziness    Osteoarthritis of multiple joints  Pain management as needed      Additional Cares:  DVT prophylaxis: Lovenox 40 mg subcutaneous daily  CODE STATUS: DNR/DNI  Anticipate 1 to 2 days hospital stay or more depending on hospital course     I spent more than 70 minutes on patient's admission which included face to face time (history taking, physical examination, education/counseling, explaining plan of action, planned labs and investigations and answering questions and concerns.  More than 50% of my time was spent in care and coordination), admission documentation.  Medication reconciliation pending pharmacy verification of patient's home medications. I discussed patient's case with the emergency room physician, Dr. Bullock in detail and reviewed patient's medical records in detail which included emergency room documentation as well as other prior medical records. I will discuss history, assessment and plan during my face to face verbal handoff with my colleague who will assume patient's care in the morning.

## 2023-02-27 LAB
ANION GAP SERPL CALC-SCNC: 6 MMOL/L (ref 3–11)
BACTERIA UR CULT: ABNORMAL
BASOPHILS # BLD AUTO: 0.1 10*3/UL
BASOPHILS NFR BLD AUTO: 0.9 % (ref 0–2)
BUN SERPL-MCNC: 12 MG/DL (ref 7–25)
CALCIUM SERPL-MCNC: 8.1 MG/DL (ref 8.6–10.3)
CHLORIDE SERPL-SCNC: 102 MMOL/L (ref 98–107)
CO2 SERPL-SCNC: 30 MMOL/L (ref 21–32)
CREAT SERPL-MCNC: 0.71 MG/DL (ref 0.7–1.3)
EOSINOPHIL # BLD AUTO: 0.1 10*3/UL
EOSINOPHIL NFR BLD AUTO: 1.5 % (ref 0–3)
ERYTHROCYTE [DISTWIDTH] IN BLOOD BY AUTOMATED COUNT: 13.8 % (ref 11.5–15)
GFR SERPL CREATININE-BSD FRML MDRD: 89 ML/MIN/1.73M*2
GLUCOSE BLDC GLUCOMTR-SCNC: 135 MG/DL (ref 70–105)
GLUCOSE BLDC GLUCOMTR-SCNC: 135 MG/DL (ref 70–105)
GLUCOSE BLDC GLUCOMTR-SCNC: 144 MG/DL (ref 70–105)
GLUCOSE BLDC GLUCOMTR-SCNC: 161 MG/DL (ref 70–105)
GLUCOSE SERPL-MCNC: 129 MG/DL (ref 70–105)
HCT VFR BLD AUTO: 36 % (ref 38–50)
HGB BLD-MCNC: 12.4 G/DL (ref 13.2–17.2)
LYMPHOCYTES # BLD AUTO: 1.1 10*3/UL
LYMPHOCYTES NFR BLD AUTO: 17.1 % (ref 15–47)
MCH RBC QN AUTO: 31.6 PG (ref 29–34)
MCHC RBC AUTO-ENTMCNC: 34.3 G/DL (ref 32–36)
MCV RBC AUTO: 91.9 FL (ref 82–97)
MONOCYTES # BLD AUTO: 0.7 10*3/UL
MONOCYTES NFR BLD AUTO: 10.2 % (ref 5–13)
NEUTROPHILS # BLD AUTO: 4.7 10*3/UL
NEUTROPHILS NFR BLD AUTO: 70.3 % (ref 46–70)
PLATELET # BLD AUTO: 281 10*3/UL (ref 130–350)
PMV BLD AUTO: 7.1 FL (ref 6.9–10.8)
POTASSIUM SERPL-SCNC: 3.2 MMOL/L (ref 3.5–5.1)
RBC # BLD AUTO: 3.92 10*6/ΜL (ref 4.1–5.8)
SODIUM SERPL-SCNC: 138 MMOL/L (ref 135–145)
WBC # BLD AUTO: 6.7 10*3/UL (ref 3.7–9.6)

## 2023-02-27 PROCEDURE — 97530 THERAPEUTIC ACTIVITIES: CPT | Mod: GP

## 2023-02-27 PROCEDURE — 36415 COLL VENOUS BLD VENIPUNCTURE: CPT | Performed by: INTERNAL MEDICINE

## 2023-02-27 PROCEDURE — 99232 SBSQ HOSP IP/OBS MODERATE 35: CPT | Performed by: STUDENT IN AN ORGANIZED HEALTH CARE EDUCATION/TRAINING PROGRAM

## 2023-02-27 PROCEDURE — 97165 OT EVAL LOW COMPLEX 30 MIN: CPT | Mod: GO

## 2023-02-27 PROCEDURE — 80048 BASIC METABOLIC PNL TOTAL CA: CPT | Performed by: INTERNAL MEDICINE

## 2023-02-27 PROCEDURE — 87088 URINE BACTERIA CULTURE: CPT | Performed by: INTERNAL MEDICINE

## 2023-02-27 PROCEDURE — 6370000100 HC RX 637 (ALT 250 FOR IP): Performed by: INTERNAL MEDICINE

## 2023-02-27 PROCEDURE — 2580000300 HC RX 258: Performed by: INTERNAL MEDICINE

## 2023-02-27 PROCEDURE — (BLANK) HC ROOM PRIVATE

## 2023-02-27 PROCEDURE — 85025 COMPLETE CBC W/AUTO DIFF WBC: CPT | Performed by: INTERNAL MEDICINE

## 2023-02-27 PROCEDURE — 51798 US URINE CAPACITY MEASURE: CPT

## 2023-02-27 PROCEDURE — 82947 ASSAY GLUCOSE BLOOD QUANT: CPT | Mod: QW

## 2023-02-27 PROCEDURE — 6360000200 HC RX 636 W HCPCS (ALT 250 FOR IP): Performed by: INTERNAL MEDICINE

## 2023-02-27 PROCEDURE — 6370000100 HC RX 637 (ALT 250 FOR IP): Performed by: STUDENT IN AN ORGANIZED HEALTH CARE EDUCATION/TRAINING PROGRAM

## 2023-02-27 PROCEDURE — 94660 CPAP INITIATION&MGMT: CPT

## 2023-02-27 RX ORDER — TAMSULOSIN HYDROCHLORIDE 0.4 MG/1
0.4 CAPSULE ORAL
Status: DISCONTINUED | OUTPATIENT
Start: 2023-02-27 | End: 2023-03-02 | Stop reason: HOSPADM

## 2023-02-27 RX ORDER — POTASSIUM CHLORIDE 750 MG/1
40 TABLET, FILM COATED, EXTENDED RELEASE ORAL ONCE
Status: COMPLETED | OUTPATIENT
Start: 2023-02-27 | End: 2023-02-27

## 2023-02-27 RX ORDER — LORAZEPAM 0.5 MG/1
0.5 TABLET ORAL ONCE AS NEEDED
Status: COMPLETED | OUTPATIENT
Start: 2023-02-27 | End: 2023-02-27

## 2023-02-27 RX ADMIN — CEFTRIAXONE 2000 MG: 2 INJECTION, POWDER, FOR SOLUTION INTRAMUSCULAR; INTRAVENOUS at 20:31

## 2023-02-27 RX ADMIN — CYANOCOBALAMIN 1000 MCG: 1000 INJECTION, SOLUTION INTRAMUSCULAR at 09:01

## 2023-02-27 RX ADMIN — ENOXAPARIN SODIUM 40 MG: 100 INJECTION SUBCUTANEOUS at 14:00

## 2023-02-27 RX ADMIN — ATORVASTATIN CALCIUM 10 MG: 10 TABLET, FILM COATED ORAL at 08:57

## 2023-02-27 RX ADMIN — LANSOPRAZOLE 30 MG: 30 CAPSULE, DELAYED RELEASE ORAL at 16:45

## 2023-02-27 RX ADMIN — AMLODIPINE BESYLATE 5 MG: 5 TABLET ORAL at 08:57

## 2023-02-27 RX ADMIN — SPIRONOLACTONE 25 MG: 25 TABLET ORAL at 08:57

## 2023-02-27 RX ADMIN — LORAZEPAM 0.5 MG: 0.5 TABLET ORAL at 17:00

## 2023-02-27 RX ADMIN — FINASTERIDE 5 MG: 5 TABLET, FILM COATED ORAL at 08:57

## 2023-02-27 RX ADMIN — POTASSIUM CHLORIDE 40 MEQ: 750 TABLET, EXTENDED RELEASE ORAL at 09:55

## 2023-02-27 RX ADMIN — TAMSULOSIN HYDROCHLORIDE 0.4 MG: 0.4 CAPSULE ORAL at 17:33

## 2023-02-27 RX ADMIN — IRBESARTAN 300 MG: 150 TABLET ORAL at 08:57

## 2023-02-27 RX ADMIN — FLUOXETINE 10 MG: 10 CAPSULE ORAL at 08:57

## 2023-02-27 NOTE — NURSING END OF SHIFT
Nursing End of Shift Summary:    Patient: Joseph Wong  MRN: [id number]  : 10/20/1934, Age: 88 y.o.    Location: [address] [place] [address]    Nursing Goals  Clinical Goals for the Shift: Maintain comfort and safety, IV abx    Narrative Summary of Progress Toward Clinical Goals:  Pt safety and comfort maintained throughout the shift. Pt did not complain of any pain. Pt intermittently incontinent. Pt confused at times. Pt vitals stable.    Barriers to Goals/Nursing Concerns:  No    New Patient or Family Concerns/Issues:  No    Shift Summary:   Significant Events & Communications to Providers (last 12 hours)       Last 5 Values    No documentation.                 Oxygen Usage (last 12 hours)       Last 5 Values       Row Name 23 0401             Oxygen Weaning Trial by Nursing    Is Patient on Room Air OR on the Same Amount of O2 as at Home? Yes No No      Are You Performing the QShift O2 Weaning Trial? -- -- No      Reason Weaning Trial Is Not Being Performed -- -- Does not meet criteria                    Mobility (last 12 hours)       Last 5 Values       Row Name 23 [id number]          Mobility    Activity Bathroom privileges -- -- --     Level of Assistance Contact guard assist, steadying assist -- -- --     Length of Time in Chair (min) 0 -- -- --     Distance Ambulated (feet) 0 Feet -- -- --     Distance Ambulated (Meters Calculated) 0 Meters -- -- --     Patient Position Supine Supine Supine Supine     Turning Turns self -- -- Turns self     Distance Ambulated (Meters Calculated)(Do Not Use) 0 Feet -- -- --                   Urethral Catheter       Active Urethral Catheter       None                  Active Lines       Active Central venous catheter / Peripherally inserted central catheter / Implantable Port / Hemodialysis catheter / Midline Catheter       None                  Infusing Medications   Medication Dose Last Rate     PRN Medications    Medication Dose Last Admin    hydrALAZINE  10 mg      sodium chloride 0.9% (NS)  25-50 mL Rate Verify at 02/26/23 2200    sodium chloride  1 spray      sodium chloride-aloe vera  1 application      dextrose 50 % in water (D50W)  15-30 mL      dextrose  15.2 g      glucagon  1 mg      Or    glucagon  1 mg      acetaminophen  650 mg      oxyCODONE  5-10 mg      bisacodyL  10 mg      magnesium hydroxide  30 mL      ondansetron  4 mg      Or    ondansetron  4 mg

## 2023-02-27 NOTE — INTERDISCIPLINARY/THERAPY
[address] [facility] [id number]  [phone]      Physical Therapy Daily Treatment Note      Date of Service: 02/27/23  Patient Name: Joseph Wong  Referring Provider: BOB GIRON  Visit Number: 2   Start of Care Date: 02/26/23  Certification Period: 04/12/23      Subjective     Therapy Treatment Diagnosis: dx: UTI, OA of multiple joints, macular degeneration, NARESH, fall       RN ok'd PT services. Pt found in bed upon arrival with wife present and agreeable to PT. Gait belt donned for mobility. Pt left in bed with call light in reach, needs met, and alarm on. RN notified.      FALL RISK Interventions:fall risk bundle  Pain:   Pain Assessment Scale  Pain Scale: 0-10  0-10 Pain Score: 0 - No pain         Objective      Co-treat: no    Precautions: Other Precautions: fall risk - impulsive, monitor BP    Cognition: cooperative and pleasant, follows commands, impulsive at times    Bed Mobility: MinAx1  Head of bed slightly elevated, supine to and from short sit. Cues provided for sequencing.    Transfers: contact guard assist x1  Sit to stand transfer from edge of bed with front wheeled walker. Cues provided for sequencing and hand placement.    Ambulation: contact guard assist x1 to MinAx1  2m with front wheeled walker. Increased time needed. Increased unsteadiness as steps progressed. Patient reports dizziness with ambulation needing two standing rest breaks before needing to sit down. No major/minor loss of balance noted.    Balance: Intact sitting edge of bed. Unsteady with gait.    Other Activities: Education provided on continued out of bed mobility with RN staff and up to the chair as able.    Activity Tolerance: Patient reports dizziness upon sitting, BP taken and within normal limits. Patient reports symptoms subside with standing, but as he continues to ambulate becomes unsteady and states he feels dizzy again. Once patient was seated edge of bed, reports symptoms are gone. Limited by fatigue  and weakness.                                                                     Time Calculation  Start Time: 1152  Stop Time: 1208  Time Calculation (min): 16 min   Therapeutic Interventions (Time Spent in Minutes)  Therapeutic Activity: 16                   ASSESSMENT:  Patient tolerated session fair. He requires 1 assist with mobility and safety at this time. He is limited in progressing mobility today due to increased reports of dizziness and unsteadiness with ambulation. He exhibits bilateral LE weakness with seated exercises and fatigues after short bouts of standing and seated activities. Continue skilled PT to improve functional independence and ensure safe DC planning.    PLAN FOR NEXT TREATMENT SESSION:  PT Frequency: 3-5x/wk      Recommendation  Recommendations for Therapy: Continue skilled therapy  Equipment Recommended:  (owns front wheeled walker)    Plan Comment: 1 assist: MinAx1 bed, contact x1 tranfsers, 2m with walker MinAx1, seated exercises. Progress standing balance, gait, strength as able.    Thank you for allowing us to share in the care of this patient. If you have any questions, recommendations, or further concerns regarding this patient, please feel free to contact me at [address] [place] [phone]  Dept: [phone].  Signed by: Patricia Inman, PT  2/27/2023  2:58 PM

## 2023-02-27 NOTE — INTERDISCIPLINARY/THERAPY
Case Management Progress Note    Phone # [phone]    Reason for Admission: UTI-pt did fall    Plan of Care:  Encephalopathy secondary to UTI. Orthostat positive. Doxazosin switch to flomax to lower fall risk. Dementia increased in last year.     PT/OT- recommend rehab therapy. Wife, Alessandra, updated. Agreeable to referral for local SNF rehabs. Referrals placed, wife would prefer [facility] if accepting.     Discussed Discharge Needs/Topics: Rehab SNF-referrals sent    Disposition: SNF Rehab prior to home with wife

## 2023-02-27 NOTE — INTERDISCIPLINARY/THERAPY
[address] [facility] [id number]  [phone]        Occupational Therapy Initial Evaluation     Date of Service: 02/27/23    Patient Name: Josehp Wong  Referring Provider: BOB GIRON    Onset Date: 2/25/2023  Certification Date: 04/13/23     HICN: [id number]    Primary Medical Diagnosis: Weakness [R53.1]     Treatment Diagnosis:   Decreased activity of daily living status, Decreased upper extremity strength, Decreased endurance, Decreased functional mobility, Decreased instrumental activities of daily living, Impaired Muscle Performance, Impaired balance, Impaired standing balance, and Decrease strength    Subjective     HISTORY OF CURRENT COMPLAINT:   Therapy Treatment Diagnosis: status post fall with UTI      PRIOR LEVEL OF FUNCTION:   Pt lives at home with his wife; independent baseline; use of front wheeled walker for mobility    SOCIAL/OCCUPATIONAL/RECREATIONAL:  States he was very active at baseline    Pain:  Pain Assessment Scale  Pain Scale: 0-10  0-10 Pain Score: 0 - No pain       Past Medical History:   Diagnosis Date    CAD (coronary artery disease)     Elevated cholesterol     GERD (gastroesophageal reflux disease)     Hiatal hernia     History of shingles     Hypertension     Macular degeneration     Metabolic syndrome     Vertigo          Current Facility-Administered Medications:     cefTRIAXone (ROCEPHIN) 2,000 mg in normal saline 50 mL IVPB - MBP, 2,000 mg, intravenous, q24h, Bob Giron MD, Stopped at 02/26/23 2150    hydrALAZINE (APRESOLINE) injection 10 mg, 10 mg, intravenous, q4h PRN, Bob Giron MD    amLODIPine (NORVASC) tablet 5 mg, 5 mg, oral, Daily, Bob Giron MD, 5 mg at 02/27/23 0857    atorvastatin (LIPITOR) tablet 10 mg, 10 mg, oral, Daily, Bob Giron MD, 10 mg at 02/27/23 0857    FLUoxetine (PROzac) capsule 10 mg, 10 mg, oral, Daily, Bob Giron MD, 10 mg at 02/27/23 0857    cholecalciferol (VITAMIN D3) capsule 50,000 Units, 50,000 Units, oral, Weekly,  Virgen Sheffield MD, 50,000 Units at 02/26/23 0829    cyanocobalamin injection 1,000 mcg, 1,000 mcg, intramuscular, Daily, 1,000 mcg at 02/27/23 0901 **FOLLOWED BY** [START ON 3/1/2023] cyanocobalamin tablet 1,000 mcg, 1,000 mcg, oral, Daily, Virgen Sheffield MD    lansoprazole (PREVACID) capsule 30 mg, 30 mg, oral, Daily at 1600, Sola Lainez MD, 30 mg at 02/26/23 1404    spironolactone (ALDACTONE) tablet 25 mg, 25 mg, oral, Daily, Sola Lainez MD, 25 mg at 02/27/23 0857    irbesartan (AVAPRO) tablet 300 mg, 300 mg, oral, Daily, Sola Lainez MD, 300 mg at 02/27/23 0857    tamsulosin (FLOMAX) 24 hr capsule 0.8 mg, 0.8 mg, oral, Daily with dinner, Sola Lainez MD, 0.8 mg at 02/26/23 1845    finasteride (PROSCAR) tablet 5 mg, 5 mg, oral, Daily, Sola Lainez MD, 5 mg at 02/27/23 0857    sodium chloride 0.9% (NS) carrier flush 25-50 mL, 25-50 mL, intravenous, PRN, Sola Lainez MD, Last Rate: 25 mL/hr at 02/26/23 2200, Rate Verify at 02/26/23 2200    sodium chloride (OCEAN) 0.65 % nasal spray 1 spray, 1 spray, Each Nostril, PRN, Virgen Sheffield MD    sodium chloride-aloe vera (AYR) nasal gel 1 application, 1 application, Each Nostril, PRN, Virgen Sheffield MD    dextrose 50 % in water (D50W) syringe 15-30 mL, 15-30 mL, intravenous, q15 min PRN, Virgen Sheffield MD    dextrose (GLUTOSE) 40 % gel 15.2 g, 15.2 g, oral, q15 min PRN, Virgen Sheffield MD    glucagon (GLUCAGEN) injection 1 mg, 1 mg, intramuscular, q15 min PRN **OR** glucagon (GLUCAGEN) injection 1 mg, 1 mg, subcutaneous, q15 min PRN, Virgen Sheffield MD    acetaminophen (TYLENOL) tablet 650 mg, 650 mg, oral, q6h PRN, Virgen Sheffield MD    oxyCODONE (ROXICODONE) immediate release tablet 5-10 mg, 5-10 mg, oral, q4h PRN, Virgen Sheffield MD    bisacodyL (DULCOLAX) suppository 10 mg, 10 mg, rectal, Daily PRN, Virgen Sheffield MD    magnesium hydroxide (MILK OF MAGNESIA) 400 mg/5 mL oral suspension 30 mL, 30 mL, oral, Daily PRN, Virgen Sheffield MD    ondansetron (ZOFRAN) tablet 4 mg, 4 mg,  oral, q6h PRN **OR** ondansetron (ZOFRAN) injection 4 mg, 4 mg, intravenous, q6h PRN, Virgen Sheffield MD    insulin aspart U-100 (NovoLOG) injection pen (correction dose) 0-15 Units, 0-15 Units, subcutaneous, Insulin: 4x daily with meals, Virgen Sheffield MD, 1 Units at 02/26/23 1937    enoxaparin (LOVENOX) syringe 40 mg, 40 mg, subcutaneous, Daily at 1400, Virgen Sheffield MD, 40 mg at 02/26/23 1404    ALLERGIES:  Patient has no known allergies.    CURRENT ASSISTIVE OR ADAPTIVE EQUIPMENT:  Front wheeled walker   Oneil Fall Risk Score: 100  FALL RISK Interventions: fall bundle     DIAGNOSTIC TESTING:  na  ACTIVITIES LIMITED BY PATIENT COMPLAINT: na    PATIENT'S GOALS FOR THERAPY:   Get stronger         Objective     FINDINGS:   Co-treat:   no    Family/Caregiver Present:  no    Precautions: Other Precautions: O2, weakness    Cognition:within functional limits     Bed Mobility:  Supine>sit: Stephany at trunk  Sit>supine; stand by assist     Transfers: minAx1  Sit to stand from edge of bed with front wheeled walker     Mobility: contact guard assist - minAx1  1 meter total; .5 meter stepping forward / backward  Assist for steadying self at items and front wheeled walker management when backing up  Pt declines further mobility stating he is very tired    Balance:contact guard assist static standing    ADLs:  Dressing: dependent sock adjustment, attempts socks but is unable       ROM:   Right upper extremity:within functional limits   Left upper extremity:within functional limits     Strength Testing:  Right upper extremity:3+/5  Left upper extremity:3+/5  Weakness noted      Activity Tolerance:   vital signs stable on 2L O2 via CPAP ; after sitting following short mobility he states he was dizzy slightly when standing but it resoled when sitting                                           EDUCATION:    Education Documentation  ADL training, taught by SHAUNA Justice at 2/27/2023 11:36 AM.  Learner: Patient  Readiness:  Acceptance  Method: Explanation  Response: Verbalizes Understanding    Education Comments  No comments found.            Time Calculation  Start Time: 0816  Stop Time: 0827  Time Calculation (min): 11 min     TIME CALCULATION         OT Untimed Charges - Quick List (Time Spent in Minutes)  OT Eval Low Complexity: 11       EVALUATION:  Low complexity: Brief history review, 1-3 performance deficits, and problem focused assessments with limited number of treatment options, modifications not necessary.       INITIAL TREATMENT:  NA          ASSESSMENT:  Patient presents with mobility, ADL, activity tolerance, strength deficits. He only tolerates 1 meter of mobility this date and is fatigued. He is well below his baseline.  This patient would benefit from OT services to increase independence and safety with daily tasks.    Rehab Potential:    good    Barriers to outcome: none       GOALS:        Multi-Disciplinary Problems (from Occupational Therapy)      Active Problems       Problem: Grooming  Start Date: 02/27/23      Goal Start Date Expected End Date End Date    LTG - Patient will complete daily grooming tasks 02/27/23 04/13/23 --    Goal Details: In standing with stand by assist                 Problem: Toileting  Start Date: 02/27/23      Goal Start Date Expected End Date End Date    LTG - Patient will complete daily toileting tasks 02/27/23 04/13/23 --    Goal Details: With stand by assist including mobility to/from bathroom                 Problem: Transfers  Start Date: 02/27/23      Goal Start Date Expected End Date End Date    LTG-Patient will complete all functional transfers 02/27/23 04/13/23 --    Goal Details: With stand by assist                                 PLAN:  It is recommended that the client attend rehabilitative therapy for up to 5 visits per week with an expected duration through Certification Date: 04/13/23.  Interventions during the course of treatment may include any combination of the  following:  ADL retraining  Therapeutic activity  Therapeutic exercise  UE strengthening/ROM  Endurance training  Patient/family training  Equipment evaluation/education  Compensatory technique education    Recommendation  Recommendation: Continue skilled therapy;Unable to tolerate 3 hours therapy;    Plan Comment: 1 assist ; progress mobility, ADLs standing    Thank you for allowing us to share in the care of this patient. If you have any questions, recommendations, or further concerns regarding this patient, please feel free to contact us at [address] [place] [phone]  Dept: [phone]      Signed by: SHAUNA Justice 2/27/2023  11:39 AM      * I have reviewed the plan of care and certify a continuing need for medically necessary services    Co-signed by:_________________________ Date and Time:________________

## 2023-02-27 NOTE — PROGRESS NOTES
[address] [place] [address]  Daily Progress Note  Patient name: Joseph Wong  MRN: [id number]   LOS: 2 days     Subjective   Joseph noted to be more oriented this morning.  He was noted to have positive orthostatic vital sign and became dizzy this morning.  He worked with PT OT and is reminded to go to rehab      Objective   Vitals:Temp:  [36.4 °C (97.5 °F)-36.9 °C (98.4 °F)] 36.8 °C (98.2 °F)  Heart Rate:  [58-75] 70  Resp:  [16-18] 18  SpO2:  [90 %-94 %] 93 %  BP: (137-173)/() 137/89  SpO2/FiO2 Ratio Using Approximate FiO2 (%):  [321.4-335.7] 332.1  Estimated P/F Ratio Using Approximate FiO2 (%):  [278.6-290.2] 287.3  Physical Exam:   General: NAD  HEENT: No pallor, cyanosis or jaundice. EOMI, PERRLA, moist mucus membranes  Neck: Supple nontender, no JVD  Lungs: Clear to auscultation bilaterally  Heart: Regular rate and rhythm with normal S1 and S2  Abdomen: Soft nontender. normoactive bowel sounds.   Extremities: No clubbing, cyanosis, or edema.  Neurologic: Alert and oriented x 2.  CN II through XII intact.  Nonfocal   Psych: normal mood  Musculoskeletal: no joint tenderness    Results from last 4 days   Lab Units 02/27/23  0650 02/26/23  0746 02/25/23  2151   WBC AUTO 10*3/uL 6.7 10.2* 11.9*   HEMOGLOBIN g/dL 12.4* 12.3* 13.0*   HEMATOCRIT % 36.0* 35.8* 36.9*   PLATELETS AUTO 10*3/uL 281 282 313       Results from last 4 days   Lab Units 02/27/23  0650 02/26/23  0746 02/25/23  2257 02/25/23  2151   SODIUM mmol/L 138 138  --  138   POTASSIUM MMOL/L 3.2* 3.0*  --  3.0*   CHLORIDE mmol/L 102 103  --  98   CO2 mmol/L 30 30  --  30   BUN mg/dL 12 16  --  17   CREATININE mg/dL 0.71 0.77  --  0.93   CALCIUM mg/dL 8.1* 7.9*  --  8.8   MAGNESIUM mg/dL  --   --   --  1.8   PHOSPHORUS mg/dL  --   --  2.8  --    ALBUMIN g/dL  --   --   --  3.6   TOTAL PROTEIN g/dL  --   --   --  6.4   BILIRUBIN TOTAL mg/dL  --   --   --  0.78   ALK PHOS U/L  --   --   --  106   ALT U/L  --   --    --  14   AST U/L  --   --   --  16   GLUCOSE mg/dL 129* 125*  --  125*           Assessment/Plan   Acute on chronic encephalopathy  Likely secondary to UTI.  Seems to have improved and back to baseline  CT head neg for acute process. Moderate chronic ischemic changes and moderate atrophy  Likely component of dementia as wife reports progressive loss of memory in the past year  C/w delirium precautions    Urinary tract infection   UA suggestive of UTI. C/w ceftriaxone f/u urine culture     Hypokalemia   K+ 3.2 this morning. Repleted. Monitor electrolytes  Seems chronic. On K 30 mg BID at home.      Dizziness   Orthostatic hypotension   Recurrent falls  Orthostat positive.  Patient noted to be on doxazosin at home, which increases the risk of fall.  Plan to discontinue doxazosin and switch to Flomax   C/w PTOT    CHRONIC PROBLEMS  CAD  Essential hypertension-on amlodipine, irbesartan  Hypercholesterolemia-c/w statin  Non-insulin-dependent diabetes mellitus-c/w low-dose SSI  Obstructive sleep apnea-c/w CPAP  BPH-c/w Flomax  Depression-c/w Prozac  GERD  History of shingles    DVT prophylaxis: Lovenox 40 mg subcutaneous daily  CODE STATUS: DNR/DNI    Electronically signed by: Sola Lainez MD  2/27/2023  12:49 PM

## 2023-02-27 NOTE — PLAN OF CARE
Problem: Safety Adult - Fall  Goal: Free from fall injury  Description: INTERVENTIONS:    Inpatient - Please reference Cares/Safety Flowsheet under Oneil Fall Risk for interventions.  Pediatrics - Please reference Peds Daily Cares/Safety Flowsheet under [facility] Pediatric Fall Assessment Fall Bundle for interventions  LD/OB - Please reference OB Shift Screening Flowsheet under OB Fall Risk for interventions.  Outcome: Progressing     Problem: Knowledge Deficit  Goal: Patient/family/caregiver demonstrates understanding of disease process, treatment plan, medications, and discharge instructions  Description: INTERVENTIONS:   1. Complete learning assessment and assess knowledge base  2. Provide teaching at level of understanding   3. Provide teaching via preferred learning methods  Outcome: Progressing     Problem: Potential for Compromised Skin Integrity  Goal: Skin Integrity is Maintained or Improved  Description: INTERVENTIONS:  1. Assess and monitor skin integrity  2. Collaborate with interdisciplinary team and initiate plans and interventions as needed  3. Alternate a full bath with partial baths for elderly   4. Monitor patient's hygiene practices   5. Collaborate with wound, ostomy, and continence nurse  Outcome: Progressing  Goal: Nutritional status is improving  Description: INTERVENTIONS:  1. Monitor and assess patient for malnutrition (ex- brittle hair, bruises, dry skin, pale skin and conjunctiva, muscle wasting, smooth red tongue, and disorientation)  2. Monitor patient's weight and dietary intake as ordered or per policy  3. Determine patient's food preferences and provide high-protein, high-caloric foods as appropriate  4. Assist patient with eating   5. Allow adequate time for meals   6. Encourage patient to take dietary supplement as ordered   7. Collaborate with dietitian  8. Include patient/family/caregiver in decisions related to nutrition  Outcome: Progressing  Goal: MOBILITY IS MAINTAINED OR  IMPROVED  Description: INTERVENTIONS  1. Collaborate with interdisciplinary team and initiate plan and interventions as ordered (PT/OT)  2. Encourage ambulation  3. Up to chair for meals  4. Monitor for signs of deconditioning  Outcome: Progressing     Problem: Urinary Incontinence  Goal: Perineal skin integrity is maintained or improved  Description: INTERVENTIONS:  1. Assess genitourinary system, perineal skin, labs (urinalysis), and history of incontinence to include past management, aggravating, and alleviating factors  2. Collaborate with interdisciplinary team including wound, ostomy, and continence nurse and initiate plans and interventions as needed  4. Consider urine containment device  5. Apply skin protectant   6. Develop skin care regimen  7. Provide privacy when changing patient's incontinence device to maintain their dignity  Outcome: Progressing     Problem: Pain - Adult  Goal: Verbalizes/displays adequate comfort level or baseline comfort level  Description: INTERVENTIONS:  1. Encourage patient to monitor pain and request interventions  2. Assess pain using the appropriate pain scale  3. Administer analgesics based on type and severity of pain and evaluate response  4. Educate/Implement non-pharmacological measures as appropriate and evaluate response  5. Consider cultural, developmental and social influences on pain and pain management  6. Notify Provider if interventions unsuccessful or patient reports new pain  Outcome: Progressing     Problem: Infection - Adult  Goal: Absence of infection during hospitalization  Description: INTERVENTIONS:  1. Assess and monitor for signs and symptoms of infection  2. Monitor lab/diagnostic results  3. Monitor all insertion sites/wounds/incisions  4. Monitor secretions for changes in amount and color  5. Administer medications as ordered  6. Educate and encourage patient and family to use good hand hygiene technique  7. Identify and educate in appropriate  isolation precautions for identified infection/condition  Outcome: Progressing     Problem: Safety Adult  Goal: Patient will remain safe during hospitalization  Description: INTERVENTIONS    1. Assess patient for fall risk and implement interventions if needed  2. Use safe transport techniques  3. Assess patient using the appropriate Harvey skin assessment scale  4. Assess patient for risk of aspiration  5. Assess patient for risk of elopement  6. Assess patient for risk of suicide  Outcome: Progressing     Problem: Daily Care  Goal: Daily care needs are met  Description: INTERVENTIONS:   1. Assess and monitor skin integrity  2. Identify patients at risk for skin breakdown on admission and per policy  3. Assess and monitor ability to perform self care and identify potential discharge needs  4. Assess skin integrity/risk for skin breakdown  5. Assist patient with activities of daily living as needed  6. Encourage independent activity per ability   7. Provide mouth care   8. Include patient/family/caregiver in decisions related to daily care   Outcome: Progressing     Problem: Discharge Barriers  Goal: Patient's discharge needs are met  Description: INTERVENTIONS:  1. Assess patient for self-management skills  2. Encourage participation in management  3. Identify potential discharge barriers on admission and throughout hospital stay  4. Involve patient/family/caregiver in discharge planning process  5. Collaborate with case management/ for discharge needs  Outcome: Progressing     Problem: Genitourinary - Adult  Goal: Absence of urinary retention  Description: INTERVENTIONS:  1. Assess patient’s ability to void and empty bladder.  2. Monitor intake/output and perform bladder scan if indicated.  3. Place urinary catheter per order and initiate and maintain CAUTI bundle.  4. Administer medications to alleviate retention as needed.  5. Discuss catheterization for long term situations as  appropriate.    Outcome: Progressing  Goal: Urinary catheter remains patent  Description: INTERVENTIONS:  1. Assess patency of urinary catheter.  2. Irrigate catheter per order if indicated and notify provider if unable to irrigate.  3. Assess need for a larger catheter size or a 3-way catheter for continuous bladder irrigation.  Outcome: Progressing  Goal: Absence of Urinary Infection  Description: INTERVENTIONS:  1. Assess urinary status for burning, urgency, frequency, and pain  2. Assess urine for color, cloudiness, odor, and amount  3. Monitor intake/output  4. Monitor lab values  5. Obtain urinalysis as ordered  6. Assess for neurological status changes    Outcome: Progressing     Problem: Metabolic/Fluid and Electrolytes - Adult  Goal: Electrolytes maintained within normal limits  Description: INTERVENTIONS:  1. Monitor labs and assess patient for signs and symptoms of electrolyte imbalances  2. Administer electrolyte replacement as ordered  3. Monitor response to electrolyte replacements, including repeat lab results as appropriate  4. Fluid restriction as ordered  5. Instruct patient on fluid and nutrition restrictions as appropriate  Outcome: Progressing  Goal: Maintain Optimal Renal Function and Hemodynamic Stability  Description: INTERVENTIONS:  1. Monitor labs and assess for signs and symptoms of volume excess or deficit  2. Monitor intake, output and patient weight  3. Monitor urine specific gravity, serum osmolarity and serum sodium as indicated or ordered  4. Monitor response to interventions for patient's volume status, including labs, urine output, blood pressure (other measures as available)  5. Encourage oral intake as appropriate  6. Instruct patient on fluid and nutrition restrictions as appropriate  Outcome: Progressing  Goal: Glucose maintained within prescribed range  Description: INTERVENTIONS:  1. Monitor blood glucose as ordered  2. Assess for signs and symptoms of hyperglycemia and  hypoglycemia  3. Administer ordered medications to maintain glucose within target range  4. Assess barriers to adequate nutritional intake and initiate nutrition consult as needed  5. Assess baseline knowledge and provide education as indicated  6. Monitor exercise as may reduce the requirements for insulin  Outcome: Progressing     Problem: Skin/Tissue Integrity - Adult  Goal: Skin integrity remains intact  Description: INTERVENTIONS  1. Assess and document risk factors for pressure ulcer development  2. Assess and document skin integrity  3. Monitor for areas of redness and/or skin breakdown  4. Initiate pressure ulcer prevention measures as indicated  Outcome: Progressing  Goal: Oral mucous membranes remain intact  Description: INTERVENTIONS  1. Assess oral mucosa and hygiene practices  2. Implement preventative oral hygiene regimen  3. Implement oral medicated treatments as ordered  Outcome: Progressing     Problem: Musculoskeletal - Adult  Goal: Return mobility to safest level of function  Description: INTERVENTIONS:  1. Assess patient stability and activity tolerance for standing, transferring and ambulating w/ or w/o assistive devices  2. Assist with transfers and ambulation using safe practices  3. Ensure adequate protection for wounds/incisions during mobilization  4. Obtain PT/OT and other consults as needed  5. Apply Continuous Passive Motion per order to increase flexion toward goal  6. Instruct patient/family in ordered activity level  Outcome: Progressing  Goal: Maintain proper alignment of affected body part  Description: INTERVENTIONS:  1. Support and protect limb and body alignment per provider order  2. Instruct and reinforce with patient and family use of appropriate assistive device and precautions (e.g. spinal or hip dislocation precautions)  Outcome: Progressing     Problem: PT Misc  Goal: LTG - Misc 1  Description: Patient will be independent with functional transfers with Front Wheeled Walker  support   Outcome: Progressing  Goal: LTG - Misc 2  Description: Patient will ambulate 30 meters with Front Wheeled Walker And standby assistance of one   Outcome: Progressing  Goal: LTG - Misc 3  Description: Patient will negotiate 1 stair with contact guard assistance of one   Outcome: Progressing

## 2023-02-28 ENCOUNTER — APPOINTMENT (OUTPATIENT)
Dept: MRI IMAGING | Facility: HOSPITAL | Age: 88
DRG: 064 | End: 2023-02-28
Payer: MEDICARE

## 2023-02-28 LAB
ANION GAP SERPL CALC-SCNC: 10 MMOL/L (ref 3–11)
BUN SERPL-MCNC: 15 MG/DL (ref 7–25)
CALCIUM SERPL-MCNC: 8.7 MG/DL (ref 8.6–10.3)
CHLORIDE SERPL-SCNC: 99 MMOL/L (ref 98–107)
CO2 SERPL-SCNC: 24 MMOL/L (ref 21–32)
CREAT SERPL-MCNC: 0.86 MG/DL (ref 0.7–1.3)
GFR SERPL CREATININE-BSD FRML MDRD: 84 ML/MIN/1.73M*2
GLUCOSE BLDC GLUCOMTR-SCNC: 128 MG/DL (ref 70–105)
GLUCOSE BLDC GLUCOMTR-SCNC: 146 MG/DL (ref 70–105)
GLUCOSE BLDC GLUCOMTR-SCNC: 155 MG/DL (ref 70–105)
GLUCOSE BLDC GLUCOMTR-SCNC: 189 MG/DL (ref 70–105)
GLUCOSE SERPL-MCNC: 225 MG/DL (ref 70–105)
POTASSIUM SERPL-SCNC: 3.4 MMOL/L (ref 3.5–5.1)
SODIUM SERPL-SCNC: 133 MMOL/L (ref 135–145)

## 2023-02-28 PROCEDURE — 99232 SBSQ HOSP IP/OBS MODERATE 35: CPT | Performed by: STUDENT IN AN ORGANIZED HEALTH CARE EDUCATION/TRAINING PROGRAM

## 2023-02-28 PROCEDURE — 6360000200 HC RX 636 W HCPCS (ALT 250 FOR IP): Performed by: INTERNAL MEDICINE

## 2023-02-28 PROCEDURE — 97112 NEUROMUSCULAR REEDUCATION: CPT | Mod: GP

## 2023-02-28 PROCEDURE — 6370000100 HC RX 637 (ALT 250 FOR IP): Performed by: INTERNAL MEDICINE

## 2023-02-28 PROCEDURE — 51798 US URINE CAPACITY MEASURE: CPT

## 2023-02-28 PROCEDURE — 82947 ASSAY GLUCOSE BLOOD QUANT: CPT | Mod: QW

## 2023-02-28 PROCEDURE — (BLANK) HC ROOM PRIVATE

## 2023-02-28 PROCEDURE — 94660 CPAP INITIATION&MGMT: CPT

## 2023-02-28 PROCEDURE — 80048 BASIC METABOLIC PNL TOTAL CA: CPT | Performed by: STUDENT IN AN ORGANIZED HEALTH CARE EDUCATION/TRAINING PROGRAM

## 2023-02-28 PROCEDURE — 70551 MRI BRAIN STEM W/O DYE: CPT | Mod: ME

## 2023-02-28 PROCEDURE — 6370000100 HC RX 637 (ALT 250 FOR IP): Performed by: STUDENT IN AN ORGANIZED HEALTH CARE EDUCATION/TRAINING PROGRAM

## 2023-02-28 PROCEDURE — 36415 COLL VENOUS BLD VENIPUNCTURE: CPT | Performed by: STUDENT IN AN ORGANIZED HEALTH CARE EDUCATION/TRAINING PROGRAM

## 2023-02-28 PROCEDURE — 97535 SELF CARE MNGMENT TRAINING: CPT | Mod: GO

## 2023-02-28 RX ORDER — SULFAMETHOXAZOLE AND TRIMETHOPRIM 800; 160 MG/1; MG/1
1 TABLET ORAL EVERY 12 HOURS SCHEDULED
Status: DISCONTINUED | OUTPATIENT
Start: 2023-02-28 | End: 2023-03-02 | Stop reason: HOSPADM

## 2023-02-28 RX ORDER — LORAZEPAM 2 MG/ML
1 INJECTION INTRAMUSCULAR ONCE AS NEEDED
Status: DISCONTINUED | OUTPATIENT
Start: 2023-02-28 | End: 2023-03-02 | Stop reason: HOSPADM

## 2023-02-28 RX ORDER — POTASSIUM CHLORIDE 750 MG/1
40 TABLET, FILM COATED, EXTENDED RELEASE ORAL ONCE
Status: COMPLETED | OUTPATIENT
Start: 2023-02-28 | End: 2023-02-28

## 2023-02-28 RX ADMIN — AMLODIPINE BESYLATE 5 MG: 5 TABLET ORAL at 08:41

## 2023-02-28 RX ADMIN — ACETAMINOPHEN 650 MG: 325 TABLET ORAL at 03:24

## 2023-02-28 RX ADMIN — INSULIN ASPART 1 UNITS: 100 INJECTION, SOLUTION INTRAVENOUS; SUBCUTANEOUS at 13:01

## 2023-02-28 RX ADMIN — ENOXAPARIN SODIUM 40 MG: 100 INJECTION SUBCUTANEOUS at 13:24

## 2023-02-28 RX ADMIN — ATORVASTATIN CALCIUM 10 MG: 10 TABLET, FILM COATED ORAL at 08:41

## 2023-02-28 RX ADMIN — SPIRONOLACTONE 25 MG: 25 TABLET ORAL at 08:41

## 2023-02-28 RX ADMIN — POTASSIUM CHLORIDE 40 MEQ: 750 TABLET, EXTENDED RELEASE ORAL at 13:48

## 2023-02-28 RX ADMIN — IRBESARTAN 300 MG: 150 TABLET ORAL at 08:41

## 2023-02-28 RX ADMIN — CYANOCOBALAMIN 1000 MCG: 1000 INJECTION, SOLUTION INTRAMUSCULAR at 08:41

## 2023-02-28 RX ADMIN — FINASTERIDE 5 MG: 5 TABLET, FILM COATED ORAL at 08:41

## 2023-02-28 RX ADMIN — HYDRALAZINE HYDROCHLORIDE 10 MG: 20 INJECTION INTRAMUSCULAR; INTRAVENOUS at 00:30

## 2023-02-28 RX ADMIN — LANSOPRAZOLE 30 MG: 30 CAPSULE, DELAYED RELEASE ORAL at 16:09

## 2023-02-28 RX ADMIN — FLUOXETINE 10 MG: 10 CAPSULE ORAL at 08:41

## 2023-02-28 RX ADMIN — SULFAMETHOXAZOLE AND TRIMETHOPRIM 1 TABLET: 800; 160 TABLET ORAL at 20:43

## 2023-02-28 RX ADMIN — TAMSULOSIN HYDROCHLORIDE 0.4 MG: 0.4 CAPSULE ORAL at 17:25

## 2023-02-28 NOTE — INTERDISCIPLINARY/THERAPY
[address] [facility] [address]  [phone]      Occupational Therapy Daily Treatment Note       Date of Service: 02/28/23    Patient Name: Joseph Wong  Referring Provider: BOB GIRON  Visit Number: 2   Start of Care Date: 02/27/23  Certification Date: 04/13/23       Subjective     RN ok'd OT services. Pt found in recliner and agreeable to therapy. Gait belt donned for mobility. Pt left seated on commode w/ call light in reach, needs met, and RN providing supervision     Co-treat:   no    Family/Caregiver Present:  no      FALL RISK Interventions:fall bundle         Pain:   Pain Assessment Scale  Pain Scale: 0-10  0-10 Pain Score: 0 - No pain         Objective     Precautions: Other Precautions: O2, weakness    Cognition:  Within functional limits     Bed Mobility:not assessed, received in recliner    Transfers: minAx1 with front wheeled walker   stand pivot transfer from recliner>commode  Balance:Stephany static standing with bilateral upper extremity support on front wheeled walker     ADLs:  Toileting: assist for clothing management down; seated on commode at end of session as he requests increased time to void          Activity Tolerance:  vital signs stable                                          Time Calculation  Start Time: 1042  Stop Time: 1050  Time Calculation (min): 8 min     Therapeutic Interventions (Time Spent in Minutes)  ADL Selfcare Home Managment: 8                  ASSESSMENT:    Pt is only able to tolerate stand pivot transfer this date and he needs Stephany. He is unsteady on his feet. Continue OT for progression of mobility and ADLs.    PLAN FOR NEXT TREATMENT SESSION:      Recommendation  Recommendation: Continue skilled therapy;Unable to tolerate 3 hours therapy;    Plan Comment: 1-2 assist ; progress mobility, ADLs standing    Thank you for allowing us to share in the care of this patient. If you have any questions, recommendations, or further concerns regarding this patient,  please feel free to contact me at [address] [place] [phone]  Dept: [phone]  .  Signed by: SHAUNA Justice  2/28/2023  1:09 PM

## 2023-02-28 NOTE — PLAN OF CARE
Problem: Safety Adult - Fall  Goal: Free from fall injury  Description: INTERVENTIONS:    Inpatient - Please reference Cares/Safety Flowsheet under Oneil Fall Risk for interventions.  Pediatrics - Please reference Peds Daily Cares/Safety Flowsheet under Whittaker Pediatric Fall Assessment Fall Bundle for interventions  LD/OB - Please reference OB Shift Screening Flowsheet under OB Fall Risk for interventions.  Outcome: Progressing     Problem: Urinary Incontinence  Goal: Perineal skin integrity is maintained or improved  Description: INTERVENTIONS:  1. Assess genitourinary system, perineal skin, labs (urinalysis), and history of incontinence to include past management, aggravating, and alleviating factors  2. Collaborate with interdisciplinary team including wound, ostomy, and continence nurse and initiate plans and interventions as needed  4. Consider urine containment device  5. Apply skin protectant   6. Develop skin care regimen  7. Provide privacy when changing patient's incontinence device to maintain their dignity  Outcome: Progressing     Problem: Pain - Adult  Goal: Verbalizes/displays adequate comfort level or baseline comfort level  Description: INTERVENTIONS:  1. Encourage patient to monitor pain and request interventions  2. Assess pain using the appropriate pain scale  3. Administer analgesics based on type and severity of pain and evaluate response  4. Educate/Implement non-pharmacological measures as appropriate and evaluate response  5. Consider cultural, developmental and social influences on pain and pain management  6. Notify Provider if interventions unsuccessful or patient reports new pain  Outcome: Progressing     Problem: Infection - Adult  Goal: Absence of infection during hospitalization  Description: INTERVENTIONS:  1. Assess and monitor for signs and symptoms of infection  2. Monitor lab/diagnostic results  3. Monitor all insertion sites/wounds/incisions  4. Monitor secretions for  changes in amount and color  5. Administer medications as ordered  6. Educate and encourage patient and family to use good hand hygiene technique  7. Identify and educate in appropriate isolation precautions for identified infection/condition  Outcome: Progressing     Problem: Safety Adult  Goal: Patient will remain safe during hospitalization  Description: INTERVENTIONS    1. Assess patient for fall risk and implement interventions if needed  2. Use safe transport techniques  3. Assess patient using the appropriate Harvey skin assessment scale  4. Assess patient for risk of aspiration  5. Assess patient for risk of elopement  6. Assess patient for risk of suicide  Outcome: Progressing     Problem: Daily Care  Goal: Daily care needs are met  Description: INTERVENTIONS:   1. Assess and monitor skin integrity  2. Identify patients at risk for skin breakdown on admission and per policy  3. Assess and monitor ability to perform self care and identify potential discharge needs  4. Assess skin integrity/risk for skin breakdown  5. Assist patient with activities of daily living as needed  6. Encourage independent activity per ability   7. Provide mouth care   8. Include patient/family/caregiver in decisions related to daily care   Outcome: Progressing     Problem: Genitourinary - Adult  Goal: Absence of urinary retention  Description: INTERVENTIONS:  1. Assess patient’s ability to void and empty bladder.  2. Monitor intake/output and perform bladder scan if indicated.  3. Place urinary catheter per order and initiate and maintain CAUTI bundle.  4. Administer medications to alleviate retention as needed.  5. Discuss catheterization for long term situations as appropriate.    Outcome: Progressing  Goal: Urinary catheter remains patent  Description: INTERVENTIONS:  1. Assess patency of urinary catheter.  2. Irrigate catheter per order if indicated and notify provider if unable to irrigate.  3. Assess need for a larger  catheter size or a 3-way catheter for continuous bladder irrigation.  Outcome: Progressing  Goal: Absence of Urinary Infection  Description: INTERVENTIONS:  1. Assess urinary status for burning, urgency, frequency, and pain  2. Assess urine for color, cloudiness, odor, and amount  3. Monitor intake/output  4. Monitor lab values  5. Obtain urinalysis as ordered  6. Assess for neurological status changes    Outcome: Progressing     Problem: Skin/Tissue Integrity - Adult  Goal: Skin integrity remains intact  Description: INTERVENTIONS  1. Assess and document risk factors for pressure ulcer development  2. Assess and document skin integrity  3. Monitor for areas of redness and/or skin breakdown  4. Initiate pressure ulcer prevention measures as indicated  Outcome: Progressing  Goal: Oral mucous membranes remain intact  Description: INTERVENTIONS  1. Assess oral mucosa and hygiene practices  2. Implement preventative oral hygiene regimen  3. Implement oral medicated treatments as ordered  Outcome: Progressing     Problem: Musculoskeletal - Adult  Goal: Return mobility to safest level of function  Description: INTERVENTIONS:  1. Assess patient stability and activity tolerance for standing, transferring and ambulating w/ or w/o assistive devices  2. Assist with transfers and ambulation using safe practices  3. Ensure adequate protection for wounds/incisions during mobilization  4. Obtain PT/OT and other consults as needed  5. Apply Continuous Passive Motion per order to increase flexion toward goal  6. Instruct patient/family in ordered activity level  Outcome: Progressing  Goal: Maintain proper alignment of affected body part  Description: INTERVENTIONS:  1. Support and protect limb and body alignment per provider order  2. Instruct and reinforce with patient and family use of appropriate assistive device and precautions (e.g. spinal or hip dislocation precautions)  Outcome: Progressing

## 2023-02-28 NOTE — PROGRESS NOTES
[address] [place] [address]  Daily Progress Note  Patient name: Joseph Wong  MRN: [id number]   LOS: 3 days     Subjective   Joseph is confused this morning. Believes he has been kidnapped and is asking for his wife. He otherwise is doing well. Denies any SOB, chest pain, fever/chills, dysuria. Worked with PTOT and is recommended to go to rehab      Objective   Vitals:Temp:  [36.6 °C (97.9 °F)-37.2 °C (98.9 °F)] 36.8 °C (98.2 °F)  Heart Rate:  [70-92] 87  Resp:  [16-18] 16  SpO2:  [90 %-93 %] 91 %  BP: (137-174)/() 147/93  SpO2/FiO2 Ratio Using Approximate FiO2 (%):  [328.6-460] 450  Estimated P/F Ratio Using Approximate FiO2 (%):  [284.4-390.9] 382.8  Physical Exam:   General: NAD  HEENT: No pallor, cyanosis or jaundice. EOMI, PERRLA, moist mucus membranes  Neck: Supple nontender, no JVD  Lungs: Clear to auscultation bilaterally  Heart: Regular rate and rhythm with normal S1 and S2  Abdomen: Soft nontender. normoactive bowel sounds.   Extremities: No clubbing, cyanosis, or edema.  Neurologic: Alert and oriented x 2.  CN II through XII intact.  Nonfocal   Psych: normal mood  Musculoskeletal: no joint tenderness    Results from last 4 days   Lab Units 02/27/23  0650 02/26/23  0746 02/25/23  2151   WBC AUTO 10*3/uL 6.7 10.2* 11.9*   HEMOGLOBIN g/dL 12.4* 12.3* 13.0*   HEMATOCRIT % 36.0* 35.8* 36.9*   PLATELETS AUTO 10*3/uL 281 282 313       Results from last 4 days   Lab Units 02/27/23  0650 02/26/23  0746 02/25/23 2257 02/25/23  2151   SODIUM mmol/L 138 138  --  138   POTASSIUM MMOL/L 3.2* 3.0*  --  3.0*   CHLORIDE mmol/L 102 103  --  98   CO2 mmol/L 30 30  --  30   BUN mg/dL 12 16  --  17   CREATININE mg/dL 0.71 0.77  --  0.93   CALCIUM mg/dL 8.1* 7.9*  --  8.8   MAGNESIUM mg/dL  --   --   --  1.8   PHOSPHORUS mg/dL  --   --  2.8  --    ALBUMIN g/dL  --   --   --  3.6   TOTAL PROTEIN g/dL  --   --   --  6.4   BILIRUBIN TOTAL mg/dL  --   --   --  0.78   ALK PHOS U/L  --    --   --  106   ALT U/L  --   --   --  14   AST U/L  --   --   --  16   GLUCOSE mg/dL 129* 125*  --  125*           Assessment/Plan   Acute on chronic encephalopathy  Likely secondary to UTI.  Suspect AMS is primarily chronic based on history wife wife. He has been having progressive memory loss for the past year  CT head neg for acute process. Moderate chronic ischemic changes and moderate atrophy  C/w delirium precautions    Urinary tract infection  Suspect recurrent UTI d/t urinary retention from BPH   UA suggestive of UTI. C/w ceftriaxone. Will transition to Bactrim to finish 7 days of antibiotics     Hypokalemia   K+ 3.2 this morning. Repleted. Monitor electrolytes  Seems chronic. On K 30 mg BID at home.      Dizziness   Orthostatic hypotension   Recurrent falls  Orthostat positive.  Patient noted to be on doxazosin at home, which increases the risk of fall.  Plan to discontinue doxazosin and switch to Flomax.  Minimize use of meds that cause orthostasis   C/w PTOT    CHRONIC PROBLEMS  CAD  Essential hypertension-on amlodipine, irbesartan  Hypercholesterolemia-c/w statin  Non-insulin-dependent diabetes mellitus-c/w low-dose SSI  Obstructive sleep apnea-c/w CPAP  BPH-c/w Flomax  Depression-c/w Prozac  GERD  History of shingles    DVT prophylaxis: Lovenox 40 mg subcutaneous daily  CODE STATUS: DNR/DNI    Electronically signed by: Sola Lainez MD  2/28/2023  10:57 AM

## 2023-02-28 NOTE — PLAN OF CARE
Problem: Safety Adult - Fall  Goal: Free from fall injury  Description: INTERVENTIONS:    Inpatient - Please reference Cares/Safety Flowsheet under Oneil Fall Risk for interventions.  Pediatrics - Please reference Peds Daily Cares/Safety Flowsheet under Whittaker Pediatric Fall Assessment Fall Bundle for interventions  LD/OB - Please reference OB Shift Screening Flowsheet under OB Fall Risk for interventions.  Outcome: Progressing

## 2023-02-28 NOTE — INTERDISCIPLINARY/THERAPY
Case Management Progress Note    Phone # [phone]    Reason for Admission: UTI-pt did fall     Plan of Care:  Encephalopathy secondary to UTI. Orthostat positive. Doxazosin switch to flomax to lower fall risk. Dementia increased in last year. Avasys in room. Pt still requires supervision for safety.     PT/OT- recommend rehab therapy. Wife, Alessandra, agreed to referrals for local. [facility] Do [facility] accepting at [facility]. Wife on waiting list for [facility] appartments and would prefer to await their decision. [facility] pending.     Disposition: SNF Rehab prior to home with wife

## 2023-02-28 NOTE — INTERDISCIPLINARY/THERAPY
[address] [facility] [address]  [phone]      Physical Therapy Daily Treatment Note      Date of Service: 02/28/23  Patient Name: Joseph Wong  Referring Provider: BOB GIRON  Visit Number: 3   Start of Care Date: 02/26/23  Certification Period: 04/12/23      Subjective     Therapy Treatment Diagnosis: dx: UTI, OA of multiple joints, macular degeneration, NARESH, fall       RN ok'd PT services. Pt found in bed upon arrival (wife outside of room with RN staff) and agreeable to PT. Gait belt donned for mobility. Pt left in recliner with call light in reach, needs met, and alarm on. RN notified.      FALL RISK Interventions: fall risk bundle  Pain:   Pain Assessment Scale  Pain Scale: 0-10  0-10 Pain Score: 0 - No pain         Objective      Co-treat: no    Precautions: Other Precautions: fall risk - impulsive, monitor BP    Cognition: cooperative and pleasant, follows commands, impulsive with mobility at times.    Bed Mobility: SBA  Head of bed elevated, supine to short sit. Increased time needed. Cues provided for sequencing.    Transfers: contact guard assist x1 - MinAx1  Sit to stand transfer from edge of bed (x2) and stand pivot transfer to the L. Contact guard assist x1 provided for sit to stand transfers, MinAx1 provided for pivot transfer. Cues provided for sequencing, hand placement and walker negotiation. Patient often places front wheeled walker out in front of him (outside base of support) and demonstrates a shuffling gait pattern. Unsteady - requiring assistance for balance.    Ambulation: unable to assess due to unsteadiness with transfer.    Balance: Impaired. Requires front wheeled walker and assist from PT for stability.  Static Sitting: patient sat edge of bed after performing various standing exercises and while BP was taken. No postural swaying, cues provided for posture.  Static/Dynamic Standing: Patient stood at edge of bed for ~5 minutes with bilateral UE supported on front  wheeled walker. No postural swaying or loss of balance noted. Patient then performed mini squats (progressing to deeper squats impulsively), marching in place and lateral weight shifting x12-15 bilaterally. Increased shortness of breath and fatigue noted with this resulting in seated rest break.    Other Activities: Education provided on continued out of bed mobility with RN staff and increasing time spent sitting in the recliner throughout the day.    Activity Tolerance: vital signs stable on RA. Patient reports dizziness upon standing with BP taken by PT - 110/82. BP taken after completing standing standing activities 120/82 with no changes in symptoms. RN notified of symptoms/BP readings (~7am systolic ~140's). Limited by fatigue, weakness and decreased cognition.                                                                      Time Calculation  Start Time: 0947  Stop Time: 1004  Time Calculation (min): 17 min   Therapeutic Interventions (Time Spent in Minutes)  Neuromuscular Re-Education: 17                   ASSESSMENT:  Patient tolerated session fair. He requires 1 assist with mobility and safety at this time. He fatigues with short bouts of standing and exhibits LE weakness with various standing exercises. He is limited in progressing mobility/ambulation due to reported dizziness that does not change with positions/movement. Continue skilled PT to improve functional independence and ensure safe DC planning.    PLAN FOR NEXT TREATMENT SESSION:  PT Frequency: 3-5x/wk      Recommendation  Recommendations for Therapy: Continue skilled therapy, Unable to tolerate 3 hours therapy  Equipment Recommended:  (owns a front wheeled walker)    Plan Comment: 1 assist: SBA bed, contact x1 transfers, standing exercises. Progress gait, balance, strength and activity tolerance as able.    Thank you for allowing us to share in the care of this patient. If you have any questions, recommendations, or further concerns  regarding this patient, please feel free to contact me at [address] [place] [phone]  Dept: [phone].  Signed by: Patricia Inman, PT  2/28/2023  1:38 PM

## 2023-02-28 NOTE — NURSING END OF SHIFT
Nursing End of Shift Summary:    Patient: Joseph Wong  MRN: [id number]  : 10/20/1934, Age: 88 y.o.    Location: [address] [place] [address]    Nursing Goals  Clinical Goals for the Shift: Maintain safety, Q4 neuros, Q6 bladder scan    Narrative Summary of Progress Toward Clinical Goals:  Somewhat restless night. Complaining of CPAP being uncomfortable, readjusted and was able to get some rest.  Had 1 incontinent void, bladder scan Q6h, both scans were <300ml. No straight cath needed.  One episode of hypertension, PRN hydralazine given 1 time. BP have been WNL since then.  Some confusion throughout the night, needing to be reoriented.    Barriers to Goals/Nursing Concerns:  Yes - Unable to care for self, having occasional confusion and difficulty voiding    New Patient or Family Concerns/Issues:  No    Shift Summary:   Significant Events & Communications to Providers (last 12 hours)       Last 5 Values    No documentation.                 Oxygen Usage (last 12 hours)       Last 5 Values    No documentation.                 Mobility (last 12 hours)       Last 5 Values       Row Name 23 1800 23 1938 23 2036 23 2338 23 0300       Mobility    Activity -- -- Turn -- --    Patient Position -- Supine -- Supine --    Turning -- -- Turns self -- --      Row Name 23 0333                   Mobility    Patient Position Sitting        Turning Turns self                      Urethral Catheter       Active Urethral Catheter       None                  Active Lines       Active Central venous catheter / Peripherally inserted central catheter / Implantable Port / Hemodialysis catheter / Midline Catheter       None                  Infusing Medications   Medication Dose Last Rate     PRN Medications   Medication Dose Last Admin    hydrALAZINE  10 mg 10 mg at 23 0030    sodium chloride 0.9% (NS)  25-50 mL Stopped at 23 2135    sodium chloride  1 spray      sodium chloride-aloe vera  1 application       dextrose 50 % in water (D50W)  15-30 mL      dextrose  15.2 g      glucagon  1 mg      Or    glucagon  1 mg      acetaminophen  650 mg 650 mg at 02/28/23 0324    oxyCODONE  5-10 mg      bisacodyL  10 mg      magnesium hydroxide  30 mL      ondansetron  4 mg      Or    ondansetron  4 mg

## 2023-03-01 ENCOUNTER — APPOINTMENT (OUTPATIENT)
Dept: CT IMAGING | Facility: HOSPITAL | Age: 88
DRG: 064 | End: 2023-03-01
Payer: MEDICARE

## 2023-03-01 ENCOUNTER — APPOINTMENT (OUTPATIENT)
Dept: CARDIOLOGY | Facility: HOSPITAL | Age: 88
DRG: 064 | End: 2023-03-01
Payer: MEDICARE

## 2023-03-01 LAB
ANION GAP SERPL CALC-SCNC: 7 MMOL/L (ref 3–11)
ASCENDING AORTA: 3.53 CM
AV LVOT PEAK GRADIENT: 4.8 MMHG
AV MEAN GRADIENT: 4.9 MMHG
AV PEAK GRADIENT: 11.56 MMHG
BACTERIA UR CULT: NORMAL
BSA FOR ECHO PROCEDURE: 2.09 M2
BUN SERPL-MCNC: 16 MG/DL (ref 7–25)
CALCIUM SERPL-MCNC: 8.4 MG/DL (ref 8.6–10.3)
CHLORIDE SERPL-SCNC: 103 MMOL/L (ref 98–107)
CO2 SERPL-SCNC: 26 MMOL/L (ref 21–32)
CREAT SERPL-MCNC: 0.89 MG/DL (ref 0.7–1.3)
DOP CALC AO PEAK VEL: 1.7 M/S
DOP CALC AO VTI: 30.7 CM
DOP CALC LVOT DIAMETER: 2.63 CM
DOP CALC LVOT STROKE VOLUME: 129 CM3
DOP CALC MV VTI: 25.04 CM
DOP CALC RVOT PEAK VEL: 0.8 M/S
DOP CALCLVOT PEAK VEL VTI: 23.8 CM
E/A RATIO: 0.5
E/E' RATIO (AVERAGE): 12.4
E/E' RATIO: 11.7
ECHO EF ESTIMATED: 65 %
EJECTION FRACTION: 65 %
ERAP: 5 MMHG
GFR SERPL CREATININE-BSD FRML MDRD: 83 ML/MIN/1.73M*2
GLUCOSE BLDC GLUCOMTR-SCNC: 115 MG/DL (ref 70–105)
GLUCOSE BLDC GLUCOMTR-SCNC: 131 MG/DL (ref 70–105)
GLUCOSE BLDC GLUCOMTR-SCNC: 133 MG/DL (ref 70–105)
GLUCOSE SERPL-MCNC: 124 MG/DL (ref 70–105)
INTERVENTRICULAR SEPTUM: 1 CM (ref 0.6–1.1)
IVC PROX: 1.64 CM
LA AREA A4C SYSTOLE: 54.7 CM3
LEFT ATRIUM SIZE: 2.99 CM
LEFT ATRIUM VOLUME INDEX: 32 ML/M2
LEFT ATRIUM VOLUME: 65.8 CM3
LEFT INTERNAL DIMENSION IN SYSTOLE: 2.3 CM (ref 2.1–4)
LEFT VENTRICLE DIASTOLIC VOLUME: 89 CM3
LEFT VENTRICLE SYSTOLIC VOLUME: 28 CM3
LEFT VENTRICULAR INTERNAL DIMENSION IN DIASTOLE: 3.8 CM (ref 3.5–6)
LVAD-AP2: 27.3 CM2
MV DEC SLOPE: 1730 MM/S2
MV DT: 182 MS
MV MEAN GRADIENT: 1.7 MMHG
MV PEAK A VEL: 119 CM/S
MV PEAK E VEL: 61.6 CM/S
MV PEAK GRADIENT: 6 MMHG
MV STENOSIS PRESSURE HALF TIME: 99 MS
MV VALVE AREA P 1/2 METHOD: 2.22 CM2
MV VMAX: 119 CM/S
MVA (VTI): 5.16 CM2
POSTERIOR WALL: 1.1 CM (ref 0.6–1.1)
POTASSIUM SERPL-SCNC: 3.7 MMOL/L (ref 3.5–5.1)
PULM VEIN S/D RATIO: 1.5
PV PEAK D VEL: 46 CM/S
PV PEAK GRADIENT: 10.37 MMHG
PV PEAK S VEL: 67 CM/S
PV VMAX: 1.61 M/S
RA AREA: 16.3 CM2
RH CV ECHO AV VALVE AREA VEL: 3.5 CM2
RH CV ECHO AV VALVE AREA VTI: 4.2 CM2
RH LVOT PEAK VELOCITY REST: 1.1 M/S
RIGHT VENTRICULAR INTERNAL DIMENSION IN DIASTOLE: 2.8 CM
RV AP4 BASE: 4 CM
S': 22.8 CM/S
SODIUM SERPL-SCNC: 136 MMOL/L (ref 135–145)
TDI: 5.3 CM/S
TDILATERAL: 4.7 CM/S
TR MAX PG: 23.81 MMHG
TRICUSPID ANNULAR PLANE SYSTOLIC EXCURSION: 2.2 CM
TRICUSPID VALVE PEAK REGURGITATION VELOCITY: 2.44 M/S
Z-SCORE OF LEFT VENTRICULAR DIMENSION IN END DIASTOLE: -5.05
Z-SCORE OF LEFT VENTRICULAR DIMENSION IN END SYSTOLE: -4.07

## 2023-03-01 PROCEDURE — 94660 CPAP INITIATION&MGMT: CPT

## 2023-03-01 PROCEDURE — 2500000200 HC RX 250 WO HCPCS: Performed by: STUDENT IN AN ORGANIZED HEALTH CARE EDUCATION/TRAINING PROGRAM

## 2023-03-01 PROCEDURE — 36415 COLL VENOUS BLD VENIPUNCTURE: CPT | Performed by: STUDENT IN AN ORGANIZED HEALTH CARE EDUCATION/TRAINING PROGRAM

## 2023-03-01 PROCEDURE — 6370000100 HC RX 637 (ALT 250 FOR IP): Performed by: INTERNAL MEDICINE

## 2023-03-01 PROCEDURE — 1110000100 HC ROOM PRIVATE W TELE

## 2023-03-01 PROCEDURE — 93306 TTE W/DOPPLER COMPLETE: CPT | Mod: 26 | Performed by: STUDENT IN AN ORGANIZED HEALTH CARE EDUCATION/TRAINING PROGRAM

## 2023-03-01 PROCEDURE — 82947 ASSAY GLUCOSE BLOOD QUANT: CPT | Mod: QW

## 2023-03-01 PROCEDURE — 97530 THERAPEUTIC ACTIVITIES: CPT | Mod: GO

## 2023-03-01 PROCEDURE — 51798 US URINE CAPACITY MEASURE: CPT

## 2023-03-01 PROCEDURE — 6360000200 HC RX 636 W HCPCS (ALT 250 FOR IP): Performed by: INTERNAL MEDICINE

## 2023-03-01 PROCEDURE — 6370000100 HC RX 637 (ALT 250 FOR IP): Performed by: STUDENT IN AN ORGANIZED HEALTH CARE EDUCATION/TRAINING PROGRAM

## 2023-03-01 PROCEDURE — 93306 TTE W/DOPPLER COMPLETE: CPT

## 2023-03-01 PROCEDURE — 2550000100 HC RX 255: Performed by: STUDENT IN AN ORGANIZED HEALTH CARE EDUCATION/TRAINING PROGRAM

## 2023-03-01 PROCEDURE — 80048 BASIC METABOLIC PNL TOTAL CA: CPT | Performed by: STUDENT IN AN ORGANIZED HEALTH CARE EDUCATION/TRAINING PROGRAM

## 2023-03-01 PROCEDURE — G1004 CDSM NDSC: HCPCS

## 2023-03-01 PROCEDURE — 99232 SBSQ HOSP IP/OBS MODERATE 35: CPT | Performed by: STUDENT IN AN ORGANIZED HEALTH CARE EDUCATION/TRAINING PROGRAM

## 2023-03-01 RX ORDER — FLUOXETINE HYDROCHLORIDE 20 MG/1
20 CAPSULE ORAL DAILY
Status: DISCONTINUED | OUTPATIENT
Start: 2023-03-02 | End: 2023-03-02 | Stop reason: HOSPADM

## 2023-03-01 RX ORDER — SODIUM CHLORIDE 9 MG/ML
20 INJECTION INTRAMUSCULAR; INTRAVENOUS; SUBCUTANEOUS ONCE
Status: COMPLETED | OUTPATIENT
Start: 2023-03-01 | End: 2023-03-01

## 2023-03-01 RX ORDER — ASPIRIN 81 MG/1
81 TABLET ORAL DAILY
Status: DISCONTINUED | OUTPATIENT
Start: 2023-03-01 | End: 2023-03-02 | Stop reason: HOSPADM

## 2023-03-01 RX ORDER — IOPAMIDOL 755 MG/ML
100 INJECTION, SOLUTION INTRAVASCULAR ONCE
Status: COMPLETED | OUTPATIENT
Start: 2023-03-01 | End: 2023-03-01

## 2023-03-01 RX ORDER — LORAZEPAM 0.5 MG/1
0.5 TABLET ORAL DAILY PRN
Status: DISCONTINUED | OUTPATIENT
Start: 2023-03-01 | End: 2023-03-02 | Stop reason: HOSPADM

## 2023-03-01 RX ADMIN — CYANOCOBALAMIN TAB 1000 MCG 1000 MCG: 1000 TAB at 08:09

## 2023-03-01 RX ADMIN — IOPAMIDOL 100 ML: 755 INJECTION, SOLUTION INTRAVENOUS at 09:15

## 2023-03-01 RX ADMIN — ASPIRIN 81 MG: 81 TABLET ORAL at 08:09

## 2023-03-01 RX ADMIN — LANSOPRAZOLE 30 MG: 30 CAPSULE, DELAYED RELEASE ORAL at 16:10

## 2023-03-01 RX ADMIN — IRBESARTAN 300 MG: 150 TABLET ORAL at 08:08

## 2023-03-01 RX ADMIN — FLUOXETINE 10 MG: 10 CAPSULE ORAL at 08:09

## 2023-03-01 RX ADMIN — SPIRONOLACTONE 25 MG: 25 TABLET ORAL at 08:09

## 2023-03-01 RX ADMIN — AMLODIPINE BESYLATE 5 MG: 5 TABLET ORAL at 08:09

## 2023-03-01 RX ADMIN — SODIUM CHLORIDE 20 ML: 9 INJECTION, SOLUTION INTRAMUSCULAR; INTRAVENOUS; SUBCUTANEOUS at 09:57

## 2023-03-01 RX ADMIN — LORAZEPAM 0.5 MG: 0.5 TABLET ORAL at 16:10

## 2023-03-01 RX ADMIN — SULFAMETHOXAZOLE AND TRIMETHOPRIM 1 TABLET: 800; 160 TABLET ORAL at 20:22

## 2023-03-01 RX ADMIN — FINASTERIDE 5 MG: 5 TABLET, FILM COATED ORAL at 08:09

## 2023-03-01 RX ADMIN — TAMSULOSIN HYDROCHLORIDE 0.4 MG: 0.4 CAPSULE ORAL at 16:54

## 2023-03-01 RX ADMIN — ATORVASTATIN CALCIUM 10 MG: 10 TABLET, FILM COATED ORAL at 08:09

## 2023-03-01 RX ADMIN — SULFAMETHOXAZOLE AND TRIMETHOPRIM 1 TABLET: 800; 160 TABLET ORAL at 08:09

## 2023-03-01 RX ADMIN — ENOXAPARIN SODIUM 40 MG: 100 INJECTION SUBCUTANEOUS at 13:27

## 2023-03-01 NOTE — INTERDISCIPLINARY/THERAPY
RN jose manuel'juan for therapy services. Patient sleeping in bed upon arrival. Patient states that he has been up all morning and is tired. He requests that PT let him rest despite encouragement from PT. PT followed up with RN who stated patient was on and off the floor all morning for imaging and worked with OT as well. Will continue to follow and attempt skilled PT as able.

## 2023-03-01 NOTE — INTERDISCIPLINARY/THERAPY
[address] [facility] [address]  [phone]      Occupational Therapy Daily Treatment Note       Date of Service: 03/01/23    Patient Name: Joseph Wong  Referring Provider: BOB GIRON  Visit Number: 3   Start of Care Date: 02/27/23  Certification Date: 04/13/23       Subjective   RN ok'd OT services. Pt found just getting back from CT with transport assisting Pt with squat pivot transfer to bed. Pt agreeable to therapy. Gait belt donned for mobility. Pt left in bed with all needs met, chair alarm on, and call light within reach.     Co-treat:   No    Family/Caregiver Present:  No    FALL RISK Interventions:fall bundle     Pain:   Pain Assessment Scale  Pain Scale: 0-10  0-10 Pain Score: 0 - No pain       Objective   Precautions: Other Precautions: weakness, fall risk    Cognition:  Within functional limits     Bed Mobility:  Short sit to supine with stand by assist, head of bed elevated    Transfers:   Stand pivot from transport chair to bed with contact guard assist-Min A x 2; without any device. OT assisted transport aid in transfer.     Pt completes 10 sit to stands from edge of bed with front wheel walker with Min A for initial boost, all others patient completed with contact guard assist. Stands and retracts shoulders and works on upright posture in standing.     Ambulation:   3m x 2 with front wheel walker with contact guard assist. Increased forward flexed posture as patient fatigues. Requires cues for thoracic extension and to stay within walker base of support.     Balance: contact guard assist for static standing with bilateral upper extremity support on front wheeled walker     ADLs:  Grooming: Setup A for supplies. Pt washed face and brushed teeth while seated edge of bed. Declined attempting to stand to complete some of tasks even when encouraged chair would be placed behind him to sit when he needs a rest break.         Activity Tolerance:  vital signs stable on room air.     Time  Calculation  Start Time: 0853  Stop Time: 0908  Time Calculation (min): 15 min     Therapeutic Interventions (Time Spent in Minutes)  Therapeutic Activity Dynamic: 15                  ASSESSMENT:  Patient was able to progress further today with functional transfers and mobility. Still requires 1 assist to do so safely. Completes sit to stands with min assist to contact guard assist. Ambulated 6m total today. Is not yet able to standing to complete grooming and hygiene tasks at the sink due to lower extremity weakness. Will continue to progress through skilled OT.     PLAN FOR NEXT TREATMENT SESSION:  Recommendation  Recommendation: Continue Skilled Therapy    Plan Comment: 1A; standing tolerance, functional mobility, ADLs    Thank you for allowing us to share in the care of this patient. If you have any questions, recommendations, or further concerns regarding this patient, please feel free to contact me at [address] [place] [phone]  Dept: [phone]  .  Signed by: SHAUNA Bagley  3/1/2023  11:45 AM

## 2023-03-01 NOTE — PLAN OF CARE
Problem: Safety Adult - Fall  Goal: Free from fall injury  Description: INTERVENTIONS:    Inpatient - Please reference Cares/Safety Flowsheet under Oneil Fall Risk for interventions.  Pediatrics - Please reference Peds Daily Cares/Safety Flowsheet under [facility] Pediatric Fall Assessment Fall Bundle for interventions  LD/OB - Please reference OB Shift Screening Flowsheet under OB Fall Risk for interventions.  Outcome: Progressing     Problem: Potential for Compromised Skin Integrity  Goal: Skin Integrity is Maintained or Improved  Description: INTERVENTIONS:  1. Assess and monitor skin integrity  2. Collaborate with interdisciplinary team and initiate plans and interventions as needed  3. Alternate a full bath with partial baths for elderly   4. Monitor patient's hygiene practices   5. Collaborate with wound, ostomy, and continence nurse  Outcome: Progressing  Goal: Nutritional status is improving  Description: INTERVENTIONS:  1. Monitor and assess patient for malnutrition (ex- brittle hair, bruises, dry skin, pale skin and conjunctiva, muscle wasting, smooth red tongue, and disorientation)  2. Monitor patient's weight and dietary intake as ordered or per policy  3. Determine patient's food preferences and provide high-protein, high-caloric foods as appropriate  4. Assist patient with eating   5. Allow adequate time for meals   6. Encourage patient to take dietary supplement as ordered   7. Collaborate with dietitian  8. Include patient/family/caregiver in decisions related to nutrition  Outcome: Progressing  Goal: MOBILITY IS MAINTAINED OR IMPROVED  Description: INTERVENTIONS  1. Collaborate with interdisciplinary team and initiate plan and interventions as ordered (PT/OT)  2. Encourage ambulation  3. Up to chair for meals  4. Monitor for signs of deconditioning  Outcome: Progressing     Problem: Urinary Incontinence  Goal: Perineal skin integrity is maintained or improved  Description: INTERVENTIONS:  1. Assess  genitourinary system, perineal skin, labs (urinalysis), and history of incontinence to include past management, aggravating, and alleviating factors  2. Collaborate with interdisciplinary team including wound, ostomy, and continence nurse and initiate plans and interventions as needed  4. Consider urine containment device  5. Apply skin protectant   6. Develop skin care regimen  7. Provide privacy when changing patient's incontinence device to maintain their dignity  Outcome: Progressing     Problem: Pain - Adult  Goal: Verbalizes/displays adequate comfort level or baseline comfort level  Description: INTERVENTIONS:  1. Encourage patient to monitor pain and request interventions  2. Assess pain using the appropriate pain scale  3. Administer analgesics based on type and severity of pain and evaluate response  4. Educate/Implement non-pharmacological measures as appropriate and evaluate response  5. Consider cultural, developmental and social influences on pain and pain management  6. Notify Provider if interventions unsuccessful or patient reports new pain  Outcome: Progressing     Problem: Infection - Adult  Goal: Absence of infection during hospitalization  Description: INTERVENTIONS:  1. Assess and monitor for signs and symptoms of infection  2. Monitor lab/diagnostic results  3. Monitor all insertion sites/wounds/incisions  4. Monitor secretions for changes in amount and color  5. Administer medications as ordered  6. Educate and encourage patient and family to use good hand hygiene technique  7. Identify and educate in appropriate isolation precautions for identified infection/condition  Outcome: Progressing     Problem: Safety Adult  Goal: Patient will remain safe during hospitalization  Description: INTERVENTIONS    1. Assess patient for fall risk and implement interventions if needed  2. Use safe transport techniques  3. Assess patient using the appropriate Harvey skin assessment scale  4. Assess patient for  risk of aspiration  5. Assess patient for risk of elopement  6. Assess patient for risk of suicide  Outcome: Progressing     Problem: Genitourinary - Adult  Goal: Absence of urinary retention  Description: INTERVENTIONS:  1. Assess patient’s ability to void and empty bladder.  2. Monitor intake/output and perform bladder scan if indicated.  3. Place urinary catheter per order and initiate and maintain CAUTI bundle.  4. Administer medications to alleviate retention as needed.  5. Discuss catheterization for long term situations as appropriate.    Outcome: Progressing  Goal: Urinary catheter remains patent  Description: INTERVENTIONS:  1. Assess patency of urinary catheter.  2. Irrigate catheter per order if indicated and notify provider if unable to irrigate.  3. Assess need for a larger catheter size or a 3-way catheter for continuous bladder irrigation.  Outcome: Progressing  Goal: Absence of Urinary Infection  Description: INTERVENTIONS:  1. Assess urinary status for burning, urgency, frequency, and pain  2. Assess urine for color, cloudiness, odor, and amount  3. Monitor intake/output  4. Monitor lab values  5. Obtain urinalysis as ordered  6. Assess for neurological status changes    Outcome: Progressing     Problem: Metabolic/Fluid and Electrolytes - Adult  Goal: Electrolytes maintained within normal limits  Description: INTERVENTIONS:  1. Monitor labs and assess patient for signs and symptoms of electrolyte imbalances  2. Administer electrolyte replacement as ordered  3. Monitor response to electrolyte replacements, including repeat lab results as appropriate  4. Fluid restriction as ordered  5. Instruct patient on fluid and nutrition restrictions as appropriate  Outcome: Progressing  Goal: Maintain Optimal Renal Function and Hemodynamic Stability  Description: INTERVENTIONS:  1. Monitor labs and assess for signs and symptoms of volume excess or deficit  2. Monitor intake, output and patient weight  3. Monitor  urine specific gravity, serum osmolarity and serum sodium as indicated or ordered  4. Monitor response to interventions for patient's volume status, including labs, urine output, blood pressure (other measures as available)  5. Encourage oral intake as appropriate  6. Instruct patient on fluid and nutrition restrictions as appropriate  Outcome: Progressing  Goal: Glucose maintained within prescribed range  Description: INTERVENTIONS:  1. Monitor blood glucose as ordered  2. Assess for signs and symptoms of hyperglycemia and hypoglycemia  3. Administer ordered medications to maintain glucose within target range  4. Assess barriers to adequate nutritional intake and initiate nutrition consult as needed  5. Assess baseline knowledge and provide education as indicated  6. Monitor exercise as may reduce the requirements for insulin  Outcome: Progressing     Problem: Skin/Tissue Integrity - Adult  Goal: Skin integrity remains intact  Description: INTERVENTIONS  1. Assess and document risk factors for pressure ulcer development  2. Assess and document skin integrity  3. Monitor for areas of redness and/or skin breakdown  4. Initiate pressure ulcer prevention measures as indicated  Outcome: Progressing  Goal: Oral mucous membranes remain intact  Description: INTERVENTIONS  1. Assess oral mucosa and hygiene practices  2. Implement preventative oral hygiene regimen  3. Implement oral medicated treatments as ordered  Outcome: Progressing     Problem: Musculoskeletal - Adult  Goal: Return mobility to safest level of function  Description: INTERVENTIONS:  1. Assess patient stability and activity tolerance for standing, transferring and ambulating w/ or w/o assistive devices  2. Assist with transfers and ambulation using safe practices  3. Ensure adequate protection for wounds/incisions during mobilization  4. Obtain PT/OT and other consults as needed  5. Apply Continuous Passive Motion per order to increase flexion toward  goal  6. Instruct patient/family in ordered activity level  Outcome: Progressing  Goal: Maintain proper alignment of affected body part  Description: INTERVENTIONS:  1. Support and protect limb and body alignment per provider order  2. Instruct and reinforce with patient and family use of appropriate assistive device and precautions (e.g. spinal or hip dislocation precautions)  Outcome: Progressing

## 2023-03-01 NOTE — NURSING END OF SHIFT
Nursing End of Shift Summary:    Patient: Joseph Wong  MRN: [id number]  : 10/20/1934, Age: 88 y.o.    Location: [address] [place] [address]    Nursing Goals  Clinical Goals for the Shift: maintain comfort and safety    Narrative Summary of Progress Toward Clinical Goals:  Safety and comfort maintained during shift.  Patient worked with PT/OT and sat in chair today.  Bladder scanned results 131 and 136.  MRI of brain completed.  Telesitter was removed from room.  Bed alarm in use.  Patient was confused during most of shift.  Barriers to Goals/Nursing Concerns:  No    New Patient or Family Concerns/Issues:  No    Shift Summary:   Significant Events & Communications to Providers (last 12 hours)       Last 5 Values    No documentation.                 Oxygen Usage (last 12 hours)       Last 5 Values    No documentation.                 Mobility (last 12 hours)       Last 5 Values       Row Name 23 0710 23 0800 23 1200 23 1600 23 1701       Mobility    Activity -- Other (Comment)  sitting up in bed eating breakfast Chair;Bathroom privileges -- Chair position in bed    Length of Time in Chair (min) -- 0 240 -- --    Distance Ambulated (feet) -- 0 Feet 4 Feet -- 5 Feet    Distance Ambulated (Meters Calculated) -- 0 Meters 1.22 Meters -- 1.52 Meters    Patient Position Supine -- Up in chair Up in chair --    Turning Turns self Pillow support Turns self;Pillow support -- --    Distance Ambulated (Meters Calculated)(Do Not Use) -- 0 Feet 1.22 Feet -- 1.52 Feet      Row Name 23 1741                   Mobility    Activity Chair;Stand at bedside        Length of Time in Chair (min) 60        Distance Ambulated (feet) 2 Feet        Distance Ambulated (Meters Calculated) 0.61 Meters        Turning Pillow support;Turns self        Distance Ambulated (Meters Calculated)(Do Not Use) 0.61 Feet                      Urethral Catheter       Active Urethral Catheter       None                  Active Lines        Active Central venous catheter / Peripherally inserted central catheter / Implantable Port / Hemodialysis catheter / Midline Catheter       None                  Infusing Medications   Medication Dose Last Rate     PRN Medications   Medication Dose Last Admin    LORazepam  1 mg      hydrALAZINE  10 mg 10 mg at 02/28/23 0030    sodium chloride 0.9% (NS)  25-50 mL Stopped at 02/27/23 2135    sodium chloride  1 spray      sodium chloride-aloe vera  1 application      dextrose 50 % in water (D50W)  15-30 mL      dextrose  15.2 g      glucagon  1 mg      Or    glucagon  1 mg      acetaminophen  650 mg 650 mg at 02/28/23 0324    oxyCODONE  5-10 mg      bisacodyL  10 mg      magnesium hydroxide  30 mL      ondansetron  4 mg      Or    ondansetron  4 mg

## 2023-03-01 NOTE — NURSING END OF SHIFT
Nursing End of Shift Summary:    Patient: Joseph Wong  MRN: [id number]  : 10/20/1934, Age: 88 y.o.    Location: [address] [place] [address]    Nursing Goals  Clinical Goals for the Shift: Ensure safety and comfort within the shift    Narrative Summary of Progress Toward Clinical Goals:  Patient slept most of the shift, CPAP is on all throughout the night. Did not require straight catheterization last scan around 6 am shows 176 ml and afterwards he urinated via urinal. Safety and comfort provided within the shift.    Barriers to Goals/Nursing Concerns:  Yes - SNF / Rehab prior to home with wife    New Patient or Family Concerns/Issues:  No    Shift Summary:   Significant Events & Communications to Providers (last 12 hours)       Last 5 Values    No documentation.                 Oxygen Usage (last 12 hours)       Last 5 Values    No documentation.                 Mobility (last 12 hours)       Last 5 Values       Row Name 23 1600 23 1701 23 1741 23 1927 23 2341       Mobility    Activity -- Chair position in bed Chair;Stand at bedside -- --    Length of Time in Chair (min) -- -- 60 -- --    Distance Ambulated (feet) -- 5 Feet 2 Feet -- --    Distance Ambulated (Meters Calculated) -- 1.52 Meters 0.61 Meters -- --    Patient Position Up in chair -- -- Supine Supine    Turning -- -- Pillow support;Turns self -- --    Distance Ambulated (Meters Calculated)(Do Not Use) -- 1.52 Feet 0.61 Feet -- --                  Urethral Catheter       Active Urethral Catheter       None                  Active Lines       Active Central venous catheter / Peripherally inserted central catheter / Implantable Port / Hemodialysis catheter / Midline Catheter       None                  Infusing Medications   Medication Dose Last Rate     PRN Medications   Medication Dose Last Admin    LORazepam  1 mg      hydrALAZINE  10 mg 10 mg at 23 0030    sodium chloride 0.9% (NS)  25-50 mL Stopped at 235    sodium  chloride  1 spray      sodium chloride-aloe vera  1 application      dextrose 50 % in water (D50W)  15-30 mL      dextrose  15.2 g      glucagon  1 mg      Or    glucagon  1 mg      acetaminophen  650 mg 650 mg at 02/28/23 0324    oxyCODONE  5-10 mg      bisacodyL  10 mg      magnesium hydroxide  30 mL      ondansetron  4 mg      Or    ondansetron  4 mg

## 2023-03-01 NOTE — PLAN OF CARE
Problem: Safety Adult - Fall  Goal: Free from fall injury  Description: INTERVENTIONS:    Inpatient - Please reference Cares/Safety Flowsheet under [facility] Fall Risk for interventions.  Pediatrics - Please reference Peds Daily Cares/Safety Flowsheet under [facility] Pediatric Fall Assessment Fall Bundle for interventions  LD/OB - Please reference OB Shift Screening Flowsheet under OB Fall Risk for interventions.  Outcome: Progressing     Problem: Knowledge Deficit  Goal: Patient/family/caregiver demonstrates understanding of disease process, treatment plan, medications, and discharge instructions  Description: INTERVENTIONS:   1. Complete learning assessment and assess knowledge base  2. Provide teaching at level of understanding   3. Provide teaching via preferred learning methods  Outcome: Progressing     Problem: Potential for Compromised Skin Integrity  Goal: Skin Integrity is Maintained or Improved  Description: INTERVENTIONS:  1. Assess and monitor skin integrity  2. Collaborate with interdisciplinary team and initiate plans and interventions as needed  3. Alternate a full bath with partial baths for elderly   4. Monitor patient's hygiene practices   5. Collaborate with wound, ostomy, and continence nurse  Outcome: Progressing  Goal: Nutritional status is improving  Description: INTERVENTIONS:  1. Monitor and assess patient for malnutrition (ex- brittle hair, bruises, dry skin, pale skin and conjunctiva, muscle wasting, smooth red tongue, and disorientation)  2. Monitor patient's weight and dietary intake as ordered or per policy  3. Determine patient's food preferences and provide high-protein, high-caloric foods as appropriate  4. Assist patient with eating   5. Allow adequate time for meals   6. Encourage patient to take dietary supplement as ordered   7. Collaborate with dietitian  8. Include patient/family/caregiver in decisions related to nutrition  Outcome: Progressing  Goal: MOBILITY IS MAINTAINED OR  IMPROVED  Description: INTERVENTIONS  1. Collaborate with interdisciplinary team and initiate plan and interventions as ordered (PT/OT)  2. Encourage ambulation  3. Up to chair for meals  4. Monitor for signs of deconditioning  Outcome: Progressing     Problem: Urinary Incontinence  Goal: Perineal skin integrity is maintained or improved  Description: INTERVENTIONS:  1. Assess genitourinary system, perineal skin, labs (urinalysis), and history of incontinence to include past management, aggravating, and alleviating factors  2. Collaborate with interdisciplinary team including wound, ostomy, and continence nurse and initiate plans and interventions as needed  4. Consider urine containment device  5. Apply skin protectant   6. Develop skin care regimen  7. Provide privacy when changing patient's incontinence device to maintain their dignity  Outcome: Progressing     Problem: Pain - Adult  Goal: Verbalizes/displays adequate comfort level or baseline comfort level  Description: INTERVENTIONS:  1. Encourage patient to monitor pain and request interventions  2. Assess pain using the appropriate pain scale  3. Administer analgesics based on type and severity of pain and evaluate response  4. Educate/Implement non-pharmacological measures as appropriate and evaluate response  5. Consider cultural, developmental and social influences on pain and pain management  6. Notify Provider if interventions unsuccessful or patient reports new pain  Outcome: Progressing     Problem: Infection - Adult  Goal: Absence of infection during hospitalization  Description: INTERVENTIONS:  1. Assess and monitor for signs and symptoms of infection  2. Monitor lab/diagnostic results  3. Monitor all insertion sites/wounds/incisions  4. Monitor secretions for changes in amount and color  5. Administer medications as ordered  6. Educate and encourage patient and family to use good hand hygiene technique  7. Identify and educate in appropriate  isolation precautions for identified infection/condition  Outcome: Progressing     Problem: Safety Adult  Goal: Patient will remain safe during hospitalization  Description: INTERVENTIONS    1. Assess patient for fall risk and implement interventions if needed  2. Use safe transport techniques  3. Assess patient using the appropriate Harvey skin assessment scale  4. Assess patient for risk of aspiration  5. Assess patient for risk of elopement  6. Assess patient for risk of suicide  Outcome: Progressing     Problem: Daily Care  Goal: Daily care needs are met  Description: INTERVENTIONS:   1. Assess and monitor skin integrity  2. Identify patients at risk for skin breakdown on admission and per policy  3. Assess and monitor ability to perform self care and identify potential discharge needs  4. Assess skin integrity/risk for skin breakdown  5. Assist patient with activities of daily living as needed  6. Encourage independent activity per ability   7. Provide mouth care   8. Include patient/family/caregiver in decisions related to daily care   Outcome: Progressing     Problem: Discharge Barriers  Goal: Patient's discharge needs are met  Description: INTERVENTIONS:  1. Assess patient for self-management skills  2. Encourage participation in management  3. Identify potential discharge barriers on admission and throughout hospital stay  4. Involve patient/family/caregiver in discharge planning process  5. Collaborate with case management/ for discharge needs  Outcome: Progressing     Problem: Genitourinary - Adult  Goal: Absence of urinary retention  Description: INTERVENTIONS:  1. Assess patient’s ability to void and empty bladder.  2. Monitor intake/output and perform bladder scan if indicated.  3. Place urinary catheter per order and initiate and maintain CAUTI bundle.  4. Administer medications to alleviate retention as needed.  5. Discuss catheterization for long term situations as  appropriate.    Outcome: Progressing  Goal: Urinary catheter remains patent  Description: INTERVENTIONS:  1. Assess patency of urinary catheter.  2. Irrigate catheter per order if indicated and notify provider if unable to irrigate.  3. Assess need for a larger catheter size or a 3-way catheter for continuous bladder irrigation.  Outcome: Progressing  Goal: Absence of Urinary Infection  Description: INTERVENTIONS:  1. Assess urinary status for burning, urgency, frequency, and pain  2. Assess urine for color, cloudiness, odor, and amount  3. Monitor intake/output  4. Monitor lab values  5. Obtain urinalysis as ordered  6. Assess for neurological status changes    Outcome: Progressing     Problem: Metabolic/Fluid and Electrolytes - Adult  Goal: Electrolytes maintained within normal limits  Description: INTERVENTIONS:  1. Monitor labs and assess patient for signs and symptoms of electrolyte imbalances  2. Administer electrolyte replacement as ordered  3. Monitor response to electrolyte replacements, including repeat lab results as appropriate  4. Fluid restriction as ordered  5. Instruct patient on fluid and nutrition restrictions as appropriate  Outcome: Progressing  Goal: Maintain Optimal Renal Function and Hemodynamic Stability  Description: INTERVENTIONS:  1. Monitor labs and assess for signs and symptoms of volume excess or deficit  2. Monitor intake, output and patient weight  3. Monitor urine specific gravity, serum osmolarity and serum sodium as indicated or ordered  4. Monitor response to interventions for patient's volume status, including labs, urine output, blood pressure (other measures as available)  5. Encourage oral intake as appropriate  6. Instruct patient on fluid and nutrition restrictions as appropriate  Outcome: Progressing  Goal: Glucose maintained within prescribed range  Description: INTERVENTIONS:  1. Monitor blood glucose as ordered  2. Assess for signs and symptoms of hyperglycemia and  hypoglycemia  3. Administer ordered medications to maintain glucose within target range  4. Assess barriers to adequate nutritional intake and initiate nutrition consult as needed  5. Assess baseline knowledge and provide education as indicated  6. Monitor exercise as may reduce the requirements for insulin  Outcome: Progressing     Problem: Skin/Tissue Integrity - Adult  Goal: Skin integrity remains intact  Description: INTERVENTIONS  1. Assess and document risk factors for pressure ulcer development  2. Assess and document skin integrity  3. Monitor for areas of redness and/or skin breakdown  4. Initiate pressure ulcer prevention measures as indicated  Outcome: Progressing  Goal: Oral mucous membranes remain intact  Description: INTERVENTIONS  1. Assess oral mucosa and hygiene practices  2. Implement preventative oral hygiene regimen  3. Implement oral medicated treatments as ordered  Outcome: Progressing     Problem: Musculoskeletal - Adult  Goal: Return mobility to safest level of function  Description: INTERVENTIONS:  1. Assess patient stability and activity tolerance for standing, transferring and ambulating w/ or w/o assistive devices  2. Assist with transfers and ambulation using safe practices  3. Ensure adequate protection for wounds/incisions during mobilization  4. Obtain PT/OT and other consults as needed  5. Apply Continuous Passive Motion per order to increase flexion toward goal  6. Instruct patient/family in ordered activity level  Outcome: Progressing  Goal: Maintain proper alignment of affected body part  Description: INTERVENTIONS:  1. Support and protect limb and body alignment per provider order  2. Instruct and reinforce with patient and family use of appropriate assistive device and precautions (e.g. spinal or hip dislocation precautions)  Outcome: Progressing     Problem: PT Misc  Goal: LTG - Misc 1  Description: Patient will be independent with functional transfers with Front Wheeled Walker  support   Outcome: Progressing  Goal: LTG - Misc 2  Description: Patient will ambulate 30 meters with Front Wheeled Walker And standby assistance of one   Outcome: Progressing  Goal: LTG - Misc 3  Description: Patient will negotiate 1 stair with contact guard assistance of one   Outcome: Progressing     Problem: Grooming  Goal: LTG - Patient will complete daily grooming tasks  Description: In standing with stand by assist   Outcome: Progressing     Problem: Toileting  Goal: LTG - Patient will complete daily toileting tasks  Description: With stand by assist including mobility to/from bathroom   Outcome: Progressing     Problem: Transfers  Goal: LTG-Patient will complete all functional transfers  Description: With stand by assist   Outcome: Progressing

## 2023-03-01 NOTE — INTERDISCIPLINARY/THERAPY
Case Management Progress Note    Phone # [phone]    Reason for Admission: UTI    Plan of Care:  O2 RA. CPAP at night. PO bactrim. MRI brain yesterday showed small acute to subacute infarcts. Echo and CT of head and carotid neck ordered today. PT/OT following- asst x1 for transfers. Unable to assess ambulation due to unsteadiness. Avasys was removed yesterday.     Discussed Discharge Needs/Topics: Pt accepted to [facility] [place], [facility], and [facility]. CM spoke with pt's wife Alessandra over the phone. Alessandra would like to proceed with [facility] [place]. CM called Philly at [facility] [place] and left a message to notify. Will likely be stable for d/c tomorrow.     Disposition: SNF

## 2023-03-01 NOTE — PROGRESS NOTES
[address] [place] [address]  Daily Progress Note  Patient name: Joseph Wong  MRN: [id number]   LOS: 4 days     Subjective   Joseph is doing well this morning.  Wife is present at bedside.  Denies any major complaints or concern.  He is accepted at [facility] and will be transported tomorrow.      Objective   Vitals:Temp:  [36.8 °C (98.2 °F)-37.6 °C (99.6 °F)] 37.1 °C (98.8 °F)  Heart Rate:  [77-96] 87  Resp:  [16-18] 18  SpO2:  [90 %-95 %] 90 %  BP: (132-159)/(77-98) 144/79  SpO2/FiO2 Ratio Using Approximate FiO2 (%):  [470] 470  Estimated P/F Ratio Using Approximate FiO2 (%):  [399] 399  Physical Exam:   General: NAD  HEENT: No pallor, cyanosis or jaundice. EOMI, PERRLA, moist mucus membranes  Neck: Supple nontender, no JVD  Lungs: Clear to auscultation bilaterally  Heart: Regular rate and rhythm with normal S1 and S2  Abdomen: Soft nontender. normoactive bowel sounds.   Extremities: No clubbing, cyanosis, or edema.  Neurologic: Alert and oriented x 2.  CN II through XII intact.  Nonfocal   Psych: normal mood  Musculoskeletal: no joint tenderness    Results from last 4 days   Lab Units 02/27/23  0650 02/26/23  0746 02/25/23  2151   WBC AUTO 10*3/uL 6.7 10.2* 11.9*   HEMOGLOBIN g/dL 12.4* 12.3* 13.0*   HEMATOCRIT % 36.0* 35.8* 36.9*   PLATELETS AUTO 10*3/uL 281 282 313       Results from last 4 days   Lab Units 03/01/23  0950 02/28/23  1214 02/27/23  0650 02/26/23  0746 02/25/23  2257 02/25/23  2151   SODIUM mmol/L 136 133* 138   < >  --  138   POTASSIUM MMOL/L 3.7 3.4* 3.2*   < >  --  3.0*   CHLORIDE mmol/L 103 99 102   < >  --  98   CO2 mmol/L 26 24 30   < >  --  30   BUN mg/dL 16 15 12   < >  --  17   CREATININE mg/dL 0.89 0.86 0.71   < >  --  0.93   CALCIUM mg/dL 8.4* 8.7 8.1*   < >  --  8.8   MAGNESIUM mg/dL  --   --   --   --   --  1.8   PHOSPHORUS mg/dL  --   --   --   --  2.8  --    ALBUMIN g/dL  --   --   --   --   --  3.6   TOTAL PROTEIN g/dL  --   --   --   --    --  6.4   BILIRUBIN TOTAL mg/dL  --   --   --   --   --  0.78   ALK PHOS U/L  --   --   --   --   --  106   ALT U/L  --   --   --   --   --  14   AST U/L  --   --   --   --   --  16   GLUCOSE mg/dL 124* 225* 129*   < >  --  125*    < > = values in this interval not displayed.           Assessment/Plan   Acute metabolic encephalopathy d/t UTI and encephalopthy d/t stroke. superimposed on dementia  Acute to subacute stroke- Mri shows infarcts in left precentral gyrus  Suspect confusion secondary to stroke and UTI  CT head neg for acute process. Moderate chronic ischemic changes and moderate atrophy  MRI of the brain shows acute to subacute infarcts in left precentral gyrus  2D echo shows normal EF, no WMA or PFO.  CTA head and neck negative for significant vascular or carotid occlusion  Started on aspirin 81 mg.  Continue with atorvastatin  Plan to discharge with the Holter monitor to evaluate for possible A-fib    Urinary tract infection  Suspect recurrent UTI d/t urinary retention from BPH   UA suggestive of UTI.  Received ceftriaxone x3 days.  Transitioned to Bactrim to finish 7 days of antibiotic     Hypokalemia  Improved after starting spironolactone.  Hold off on scheduled supplement     Dizziness   Orthostatic hypotension-improved   Recurrent falls  Orthostat hypotension improved.  Likely related to meds.  Doxazosin  Minimize use of meds that cause orthostasis   C/w PTOT    CHRONIC PROBLEMS  CAD  Essential hypertension-on amlodipine, irbesartan  Hypercholesterolemia-c/w statin  Non-insulin-dependent diabetes mellitus-c/w low-dose SSI  Obstructive sleep apnea-c/w CPAP  BPH-c/w Flomax  Depression-c/w Prozac  GERD  History of shingles    DVT prophylaxis: Lovenox 40 mg subcutaneous daily  CODE STATUS: DNR/DNI    Electronically signed by: Sola Lainez MD  3/1/2023  2:23 PM

## 2023-03-02 VITALS
SYSTOLIC BLOOD PRESSURE: 136 MMHG | BODY MASS INDEX: 26.79 KG/M2 | RESPIRATION RATE: 18 BRPM | TEMPERATURE: 98.4 F | WEIGHT: 191.36 LBS | DIASTOLIC BLOOD PRESSURE: 98 MMHG | HEIGHT: 71 IN | OXYGEN SATURATION: 91 % | HEART RATE: 82 BPM

## 2023-03-02 LAB
GLUCOSE BLDC GLUCOMTR-SCNC: 132 MG/DL (ref 70–105)
GLUCOSE BLDC GLUCOMTR-SCNC: 147 MG/DL (ref 70–105)
POTASSIUM SERPL-SCNC: 3.9 MMOL/L (ref 3.5–5.1)

## 2023-03-02 PROCEDURE — 97530 THERAPEUTIC ACTIVITIES: CPT

## 2023-03-02 PROCEDURE — 6370000100 HC RX 637 (ALT 250 FOR IP): Performed by: STUDENT IN AN ORGANIZED HEALTH CARE EDUCATION/TRAINING PROGRAM

## 2023-03-02 PROCEDURE — 99239 HOSP IP/OBS DSCHRG MGMT >30: CPT | Performed by: STUDENT IN AN ORGANIZED HEALTH CARE EDUCATION/TRAINING PROGRAM

## 2023-03-02 PROCEDURE — 36415 COLL VENOUS BLD VENIPUNCTURE: CPT | Performed by: STUDENT IN AN ORGANIZED HEALTH CARE EDUCATION/TRAINING PROGRAM

## 2023-03-02 PROCEDURE — 84132 ASSAY OF SERUM POTASSIUM: CPT | Performed by: STUDENT IN AN ORGANIZED HEALTH CARE EDUCATION/TRAINING PROGRAM

## 2023-03-02 PROCEDURE — 97535 SELF CARE MNGMENT TRAINING: CPT

## 2023-03-02 PROCEDURE — 51798 US URINE CAPACITY MEASURE: CPT

## 2023-03-02 PROCEDURE — 82947 ASSAY GLUCOSE BLOOD QUANT: CPT | Mod: QW

## 2023-03-02 PROCEDURE — 6370000100 HC RX 637 (ALT 250 FOR IP): Performed by: INTERNAL MEDICINE

## 2023-03-02 PROCEDURE — 94660 CPAP INITIATION&MGMT: CPT

## 2023-03-02 RX ORDER — ASPIRIN 81 MG/1
81 TABLET ORAL DAILY
Qty: 30 TABLET | Refills: 0 | Status: SHIPPED | OUTPATIENT
Start: 2023-03-03 | End: 2023-12-04 | Stop reason: WASHOUT

## 2023-03-02 RX ORDER — SULFAMETHOXAZOLE AND TRIMETHOPRIM 800; 160 MG/1; MG/1
1 TABLET ORAL EVERY 12 HOURS SCHEDULED
Qty: 4 TABLET | Refills: 0 | Status: SHIPPED | OUTPATIENT
Start: 2023-03-02 | End: 2023-03-04

## 2023-03-02 RX ORDER — TAMSULOSIN HYDROCHLORIDE 0.4 MG/1
0.4 CAPSULE ORAL
Qty: 30 CAPSULE | Refills: 0 | Status: SHIPPED | OUTPATIENT
Start: 2023-03-02 | End: 2023-08-07 | Stop reason: ALTCHOICE

## 2023-03-02 RX ORDER — FINASTERIDE 5 MG/1
5 TABLET, FILM COATED ORAL DAILY
Qty: 30 TABLET | Refills: 0 | Status: SHIPPED | OUTPATIENT
Start: 2023-03-03 | End: 2023-12-04 | Stop reason: WASHOUT

## 2023-03-02 RX ORDER — IRBESARTAN 300 MG/1
300 TABLET ORAL DAILY
Qty: 30 TABLET | Refills: 0 | Status: SHIPPED | OUTPATIENT
Start: 2023-03-03 | End: 2023-08-07 | Stop reason: CLARIF

## 2023-03-02 RX ORDER — ATORVASTATIN CALCIUM 10 MG/1
20 TABLET, FILM COATED ORAL DAILY
Qty: 60 TABLET | Refills: 0 | Status: SHIPPED | OUTPATIENT
Start: 2023-03-02 | End: 2023-12-04 | Stop reason: WASHOUT

## 2023-03-02 RX ORDER — SPIRONOLACTONE 25 MG/1
25 TABLET ORAL DAILY
Qty: 30 TABLET | Refills: 0 | Status: SHIPPED | OUTPATIENT
Start: 2023-03-03 | End: 2023-12-04 | Stop reason: WASHOUT

## 2023-03-02 RX ADMIN — AMLODIPINE BESYLATE 5 MG: 5 TABLET ORAL at 08:01

## 2023-03-02 RX ADMIN — ATORVASTATIN CALCIUM 10 MG: 10 TABLET, FILM COATED ORAL at 08:02

## 2023-03-02 RX ADMIN — SPIRONOLACTONE 25 MG: 25 TABLET ORAL at 08:01

## 2023-03-02 RX ADMIN — FINASTERIDE 5 MG: 5 TABLET, FILM COATED ORAL at 08:01

## 2023-03-02 RX ADMIN — IRBESARTAN 300 MG: 150 TABLET ORAL at 08:01

## 2023-03-02 RX ADMIN — ASPIRIN 81 MG: 81 TABLET ORAL at 08:01

## 2023-03-02 RX ADMIN — FLUOXETINE 20 MG: 20 CAPSULE ORAL at 08:01

## 2023-03-02 RX ADMIN — CYANOCOBALAMIN TAB 1000 MCG 1000 MCG: 1000 TAB at 08:01

## 2023-03-02 RX ADMIN — SULFAMETHOXAZOLE AND TRIMETHOPRIM 1 TABLET: 800; 160 TABLET ORAL at 08:01

## 2023-03-02 NOTE — NURSING END OF SHIFT
Nursing End of Shift Summary:    Patient: Joseph Wong  MRN: [id number]  : 10/20/1934, Age: 88 y.o.    Location: [address] [place] [address]    Nursing Goals  Clinical Goals for the Shift: maintain comfort and safety    Narrative Summary of Progress Toward Clinical Goals:  Comfort and safety maintained during shift.  Patient confused during shift.  Anxious.  Medication order received from MD but patient remains agitated.  Bladder scan results are 12ml and 86 at noon and 1800.  Patient worked with OT today but refused PT.  MRI, CT, and echo completed during shift today.    Barriers to Goals/Nursing Concerns:  No    New Patient or Family Concerns/Issues:  No    Shift Summary:   Significant Events & Communications to Providers (last 12 hours)       Last 5 Values    No documentation.                 Oxygen Usage (last 12 hours)       Last 5 Values    No documentation.                 Mobility (last 12 hours)       Last 5 Values       Row Name 23 0730 23 0800 23 1154 23 1205 23 1554       Mobility    Activity -- Stand at bedside;Chair Other (Comment)  sitting up in bed, eating -- --    Length of Time in Chair (min) -- 10 0 -- --    Distance Ambulated (feet) -- 2 Feet 0 Feet -- --    Distance Ambulated (Meters Calculated) -- 0.61 Meters 0 Meters -- --    Patient Position Supine -- -- Supine Supine    Turning Turns self Pillow support Pillow support Turns self Turns self    Distance Ambulated (Meters Calculated)(Do Not Use) -- 0.61 Feet 0 Feet -- --      Row Name 23 1800                   Mobility    Activity Sleeping        Length of Time in Chair (min) 0        Distance Ambulated (feet) 0 Feet        Distance Ambulated (Meters Calculated) 0 Meters        Turning Turns self        Distance Ambulated (Meters Calculated)(Do Not Use) 0 Feet                      Urethral Catheter       Active Urethral Catheter       None                  Active Lines       Active Central venous catheter / Peripherally  inserted central catheter / Implantable Port / Hemodialysis catheter / Midline Catheter       None                  Infusing Medications   Medication Dose Last Rate     PRN Medications   Medication Dose Last Admin    LORazepam  0.5 mg 0.5 mg at 03/01/23 1610    LORazepam  1 mg      hydrALAZINE  10 mg 10 mg at 02/28/23 0030    sodium chloride 0.9% (NS)  25-50 mL Stopped at 02/27/23 2135    sodium chloride  1 spray      sodium chloride-aloe vera  1 application      dextrose 50 % in water (D50W)  15-30 mL      dextrose  15.2 g      glucagon  1 mg      Or    glucagon  1 mg      acetaminophen  650 mg 650 mg at 02/28/23 0324    oxyCODONE  5-10 mg      bisacodyL  10 mg      magnesium hydroxide  30 mL      ondansetron  4 mg      Or    ondansetron  4 mg

## 2023-03-02 NOTE — NURSING END OF SHIFT
Nursing End of Shift Summary:    Patient: Joseph Wong  MRN: [id number]  : 10/20/1934, Age: 88 y.o.    Location: [address] [place] [address]    Nursing Goals  Clinical Goals for the Shift: Keep patient safe and comfortable    Narrative Summary of Progress Toward Clinical Goals:  Patient is still weak but able to walk with a walker and will be going to an assisted living for care    Barriers to Goals/Nursing Concerns:  No    New Patient or Family Concerns/Issues:  No    Shift Summary:   Significant Events & Communications to Providers (last 12 hours)       Last 5 Values    No documentation.                 Oxygen Usage (last 12 hours)       Last 5 Values    No documentation.                 Mobility (last 12 hours)       Last 5 Values       Row Name 23 0744 23 0800 23 1200 23 1211          Mobility    Activity -- Bedrest Bedrest;Ambulate in room --     Patient Position Supine -- -- Supine     Turning -- Turns self Turns self --                   Urethral Catheter       Active Urethral Catheter       None                  Active Lines       Active Central venous catheter / Peripherally inserted central catheter / Implantable Port / Hemodialysis catheter / Midline Catheter       None                  Infusing Medications   Medication Dose Last Rate     PRN Medications   Medication Dose Last Admin    LORazepam  0.5 mg 0.5 mg at 23 1610    LORazepam  1 mg      hydrALAZINE  10 mg 10 mg at 23 0030    sodium chloride 0.9% (NS)  25-50 mL Stopped at 23 2135    sodium chloride  1 spray      sodium chloride-aloe vera  1 application      dextrose 50 % in water (D50W)  15-30 mL      dextrose  15.2 g      glucagon  1 mg      Or    glucagon  1 mg      acetaminophen  650 mg 650 mg at 23 0324    oxyCODONE  5-10 mg      bisacodyL  10 mg      magnesium hydroxide  30 mL      ondansetron  4 mg      Or    ondansetron  4 mg

## 2023-03-02 NOTE — INTERDISCIPLINARY/THERAPY
Case Management Facility Discharge Note    Phone # [phone]    Accepting Facility: [facility]   Accepting Physician:   Johny  Report number provided to: Dr. Lainez  PASSR:  GERMAN  Pharmacy: [facility], [place] - [address]    Orders faxed: Epic access  Transportation: [facility]  RN given number for report: Yes  Support System Notified: Yes

## 2023-03-02 NOTE — NURSING END OF SHIFT
Nursing End of Shift Summary:    Patient: Joseph Wong  MRN: [id number]  : 10/20/1934, Age: 88 y.o.    Location: [address] [place] [address]    Nursing Goals  Clinical Goals for the Shift: Pain management, ensure safety, promote rest and sleep, Q4 neuros, Q6 bladder scan, tele    Narrative Summary of Progress Toward Clinical Goals:  No complain of pain; kept safe; able to rest and sleep; no changes on baseline neuros; able to void using the urinal, did bladder scan every 6 hours (79 ml and 105ml); on tele- Sinus rhythm; CPAP at night- tolerated well    Barriers to Goals/Nursing Concerns:  Yes - placement    New Patient or Family Concerns/Issues:  No    Shift Summary:   Significant Events & Communications to Providers (last 12 hours)       Last 5 Values    No documentation.                 Oxygen Usage (last 12 hours)       Last 5 Values    No documentation.                 Mobility (last 12 hours)       Last 5 Values       Row Name 23 1800 23 1922 23 2329 23 0337          Mobility    Activity Sleeping -- -- --     Length of Time in Chair (min) 0 -- -- --     Distance Ambulated (feet) 0 Feet -- -- --     Distance Ambulated (Meters Calculated) 0 Meters -- -- --     Patient Position -- Supine Supine Supine     Turning Turns self -- -- --     Distance Ambulated (Meters Calculated)(Do Not Use) 0 Feet -- -- --                   Urethral Catheter       Active Urethral Catheter       None                  Active Lines       Active Central venous catheter / Peripherally inserted central catheter / Implantable Port / Hemodialysis catheter / Midline Catheter       None                  Infusing Medications   Medication Dose Last Rate     PRN Medications   Medication Dose Last Admin    LORazepam  0.5 mg 0.5 mg at 23 1610    LORazepam  1 mg      hydrALAZINE  10 mg 10 mg at 23 0030    sodium chloride 0.9% (NS)  25-50 mL Stopped at 235    sodium chloride  1 spray      sodium chloride-aloe vera  1  application      dextrose 50 % in water (D50W)  15-30 mL      dextrose  15.2 g      glucagon  1 mg      Or    glucagon  1 mg      acetaminophen  650 mg 650 mg at 02/28/23 0324    oxyCODONE  5-10 mg      bisacodyL  10 mg      magnesium hydroxide  30 mL      ondansetron  4 mg      Or    ondansetron  4 mg

## 2023-03-02 NOTE — DISCHARGE SUMMARY
[address] [place] [address]  Discharge Summary    Patient name: Joseph Wong  MRN: [id number]    Admission Date: 2/25/2023       Discharge Date: 3/2/2023    Admitting Provider: Virgen Sheffield MD  Discharge Provider: Sola Lainez MD  Primary Care Physician at Discharge: Malena Mcclain [phone]     Discharge Disposition  03 - Medicare Certified Skilled Nursing Facility  Code Status at Discharge: DNR    Outpatient Follow-Up  Future Appointments   Date Time Provider Department Center   4/21/2023 10:00 AM Jessica Elise CNP [id number] [facility]   9/5/2023 10:30 AM Acacia Tapia NP [facility] NR [facility]       Discharge Diagnosis  Acute metabolic encephalopathy d/t UTI and encephalopthy d/t stroke. superimposed on dementia  Acute to subacute stroke- Mri shows infarcts in left precentral gyrus  Urinary tract infection  Recurrent falls  Hypokalemia   Orthostatic hypotension   Hypertension   Hypercholesterolemia   BPH   Depression   GERD         Discharge medication list        START taking these medications        Instructions   aspirin 81 mg EC tablet  Start taking on: March 3, 2023   Take 1 tablet (81 mg total) by mouth daily     finasteride 5 mg tablet  Commonly known as: PROSCAR  Start taking on: March 3, 2023   Take 1 tablet (5 mg total) by mouth daily     irbesartan 300 mg tablet  Commonly known as: AVAPRO  Start taking on: March 3, 2023   Take 1 tablet (300 mg total) by mouth daily     spironolactone 25 mg tablet  Commonly known as: ALDACTONE  Start taking on: March 3, 2023   Take 1 tablet (25 mg total) by mouth daily     sulfamethoxazole-trimethoprim 800-160 mg per tablet  Commonly known as: BACTRIM DS,SEPTRA DS   Take 1 tablet by mouth every 12 (twelve) hours for 4 doses     tamsulosin 0.4 mg capsule  Commonly known as: FLOMAX   Take 1 capsule (0.4 mg total) by mouth daily with dinner            CHANGE how you take these medications        Instructions   atorvastatin 10 mg tablet  Commonly known as:  LIPITOR  What changed: how much to take   Take 2 tablets (20 mg total) by mouth daily            CONTINUE taking these medications        Instructions   amLODIPine 5 mg tablet  Commonly known as: NORVASC      FLUoxetine 20 mg capsule  Commonly known as: PROzac      KRILL OIL ORAL      metFORMIN 500 mg tablet  Commonly known as: GLUCOPHAGE      metoprolol succinate XL 25 mg 24 hr tablet  Commonly known as: TOPROL-XL             STOP taking these medications      doxazosin 8 mg tablet  Commonly known as: CARDURA     ibuprofen 200 mg tablet  Commonly known as: ADVIL,MOTRIN     irbesartan-hydroCHLOROthiazide 300-12.5 mg per tablet  Commonly known as: AVALIDE     potassium chloride 10 mEq CR tablet               Where to Get Your Medications        These medications were sent to [facility] [place], [place] - [address], [place] [address]      Phone: [phone]   aspirin 81 mg EC tablet  atorvastatin 10 mg tablet  finasteride 5 mg tablet  irbesartan 300 mg tablet  spironolactone 25 mg tablet  sulfamethoxazole-trimethoprim 800-160 mg per tablet  tamsulosin 0.4 mg capsule         Hospital Course  Joseph Wong is an 88 y.o. male with medical history significant for CAD, essential hypertension, hypercholesterolemia, non-insulin-dependent diabetes mellitus, history of metabolic syndrome, macular degeneration, BPH, depression, GERD, shingles, vertigo and hiatal hernia who had presented to the emergency room via EMS following a fall.he reported associated dizziness and weakness.  Reports 2 falls in the past week .of note, patient was recently admitted to our facility on 1/14/2023 to 1/16/2022 and treated for UTI with delirium.   In ER, VS /93, HR 71, saturating 94% 2 L oxygen.  Temperature 36.9 C, respiration 20  UA was suggestive of UTI.  He was started on ceftriaxone.  Urine culture growing E. coli.  Labs were suggestive of hypokalemia with potassium of 3, which was  "repleted.  He was noted to be on hydrochlorothiazide and potassium chloride supplement 30 twice daily daily.  HCTZ was discontinued and switched to spironolactone to help with persistent hypokalemia, with improvement.  MRI of the brain showed acute to subacute infarcts in the left precentral gyrus.  He was initiated on aspirin 81 mg and continued on atorvastatin.  2D echo shows normal EF, no WMA or PFO.  CTA head and neck negative for significant vascular or carotid occlusion.  He worked with PT OT and noted to have significant deconditioning and weakness and is going to [facility] for SNF.  He is medically stable to be transferred and will follow-up with PCP as an outpatient.  He will continue Bactrim to finish course of antibiotic for UTI.    Physical Exam at Discharge   Discharge Condition: Stable  /95 (BP Location: Left arm, Patient Position: Supine, Cuff Size: Regular Adult)   Pulse 75   Temp 37.1 °C (98.8 °F) (Temporal)   Resp 20   Ht 1.803 m (5' 11\")   Wt 86.8 kg (191 lb 5.8 oz)   SpO2 91%   BMI 26.69 kg/m²   Weight: 86.8 kg (191 lb 5.8 oz)  HEENT:  PERRLA. Extra ocular movements are intact. Oral pharynx clear without erythema of exudate.   Neck:  Supple. Nontender.  No palpable lymphadenopathy, JVD, or goiter.  Lungs: Clear to auscultation bilaterally without adventitious sounds appreciated  Heart: Regular rate and rhythm with normal S1, S2  Abdomen: Soft.  Nontender.  Normal active bowel sounds.  No hepatosplenomegaly or other masses appreciated.  Extremities: No clubbing, cyanosis, or edema.  Skin: Normal skin turgor  Neurologic: Alert oriented ×3.  CN II through XII intact.  5 out of 5 motor strength throughout upper and lower extremities.      Time spent coordinating discharge: 40 mins    Electronically signed by: Sola Lainez MD  3/2/2023  11:45 AM     "

## 2023-03-02 NOTE — PLAN OF CARE
Problem: Safety Adult - Fall  Goal: Free from fall injury  Description: INTERVENTIONS:    Inpatient - Please reference Cares/Safety Flowsheet under [facility] Fall Risk for interventions.  Pediatrics - Please reference Peds Daily Cares/Safety Flowsheet under [facility] Pediatric Fall Assessment Fall Bundle for interventions  LD/OB - Please reference OB Shift Screening Flowsheet under OB Fall Risk for interventions.  Outcome: Progressing     Problem: Knowledge Deficit  Goal: Patient/family/caregiver demonstrates understanding of disease process, treatment plan, medications, and discharge instructions  Description: INTERVENTIONS:   1. Complete learning assessment and assess knowledge base  2. Provide teaching at level of understanding   3. Provide teaching via preferred learning methods  Outcome: Progressing     Problem: Potential for Compromised Skin Integrity  Goal: Nutritional status is improving  Description: INTERVENTIONS:  1. Monitor and assess patient for malnutrition (ex- brittle hair, bruises, dry skin, pale skin and conjunctiva, muscle wasting, smooth red tongue, and disorientation)  2. Monitor patient's weight and dietary intake as ordered or per policy  3. Determine patient's food preferences and provide high-protein, high-caloric foods as appropriate  4. Assist patient with eating   5. Allow adequate time for meals   6. Encourage patient to take dietary supplement as ordered   7. Collaborate with dietitian  8. Include patient/family/caregiver in decisions related to nutrition  Outcome: Progressing  Goal: MOBILITY IS MAINTAINED OR IMPROVED  Description: INTERVENTIONS  1. Collaborate with interdisciplinary team and initiate plan and interventions as ordered (PT/OT)  2. Encourage ambulation  3. Up to chair for meals  4. Monitor for signs of deconditioning  Outcome: Progressing     Problem: Urinary Incontinence  Goal: Perineal skin integrity is maintained or improved  Description: INTERVENTIONS:  1. Assess  genitourinary system, perineal skin, labs (urinalysis), and history of incontinence to include past management, aggravating, and alleviating factors  2. Collaborate with interdisciplinary team including wound, ostomy, and continence nurse and initiate plans and interventions as needed  4. Consider urine containment device  5. Apply skin protectant   6. Develop skin care regimen  7. Provide privacy when changing patient's incontinence device to maintain their dignity  Outcome: Progressing     Problem: Pain - Adult  Goal: Verbalizes/displays adequate comfort level or baseline comfort level  Description: INTERVENTIONS:  1. Encourage patient to monitor pain and request interventions  2. Assess pain using the appropriate pain scale  3. Administer analgesics based on type and severity of pain and evaluate response  4. Educate/Implement non-pharmacological measures as appropriate and evaluate response  5. Consider cultural, developmental and social influences on pain and pain management  6. Notify Provider if interventions unsuccessful or patient reports new pain  Outcome: Progressing     Problem: Infection - Adult  Goal: Absence of infection during hospitalization  Description: INTERVENTIONS:  1. Assess and monitor for signs and symptoms of infection  2. Monitor lab/diagnostic results  3. Monitor all insertion sites/wounds/incisions  4. Monitor secretions for changes in amount and color  5. Administer medications as ordered  6. Educate and encourage patient and family to use good hand hygiene technique  7. Identify and educate in appropriate isolation precautions for identified infection/condition  Outcome: Progressing     Problem: Safety Adult  Goal: Patient will remain safe during hospitalization  Description: INTERVENTIONS    1. Assess patient for fall risk and implement interventions if needed  2. Use safe transport techniques  3. Assess patient using the appropriate Harvey skin assessment scale  4. Assess patient for  risk of aspiration  5. Assess patient for risk of elopement  6. Assess patient for risk of suicide  Outcome: Progressing     Problem: Daily Care  Goal: Daily care needs are met  Description: INTERVENTIONS:   1. Assess and monitor skin integrity  2. Identify patients at risk for skin breakdown on admission and per policy  3. Assess and monitor ability to perform self care and identify potential discharge needs  4. Assess skin integrity/risk for skin breakdown  5. Assist patient with activities of daily living as needed  6. Encourage independent activity per ability   7. Provide mouth care   8. Include patient/family/caregiver in decisions related to daily care   Outcome: Progressing     Problem: Discharge Barriers  Goal: Patient's discharge needs are met  Description: INTERVENTIONS:  1. Assess patient for self-management skills  2. Encourage participation in management  3. Identify potential discharge barriers on admission and throughout hospital stay  4. Involve patient/family/caregiver in discharge planning process  5. Collaborate with case management/ for discharge needs  Outcome: Progressing     Problem: Genitourinary - Adult  Goal: Absence of urinary retention  Description: INTERVENTIONS:  1. Assess patient’s ability to void and empty bladder.  2. Monitor intake/output and perform bladder scan if indicated.  3. Place urinary catheter per order and initiate and maintain CAUTI bundle.  4. Administer medications to alleviate retention as needed.  5. Discuss catheterization for long term situations as appropriate.    Outcome: Progressing     Problem: Metabolic/Fluid and Electrolytes - Adult  Goal: Glucose maintained within prescribed range  Description: INTERVENTIONS:  1. Monitor blood glucose as ordered  2. Assess for signs and symptoms of hyperglycemia and hypoglycemia  3. Administer ordered medications to maintain glucose within target range  4. Assess barriers to adequate nutritional intake and  initiate nutrition consult as needed  5. Assess baseline knowledge and provide education as indicated  6. Monitor exercise as may reduce the requirements for insulin  Outcome: Progressing

## 2023-03-02 NOTE — DISCHARGE INSTRUCTIONS
Activity As Tolerated.  Speech Therapy to eval and treat.    You are found to have a small stroke on MRI.  Recommended to continue with baby aspirin along with atorvastatin.  Continue with Bactrim to finish course of antibiotic for UTI.      You were noted to have orthostatic hypotension.  Stop doxazosin and start Flomax as it has less association with orthostasis    You were found to have low potassium level.  Stop hydrochlorothiazide and start spironolactone to help with low potassium

## 2023-03-02 NOTE — INTERDISCIPLINARY/THERAPY
[facility] [address]  [phone]      Occupational Therapy Daily Treatment Note       Date of Service: 03/02/23    Patient Name: Joseph Wong  Referring Provider: BOB GIRON  Visit Number: 4   Start of Care Date: 02/27/23  Certification Date: 04/13/23       Subjective     Other Precautions: Confusion, fall      Therapy Treatment Diagnosis: status post fall with UTI    RN ok'd OT services. Pt found supine in bed and agreeable to therapy. Gait belt donned for mobility. Pt left in chair w/ call light in reach, needs met, and alarm on.    Co-treat: No    Family/Caregiver Present: no            FALL RISK Interventions:Fall risk bundle        Pain:   Pain Assessment Scale  Pain Scale: 0-10  0-10 Pain Score: 0 - No pain         Objective     Cognition: Patient lethargic, oriented to self only. Required Moderate / Maximum Verbal cues for sequencing/safety and problem solving during functional tasks.     Bed mobility: Minimum assistance at trunk with head of bed elevated utilizing bed rail.     Transfers: Sit to/from stand Minimum assistance utilizing front wheeled walker with Verbal cues for hand placement. X2 sets.     Ambulation: Ambulated 1 meter using front wheeled walker Moderate assistance- posterior lean and Loss of balance     ADLs:   Dressing: Total assistance to don bilateral socks and new brief from Edge of Bed   Grooming: Brushed teeth Setup assistance from chair position  Toileting: N/A  Bathing: N/A  Feeding: Setup assistance     Balance: Tolerated Edge of Bed 8 minutes with Minimum / Moderate assistance at trunk to maintain midline- posterior lean. From standing, continued to have posterior lean and Loss of balance despite adaptive device and Verbal cues     Activity tolerance: Vital signs stable room air                  Ambulation 2  Ambulation Distance (meters): 1 meters                           Time Calculation  Start Time: 0846  Stop Time: 0913  Time Calculation (min): 27  min     Therapeutic Interventions (Time Spent in Minutes)  Therapeutic Activity Dynamic: 12  ADL Selfcare Home Managment: 15                  ASSESSMENT:    Patient continues to demonstrate with decreased activity tolerance, functional strength and impaired balance with decreased cognition, limiting overall independence in Activities of daily living's and mobility. Would continue to benefit from skilled Occupational Therapy to maximize independence     PLAN FOR NEXT TREATMENT SESSION:      Recommendation  Recommendation: Continue skilled therapy, placement    Plan Comment: Standing balance/transfers, grooming edge of bed    Thank you for allowing us to share in the care of this patient. If you have any questions, recommendations, or further concerns regarding this patient, please feel free to contact me at [address] [place] [id number]  Dept: [phone]  .  Signed by: SHAUNA Rene  3/2/2023  10:53 AM

## 2023-03-03 ENCOUNTER — LAB REQUISITION (OUTPATIENT)
Dept: FAMILY MEDICINE | Facility: HOSPITAL | Age: 88
End: 2023-03-03

## 2023-03-03 DIAGNOSIS — R76.12 (QFT) QUANTIFERON-TB TEST REACTION WITHOUT ACTIVE TUBERCULOSIS: ICD-10-CM

## 2023-03-03 LAB
BACTERIA BLD CULT: NORMAL
BACTERIA BLD CULT: NORMAL

## 2023-03-03 PROCEDURE — 86481 TB AG RESPONSE T-CELL SUSP: CPT | Performed by: FAMILY MEDICINE

## 2023-03-03 PROCEDURE — 36415 COLL VENOUS BLD VENIPUNCTURE: CPT | Performed by: FAMILY MEDICINE

## 2023-03-04 LAB
M TB TUBERC IFN-G BLD QL: NEGATIVE
TBGOLD MITO: 10 IU/ML
TBGOLD NIL: 0 IU/ML
TBGOLD TB1-NIL: 0.01 IU/ML
TBGOLD TB2-NIL: 0 IU/ML

## 2023-03-22 ENCOUNTER — LAB REQUISITION (OUTPATIENT)
Dept: FAMILY MEDICINE | Facility: HOSPITAL | Age: 88
End: 2023-03-22

## 2023-03-22 DIAGNOSIS — N39.0 URINARY TRACT INFECTION WITHOUT HEMATURIA, SITE UNSPECIFIED: ICD-10-CM

## 2023-03-22 LAB
BACTERIA #/AREA URNS HPF: NORMAL /HPF
BILIRUB UR QL: NEGATIVE
CLARITY UR: CLEAR
COLOR UR: NORMAL
GLUCOSE UR QL: NEGATIVE MG/DL
HGB UR QL: NEGATIVE
KETONES UR-MCNC: NEGATIVE MG/DL
LEUKOCYTE ESTERASE UR QL STRIP: NEGATIVE
NITRITE UR QL: NEGATIVE
PH UR: 6.5 PH
PROT UR STRIP-MCNC: NEGATIVE MG/DL
RBC #/AREA URNS HPF: NORMAL /HPF
SP GR UR: 1.02 (ref 1–1.03)
SQUAMOUS #/AREA URNS HPF: NEGATIVE /HPF
UROBILINOGEN UR-MCNC: NORMAL MG/DL
WBC #/AREA URNS HPF: NORMAL /HPF
WBC CLUMPS #/AREA URNS HPF: NORMAL /HPF

## 2023-03-22 PROCEDURE — 81001 URINALYSIS AUTO W/SCOPE: CPT | Performed by: FAMILY MEDICINE

## 2023-04-13 ENCOUNTER — TELEPHONE (OUTPATIENT)
Dept: UROLOGY | Facility: CLINIC | Age: 88
End: 2023-04-13
Payer: MEDICARE

## 2023-04-13 NOTE — TELEPHONE ENCOUNTER
Called the patient and [facility] records and left a messsage to see if he has completed a PSA. If they call back please try my ext.

## 2023-04-13 NOTE — TELEPHONE ENCOUNTER
Caller would like to request: ORDER    Patient: Joseph Wong    Caller Name (Last and first, relation/role): chriss [facility]    Callback Number: [phone]    Best call Availability: as soon as possible    Order Type: PSA order    Additional Info: need order for PSA lab faxed to  [phone] so they are able to draw there at the facility    I advised caller of a callback by 48 hours.

## 2023-04-13 NOTE — TELEPHONE ENCOUNTER
This is a documented call only.    Patient: Joseph Wong    Caller Name (Last and first, relation/role): Alessandra Wong    Additional Info: returning call to [facility]    Transferred to: Yan[facility]

## 2023-04-13 NOTE — TELEPHONE ENCOUNTER
Spoke to the patients wife and let them know we will get an order in the system and faxed over earlier today.    At 242 I faxed it over to [facility] of [facility] and called [facility] to let them know.

## 2023-04-13 NOTE — TELEPHONE ENCOUNTER
This is a documented call only.    Patient: Joseph Wong    Caller Name (Last and first, relation/role): Alessandra Wong    Additional Info: needing to speak with [facility]    Transferred to: Yanet

## 2023-04-14 ENCOUNTER — LAB REQUISITION (OUTPATIENT)
Dept: FAMILY MEDICINE | Facility: HOSPITAL | Age: 88
End: 2023-04-14

## 2023-04-14 DIAGNOSIS — R39.12 WEAK URINARY STREAM: ICD-10-CM

## 2023-04-14 DIAGNOSIS — N39.0 URINARY TRACT INFECTION WITHOUT HEMATURIA, SITE UNSPECIFIED: ICD-10-CM

## 2023-04-14 DIAGNOSIS — R97.20 ELEVATED PROSTATE SPECIFIC ANTIGEN (PSA): Primary | ICD-10-CM

## 2023-04-14 LAB
BACTERIA #/AREA URNS HPF: NORMAL /HPF
BILIRUB UR QL: NEGATIVE
CLARITY UR: CLEAR
COLOR UR: YELLOW
GLUCOSE UR QL: 30 MG/DL
HGB UR QL: NEGATIVE
KETONES UR-MCNC: NEGATIVE MG/DL
LEUKOCYTE ESTERASE UR QL STRIP: NEGATIVE
NITRITE UR QL: NEGATIVE
PH UR: 6 PH
PROT UR STRIP-MCNC: 10 MG/DL
RBC #/AREA URNS HPF: NORMAL /HPF
SP GR UR: 1.02 (ref 1–1.03)
SQUAMOUS #/AREA URNS HPF: NORMAL /HPF
UROBILINOGEN UR-MCNC: NORMAL MG/DL
WBC #/AREA URNS HPF: NORMAL /HPF
WBC CLUMPS #/AREA URNS HPF: NORMAL /HPF

## 2023-04-14 PROCEDURE — 81001 URINALYSIS AUTO W/SCOPE: CPT | Performed by: FAMILY MEDICINE

## 2023-04-17 ENCOUNTER — LAB REQUISITION (OUTPATIENT)
Dept: FAMILY MEDICINE | Facility: HOSPITAL | Age: 88
End: 2023-04-17

## 2023-04-17 DIAGNOSIS — N30.00 ACUTE CYSTITIS WITHOUT HEMATURIA: ICD-10-CM

## 2023-04-17 LAB — PSA SERPL-MCNC: 2.79 NG/ML (ref 0–4)

## 2023-04-17 PROCEDURE — 84153 ASSAY OF PSA TOTAL: CPT | Performed by: FAMILY MEDICINE

## 2023-04-17 PROCEDURE — 36415 COLL VENOUS BLD VENIPUNCTURE: CPT | Performed by: FAMILY MEDICINE

## 2023-04-21 ENCOUNTER — OFFICE VISIT (OUTPATIENT)
Dept: UROLOGY | Facility: CLINIC | Age: 88
End: 2023-04-21
Payer: MEDICARE

## 2023-04-21 VITALS
DIASTOLIC BLOOD PRESSURE: 81 MMHG | HEART RATE: 68 BPM | SYSTOLIC BLOOD PRESSURE: 126 MMHG | OXYGEN SATURATION: 95 % | WEIGHT: 191.36 LBS | HEIGHT: 71 IN | BODY MASS INDEX: 26.79 KG/M2 | TEMPERATURE: 97.9 F

## 2023-04-21 DIAGNOSIS — R39.12 WEAK URINARY STREAM: ICD-10-CM

## 2023-04-21 DIAGNOSIS — R97.20 ELEVATED PROSTATE SPECIFIC ANTIGEN (PSA): Primary | ICD-10-CM

## 2023-04-21 PROCEDURE — G0463 HOSPITAL OUTPT CLINIC VISIT: HCPCS | Performed by: NURSE PRACTITIONER

## 2023-04-21 PROCEDURE — 99213 OFFICE O/P EST LOW 20 MIN: CPT | Performed by: NURSE PRACTITIONER

## 2023-04-21 ASSESSMENT — PAIN SCALES - GENERAL: PAINLEVEL: 0-NO PAIN

## 2023-04-21 NOTE — PROGRESS NOTES
HPI:  Joseph is an 88-year-old male seen in follow-up regarding BPH and elevated PSA.    He was last seen in the urology clinic on 8/5/2022 where his AUA-SS was 22 and his bladder scan PVR was 74 cc.  He was continued on doxazosin per his request.  PSA had decreased to 7.26.    Most recent PSA was 2.79, 5.58 when corrected for finasteride.   He was recently hospitalized and found to have a UTI. He was started on finasteride in March 2023.  He was discharged home from rehab today.  He is having some improvement in his voiding symptoms.      His AUA-SS is 14.    Medications:  Current Outpatient Medications   Medication Sig Dispense Refill    atorvastatin (LIPITOR) 10 mg tablet Take 2 tablets (20 mg total) by mouth daily 60 tablet 0    aspirin 81 mg EC tablet Take 1 tablet (81 mg total) by mouth daily 30 tablet 0    finasteride (PROSCAR) 5 mg tablet Take 1 tablet (5 mg total) by mouth daily 30 tablet 0    irbesartan (AVAPRO) 300 mg tablet Take 1 tablet (300 mg total) by mouth daily 30 tablet 0    spironolactone (ALDACTONE) 25 mg tablet Take 1 tablet (25 mg total) by mouth daily 30 tablet 0    tamsulosin (FLOMAX) 0.4 mg capsule Take 1 capsule (0.4 mg total) by mouth daily with dinner 30 capsule 0    FLUoxetine (PROzac) 20 mg capsule Take 1 capsule (20 mg total) by mouth daily      metFORMIN (GLUCOPHAGE) 500 mg tablet Take 1 tablet (500 mg total) by mouth 2 (two) times a day with meals      metoprolol succinate XL (TOPROL-XL) 25 mg 24 hr tablet Take 1 tablet (25 mg total) by mouth daily      KRILL OIL ORAL Take 1 tab/cap by mouth daily      amLODIPine (NORVASC) 5 mg tablet Take 1 tablet (5 mg total) by mouth daily 90 0     No current facility-administered medications for this visit.       Allergies:  No Known Allergies    Past Medical History:  Past Medical History:   Diagnosis Date    CAD (coronary artery disease)     Elevated cholesterol     GERD (gastroesophageal reflux disease)     Hiatal hernia     History of  "shingles     Hypertension     Macular degeneration     Metabolic syndrome     Vertigo        Past Surgical History:  Past Surgical History:   Procedure Laterality Date    OTHER SURGICAL HISTORY Left 2016    quad tendon repair    QUADRICEPS REPAIR Left 2015    TONSILLECTOMY      TOTAL ANKLE ARTHROPLASTY Left     TOTAL HIP ARTHROPLASTY Right     TOTAL HIP ARTHROPLASTY Left     TOTAL KNEE ARTHROPLASTY  januauary 2022    TRANSURETHRAL RESECTION OF PROSTATE         Family History:    Family History   Problem Relation Age of Onset    Hypertension Father     Stroke Father     Sleep apnea Brother        Social History:  Social History     Tobacco Use    Smoking status: Former     Types: Cigarettes     Quit date: 1965     Years since quittin.3    Smokeless tobacco: Never   Substance Use Topics    Alcohol use: Yes     Alcohol/week: 1.0 standard drink     Types: 1 Shots of liquor per week       A medically appropriate review of systems was performed and negative except as per HPI or below:  None    Review of Systems    /81 (BP Location: Right arm, Patient Position: Sitting, Cuff Size: Long Adult)   Pulse 68   Temp 36.6 °C (97.9 °F) (Temporal)   Ht 1.803 m (5' 11\")   Wt 86.8 kg (191 lb 5.8 oz)   SpO2 95%   BMI 26.69 kg/m²     Physical Exam  Constitutional:       Appearance: Normal appearance.   HENT:      Head: Normocephalic and atraumatic.   Pulmonary:      Effort: Pulmonary effort is normal. No respiratory distress.   Skin:     General: Skin is warm and dry.   Neurological:      General: No focal deficit present.      Mental Status: The patient is alert.   Psychiatric:         Mood and Affect: Mood normal.         Behavior: Behavior normal.       Results:  Lab Results   Component Value Date    PSA 2.79 2023    PSA 7.75 (H) 2022    PSA 8.54 (H) 2021        Imaging:  I personally reviewed the following imaging studies, which were used to inform clinical decision making. I agree with " the attending radiologist's interpretation unless noted below:   None    Labs:  I personally reviewed the following laboratory studies which were used to inform clinical decision making:  PSA and UA    Surgical Risk Factors:  None    Assessment    Assessment and Plan:  Diagnoses and all orders for this visit:    Elevated prostate specific antigen (PSA)    Weak urinary stream    He will continue on the finasteride and we discussed that this medication can take up to 6 months to be fully working and he may continue to see improvement in his voiding symptoms over the next few months.  His PSA has decreased to 2.79, 5.58 when corrected for finasteride however he is only been on finasteride for approximately 2 months.  We discussed that this should be checked after he has been on the finasteride for at least 6 months for a more accurate PSA.    Follow-up in 4 months with ABILIO Elise, CNP

## 2023-06-13 ENCOUNTER — TELEPHONE (OUTPATIENT)
Dept: NEUROLOGY | Facility: CLINIC | Age: 88
End: 2023-06-13
Payer: MEDICARE

## 2023-06-13 NOTE — TELEPHONE ENCOUNTER
Thank you for referring your patient to [facility]. Unfortunately at this time we will be declining this referral for incomplete information and/or lack of diagnosis or neurological concern.    Please continue the work-up on your patient and if needed, resubmit an updated referral for consult.    All referrals should include a demographics page with contact and insurance information. Most recent imaging that is no older than 12 months, most recent labs and visit notes.

## 2023-08-07 ENCOUNTER — OFFICE VISIT (OUTPATIENT)
Dept: UROLOGY | Facility: CLINIC | Age: 88
End: 2023-08-07
Payer: MEDICARE

## 2023-08-07 VITALS
OXYGEN SATURATION: 90 % | SYSTOLIC BLOOD PRESSURE: 138 MMHG | HEART RATE: 68 BPM | TEMPERATURE: 97.3 F | HEIGHT: 71 IN | DIASTOLIC BLOOD PRESSURE: 84 MMHG | BODY MASS INDEX: 26.79 KG/M2 | WEIGHT: 191.36 LBS

## 2023-08-07 DIAGNOSIS — R97.20 ELEVATED PROSTATE SPECIFIC ANTIGEN (PSA): Primary | ICD-10-CM

## 2023-08-07 PROCEDURE — G0463 HOSPITAL OUTPT CLINIC VISIT: HCPCS | Performed by: NURSE PRACTITIONER

## 2023-08-07 PROCEDURE — 99212 OFFICE O/P EST SF 10 MIN: CPT | Performed by: NURSE PRACTITIONER

## 2023-08-07 RX ORDER — LOSARTAN POTASSIUM 50 MG/1
50 TABLET ORAL EVERY MORNING
Status: ON HOLD | COMMUNITY
End: 2024-01-09

## 2023-08-07 RX ORDER — POTASSIUM CHLORIDE 750 MG/1
20 TABLET, EXTENDED RELEASE ORAL 2 TIMES DAILY
COMMUNITY
End: 2024-01-09 | Stop reason: HOSPADM

## 2023-08-07 ASSESSMENT — PAIN SCALES - GENERAL: PAINLEVEL: 0-NO PAIN

## 2023-08-07 NOTE — PROGRESS NOTES
8/7/2023    HPI:  Joseph is an 88-year-old male seen in follow-up regarding BPH and elevated PSA    He was last seen in urology clinic on 4/21/2023 where his AUA-SS was 14.  He was started on finasteride in March so his PSA at this visit was inaccurate.    He continues on finasteride and Flomax 0.4 mg daily.  He reports a weak urinary stream with some intermittency and incomplete emptying.  He has nocturia x 3.  He reports that he is not bothered by his voiding symptoms at this time.    His AUA-SS is 16.    Medications:  Current Outpatient Medications   Medication Sig Dispense Refill    losartan (COZAAR) 50 mg tablet Take 1 tablet (50 mg total) by mouth daily      potassium chloride 10 mEq CR tablet Take 2 tablets (20 mEq total) by mouth 2 (two) times a day      atorvastatin (LIPITOR) 10 mg tablet Take 2 tablets (20 mg total) by mouth daily 60 tablet 0    aspirin 81 mg EC tablet Take 1 tablet (81 mg total) by mouth daily 30 tablet 0    finasteride (PROSCAR) 5 mg tablet Take 1 tablet (5 mg total) by mouth daily 30 tablet 0    spironolactone (ALDACTONE) 25 mg tablet Take 1 tablet (25 mg total) by mouth daily 30 tablet 0    FLUoxetine (PROzac) 20 mg capsule Take 1 capsule (20 mg total) by mouth daily      metFORMIN (GLUCOPHAGE) 500 mg tablet Take 1 tablet (500 mg total) by mouth 2 (two) times a day with meals      metoprolol succinate XL (TOPROL-XL) 25 mg 24 hr tablet Take 1 tablet (25 mg total) by mouth daily      KRILL OIL ORAL Take 1 tab/cap by mouth daily      amLODIPine (NORVASC) 5 mg tablet Take 1 tablet (5 mg total) by mouth daily 90 0     No current facility-administered medications for this visit.       Allergies:  No Known Allergies    Past Medical History:  Past Medical History:   Diagnosis Date    CAD (coronary artery disease)     Elevated cholesterol     GERD (gastroesophageal reflux disease)     Hiatal hernia     History of shingles     Hypertension     Macular degeneration     Metabolic syndrome      "Vertigo        Past Surgical History:  Past Surgical History:   Procedure Laterality Date    OTHER SURGICAL HISTORY Left 2016    quad tendon repair    QUADRICEPS REPAIR Left     TONSILLECTOMY      TOTAL ANKLE ARTHROPLASTY Left     TOTAL HIP ARTHROPLASTY Right     TOTAL HIP ARTHROPLASTY Left     TOTAL KNEE ARTHROPLASTY  januauary 2022    TRANSURETHRAL RESECTION OF PROSTATE         Family History:    Family History   Problem Relation Age of Onset    Hypertension Father     Stroke Father     Sleep apnea Brother        Social History:  Social History     Tobacco Use    Smoking status: Former     Types: Cigarettes     Quit date: 1965     Years since quittin.6    Smokeless tobacco: Never   Substance Use Topics    Alcohol use: Yes     Alcohol/week: 1.0 standard drink of alcohol     Types: 1 Shots of liquor per week       A medically appropriate review of systems was performed and negative except as per HPI or below:  None    Review of Systems    /84 (BP Location: Right arm, Patient Position: Sitting, Cuff Size: Long Adult)   Pulse 68   Temp 36.3 °C (97.3 °F) (Temporal)   Ht 1.803 m (5' 11\")   Wt 86.8 kg (191 lb 5.8 oz)   SpO2 90%   BMI 26.69 kg/m²     Physical Exam  Constitutional:       Appearance: Normal appearance.  In a wheelchair throughout the visit  HENT:      Head: Normocephalic and atraumatic.   Pulmonary:      Effort: Pulmonary effort is normal. No respiratory distress.   Skin:     General: Skin is warm and dry.   Neurological:      General: No focal deficit present.      Mental Status: The patient is alert.   Psychiatric:         Mood and Affect: Mood normal.         Behavior: Behavior normal.       Results:  Lab Results   Component Value Date    PSA 2.79 2023    PSA 7.75 (H) 2022    PSA 8.54 (H) 2021        Imaging:  I personally reviewed the following imaging studies, which were used to inform clinical decision making. I agree with the attending radiologist's " interpretation unless noted below:   None    Labs:  I personally reviewed the following laboratory studies which were used to inform clinical decision making:  None    Surgical Risk Factors:  None    Assessment    Assessment and Plan:  Diagnoses and all orders for this visit:    Elevated prostate specific antigen (PSA)  -     PSA diagnostic Blood, Venous; Future              His voiding symptoms are currently controlled with finasteride 5 mg and Flomax 0.4 mg daily.  He will continue on these medications.  He has not yet been on the finasteride for 6 months and we will plan to get a repeat PSA in approximately 6 weeks to reevaluate.    Follow-up in approximately 8 weeks for telehealth to review PSA results.      Jessica Elise, CNP

## 2023-08-29 ENCOUNTER — CONSULT (OUTPATIENT)
Dept: NEUROLOGY | Facility: CLINIC | Age: 88
End: 2023-08-29
Payer: MEDICARE

## 2023-08-29 VITALS
HEART RATE: 58 BPM | SYSTOLIC BLOOD PRESSURE: 126 MMHG | DIASTOLIC BLOOD PRESSURE: 74 MMHG | OXYGEN SATURATION: 90 % | RESPIRATION RATE: 16 BRPM | WEIGHT: 191 LBS | HEIGHT: 71 IN | BODY MASS INDEX: 26.74 KG/M2

## 2023-08-29 DIAGNOSIS — R41.3 MEMORY DISTURBANCE: Primary | ICD-10-CM

## 2023-08-29 DIAGNOSIS — I10 UNCONTROLLED HYPERTENSION: ICD-10-CM

## 2023-08-29 DIAGNOSIS — I63.9 CEREBROVASCULAR ACCIDENT (CVA), UNSPECIFIED MECHANISM (CMS/HCC): ICD-10-CM

## 2023-08-29 PROCEDURE — G0463 HOSPITAL OUTPT CLINIC VISIT: HCPCS | Performed by: NURSE PRACTITIONER

## 2023-08-29 PROCEDURE — 99214 OFFICE O/P EST MOD 30 MIN: CPT | Performed by: NURSE PRACTITIONER

## 2023-08-29 ASSESSMENT — PAIN SCALES - GENERAL: PAINLEVEL: 0-NO PAIN

## 2023-08-29 NOTE — PROGRESS NOTES
Neurointerventional History and Physical    Patient ID: [id number]:   Joseph Wong 10/20/1934 is a 88 y.o. male.                                                          CC:  Stroke    History of Present Illness:  Joseph is a 88-year-old male that presents today for stroke follow-up.  He was admitted in March 2023 for acute metabolic encephalopathy related to UTI and superimposed on dementia.  Through that admission he had an MRI brain that showed acute to subacute stroke in the left precentral gyrus.  Echo showed no PFO, thrombus or atrial enlargement.  Telemetry did not  arrhythmia.  Was placed on aspirin 81 mg for secondary prevention.  Was discussed that Holter monitor for evaluation of atrial fibrillation but not completed.    Presents today with his wife.  He has had a slow decline since his hospitalization.  He went to rehab at [facility].  Was doing okay enough to go home.  Since being at home he has declined concerning memory and strength.  Prior to his hospitalization he was able to use a walker around the house.  Now he is in the wheelchair all the time.  He reports his memory has worsened.  Wife reports he will go back and talk about something that happened 6 to 12 months ago and ask a lot of questions as it was current events.  Recognizes people.  No clear hallucinations.  Cannot recall details from his hospitalization.  Wife reports that a urologist told her that his UTI was not severe back then and all that happened most related to his stroke.  Cannot appreciate unilateral weakness.    Joseph lives at home with his wife.  She helps with all his activities of daily living.  She does get help twice a week with him.  They are on a waiting list for [facility].    On chart review he had an EMG nerve conduction study last fall.  This showed severe lower extremity sensorimotor neuropathy.  Has a history of prediabetes and is on metformin.  Follows with Dr. Malena Mcclain at [facility] primary care.                __________________________________________________    Past History:    The patient's past medical history, family history, social history, medications, allergies are reviewed.    HPI       Consult     Additional comments: Ref: Johny // Acute to subacute infacts in the Lt. precentral gyrus // MR Brain , CTA Head and Neck, Neck 3/1          Last edited by Zara Wynne LPN on 2023  2:04 PM.        Past Medical History:   Diagnosis Date    CAD (coronary artery disease)     Elevated cholesterol     GERD (gastroesophageal reflux disease)     Hiatal hernia     History of shingles     Hypertension     Macular degeneration     Metabolic syndrome     Vertigo      Past Surgical History:   Procedure Laterality Date    OTHER SURGICAL HISTORY Left 2016    quad tendon repair    QUADRICEPS REPAIR Left     TONSILLECTOMY      TOTAL ANKLE ARTHROPLASTY Left     TOTAL HIP ARTHROPLASTY Right     TOTAL HIP ARTHROPLASTY Left     TOTAL KNEE ARTHROPLASTY  janua2022    TRANSURETHRAL RESECTION OF PROSTATE       Family History   Problem Relation Age of Onset    Hypertension Father     Stroke Father     Sleep apnea Brother      Social History     Socioeconomic History    Marital status:    Tobacco Use    Smoking status: Former     Types: Cigarettes     Quit date: 1965     Years since quittin.6    Smokeless tobacco: Never   Vaping Use    Vaping Use: Never used   Substance and Sexual Activity    Alcohol use: Yes     Alcohol/week: 1.0 standard drink of alcohol     Types: 1 Shots of liquor per week    Drug use: Never    Sexual activity: Defer     Social Determinants of Health     Tobacco Use: Medium Risk (2023)    Patient History     Smoking Tobacco Use: Former     Smokeless Tobacco Use: Never       Current Medications:  Current Outpatient Medications on File Prior to Visit   Medication Sig    losartan (COZAAR) 50 mg tablet Take 1 tablet (50 mg total)  by mouth daily    potassium chloride 10 mEq CR tablet Take 2 tablets (20 mEq total) by mouth 2 (two) times a day    FLUoxetine (PROzac) 20 mg capsule Take 1 capsule (20 mg total) by mouth daily    metFORMIN (GLUCOPHAGE) 500 mg tablet Take 1 tablet (500 mg total) by mouth 2 (two) times a day with meals    metoprolol succinate XL (TOPROL-XL) 25 mg 24 hr tablet Take 1 tablet (25 mg total) by mouth daily    KRILL OIL ORAL Take 1 tab/cap by mouth daily    amLODIPine (NORVASC) 5 mg tablet Take 1 tablet (5 mg total) by mouth daily    atorvastatin (LIPITOR) 10 mg tablet Take 2 tablets (20 mg total) by mouth daily    aspirin 81 mg EC tablet Take 1 tablet (81 mg total) by mouth daily    finasteride (PROSCAR) 5 mg tablet Take 1 tablet (5 mg total) by mouth daily    spironolactone (ALDACTONE) 25 mg tablet Take 1 tablet (25 mg total) by mouth daily     No current facility-administered medications on file prior to visit.       Allergies:  No Known Allergies    Review of Systems:   Denies headache, changes in vision, balance, hearing, taste, smell.  Denies chest pain, shortness of breath, abdominal pain, digestive difficulty, diarrhea.  Denies  fever, chills, malaise, nausea, vomiting.  Denies skin rash. Denies rheumatologic changes  Denies stroke like symptoms. There is no psychiatric symptoms.  Denies recent trauma.    Vital Signs:  Heart Rate:  [58] 58  Resp:  [16] 16  BP: (126)/(74) 126/74    EXAM:  Alert.  Oriented to person and place.  Speech is clear and appropriate.  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  Keeps good attention.  Face movement symmetric.  No pronator drift.  No leg drift.  Bilateral hands have joint deformity secondary to arthritis and injury.  Bilateral hip flexor 5/5, bilateral plantar and dorsiflexion 4/5.  Sensation equal in bilateral lower extremities.    Imaging:  CTA head and neck: IMPRESSION:  1.  There is no significant stenosis of the right carotid artery.  2.  There is no significant  "stenosis of the left carotid artery.  3.  There are no vertebral arterial occlusive changes.  4.  No hemorrhage identified.  5.  No large vessel occlusion or intracranial aneurysm. There is some mild atherosclerotic change of the middle cerebral artery branches.    MRI brain:IMPRESSION:  1.  Small acute to subacute infarcts within the left precentral gyrus in the posterior left frontal lobe as well as the left frontoparietal corona radiata.  2.  Severe presumed sequelae of chronic small vessel ischemic disease and prior ischemia, progressed since 6/27/2019.  3.  Moderate diffuse brain parenchymal volume loss.  4.  Small foci of susceptibility artifact in the bilateral basal ganglia and left parietal periventricular white matter, compatible with chronic microhemorrhages.    Labs:    Lab Results   Component Value Date    WBC 6.7 02/27/2023    HGB 12.4 (L) 02/27/2023    HCT 36.0 (L) 02/27/2023     02/27/2023    CRP 33.6 (H) 02/25/2023       Lab Results   Component Value Date     03/01/2023    K 3.9 03/02/2023     03/01/2023    CO2 26 03/01/2023    BUN 16 03/01/2023    CALCIUM 8.4 (L) 03/01/2023    ALBUMIN 3.6 02/25/2023    BILITOT 0.78 02/25/2023    AST 16 02/25/2023    ALT 14 02/25/2023    ALKPHOS 106 02/25/2023     No results found for: \"LDLCALC\"    Lab Results   Component Value Date    HGBA1C 6.0 02/25/2023   Echo with bubble: Normal biatrial size.  No PFO.  EF 65%.  No thrombus.    Modified [place] Score 4    Assessment / Plan:  Left frontal/frontoparietal ischemic stroke  Severe chronic small vessel disease  Essential hypertension  Memory concerns.    -Discussed at length his symptoms and normal stroke recovery.  This can cause a lot of fatigue and memory concerns.  Reviewed imaging and showed severe chronic small vessel disease.  This puts him at high risk for vascular dementia.  I offered to order a neuropsych testing.  This will take a while to get into so we can see how he recovers from his " stroke over the next 6 months.  Wife is amendable to this.  -Encouraged returning to physical therapy to see if he can get any improvement in his strength and walking.  They really like a physical therapist named Sharad in [place] and will call us with his information.  -Continue aspirin for secondary prevention.    Plan to follow-up in 6 months or sooner if needed.  If things worsen and change and requires a general neurologist, I assured her I would help get him in to be seen sooner.    Electronically Signed by:  Sera Nelson CNP 8/29/20232:56 PM

## 2023-09-18 ENCOUNTER — LAB (OUTPATIENT)
Dept: LAB | Facility: CLINIC | Age: 88
End: 2023-09-18
Payer: MEDICARE

## 2023-09-18 DIAGNOSIS — R97.20 ELEVATED PROSTATE SPECIFIC ANTIGEN (PSA): ICD-10-CM

## 2023-09-18 LAB — PSA SERPL-MCNC: 1.97 NG/ML (ref 0–4)

## 2023-09-18 PROCEDURE — 84153 ASSAY OF PSA TOTAL: CPT

## 2023-09-18 PROCEDURE — 36415 COLL VENOUS BLD VENIPUNCTURE: CPT

## 2023-09-27 ENCOUNTER — TELEPHONE - BILLABLE (OUTPATIENT)
Dept: UROLOGY | Facility: CLINIC | Age: 88
End: 2023-09-27
Payer: MEDICARE

## 2023-09-27 ENCOUNTER — TELEPHONE (OUTPATIENT)
Dept: UROLOGY | Facility: CLINIC | Age: 88
End: 2023-09-27
Payer: MEDICARE

## 2023-09-27 DIAGNOSIS — R39.12 WEAK URINARY STREAM: Primary | ICD-10-CM

## 2023-09-27 PROCEDURE — 99442 *INACTIVE DO NOT USE* PR PHYS/QHP TELEPHONE EVALUATION 11-20 MIN: CPT | Mod: 95 | Performed by: NURSE PRACTITIONER

## 2023-09-27 RX ORDER — TAMSULOSIN HYDROCHLORIDE 0.4 MG/1
0.8 CAPSULE ORAL
Qty: 60 CAPSULE | Refills: 11 | Status: ON HOLD | OUTPATIENT
Start: 2023-09-27 | End: 2024-01-09

## 2023-09-27 NOTE — TELEPHONE ENCOUNTER
Caller would like to discuss (a) appointment     Patient: Joseph Wong    Caller Name:: Alessandra Wong-wife     Callback Number: [phone]    Best Availability: as soon as possible    Attempted to Reach: yes, but was not available     Additional Info: Patient's wife is calling stating that the patient was to have an appt at 3:00. She stated that no one has called and she would like to know what the plan is. Are we going to reschedule???     I advised caller of a callback by 48 hours.

## 2023-12-04 ENCOUNTER — APPOINTMENT (OUTPATIENT)
Dept: RADIOLOGY | Facility: HOSPITAL | Age: 88
DRG: 884 | End: 2023-12-04
Payer: MEDICARE

## 2023-12-04 ENCOUNTER — APPOINTMENT (OUTPATIENT)
Dept: CT IMAGING | Facility: HOSPITAL | Age: 88
DRG: 884 | End: 2023-12-04
Payer: MEDICARE

## 2023-12-04 ENCOUNTER — HOSPITAL ENCOUNTER (INPATIENT)
Facility: HOSPITAL | Age: 88
LOS: 33 days | Discharge: NURSING, REHAB OR LONG-TERM CARE | DRG: 884 | End: 2024-01-09
Attending: EMERGENCY MEDICINE | Admitting: HOSPITALIST
Payer: MEDICARE

## 2023-12-04 ENCOUNTER — APPOINTMENT (OUTPATIENT)
Dept: ULTRASOUND IMAGING | Facility: HOSPITAL | Age: 88
DRG: 884 | End: 2023-12-04
Payer: MEDICARE

## 2023-12-04 DIAGNOSIS — K21.00 GASTROESOPHAGEAL REFLUX DISEASE WITH ESOPHAGITIS WITHOUT HEMORRHAGE: ICD-10-CM

## 2023-12-04 DIAGNOSIS — I10 ESSENTIAL HYPERTENSION: ICD-10-CM

## 2023-12-04 DIAGNOSIS — E55.9 VITAMIN D DEFICIENCY: ICD-10-CM

## 2023-12-04 DIAGNOSIS — R39.12 WEAK URINARY STREAM: ICD-10-CM

## 2023-12-04 DIAGNOSIS — M15.3 POST-TRAUMATIC OSTEOARTHRITIS OF MULTIPLE JOINTS: Chronic | ICD-10-CM

## 2023-12-04 DIAGNOSIS — S32.000A LUMBAR COMPRESSION FRACTURE, CLOSED, INITIAL ENCOUNTER (CMS/HCC): Primary | ICD-10-CM

## 2023-12-04 DIAGNOSIS — R53.81 PHYSICAL DECONDITIONING: ICD-10-CM

## 2023-12-04 DIAGNOSIS — R73.01 IMPAIRED FASTING GLUCOSE: ICD-10-CM

## 2023-12-04 DIAGNOSIS — F32.0 CURRENT MILD EPISODE OF MAJOR DEPRESSIVE DISORDER WITHOUT PRIOR EPISODE (CMS/HCC): ICD-10-CM

## 2023-12-04 DIAGNOSIS — E78.5 DYSLIPIDEMIA: ICD-10-CM

## 2023-12-04 DIAGNOSIS — K59.09 OTHER CONSTIPATION: ICD-10-CM

## 2023-12-04 DIAGNOSIS — E53.8 VITAMIN B12 DEFICIENCY: ICD-10-CM

## 2023-12-04 DIAGNOSIS — R26.89 UNABLE TO BEAR WEIGHT ON LEFT LOWER EXTREMITY: ICD-10-CM

## 2023-12-04 DIAGNOSIS — I25.10 CORONARY ARTERY DISEASE INVOLVING NATIVE CORONARY ARTERY OF NATIVE HEART WITHOUT ANGINA PECTORIS: Chronic | ICD-10-CM

## 2023-12-04 DIAGNOSIS — K21.9 GASTROESOPHAGEAL REFLUX DISEASE WITHOUT ESOPHAGITIS: ICD-10-CM

## 2023-12-04 DIAGNOSIS — N30.00 ACUTE CYSTITIS WITHOUT HEMATURIA: ICD-10-CM

## 2023-12-04 DIAGNOSIS — Z78.9 TAKES DIETARY SUPPLEMENTS: ICD-10-CM

## 2023-12-04 DIAGNOSIS — F01.50 VASCULAR DEMENTIA WITHOUT BEHAVIORAL DISTURBANCE, PSYCHOTIC DISTURBANCE, MOOD DISTURBANCE, OR ANXIETY, UNSPECIFIED DEMENTIA SEVERITY (CMS/HCC): ICD-10-CM

## 2023-12-04 DIAGNOSIS — N10 ACUTE PYELONEPHRITIS: ICD-10-CM

## 2023-12-04 DIAGNOSIS — M17.12 PRIMARY OSTEOARTHRITIS OF LEFT KNEE: ICD-10-CM

## 2023-12-04 DIAGNOSIS — N40.0 BENIGN PROSTATIC HYPERPLASIA WITHOUT LOWER URINARY TRACT SYMPTOMS: ICD-10-CM

## 2023-12-04 DIAGNOSIS — E87.6 HYPOKALEMIA: ICD-10-CM

## 2023-12-04 DIAGNOSIS — G47.33 OBSTRUCTIVE SLEEP APNEA SYNDROME: ICD-10-CM

## 2023-12-04 DIAGNOSIS — M79.605 LEFT LEG PAIN: ICD-10-CM

## 2023-12-04 DIAGNOSIS — Z87.891 FORMER SMOKER: ICD-10-CM

## 2023-12-04 DIAGNOSIS — E78.00 HYPERCHOLESTEROLEMIA: Chronic | ICD-10-CM

## 2023-12-04 DIAGNOSIS — R53.1 WEAKNESS: ICD-10-CM

## 2023-12-04 DIAGNOSIS — I10 UNCONTROLLED HYPERTENSION: ICD-10-CM

## 2023-12-04 DIAGNOSIS — R53.1 GENERALIZED WEAKNESS: ICD-10-CM

## 2023-12-04 PROBLEM — F32.A DEPRESSION: Status: ACTIVE | Noted: 2023-12-04

## 2023-12-04 PROBLEM — E11.9 TYPE 2 DIABETES MELLITUS (CMS/HCC): Status: ACTIVE | Noted: 2023-12-04

## 2023-12-04 LAB
25(OH)D3 SERPL-MCNC: 30 NG/ML (ref 30–100)
ALBUMIN SERPL-MCNC: 3.7 G/DL (ref 3.5–5.3)
ALP SERPL-CCNC: 103 U/L (ref 45–115)
ALT SERPL-CCNC: 12 U/L (ref 7–52)
ANION GAP SERPL CALC-SCNC: 9 MMOL/L (ref 3–11)
APTT PPP: 27.6 SECONDS (ref 25.1–36.5)
AST SERPL-CCNC: 16 U/L
BACTERIA #/AREA URNS HPF: NORMAL /HPF
BASOPHILS # BLD AUTO: 0.1 10*3/UL
BASOPHILS NFR BLD AUTO: 0.7 % (ref 0–2)
BILIRUB SERPL-MCNC: 0.95 MG/DL (ref 0.2–1.4)
BILIRUB UR QL: NEGATIVE
BUN SERPL-MCNC: 26 MG/DL (ref 7–25)
CALCIUM ALBUM COR SERPL-MCNC: 9.1 MG/DL (ref 8.6–10.3)
CALCIUM SERPL-MCNC: 8.9 MG/DL (ref 8.6–10.3)
CHLORIDE SERPL-SCNC: 103 MMOL/L (ref 98–107)
CLARITY UR: CLEAR
CO2 SERPL-SCNC: 22 MMOL/L (ref 21–32)
COLOR UR: YELLOW
CREAT SERPL-MCNC: 0.95 MG/DL (ref 0.7–1.3)
CRP SERPL-MCNC: <1 MG/L
EGFRCR SERPLBLD CKD-EPI 2021: 77 ML/MIN/1.73M*2
EOSINOPHIL # BLD AUTO: 0.1 10*3/UL
EOSINOPHIL NFR BLD AUTO: 1.4 % (ref 0–3)
ERYTHROCYTE [DISTWIDTH] IN BLOOD BY AUTOMATED COUNT: 14 % (ref 11.5–15)
ERYTHROCYTE [SEDIMENTATION RATE] IN BLOOD: 5 MM/HR
EST. AVERAGE GLUCOSE BLD GHB EST-MCNC: 137 MG/DL
FOLATE SERPL-MCNC: 13.3 NG/ML
GLUCOSE BLDC GLUCOMTR-MCNC: 117 MG/DL (ref 70–105)
GLUCOSE BLDC GLUCOMTR-MCNC: 119 MG/DL (ref 70–105)
GLUCOSE SERPL-MCNC: 119 MG/DL (ref 70–105)
GLUCOSE UR QL: NEGATIVE MG/DL
HBA1C MFR BLD: 6.4 % (ref 4–6)
HCT VFR BLD AUTO: 38.5 % (ref 38–50)
HGB BLD-MCNC: 14 G/DL (ref 13.2–17.2)
HGB UR QL: NEGATIVE
INR BLD: 1.1
KETONES UR-MCNC: NEGATIVE MG/DL
LACTATE BLDV-SCNC: 1.2 MMOL/L (ref 0.5–1.99)
LEUKOCYTE ESTERASE UR QL STRIP: NEGATIVE
LIPASE SERPL-CCNC: 77 U/L (ref 11–82)
LYMPHOCYTES # BLD AUTO: 1.1 10*3/UL
LYMPHOCYTES NFR BLD AUTO: 15.4 % (ref 15–47)
MAGNESIUM SERPL-MCNC: 1.9 MG/DL (ref 1.8–2.4)
MCH RBC QN AUTO: 33.9 PG (ref 29–34)
MCHC RBC AUTO-ENTMCNC: 36.3 G/DL (ref 32–36)
MCV RBC AUTO: 93.3 FL (ref 82–97)
MONOCYTES # BLD AUTO: 0.7 10*3/UL
MONOCYTES NFR BLD AUTO: 9.2 % (ref 5–13)
NEUTROPHILS # BLD AUTO: 5.3 10*3/UL
NEUTROPHILS NFR BLD AUTO: 73.3 % (ref 46–70)
NITRITE UR QL: NEGATIVE
PH UR: 5.5 PH
PHOSPHATE SERPL-MCNC: 3 MG/DL (ref 2.5–4.9)
PLATELET # BLD AUTO: 203 10*3/UL (ref 130–350)
PMV BLD AUTO: 7.3 FL (ref 6.9–10.8)
POTASSIUM SERPL-SCNC: 4.4 MMOL/L (ref 3.5–5.1)
PROCALCITONIN SERPL-MCNC: <0.02 NG/ML
PROT SERPL-MCNC: 6.1 G/DL (ref 6–8.3)
PROT UR STRIP-MCNC: 10 MG/DL
PROTHROMBIN TIME: 12 SECONDS (ref 9.4–12.5)
RBC # BLD AUTO: 4.13 10*6/ΜL (ref 4.1–5.8)
RBC #/AREA URNS HPF: NORMAL /HPF
SODIUM SERPL-SCNC: 134 MMOL/L (ref 135–145)
SP GR UR: 1.02 (ref 1–1.03)
SQUAMOUS #/AREA URNS HPF: NEGATIVE /HPF
TSH SERPL DL<=0.05 MIU/L-ACNC: 3.62 UIU/ML (ref 0.34–4.82)
URATE SERPL-MCNC: 4 MG/DL (ref 4.4–7.6)
UROBILINOGEN UR-MCNC: 2 MG/DL
VIT B12 SERPL-MCNC: 343 PG/ML (ref 180–914)
WBC # BLD AUTO: 7.3 10*3/UL (ref 3.7–9.6)
WBC #/AREA URNS HPF: NORMAL /HPF
WBC CLUMPS #/AREA URNS HPF: NORMAL /HPF

## 2023-12-04 PROCEDURE — 84100 ASSAY OF PHOSPHORUS: CPT | Performed by: FAMILY MEDICINE

## 2023-12-04 PROCEDURE — 93005 ELECTROCARDIOGRAM TRACING: CPT | Performed by: FAMILY MEDICINE

## 2023-12-04 PROCEDURE — 85025 COMPLETE CBC W/AUTO DIFF WBC: CPT | Performed by: EMERGENCY MEDICINE

## 2023-12-04 PROCEDURE — 99284 EMERGENCY DEPT VISIT MOD MDM: CPT | Performed by: EMERGENCY MEDICINE

## 2023-12-04 PROCEDURE — 73590 X-RAY EXAM OF LOWER LEG: CPT | Mod: LT

## 2023-12-04 PROCEDURE — 73502 X-RAY EXAM HIP UNI 2-3 VIEWS: CPT | Mod: LT

## 2023-12-04 PROCEDURE — 83690 ASSAY OF LIPASE: CPT | Performed by: EMERGENCY MEDICINE

## 2023-12-04 PROCEDURE — 85730 THROMBOPLASTIN TIME PARTIAL: CPT | Performed by: EMERGENCY MEDICINE

## 2023-12-04 PROCEDURE — 84145 PROCALCITONIN (PCT): CPT | Performed by: EMERGENCY MEDICINE

## 2023-12-04 PROCEDURE — G0378 HOSPITAL OBSERVATION PER HR: HCPCS

## 2023-12-04 PROCEDURE — 82607 VITAMIN B-12: CPT | Performed by: FAMILY MEDICINE

## 2023-12-04 PROCEDURE — 82306 VITAMIN D 25 HYDROXY: CPT | Performed by: FAMILY MEDICINE

## 2023-12-04 PROCEDURE — 93971 EXTREMITY STUDY: CPT | Mod: LT

## 2023-12-04 PROCEDURE — 6360000200 HC RX 636 W HCPCS (ALT 250 FOR IP): Performed by: FAMILY MEDICINE

## 2023-12-04 PROCEDURE — 82947 ASSAY GLUCOSE BLOOD QUANT: CPT | Mod: QW

## 2023-12-04 PROCEDURE — 99232 SBSQ HOSP IP/OBS MODERATE 35: CPT | Performed by: FAMILY MEDICINE

## 2023-12-04 PROCEDURE — 85610 PROTHROMBIN TIME: CPT | Performed by: EMERGENCY MEDICINE

## 2023-12-04 PROCEDURE — 6370000100 HC RX 637 (ALT 250 FOR IP): Performed by: FAMILY MEDICINE

## 2023-12-04 PROCEDURE — 83735 ASSAY OF MAGNESIUM: CPT | Performed by: EMERGENCY MEDICINE

## 2023-12-04 PROCEDURE — 82746 ASSAY OF FOLIC ACID SERUM: CPT | Performed by: FAMILY MEDICINE

## 2023-12-04 PROCEDURE — 93925 LOWER EXTREMITY STUDY: CPT

## 2023-12-04 PROCEDURE — 85652 RBC SED RATE AUTOMATED: CPT | Performed by: EMERGENCY MEDICINE

## 2023-12-04 PROCEDURE — 71045 X-RAY EXAM CHEST 1 VIEW: CPT

## 2023-12-04 PROCEDURE — 2590000100 HC RX 259: Performed by: FAMILY MEDICINE

## 2023-12-04 PROCEDURE — 2580000300 HC RX 258: Performed by: FAMILY MEDICINE

## 2023-12-04 PROCEDURE — 86140 C-REACTIVE PROTEIN: CPT | Performed by: EMERGENCY MEDICINE

## 2023-12-04 PROCEDURE — 72131 CT LUMBAR SPINE W/O DYE: CPT

## 2023-12-04 PROCEDURE — 36415 COLL VENOUS BLD VENIPUNCTURE: CPT | Performed by: EMERGENCY MEDICINE

## 2023-12-04 PROCEDURE — 70450 CT HEAD/BRAIN W/O DYE: CPT

## 2023-12-04 PROCEDURE — 84550 ASSAY OF BLOOD/URIC ACID: CPT | Performed by: EMERGENCY MEDICINE

## 2023-12-04 PROCEDURE — 81001 URINALYSIS AUTO W/SCOPE: CPT | Performed by: EMERGENCY MEDICINE

## 2023-12-04 PROCEDURE — 80053 COMPREHEN METABOLIC PANEL: CPT | Performed by: EMERGENCY MEDICINE

## 2023-12-04 PROCEDURE — 83605 ASSAY OF LACTIC ACID: CPT | Performed by: EMERGENCY MEDICINE

## 2023-12-04 PROCEDURE — 84443 ASSAY THYROID STIM HORMONE: CPT | Performed by: FAMILY MEDICINE

## 2023-12-04 PROCEDURE — 83036 HEMOGLOBIN GLYCOSYLATED A1C: CPT | Performed by: FAMILY MEDICINE

## 2023-12-04 RX ORDER — OLANZAPINE 5 MG/1
5 TABLET, ORALLY DISINTEGRATING ORAL EVERY 6 HOURS PRN
Status: DISCONTINUED | OUTPATIENT
Start: 2023-12-04 | End: 2023-12-06

## 2023-12-04 RX ORDER — TAMSULOSIN HYDROCHLORIDE 0.4 MG/1
0.4 CAPSULE ORAL 2 TIMES DAILY
Status: DISCONTINUED | OUTPATIENT
Start: 2023-12-04 | End: 2024-01-09 | Stop reason: HOSPADM

## 2023-12-04 RX ORDER — POLYETHYLENE GLYCOL (3350) 17 G/17G
17 POWDER, FOR SOLUTION ORAL DAILY
Status: DISCONTINUED | OUTPATIENT
Start: 2023-12-04 | End: 2024-01-09 | Stop reason: HOSPADM

## 2023-12-04 RX ORDER — ONDANSETRON 4 MG/1
4 TABLET, FILM COATED ORAL EVERY 6 HOURS PRN
Status: DISCONTINUED | OUTPATIENT
Start: 2023-12-04 | End: 2024-01-09 | Stop reason: HOSPADM

## 2023-12-04 RX ORDER — ENOXAPARIN SODIUM 100 MG/ML
40 INJECTION SUBCUTANEOUS
Status: DISCONTINUED | OUTPATIENT
Start: 2023-12-04 | End: 2023-12-14

## 2023-12-04 RX ORDER — DEXTROSE 50 % IN WATER (D50W) INTRAVENOUS SYRINGE
15-30
Status: DISCONTINUED | OUTPATIENT
Start: 2023-12-04 | End: 2023-12-14

## 2023-12-04 RX ORDER — ATORVASTATIN CALCIUM 40 MG/1
1 TABLET, FILM COATED ORAL EVERY EVENING
Status: ON HOLD | COMMUNITY
End: 2024-01-09

## 2023-12-04 RX ORDER — FAMOTIDINE 20 MG/1
10 TABLET, FILM COATED ORAL 2 TIMES DAILY
Status: DISCONTINUED | OUTPATIENT
Start: 2023-12-04 | End: 2024-01-09 | Stop reason: HOSPADM

## 2023-12-04 RX ORDER — FINASTERIDE 5 MG/1
5 TABLET, FILM COATED ORAL EVERY EVENING
Status: DISCONTINUED | OUTPATIENT
Start: 2023-12-04 | End: 2024-01-09 | Stop reason: HOSPADM

## 2023-12-04 RX ORDER — FLUOXETINE HYDROCHLORIDE 20 MG/1
40 CAPSULE ORAL EVERY EVENING
Status: DISCONTINUED | OUTPATIENT
Start: 2023-12-04 | End: 2024-01-09 | Stop reason: HOSPADM

## 2023-12-04 RX ORDER — AMLODIPINE BESYLATE 5 MG/1
5 TABLET ORAL EVERY MORNING
Status: DISCONTINUED | OUTPATIENT
Start: 2023-12-04 | End: 2024-01-09 | Stop reason: HOSPADM

## 2023-12-04 RX ORDER — ASPIRIN 81 MG/1
81 TABLET ORAL EVERY MORNING
Status: DISCONTINUED | OUTPATIENT
Start: 2023-12-04 | End: 2024-01-09 | Stop reason: HOSPADM

## 2023-12-04 RX ORDER — ALPRAZOLAM 0.5 MG/1
0.25 TABLET ORAL
Status: DISCONTINUED | OUTPATIENT
Start: 2023-12-04 | End: 2023-12-05

## 2023-12-04 RX ORDER — ATORVASTATIN CALCIUM 40 MG/1
40 TABLET, FILM COATED ORAL EVERY EVENING
Status: DISCONTINUED | OUTPATIENT
Start: 2023-12-04 | End: 2024-01-09 | Stop reason: HOSPADM

## 2023-12-04 RX ORDER — OXYCODONE HYDROCHLORIDE 5 MG/1
5 TABLET ORAL EVERY 4 HOURS PRN
Status: DISCONTINUED | OUTPATIENT
Start: 2023-12-04 | End: 2024-01-09 | Stop reason: HOSPADM

## 2023-12-04 RX ORDER — LIDOCAINE 50 MG/G
1 PATCH TOPICAL DAILY
Status: DISCONTINUED | OUTPATIENT
Start: 2023-12-04 | End: 2024-01-03

## 2023-12-04 RX ORDER — ASPIRIN 81 MG/1
81 TABLET ORAL EVERY MORNING
Status: ON HOLD | COMMUNITY
End: 2024-01-09

## 2023-12-04 RX ORDER — HALOPERIDOL 5 MG/ML
2 INJECTION INTRAMUSCULAR
Status: DISCONTINUED | OUTPATIENT
Start: 2023-12-04 | End: 2023-12-05

## 2023-12-04 RX ORDER — BISACODYL 10 MG/1
10 SUPPOSITORY RECTAL DAILY PRN
Status: DISCONTINUED | OUTPATIENT
Start: 2023-12-04 | End: 2023-12-05

## 2023-12-04 RX ORDER — DEXTROSE 40 %
15 GEL (GRAM) ORAL
Status: DISCONTINUED | OUTPATIENT
Start: 2023-12-04 | End: 2023-12-14

## 2023-12-04 RX ORDER — ACETAMINOPHEN 500 MG
500 TABLET ORAL EVERY 6 HOURS PRN
Status: DISCONTINUED | OUTPATIENT
Start: 2023-12-04 | End: 2024-01-09 | Stop reason: HOSPADM

## 2023-12-04 RX ORDER — SPIRONOLACTONE 25 MG/1
25 TABLET ORAL EVERY MORNING
Status: ON HOLD | COMMUNITY
End: 2024-01-09

## 2023-12-04 RX ORDER — AMOXICILLIN 250 MG
1 CAPSULE ORAL 2 TIMES DAILY
Status: DISCONTINUED | OUTPATIENT
Start: 2023-12-04 | End: 2024-01-09 | Stop reason: HOSPADM

## 2023-12-04 RX ORDER — FINASTERIDE 5 MG/1
5 TABLET, FILM COATED ORAL EVERY EVENING
Status: ON HOLD | COMMUNITY
End: 2024-01-09

## 2023-12-04 RX ORDER — SODIUM CHLORIDE 9 MG/ML
75 INJECTION, SOLUTION INTRAVENOUS CONTINUOUS
Status: DISCONTINUED | OUTPATIENT
Start: 2023-12-04 | End: 2023-12-05

## 2023-12-04 RX ORDER — SODIUM CHLORIDE 0.9 % (FLUSH) 0.9 %
3 SYRINGE (ML) INJECTION AS NEEDED
Status: DISCONTINUED | OUTPATIENT
Start: 2023-12-04 | End: 2024-01-09 | Stop reason: HOSPADM

## 2023-12-04 RX ORDER — INSULIN ASPART 100 [IU]/ML
0-15 INJECTION, SOLUTION INTRAVENOUS; SUBCUTANEOUS
Status: DISCONTINUED | OUTPATIENT
Start: 2023-12-04 | End: 2023-12-14

## 2023-12-04 RX ORDER — ONDANSETRON HYDROCHLORIDE 2 MG/ML
4 INJECTION, SOLUTION INTRAVENOUS EVERY 6 HOURS PRN
Status: DISCONTINUED | OUTPATIENT
Start: 2023-12-04 | End: 2024-01-09 | Stop reason: HOSPADM

## 2023-12-04 RX ORDER — LOSARTAN POTASSIUM 50 MG/1
50 TABLET ORAL EVERY MORNING
Status: DISCONTINUED | OUTPATIENT
Start: 2023-12-04 | End: 2024-01-09 | Stop reason: HOSPADM

## 2023-12-04 RX ORDER — METOPROLOL SUCCINATE 25 MG/1
25 TABLET, EXTENDED RELEASE ORAL EVERY MORNING
Status: DISCONTINUED | OUTPATIENT
Start: 2023-12-04 | End: 2024-01-09 | Stop reason: HOSPADM

## 2023-12-04 RX ORDER — ADHESIVE BANDAGE
30 BANDAGE TOPICAL DAILY PRN
Status: DISCONTINUED | OUTPATIENT
Start: 2023-12-04 | End: 2024-01-09 | Stop reason: HOSPADM

## 2023-12-04 RX ORDER — GABAPENTIN 300 MG/1
300 CAPSULE ORAL 3 TIMES DAILY
Status: DISCONTINUED | OUTPATIENT
Start: 2023-12-04 | End: 2023-12-05

## 2023-12-04 RX ORDER — ACETAMINOPHEN 325 MG/1
650 TABLET ORAL EVERY 4 HOURS PRN
Status: ON HOLD | COMMUNITY
End: 2024-01-09

## 2023-12-04 RX ORDER — SPIRONOLACTONE 25 MG/1
25 TABLET ORAL EVERY MORNING
Status: DISCONTINUED | OUTPATIENT
Start: 2023-12-04 | End: 2024-01-09 | Stop reason: HOSPADM

## 2023-12-04 RX ADMIN — SODIUM CHLORIDE 75 ML/HR: 9 INJECTION, SOLUTION INTRAVENOUS at 15:36

## 2023-12-04 RX ADMIN — FAMOTIDINE 10 MG: 20 TABLET, FILM COATED ORAL at 21:50

## 2023-12-04 RX ADMIN — LOSARTAN POTASSIUM 50 MG: 50 TABLET, FILM COATED ORAL at 15:33

## 2023-12-04 RX ADMIN — AMLODIPINE BESYLATE 5 MG: 5 TABLET ORAL at 15:33

## 2023-12-04 RX ADMIN — ATORVASTATIN CALCIUM 40 MG: 40 TABLET, FILM COATED ORAL at 21:50

## 2023-12-04 RX ADMIN — POLYETHYLENE GLYCOL 3350 17 G: 17 POWDER, FOR SOLUTION ORAL at 15:33

## 2023-12-04 RX ADMIN — HALOPERIDOL LACTATE 2 MG: 5 INJECTION, SOLUTION INTRAMUSCULAR at 16:30

## 2023-12-04 RX ADMIN — ENOXAPARIN SODIUM 40 MG: 100 INJECTION SUBCUTANEOUS at 15:33

## 2023-12-04 RX ADMIN — METOPROLOL SUCCINATE 25 MG: 25 TABLET, EXTENDED RELEASE ORAL at 15:35

## 2023-12-04 RX ADMIN — GABAPENTIN 300 MG: 300 CAPSULE ORAL at 15:33

## 2023-12-04 RX ADMIN — FLUOXETINE HYDROCHLORIDE 40 MG: 20 CAPSULE ORAL at 21:50

## 2023-12-04 RX ADMIN — SENNOSIDES AND DOCUSATE SODIUM 1 TABLET: 50; 8.6 TABLET ORAL at 15:35

## 2023-12-04 RX ADMIN — FINASTERIDE 5 MG: 5 TABLET, FILM COATED ORAL at 21:50

## 2023-12-04 RX ADMIN — SPIRONOLACTONE 25 MG: 25 TABLET ORAL at 15:33

## 2023-12-04 RX ADMIN — GABAPENTIN 300 MG: 300 CAPSULE ORAL at 21:50

## 2023-12-04 RX ADMIN — TAMSULOSIN HYDROCHLORIDE 0.4 MG: 0.4 CAPSULE ORAL at 21:50

## 2023-12-04 RX ADMIN — ASPIRIN 81 MG: 81 TABLET ORAL at 15:33

## 2023-12-04 RX ADMIN — SENNOSIDES AND DOCUSATE SODIUM 1 TABLET: 50; 8.6 TABLET ORAL at 21:50

## 2023-12-04 ASSESSMENT — ENCOUNTER SYMPTOMS
FATIGUE: 0
NECK PAIN: 0
RHINORRHEA: 0
CHILLS: 0
HEADACHES: 0
ARTHRALGIAS: 0
EYE PAIN: 0
BLOOD IN STOOL: 0
COUGH: 0
HEMATURIA: 0
FLANK PAIN: 0
FEVER: 0
BRUISES/BLEEDS EASILY: 0
BACK PAIN: 0
ABDOMINAL PAIN: 0
VOMITING: 0
NAUSEA: 0
CONFUSION: 0
DIARRHEA: 0
DYSURIA: 0
SHORTNESS OF BREATH: 0
DIAPHORESIS: 0
SORE THROAT: 0
MYALGIAS: 0
WEAKNESS: 0
EXTREMITY WEAKNESS: 1
AGITATION: 0

## 2023-12-04 ASSESSMENT — ACTIVITIES OF DAILY LIVING (ADL): ASSISTIVE_DEVICES: WHEELCHAIR

## 2023-12-04 NOTE — MEDICATION HISTORY SPECIALIST NOTES
[facility] ED-228    CSN: [id number]  : [id number]    Information sources:  EPIC  Complete Dispense Report  Go Reconcile  Patient's wife and list    Patient and wife interviewed in person who provided all history. Allergies and preferred pharmacy verified. Patient's wife verified medications and provided last doses. Verified with CDR and Go Rec.    New medications added:  acetaminophen (TYLENOL) 325 mg tablet as needed    Dose changes:  FLUoxetine (PROzac) from 20 mg to 40 mg capsule    Added the following meds back to profile as they had  and subsequently fallen off:  finasteride (PROSCAR) 5 mg tablet  spironolactone (ALDACTONE) 25 mg tablet  atorvastatin (LIPITOR) 40 mg tablet  aspirin 81 mg EC tablet    Discrepancies:  tamsulosin (FLOMAX) 0.4 mg capsule  House med entered for 2 capsules daily with dinner  Patient's wife reports he takes 1 capsule twice daily      Xander Ballard  Med History Specialist

## 2023-12-04 NOTE — H&P
"Extremity Weakness (Patient arrives from home by EMS related to left leg weakness noted this morning while trying to get out of bed. Blood sugar 187mg/dl. No other complaints or weakness, vision change or memory loss. Patient intermittently uses wheelchair at home for transportation and weakness issues)      HPI  Joseph Wong is a 89 y.o. male,  who presented with LLE pain today. His wife is with him. Joseph had a stroke in March and was in rehab until early summer. Since July he has been essentially wheelchair bound. His wife needs to help him with all ADLs, He saw neuro interventional NP Sera in August and was advised he has dementia. He is on a waiting list for neuropsych eval. This morning he woke up with acute LLE pain. In the ED his VS were wnl as were cmp, cbc, hip XR, CT head, tib-fib xr, cxr, and venous duplex. ED physician was bale to elicit the pain by \"swinging\" his leg over the side of the bed as if her were going to sit up. On my exam, this did not elicit pain, but having him bend his left knee and place the foot flat on the bed did elicit pain in the back of his leg just proximal to the knee. His chart lists a h/o lumbar radiculopathy.       PAST MEDICAL HISTORY  Past Medical History:   Diagnosis Date    CAD (coronary artery disease)     Elevated cholesterol     GERD (gastroesophageal reflux disease)     Hiatal hernia     History of shingles     Hypertension     Macular degeneration     Metabolic syndrome     Vertigo         PAST SURGICAL HISTORY  Past Surgical History:   Procedure Laterality Date    OTHER SURGICAL HISTORY Left 08/2016    quad tendon repair    QUADRICEPS REPAIR Left 2015    TONSILLECTOMY      TOTAL ANKLE ARTHROPLASTY Left     TOTAL HIP ARTHROPLASTY Right     TOTAL HIP ARTHROPLASTY Left     TOTAL KNEE ARTHROPLASTY  [facility] 2nd 2022    TRANSURETHRAL RESECTION OF PROSTATE         SOCIAL HISTORY  Tobacco: none  ETOH:none  Illicit drugs: none  Other: lives at home, dependant for " ADLs    Family History:  family history includes Hypertension in his father; Sleep apnea in his brother; Stroke in his father.    Allergies:  No Known Allergies    Medications:   Awaiting med rec      Review of Systems  12 point review of symptoms negative except as discussed in the HPI  Objective     Vital signs:  Temp:  [36.7 °C (98 °F)] 36.7 °C (98 °F)  Heart Rate:  [63-68] 63  Resp:  [14-18] 14  SpO2:  [92 %-93 %] 93 %  BP: (136-156)/(81-94) 156/94      Exam:  Physical Exam  Patient consented to exam.  General: Appears comfortable and in no distress.   Neuro: No new focal deficits observed.  No motor or sensory deficits observed.  Cranial nerves intact. Pain as described in HPI. Otherwise unremarkable.   Psychiatric: No hallucinations or delusions.   ENT: MMM and no acute oral pathology detected.  CVS: S1 S2 with equal pulses.  Resp: CTABL with good inspiratory effort.  Abdomen: SNT with +BS. No guarding, rebound or tenderness.  Extremities: Good perfusion.  Ext x 4 NVI        RESULTS AND DECISION MAKING:    Results from last 4 days   Lab Units 12/04/23  1015   WBC AUTO 10*3/uL 7.3   HEMOGLOBIN g/dL 14.0   HEMATOCRIT % 38.5   PLATELETS AUTO 10*3/uL 203     Results from last 4 days   Lab Units 12/04/23  1015   SODIUM mmol/L 134*   POTASSIUM MMOL/L 4.4   CHLORIDE mmol/L 103   CO2 mmol/L 22   BUN mg/dL 26*   CREATININE mg/dL 0.95   CALCIUM mg/dL 8.9   TOTAL PROTEIN g/dL 6.1   BILIRUBIN TOTAL mg/dL 0.95   ALK PHOS U/L 103   ALT U/L 12   AST U/L 16   GLUCOSE mg/dL 119*       I have reviewed all the lab results.    IMAGING:  I have reviewed all imaging. Please see radiology tab for radiologist's impression and my assessment and plan for discussion of management.       ASSESSMENT AND PLAN:     Assessment/Plan       Principal Problem:    Weakness  Active Problems:    Obstructive sleep apnea syndrome    Physical deconditioning    GERD (gastroesophageal reflux disease)    CAD (coronary artery disease)     "Hypercholesterolemia    Vitamin B12 deficiency    Vitamin D deficiency    88 y/o M admitted with LLE pain and debility    LLE pain  Compression fractures, acute  Lumbar radiculopathy  - consulted neurosurgeon Dr. Echeverria.   - MRI L spine with T12   - pain medication as needed  - plan for PT once seen by NS    Debility  Stroke in March  Suspected dementia, vascular type vs combination  B12 and vit D deficiency  - agree with OP neuropsych eval  - follow B12, folate, D25, tsh  - Pts wife mentions candidly that she sometimes wishes she \"could run away.\" She seems to be doing an amazing job but is understandably overwhelmed. SW consult for discussion of placement.   - Present: failure to thrive,  decreased appetite, poor nutrition, and inactivity, depressive symptoms  - malnutrition: suspected - consulted nutrition  - agre related physical debility      NIDDM2 with neuropathy  - hold home metformin  - low ISS  - home medications:  - most recent A1c: 6.0 2/23, recheck ordered    NARESH  - cpap per protocol    GERD  - pepcid    CAD  Dyslipidemia  - continue lipitor, cozaar, metoprolol, amlodipine    BPH with nocturia  - continue proscar and flomax  - PVR and I/O cath prn per protocol    Mood disorder  - cont prozac    Hyperactive dementia and delirium  - I was called by nursing staff this afternoon for patient being aggressive and hyperactive. He is swinging at staff, throwing pills, and yelling. His wife did not mention any of this to me earlier, but she has told RN Blanche that this is typical for this time of day. She has been trying to manage it by herself and sometimes breaks an Ambien to try to help. This is clearly not a safe situation for home.   - start with IM Haldol, 2mg q1hr prn max 10mg/day  - If he is willing to take PO, we can try zyprexa ODTs  - tomorrow it may be reasonable to start empiric seroquel, zyprexa, or depakote sprinkles once we see how he responds to the above.   - advised Rns to use physical " restraints only if necessary, but to initiate if safety of staff or patient is compromised.       DVT Prophylaxis: lovenox  Oupatient Followup: tbs  Social Determinants of Health Considerations: lack of home services  Disposition: TBD - SW and CM to assist      Code Status: Full Code      A voice recognition program was used to aid in documentation of this record.  Sometimes words are not printed exactly as they were spoken.  While efforts were made to carefully edit and correct any inaccuracies, some errors may be present; please take these into context.  Please contact the provider if errors are identified.      Tanya Larry MD

## 2023-12-04 NOTE — ED PROVIDER NOTES
HPI:  Chief Complaint   Patient presents with    Extremity Weakness     Patient arrives from home by EMS related to left leg weakness noted this morning while trying to get out of bed. Blood sugar 187mg/dl. No other complaints or weakness, vision change or memory loss. Patient intermittently uses wheelchair at home for transportation and weakness issues         Extremity Weakness  Associated symptoms: no abdominal pain, no chest pain, no congestion, no cough, no diarrhea, no ear pain, no fatigue, no fever, no headaches, no myalgias, no nausea, no rash, no rhinorrhea, no shortness of breath, no sore throat and no vomiting    Patient is an 89-year-old gentleman who presents with his wife.  Today he was unable to stand and pivot as he normally does to transfer from bed to wheelchair.  He had a stroke earlier this year.  He was left with minimal deficits after rehab.  However essentially now uses a wheelchair.  Does stand and pivot for transfers.  Today when she attempted to stand him he immediately cried out in pain.  He states it is behind his left leg and knee.  Not there lying in bed.  Worse when he tried to stand up.  Worse when he tries to move his leg out of bed.  Patient has some dementia history.  Wife denies any fall or trauma.  No injury.  No recent illness.  No fever.  No history of similar.  He has had a previous left knee replacement, previous left hip replacement, there is been no swelling.  No fever.  No chest pain.  No shortness of breath.  No changes in medications recently.  She states she is been caring for him at home now.      Later on reevaluation wife states a few days back he did miss the bed sitting down and slide down to the floor.  Landing on his buttock.  He did not complain of any back pain.  She does not think he injured anything.  She was not able to lift them on her own.  She had to call EMS for lift assistance.  Patient had been on the way to the bathroom.  They went ahead and took  him to the bathroom and then took him back to bed before leaving.  She did not think anything of it at the time.  She did not think it was a significant fall enough to cause injury.  He had not complained of any pain.  She states yesterday he was up and down several times because they had company and did not complain of any pain then.  That is the only other thing she can think of that is been different in the last few days.      Previous minor stroke, no deficits, wife historian, wheelchair, dementia pain and weakness since this am.      HISTORY:  Past Medical History:   Diagnosis Date    CAD (coronary artery disease)     Elevated cholesterol     GERD (gastroesophageal reflux disease)     Hiatal hernia     History of shingles     Hypertension     Macular degeneration     Metabolic syndrome     Vertigo        Past Surgical History:   Procedure Laterality Date    OTHER SURGICAL HISTORY Left 2016    quad tendon repair    QUADRICEPS REPAIR Left     TONSILLECTOMY      TOTAL ANKLE ARTHROPLASTY Left     TOTAL HIP ARTHROPLASTY Right     TOTAL HIP ARTHROPLASTY Left     TOTAL KNEE ARTHROPLASTY  januauary 2022    TRANSURETHRAL RESECTION OF PROSTATE         Family History   Problem Relation Age of Onset    Hypertension Father     Stroke Father     Sleep apnea Brother        Social History     Tobacco Use    Smoking status: Former     Types: Cigarettes     Quit date: 1965     Years since quittin.9    Smokeless tobacco: Never   Vaping Use    Vaping Use: Never used   Substance Use Topics    Alcohol use: Yes     Alcohol/week: 1.0 standard drink of alcohol     Types: 1 Shots of liquor per week    Drug use: Never         ROS:  Review of Systems   Constitutional:  Negative for chills, diaphoresis, fatigue and fever.   HENT:  Negative for congestion, ear pain, rhinorrhea and sore throat.    Eyes:  Negative for pain.   Respiratory:  Negative for cough and shortness of breath.    Cardiovascular:  Negative for chest  pain and leg swelling.   Gastrointestinal:  Negative for abdominal pain, blood in stool, diarrhea, nausea and vomiting.   Genitourinary:  Negative for dysuria, flank pain and hematuria.   Musculoskeletal:  Positive for extremity weakness. Negative for arthralgias, back pain, myalgias and neck pain.        Left leg pain   Skin:  Negative for rash.   Neurological:  Negative for weakness and headaches.   Hematological:  Does not bruise/bleed easily.   Psychiatric/Behavioral:  Negative for agitation and confusion.    All other systems reviewed and are negative.      PE:  ED Triage Vitals   Temp Heart Rate Resp BP SpO2   12/04/23 0827 12/04/23 0827 12/04/23 0827 12/04/23 0827 12/04/23 0827   36.7 °C (98 °F) 68 18 136/81 92 %      Mean BP (mmHg) Temp Source Heart Rate Source Patient Position BP Location   12/04/23 0827 12/04/23 0827 12/04/23 1505 12/04/23 1505 12/04/23 1505   101 Oral Monitor Supine Left arm      FiO2 (%)       --                  Physical Exam  Vitals and nursing note reviewed.   Constitutional:       General: He is not in acute distress.     Appearance: He is well-developed. He is not toxic-appearing.   HENT:      Head: Normocephalic and atraumatic.   Eyes:      Extraocular Movements: Extraocular movements intact.      Conjunctiva/sclera: Conjunctivae normal.      Pupils: Pupils are equal.   Cardiovascular:      Rate and Rhythm: Normal rate and regular rhythm.      Heart sounds: No murmur heard.  Pulmonary:      Effort: Pulmonary effort is normal. No respiratory distress.      Breath sounds: Normal breath sounds. No stridor. No wheezing.   Abdominal:      General: There is no distension.      Palpations: Abdomen is soft.      Tenderness: There is no abdominal tenderness.   Musculoskeletal:         General: No swelling, tenderness, deformity or signs of injury. Normal range of motion.      Cervical back: Normal range of motion and neck supple.        Legs:       Comments: No significant pain with flexion  or extension of the hip knee or ankle.  When I have him swing the leg off the bed the weight caused significant pain as it came over the side of the bed.  He could not bear weight.  He points to behind his knee and calf area.  No focal tenderness, no swelling, no erythema, minimal warmth to the knee normal flexion past 90 degrees, no reproduction of pain with simply lifting the leg, no reproduction of pain with axial loading of the heel,     Skin:     General: Skin is warm and dry.      Capillary Refill: Capillary refill takes less than 2 seconds.   Neurological:      General: No focal deficit present.      Mental Status: He is alert and oriented to person, place, and time.      Motor: Weakness present.      Comments: Bilateral lower extremities are difficult for him to raise off the bed independently   Psychiatric:         Mood and Affect: Mood normal.         Behavior: Behavior normal.         Thought Content: Thought content normal.         Judgment: Judgment normal.         ED LABS:  Labs Reviewed   CBC WITH AUTO DIFFERENTIAL - Abnormal       Result Value    WBC 7.3      RBC 4.13      Hemoglobin 14.0      Hematocrit 38.5      MCV 93.3      MCH 33.9      MCHC 36.3 (*)     RDW 14.0      Platelets 203      MPV 7.3      Neutrophils% 73.3 (*)     Lymphocytes% 15.4      Monocytes% 9.2      Eosinophils% 1.4      Basophils% 0.7      ANC (auto diff) 5.30      Lymphocytes Absolute 1.10      Monocytes Absolute 0.70      Eosinophils Absolute 0.10      Basophils Absolute 0.10     COMPREHENSIVE METABOLIC PANEL - Abnormal    Sodium 134 (*)     Potassium 4.4      Chloride 103      CO2 22      Anion Gap 9      BUN 26 (*)     Creatinine 0.95      Glucose 119 (*)     Calcium 8.9      AST 16      ALT (SGPT) 12      Alkaline Phosphatase 103      Total Protein 6.1      Albumin 3.7      Total Bilirubin 0.95      Corrected Calcium 9.1      eGFR 77      Narrative:     Calculation based on the 2021 Chronic Kidney Disease Epidemiology  Collaboration (CKD-EPI) equation refit without adjustment for race.   URINALYSIS, DIPSTICK ONLY, FOR USE WITH MICROSCOPIC PANEL - Abnormal    Color, Urine Yellow      Clarity, Urine Clear      Specific Gravity, Urine 1.025      Leukocytes, Urine Negative      Nitrite, Urine Negative      Protein, Urine 10 (*)     Ketones, Urine Negative      Urobilinogen, Urine 2.0 (*)     Bilirubin, Urine Negative      Blood, Urine Negative      Glucose, Urine Negative      pH, Urine 5.5     URIC ACID - Abnormal    Uric Acid 4.0 (*)    POCT GLUCOSE RESULTS ONLY - Abnormal    POC Glucose 119 (*)    LIPASE - Normal    Lipase 77     MAGNESIUM - Normal    Magnesium 1.9     POCT LACTIC ACID (LACTATE) - Normal    POC Lactate 1.20     C-REACTIVE PROTEIN - Normal    CRP <1.0     PROTIME-INR - Normal    Protime 12.0      INR 1.1     PTT (ACTIVATED PARTIAL THROMBOPLASTIN TIME) - Normal    PTT 27.6     URINALYSIS, MICROSCOPIC ONLY - Normal    RBC, Urine 0-2      WBC, Urine 0-4      WBC Clumps, Urine None seen      Squamous Epithelial, Urine Negative      Bacteria, Urine None seen     PROCALCITONIN - Normal    Procalcitonin <0.02      Narrative:     Low risk of severe sepsis and/or septic shock.  Concentrations <0.5 ng/mL do not exclude an infection.  It is recommended to retest PCT within 6-24 hours if any concentrations <2.0 ng/mL are obtained.  [employer] PCT is a registered trademark belonging to [employer]  For more information about the [employer] Procalcitonin assay, please see the lab resources menu or chart search [employer] Procalcitonin.   SEDIMENTATION RATE, AUTOMATED - Normal    Sed Rate 5     VITAMIN B12 - Normal    Vitamin B-12 343     FOLATE - Normal    Folate 13.3     VITAMIN D 25 HYDROXY - Normal    Vit D, 25-Hydroxy 30     TSH - Normal    TSH 3.622     PHOSPHORUS - Normal    Phosphorus 3.0     URINALYSIS WITH MICROSCOPIC    Narrative:     The following orders were created for panel order Urinalysis w/microscopic Urine, Clean  Catch.  Procedure                               Abnormality         Status                     ---------                               -----------         ------                     Urinalysis, microscopic ...[[id number]]  Normal              Final result               Urinalysis, dipstick Uri...[[id number]]  Abnormal            Final result                 Please view results for these tests on the individual orders.         ED IMAGES:  US Arterial duplex lower extremity bilateral   Final Result   IMPRESSION:   3 vessel run off without high grade stenosis.      CT lumbar spine without contrast   Final Result   IMPRESSION:   1.  Compression deformities of the T12 and L4 vertebral bodies appear likely acute.   2.  Diffuse lumbar degenerative changes.   3.  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      CT head without IV contrast   Final Result   IMPRESSION:   Stable exam without evidence of acute intracranial injury.      US Venous duplex lower extremity left   Final Result   IMPRESSION:   Negative for left lower extremity DVT.      X-ray hip 2 or 3 views left   Final Result   Impression:   1. Negative      X-ray tibia fibula 2 views left   Final Result   IMPRESSION:   Negative      XR chest portable 1 view   Final Result   IMPRESSION:   1. Negative chest      MR lumbar spine without contrast    (Results Pending)       ED PROCEDURES:  Procedures    ED COURSE:          Sepsis Quality Bundle   Financial and social constraints, proximity to healthcare facilities, and comorbidities were considered in the treatment plan for this patient.  Concerned wife is not able to care for him at home currently    Medical records reviewed including previous hospitalization in March for metabolic encephalopathy, UTI, stroke, dementia,    Continuous telemetry monitoring without dysrhythmia  MDM:  Medical Decision Making  Patient is an 89-year-old gentleman who could not bear weight on his left extremity  today.  It was initially reported his weakness.  Is not weakness of the sick significant pain.  If I attempt to try to sit him at the edge of the bed I produce severe left lower extremity pain.  He grabs behind his calf.  I am able to flex and extend hip knee and ankle.  Does not appear to have joint involvement.  He has had previous orthopedic repairs of the hip and knee.  There is no warmth.  No erythema.  Minimal swelling of the artificial knee on the left.  I am able to flex it to more than 90 degrees.  My suspicion for septic arthritis is extremely low.  He has a good DP pulse.  Differential would include radiculopathy, DVT, less likely infection, musculoskeletal strain.  Will proceed with laboratory diagnostics, venous ultrasound, plain film imaging.  There is report of dementia.  There may have been an injury patient does not remember.    Further discussion with the wife she tells me about an episode a couple of days back where he slid off the bed and landed on his buttock.  Will add CT of the lumbar spine, include head at the same time in case he hit his head at some point and we do not know what.  Could represent a radiculopathy as well.  Did not reproduce the symptoms with simple straight leg test though    Patient is not safe for discharge home.  Wife cannot currently care for him as if he is not able to pivot and transfer.  He is not able to bear weight.  Not able to pivot and transfer.  If he slides out of the bed again she will not be able to pick him up.  Hospitalist will be consulted for hospitalization regardless, CT lumbar spine pending.  Head is negative.    No evidence of infectious process.  Strong DP pulse.  No history of gout.  Uric acid is normal.    Dr. Larry is accepted the patient for hospitalization.    CT lumbar spine with several compression fractures.  Dr. Larry appears to have already consulted with appropriate services.    May need rehab or long-term placement  considerations.        Final diagnoses:   [R26.89] Unable to bear weight on left lower extremity   [S32.000A] Lumbar compression fracture, closed, initial encounter (CMS/[facility])   [M79.605] Left leg pain     12/4/2023 12:15 PM              A voice recognition program was used to aid in documentation of this record.  Sometimes words are not printed exactly as they were spoken.  While efforts were made to carefully edit and correct any inaccuracies, some areas may be present; please take these into context.  Please contact the physician if areas are identified.             Melinda Bullock MD  12/04/23 2066

## 2023-12-05 DIAGNOSIS — S22.000S COMPRESSION FX, THORACIC SPINE, SEQUELA: Primary | ICD-10-CM

## 2023-12-05 LAB
ANION GAP SERPL CALC-SCNC: 6 MMOL/L (ref 3–11)
BASOPHILS # BLD AUTO: 0.1 10*3/UL
BASOPHILS NFR BLD AUTO: 0.8 % (ref 0–2)
BUN SERPL-MCNC: 16 MG/DL (ref 7–25)
CALCIUM SERPL-MCNC: 8.9 MG/DL (ref 8.6–10.3)
CHLORIDE SERPL-SCNC: 103 MMOL/L (ref 98–107)
CO2 SERPL-SCNC: 24 MMOL/L (ref 21–32)
CREAT SERPL-MCNC: 0.82 MG/DL (ref 0.7–1.3)
EGFRCR SERPLBLD CKD-EPI 2021: 84 ML/MIN/1.73M*2
EOSINOPHIL # BLD AUTO: 0.1 10*3/UL
EOSINOPHIL NFR BLD AUTO: 1.7 % (ref 0–3)
ERYTHROCYTE [DISTWIDTH] IN BLOOD BY AUTOMATED COUNT: 13.6 % (ref 11.5–15)
GLUCOSE BLDC GLUCOMTR-MCNC: 113 MG/DL (ref 70–105)
GLUCOSE BLDC GLUCOMTR-MCNC: 135 MG/DL (ref 70–105)
GLUCOSE BLDC GLUCOMTR-MCNC: 142 MG/DL (ref 70–105)
GLUCOSE BLDC GLUCOMTR-MCNC: 173 MG/DL (ref 70–105)
GLUCOSE SERPL-MCNC: 106 MG/DL (ref 70–105)
HCT VFR BLD AUTO: 36.2 % (ref 38–50)
HGB BLD-MCNC: 13 G/DL (ref 13.2–17.2)
LYMPHOCYTES # BLD AUTO: 1.2 10*3/UL
LYMPHOCYTES NFR BLD AUTO: 18.9 % (ref 15–47)
MCH RBC QN AUTO: 33.1 PG (ref 29–34)
MCHC RBC AUTO-ENTMCNC: 35.8 G/DL (ref 32–36)
MCV RBC AUTO: 92.4 FL (ref 82–97)
MONOCYTES # BLD AUTO: 0.8 10*3/UL
MONOCYTES NFR BLD AUTO: 12.4 % (ref 5–13)
NEUTROPHILS # BLD AUTO: 4.2 10*3/UL
NEUTROPHILS NFR BLD AUTO: 66.2 % (ref 46–70)
PLATELET # BLD AUTO: 213 10*3/UL (ref 130–350)
PMV BLD AUTO: 7.3 FL (ref 6.9–10.8)
POTASSIUM SERPL-SCNC: 4 MMOL/L (ref 3.5–5.1)
RBC # BLD AUTO: 3.92 10*6/ΜL (ref 4.1–5.8)
SODIUM SERPL-SCNC: 133 MMOL/L (ref 135–145)
WBC # BLD AUTO: 6.4 10*3/UL (ref 3.7–9.6)

## 2023-12-05 PROCEDURE — 82947 ASSAY GLUCOSE BLOOD QUANT: CPT | Mod: QW

## 2023-12-05 PROCEDURE — 99221 1ST HOSP IP/OBS SF/LOW 40: CPT | Performed by: NURSE PRACTITIONER

## 2023-12-05 PROCEDURE — G0378 HOSPITAL OBSERVATION PER HR: HCPCS

## 2023-12-05 PROCEDURE — 6370000100 HC RX 637 (ALT 250 FOR IP): Performed by: FAMILY MEDICINE

## 2023-12-05 PROCEDURE — 85025 COMPLETE CBC W/AUTO DIFF WBC: CPT | Performed by: FAMILY MEDICINE

## 2023-12-05 PROCEDURE — 36415 COLL VENOUS BLD VENIPUNCTURE: CPT | Performed by: FAMILY MEDICINE

## 2023-12-05 PROCEDURE — 99232 SBSQ HOSP IP/OBS MODERATE 35: CPT | Performed by: HOSPITALIST

## 2023-12-05 PROCEDURE — 6370000100 HC RX 637 (ALT 250 FOR IP): Performed by: NURSE PRACTITIONER

## 2023-12-05 PROCEDURE — 80048 BASIC METABOLIC PNL TOTAL CA: CPT | Performed by: FAMILY MEDICINE

## 2023-12-05 PROCEDURE — 93010 ELECTROCARDIOGRAM REPORT: CPT | Performed by: INTERNAL MEDICINE

## 2023-12-05 RX ORDER — LORAZEPAM 2 MG/ML
1 INJECTION INTRAMUSCULAR ONCE
Status: DISCONTINUED | OUTPATIENT
Start: 2023-12-05 | End: 2023-12-05

## 2023-12-05 RX ORDER — TALC
3 POWDER (GRAM) TOPICAL ONCE
Status: COMPLETED | OUTPATIENT
Start: 2023-12-05 | End: 2023-12-05

## 2023-12-05 RX ADMIN — AMLODIPINE BESYLATE 5 MG: 5 TABLET ORAL at 09:00

## 2023-12-05 RX ADMIN — FAMOTIDINE 10 MG: 20 TABLET, FILM COATED ORAL at 19:43

## 2023-12-05 RX ADMIN — SPIRONOLACTONE 25 MG: 25 TABLET ORAL at 09:00

## 2023-12-05 RX ADMIN — LOSARTAN POTASSIUM 50 MG: 50 TABLET, FILM COATED ORAL at 09:00

## 2023-12-05 RX ADMIN — TAMSULOSIN HYDROCHLORIDE 0.4 MG: 0.4 CAPSULE ORAL at 19:43

## 2023-12-05 RX ADMIN — OLANZAPINE 5 MG: 5 TABLET, ORALLY DISINTEGRATING ORAL at 19:54

## 2023-12-05 RX ADMIN — FLUOXETINE HYDROCHLORIDE 40 MG: 20 CAPSULE ORAL at 19:43

## 2023-12-05 RX ADMIN — Medication 500 MG: at 19:43

## 2023-12-05 RX ADMIN — OLANZAPINE 5 MG: 5 TABLET, ORALLY DISINTEGRATING ORAL at 14:26

## 2023-12-05 RX ADMIN — ASPIRIN 81 MG: 81 TABLET ORAL at 09:00

## 2023-12-05 RX ADMIN — METOPROLOL SUCCINATE 25 MG: 25 TABLET, EXTENDED RELEASE ORAL at 09:00

## 2023-12-05 RX ADMIN — Medication 3 MG: at 19:43

## 2023-12-05 RX ADMIN — TAMSULOSIN HYDROCHLORIDE 0.4 MG: 0.4 CAPSULE ORAL at 09:00

## 2023-12-05 RX ADMIN — FAMOTIDINE 10 MG: 20 TABLET, FILM COATED ORAL at 09:00

## 2023-12-05 RX ADMIN — FINASTERIDE 5 MG: 5 TABLET, FILM COATED ORAL at 19:43

## 2023-12-05 RX ADMIN — ATORVASTATIN CALCIUM 40 MG: 40 TABLET, FILM COATED ORAL at 19:43

## 2023-12-05 NOTE — INTERDISCIPLINARY/THERAPY
Case Management Admission Note    Phone # [phone]    Living Situation: Spouse/significant other Private residence            ADLs: Needs Assistance  Stairs: No    HME/CPAP: Standard Wheelchair, Front Wheeled Walker, Shower Chair      Oxygen: No   Home Health:No  Current Resources: Housekeeping, Other (Comment) (respite care-private pay)   Diabetes/supplies: Do you have Diabetes?: Yes  Do you have diabetic supplies?: Yes  PCP: Malena Mcclain MD  Funding: [facility]  Pharmacy:[id number] [address] AT [facility] OF [facility] [facility]       Support Person: Primary Emergency Contact: Alessandra Wong, Home Phone: [phone], Mobile Phone: [phone], Relation: Spouse  Advance Directives (For Healthcare)  Advance Directive: Patient has advance directive, copy in chart  Needs transportation assistance at DC: Family     Discharge Needs/Barriers:  PT/OT, pain management, MRI  Narrative: Chart reviewed. Pt needs discussed in MDR. CM called pt's spouse as pt has been confused. CM introduced self and educated pt's spouse on CM role. Pt has inpatient rehab earlier this year at [facility]. If rehab is needed again, pt's spouse would like pt to rehab at [facility]. Awaiting PT/OT evaluations. Pt's spouse denies discharge questions or concerns at this time. CM will continue to follow for discharge planning.  Dispo: DOP

## 2023-12-05 NOTE — INTERDISCIPLINARY/THERAPY
NUTRITION ASSESSMENT/REASSESSMENT    PERTINENT MEDICAL DIAGNOSIS/PROBLEMS:     Principle problem/diagnosis/procedures:   Left knee pain with associated effusion, Left total knee arthroplasty, 1/22, T12 and L4 compression deformities/fractures, Generalized weakness, Dementia, B12 and vitamin D deficiency, Type 2 diabetes mellitus, Neuropathy history, Obstructive sleep apnea, Gastroesophageal reflux disease history, Of coronary artery disease history, Benign prostatic hypertrophy history, Mood disorder history     PMH:     Past Medical History:   Diagnosis Date    CAD (coronary artery disease)     Elevated cholesterol     GERD (gastroesophageal reflux disease)     Hiatal hernia     History of shingles     Hypertension     Macular degeneration     Metabolic syndrome     Vertigo       Past Surgical History:   Procedure Laterality Date    OTHER SURGICAL HISTORY Left 08/2016    quad tendon repair    QUADRICEPS REPAIR Left 2015    TONSILLECTOMY      TOTAL ANKLE ARTHROPLASTY Left     TOTAL HIP ARTHROPLASTY Right     TOTAL HIP ARTHROPLASTY Left     TOTAL KNEE ARTHROPLASTY  januauary 2nd 2022    TRANSURETHRAL RESECTION OF PROSTATE             NUTRITION FOCUSED PHYSICAL EXAM (NFPE):  Physical Exam  Physical Exam:  (RD to complete NFPE as able.)    Subcutaneous Fat Loss       Muscle Wasting       Physical Findings       MONITORING/EVALUATION:    Labs:  Reviewed 12/5.  Results from last 4 days   Lab Units 12/05/23  0439 12/04/23  1015   POTASSIUM MMOL/L 4.0 4.4   CHLORIDE mmol/L 103 103   SODIUM mmol/L 133* 134*   BUN mg/dL 16 26*   CREATININE mg/dL 0.82 0.95   CO2 mmol/L 24 22   HEMOGLOBIN A1C %  --  6.4*   ANION GAP mmol/L 6 9   GLUCOSE mg/dL 106* 119*   CALCIUM mg/dL 8.9 8.9   AST U/L  --  16   ALT U/L  --  12   ALK PHOS U/L  --  103   TOTAL PROTEIN g/dL  --  6.1   ALBUMIN g/dL  --  3.7   BILIRUBIN TOTAL mg/dL  --  0.95   EGFR mL/min/1.73m*2 84 77   PHOSPHORUS mg/dL  --  3.0   MAGNESIUM mg/dL  --  1.9   VITAMIN B 12 pg/mL  --  " 343   VIT D 25 HYDROXY ng/mL  --  30      Lab Results   Component Value Date    HGBA1C 6.4 (H) 12/04/2023     Lab Results   Component Value Date    POCGLU 173 (H) 12/05/2023    POCGLU 113 (H) 12/05/2023    POCGLU 117 (H) 12/04/2023    POCGLU 119 (H) 12/04/2023    POCGLU 147 (H) 03/02/2023     Pertinent Meds: Scheduled Meds:enoxaparin, 40 mg, subcutaneous, Daily at 1400  lidocaine, 1 patch, Topical, Daily  sennosides-docusate sodium, 1 tablet, oral, 2x daily  polyethylene glycol, 17 g, oral, Daily  insulin short-acting 100 unit/mL SQ injection (correction dose), 0-15 Units, subcutaneous, Insulin: 4x daily with meals  famotidine, 10 mg, oral, 2x daily  amLODIPine, 5 mg, oral, q AM  aspirin, 81 mg, oral, q AM  atorvastatin, 40 mg, oral, q PM  finasteride, 5 mg, oral, q PM  FLUoxetine, 40 mg, oral, q PM  losartan, 50 mg, oral, q AM  metoprolol succinate XL, 25 mg, oral, q AM  spironolactone, 25 mg, oral, q AM  tamsulosin, 0.4 mg, oral, 2x daily      Continuous Infusions:   PRN Meds: Milk of magnesia, Zofran (none given).  Neuro status: Alert & disoriented x3  Respiratory status:   RA  GI findings: Last BM: PTA  Wounds:    none  Edema (per nursing documentation):  no edema as of 12/5 (no edema on admit)    ANTHROPOMETRICS:  Wt Change in hospital:  new admit   Ht Readings from Last 3 Encounters:   12/04/23 1.829 m (6')   08/29/23 1.803 m (5' 11\")   08/07/23 1.803 m (5' 11\")     Weights (Current Encounter Only) (last 180 days)       Date/Time Weight    12/04/23 1505 78 kg (171 lb 14.4 oz)          Wt Readings from Last 10 Encounters:   12/04/23 78 kg (171 lb 14.4 oz)   08/29/23 86.6 kg (191 lb)   08/07/23 86.8 kg (191 lb 5.8 oz)   04/21/23 86.8 kg (191 lb 5.8 oz)   02/26/23 86.8 kg (191 lb 5.8 oz)   01/14/23 80.3 kg (177 lb 0.5 oz)   09/02/22 82.6 kg (182 lb)   08/05/22 86.6 kg (191 lb)   03/21/22 86.6 kg (191 lb)   12/20/21 86.6 kg (191 lb)     Admit Weight: 78 kg (171 lb 14.4 oz) 12/4/2023  Admit BMI (kg/m2): " 23.31  IBW/kg (Calculated) Male: 80.8 kg  Weight Category: Acceptable  Wt Change Hx: -8.6 kg weight loss (10%) x 3 months prior to admission (8/29/23- 12/04/23), clinically significant  Per wife, UBW: 190 lbs 6 months ago    ORAL/DENTAL STATUS:          FOOD ALLERGIES:    No Known Allergies  Jew/CULTURAL REQUESTS:      None  Cultural Requests During Hospitalization: none  Spiritual Requests During Hospitalization: none    Social Determinants of Health with Concerns     Tobacco Use: Medium Risk (12/4/2023)    Patient History     Smoking Tobacco Use: Former     Smokeless Tobacco Use: Never     Passive Exposure: Not on file   Alcohol Use: Not on file   Financial Resource Strain: Not on file   Physical Activity: Not on file   Stress: Not on file   Social Connections: Not on file   Depression: Not on file   Postpartum Depression: Not on file     Hunger Screen:        Meal/Supplement Intake:  12/5: Pt ate 95% breakfast on 12/5 (only meal documented). No supplements ordered.      Average Percent Meals Eaten (%): 95 Avg %  Average Percent Supplement Eaten (%): 0 Avg %    NUTRITION PRESCRIPTION:       Dietary Orders   (From admission, onward)                 Start     Ordered    12/05/23 0621  Diet Regular  Diet effective now        Comments: Automatic trays   Question Answer Comment   Nutrition Therapy Protocol (Dietitian May Adjust Diet and Nourishments) Yes    Diet type Regular        12/05/23 0621                  Estimated Needs:  Total Energy Estimated Needs: 1560- 1794 kcal/day (20-23 kcal/kg ABW)  Total Protein Estimated Needs: 78- 94 g/day (1-1.2 g/kg ABW)  Total Fluid Estimated Needs: 1950 mL/day (25 mL/kg ABW)      SUMMARY 12/5: Pt seen by Nutrition Services for +Consult to Nutrition Services. Pt presents with extremity weakness, LLE pain. Pt presents with failure to thrive, decreased appetite, poor nutrition and inactivity along with age related physical debility, malnutrition suspected per admission note.  Per Care Everywhere data, pt with -8.6 kg weight loss (10%) x 3 months prior to admission (8/29/23- 12/04/23), clinically significant. Per discussion with patient's wife, he has had a decreased appetite over the past few months. She reports he drinks Ensure supplements at home, various flavors. Pt UBW: 190 lbs but has been gradually losing over the past few months. Disposition: DOP      ____________________________________________________________________  NUTRITION CARE PLAN     MALNUTRITION:  Malnutrition Present On Admission: Yes  Parameters for Malnutrition: Severe Xhpocxrmsgnc-Mavvjmboce-hiemxjy (NC-4.1.1.2)  Etiology: decreased appetite/ intakes related to failure to thrive, advanced age with some confusion  Signs/Symptoms:     Weight Loss: -8.6 kg weight loss (10%) x 3 months prior to admission (8/29/23- 12/04/23)  Energy Intake: <75% compared with estimated needs for >1 month prior to admission    Goals: Pt to consume >75% meals during admission, no weight loss below 78 kg  Progress: new admit, goals initiated 12/5  Care Plan Documented:  yes    Interventions:  12/5:   -Diet: Regular  -Nutrition Supplementation: add Medical Nutrition Suppl. (ND-3.1.1):  Boost Plus 8 oz TID with meal trays (14g PRO, 45g CHO, 360 Kehinde, 1gm fiber per 8 oz)  -Add daily multivitamin with minerals             Discharge nutrition recommendations: Regular diet with oral nutrition supplementation as needed for poor appetite.

## 2023-12-05 NOTE — CONSULTATION
Neurosurgery:    Patient ID: [id number]:   Joseph Wong 10/20/1934 is a 89 y.o. male.                                                          CC:  Left leg pain    History of Present Illness:  This is an 89-year-old male resting in consultation admit date 12/4/2023 with a history of stroke in March, was in rehab until early summer.  Since July he has been essentially wheelchair-bound.  His wife does have to assist him with all his ADLs.  Apparently the patient has been seen by neuro interventional, and dementia diagnosis was suggested.  He is on a waiting list for a neuropsych evaluation.    The patient did wake up yesterday 12/4/2023 with acute onset of left leg pain.  Per the patient's report his pain is isolated to the left posterior lateral knee.  He denies any back pain, denies any pain from the buttock down into the posterior thigh.  He points specifically to the small area around the posterior lateral left knee.  He can bend his leg however the effort elicits pain in that area.  CT lumbar spine demonstrates T12 and L4 vertebral body fractures possibly acute.  MRI has been ordered, not performed yet, the patient will most likely need some type of sedation or even general anesthesia for lumbar MRI.    No other complaints per the patient's report.    We are seeing this patient in consultation for his leg pain and compression fractures                __________________________________________________    Past History:    Note: The patient's past medical history, family history, social history, medications, allergies are reviewed.    Was history unobtainable?  Was history obtained from family members or a significant other?    HPI       Extremity Weakness     Additional comments: Patient arrives from home by EMS related to left leg weakness noted this morning while trying to get out of bed. Blood sugar 187mg/dl. No other complaints or weakness, vision change or memory loss. Patient intermittently uses wheelchair at  home for transportation and weakness issues          Last edited by Nat Conde RN on 2023  8:26 AM.        Past Medical History:   Diagnosis Date    CAD (coronary artery disease)     Elevated cholesterol     GERD (gastroesophageal reflux disease)     Hiatal hernia     History of shingles     Hypertension     Macular degeneration     Metabolic syndrome     Vertigo      Past Surgical History:   Procedure Laterality Date    OTHER SURGICAL HISTORY Left 2016    quad tendon repair    QUADRICEPS REPAIR Left     TONSILLECTOMY      TOTAL ANKLE ARTHROPLASTY Left     TOTAL HIP ARTHROPLASTY Right     TOTAL HIP ARTHROPLASTY Left     TOTAL KNEE ARTHROPLASTY  [id number]    TRANSURETHRAL RESECTION OF PROSTATE       Family History   Problem Relation Age of Onset    Hypertension Father     Stroke Father     Sleep apnea Brother      Social History     Socioeconomic History    Marital status:    Tobacco Use    Smoking status: Former     Types: Cigarettes     Quit date: 1965     Years since quittin.9    Smokeless tobacco: Never   Vaping Use    Vaping Use: Never used   Substance and Sexual Activity    Alcohol use: Yes     Alcohol/week: 1.0 standard drink of alcohol     Types: 1 Shots of liquor per week    Drug use: Never    Sexual activity: Defer     Social Determinants of Health     Tobacco Use: Medium Risk (2023)    Patient History     Smoking Tobacco Use: Former     Smokeless Tobacco Use: Never   Food Insecurity: No Food Insecurity (2023)    Hunger Vital Sign     Worried About Running Out of Food in the Last Year: Never true     Ran Out of Food in the Last Year: Never true   Transportation Needs: No Transportation Needs (2023)    PRAPARE - Transportation     Lack of Transportation (Medical): No     Lack of Transportation (Non-Medical): No   Housing Stability: Low Risk  (2023)    Housing Stability Vital Sign     Unable to Pay for Housing in the Last Year: No     Number of  Places Lived in the Last Year: 1     Unstable Housing in the Last Year: No   Utilities: Not At Risk (12/4/2023)    [facility] Utilities     Threatened with loss of utilities: No       Current Medications:  No current facility-administered medications on file prior to encounter.     Current Outpatient Medications on File Prior to Encounter   Medication Sig    atorvastatin (LIPITOR) 40 mg tablet Take 1 tablet (40 mg total) by mouth every evening    finasteride (PROSCAR) 5 mg tablet Take 1 tablet (5 mg total) by mouth every evening    spironolactone (ALDACTONE) 25 mg tablet Take 1 tablet (25 mg total) by mouth every morning    acetaminophen (TYLENOL) 325 mg tablet Take 2 tablets (650 mg total) by mouth every 4 (four) hours as needed    aspirin 81 mg EC tablet Take 1 tablet (81 mg total) by mouth every morning    tamsulosin (FLOMAX) 0.4 mg capsule Take 2 capsules (0.8 mg total) by mouth daily with dinner (Patient taking differently: Take 1 capsule (0.4 mg total) by mouth 2 (two) times a day)    losartan (COZAAR) 50 mg tablet Take 1 tablet (50 mg total) by mouth every morning    potassium chloride 10 mEq CR tablet Take 2 tablets (20 mEq total) by mouth 2 (two) times a day    FLUoxetine (PROzac) 40 mg capsule Take 1 capsule (40 mg total) by mouth every evening    metFORMIN (GLUCOPHAGE) 500 mg tablet Take 1 tablet (500 mg total) by mouth 2 (two) times a day with meals    metoprolol succinate XL (TOPROL-XL) 25 mg 24 hr tablet Take 1 tablet (25 mg total) by mouth every morning    KRILL OIL ORAL Take 1 tab/cap by mouth every evening    amLODIPine (NORVASC) 5 mg tablet Take 1 tablet (5 mg total) by mouth every morning       Allergies:  No Known Allergies    Review of Systems:   A complete review of systems is performed and is negative with the exception of the above illnesses and HPI    Vital Signs:  Temp:  [36.4 °C (97.5 °F)] 36.4 °C (97.5 °F)  Heart Rate:  [77] 77  Resp:  [18] 18  BP: (142)/(100) 142/100    EXAM:  Neuro:  Well-developed well-nourished male who is lying in bed with a sitter at bedside, in no obvious distress  HEENT: Normocephalic, PERRLA, extract muscle intact  Neck: Soft supple  Respiratory: Respiratory is nonlabored  Abdomen: Soft nontender nondistended  Musculoskeletal: Left leg weakness exacerbated by pain around the knee.  I do not detect any actual overt weakness, but strength is limited by the discomfort around his knee.  Patient does have a large left knee effusion present as well, moderately warm to touch.  Otherwise strength is full upper and lower extremities  Neuro: Patient with baseline dementia.  He is awake and alert.  He does answer my questions appropriately.  He does follow commands.  Cranial nerves II through XII are intact.  I did detect 2 beats of clonus on the left foot.  Negative Babinski.  Patient reports intact sensation to light touch throughout his lower extremities.  He denies back pain on palpation.  Integument: No obvious open wounds or lesion    Imaging:  CT lumbar spine demonstrates T12 and L4 compression fractures probable acute.  MRI pending    Labs:    Sodium 133, potassium 4.0, WBC 6.4, hemoglobin 13.0, hematocrit 36.2, hemoglobin A1c 6.4, ESR 5, procalcitonin 0.02, CRP less than 1, uric acid 4.0, PTT 27.6, pro time 12.0, INR 1.1    Assessment / Plan:  1: Left leg pain    2: T12 and L4 compression fractures    3: Baseline dementia    Plan:  - I will discuss this patient in further detail with Dr. Velasco  - Awaiting MRI results  - Further plan based on MRI results and per Dr. Velasco's discretion    After examination with Dr. Velasco, this patient's pain is most likely coming from his knee she has a large effusion in his tender.    We do not need to proceed with an MRI    We will obtain upright thoracolumbar x-rays prior to discharge, these can be seated, I will place that order    We do not recommend a brace as the patient does not complain of or have any back pain on  palpation    We do recommend to follow-up as an outpatient with the osteoporosis/bone health clinic    We will have the patient follow-up in approximately 4 to 6 weeks with repeat thoracolumbar x-rays to follow his compression fractures    Would recommend orthopedic consultation for left knee effusion.      Medical decision making:                A voice recognition program was used to aid in documentation of this record. Sometimes words are not printed exactly as they were spoken.  While efforts were made to carefully edit and correct any inaccuracies, some errors may be present; please take these into context.  Please contact the provider's office if you have any questions or concerns.     Electronically Signed by:  Sami Wade CNP 12/5/20237:47 AM

## 2023-12-05 NOTE — PROGRESS NOTES
[address] [address]  Daily Progress Note  Patient name: Joseph Wong  MRN: [id number]   LOS: 0 days     Subjective   Patient resting comfortably.  He complains of left knee pain.  No back discomfort complaint.  No numbness or tingling.  Objective   Vitals:Temp:  [36.4 °C (97.5 °F)-36.7 °C (98.1 °F)] 36.4 °C (97.5 °F)  Heart Rate:  [63-88] 72  Resp:  [14-18] 18  SpO2:  [90 %-96 %] 92 %  BP: (135-167)/() 141/100  Physical Exam:  HEENT: No scleral icterus, no conjunctival pallor  Neck: No neck vein distention  Lungs: Clear to auscultation bilaterally  Heart: Regular, nontachycardic  Abdomen: Soft with positive bowel sounds, nontender  Genitourinary: Deferred  Extremities: Left knee effusion, stable vertical surgical wound  Skin: No rash or jaundice  Neurologic: Alert    Review of Systems:  Per subjective  Admission on 12/04/2023   Component Date Value Ref Range Status    WBC 12/04/2023 7.3  3.7 - 9.6 10*3/uL Final    RBC 12/04/2023 4.13  4.10 - 5.80 10*6/µL Final    Hemoglobin 12/04/2023 14.0  13.2 - 17.2 g/dL Final    Hematocrit 12/04/2023 38.5  38.0 - 50.0 % Final    MCV 12/04/2023 93.3  82.0 - 97.0 fL Final    MCH 12/04/2023 33.9  29.0 - 34.0 pg Final    MCHC 12/04/2023 36.3 (H)  32.0 - 36.0 g/dL Final    RDW 12/04/2023 14.0  11.5 - 15.0 % Final    Platelets 12/04/2023 203  130 - 350 10*3/uL Final    MPV 12/04/2023 7.3  6.9 - 10.8 fL Final    Neutrophils% 12/04/2023 73.3 (H)  46.0 - 70.0 % Final    Lymphocytes% 12/04/2023 15.4  15.0 - 47.0 % Final    Monocytes% 12/04/2023 9.2  5.0 - 13.0 % Final    Eosinophils% 12/04/2023 1.4  0.0 - 3.0 % Final    Basophils% 12/04/2023 0.7  0.0 - 2.0 % Final    ANC (auto diff) 12/04/2023 5.30  10*3/UL Final    Lymphocytes Absolute 12/04/2023 1.10  10*3/uL Final    Monocytes Absolute 12/04/2023 0.70  10*3/uL Final    Eosinophils Absolute 12/04/2023 0.10  10*3/uL Final    Basophils Absolute 12/04/2023 0.10  10*3/uL Final    Sodium  12/04/2023 134 (L)  135 - 145 mmol/L Final    Potassium 12/04/2023 4.4  3.5 - 5.1 MMOL/L Final    Chloride 12/04/2023 103  98 - 107 mmol/L Final    CO2 12/04/2023 22  21 - 32 mmol/L Final    Anion Gap 12/04/2023 9  3 - 11 mmol/L Final    BUN 12/04/2023 26 (H)  7 - 25 mg/dL Final    Creatinine 12/04/2023 0.95  0.70 - 1.30 mg/dL Final    Glucose 12/04/2023 119 (H)  70 - 105 mg/dL Final    Calcium 12/04/2023 8.9  8.6 - 10.3 mg/dL Final    AST 12/04/2023 16  <40 U/L Final    ALT (SGPT) 12/04/2023 12  7 - 52 U/L Final    Alkaline Phosphatase 12/04/2023 103  45 - 115 U/L Final    Total Protein 12/04/2023 6.1  6.0 - 8.3 g/dL Final    Albumin 12/04/2023 3.7  3.5 - 5.3 g/dL Final    Total Bilirubin 12/04/2023 0.95  0.20 - 1.40 mg/dL Final    Corrected Calcium 12/04/2023 9.1  8.6 - 10.3 mg/dL Final    eGFR 12/04/2023 77  >60 mL/min/1.73m*2 Final    Lipase 12/04/2023 77  11 - 82 U/L Final    Magnesium 12/04/2023 1.9  1.8 - 2.4 mg/dL Final    POC Lactate 12/04/2023 1.20  0.50 - 1.99 MMOL/L Final    CRP 12/04/2023 <1.0  <=10.0 MG/L Final    Protime 12/04/2023 12.0  9.4 - 12.5 SECONDS Final    INR 12/04/2023 1.1  <=1.1 Final    PTT 12/04/2023 27.6  25.1 - 36.5 SECONDS Final    RBC, Urine 12/04/2023 0-2  None seen, 0-2, Negative /HPF Final    WBC, Urine 12/04/2023 0-4  0 - 4 /HPF Final    WBC Clumps, Urine 12/04/2023 None seen  None seen /HPF Final    Squamous Epithelial, Urine 12/04/2023 Negative  None Seen-9 /HPF Final    Bacteria, Urine 12/04/2023 None seen  None seen, Few /HPF Final    Color, Urine 12/04/2023 Yellow  Yellow Final    Clarity, Urine 12/04/2023 Clear  Clear Final    Specific Gravity, Urine 12/04/2023 1.025  1.003 - 1.030 Final    Leukocytes, Urine 12/04/2023 Negative  Negative Final    Nitrite, Urine 12/04/2023 Negative  Negative Final    Protein, Urine 12/04/2023 10 (A)  Negative MG/DL Final    Ketones, Urine 12/04/2023 Negative  Negative MG/DL Final    Urobilinogen, Urine 12/04/2023 2.0 (A)  <2.0 mg/dL Final     Bilirubin, Urine 12/04/2023 Negative  Negative Final    Blood, Urine 12/04/2023 Negative  Negative Final    Glucose, Urine 12/04/2023 Negative  Negative MG/DL Final    pH, Urine 12/04/2023 5.5  5.0 - 8.0 PH Final    POC Glucose 12/04/2023 119 (H)  70 - 105 MG/DL Final    Uric Acid 12/04/2023 4.0 (L)  4.4 - 7.6 mg/dL Final    Procalcitonin 12/04/2023 <0.02  <0.50 ng/mL Final    Sed Rate 12/04/2023 5  <=20 mm/hr Final    Vitamin B-12 12/04/2023 343  180 - 914 pg/mL Final    Folate 12/04/2023 13.3  >6.6 ng/mL Final    Vit D, 25-Hydroxy 12/04/2023 30  30 - 100 ng/mL Final    TSH 12/04/2023 3.622  0.340 - 4.820 uIU/ml Final    Phosphorus 12/04/2023 3.0  2.5 - 4.9 mg/dL Final    Hemoglobin A1C 12/04/2023 6.4 (H)  4.0 - 6.0 % Final    Estimated Average Glucose 12/04/2023 137.0  mg/dL Final    POC Glucose 12/04/2023 117 (H)  70 - 105 MG/DL Final    WBC 12/05/2023 6.4  3.7 - 9.6 10*3/uL Final    RBC 12/05/2023 3.92 (L)  4.10 - 5.80 10*6/µL Final    Hemoglobin 12/05/2023 13.0 (L)  13.2 - 17.2 g/dL Final    Hematocrit 12/05/2023 36.2 (L)  38.0 - 50.0 % Final    MCV 12/05/2023 92.4  82.0 - 97.0 fL Final    MCH 12/05/2023 33.1  29.0 - 34.0 pg Final    MCHC 12/05/2023 35.8  32.0 - 36.0 g/dL Final    RDW 12/05/2023 13.6  11.5 - 15.0 % Final    Platelets 12/05/2023 213  130 - 350 10*3/uL Final    MPV 12/05/2023 7.3  6.9 - 10.8 fL Final    Neutrophils% 12/05/2023 66.2  46.0 - 70.0 % Final    Lymphocytes% 12/05/2023 18.9  15.0 - 47.0 % Final    Monocytes% 12/05/2023 12.4  5.0 - 13.0 % Final    Eosinophils% 12/05/2023 1.7  0.0 - 3.0 % Final    Basophils% 12/05/2023 0.8  0.0 - 2.0 % Final    ANC (auto diff) 12/05/2023 4.20  10*3/UL Final    Lymphocytes Absolute 12/05/2023 1.20  10*3/uL Final    Monocytes Absolute 12/05/2023 0.80  10*3/uL Final    Eosinophils Absolute 12/05/2023 0.10  10*3/uL Final    Basophils Absolute 12/05/2023 0.10  10*3/uL Final    Sodium 12/05/2023 133 (L)  135 - 145 mmol/L Final    Potassium 12/05/2023 4.0  3.5 -  5.1 MMOL/L Final    Chloride 12/05/2023 103  98 - 107 mmol/L Final    CO2 12/05/2023 24  21 - 32 mmol/L Final    BUN 12/05/2023 16  7 - 25 mg/dL Final    Creatinine 12/05/2023 0.82  0.70 - 1.30 mg/dL Final    Glucose 12/05/2023 106 (H)  70 - 105 mg/dL Final    Calcium 12/05/2023 8.9  8.6 - 10.3 mg/dL Final    Anion Gap 12/05/2023 6  3 - 11 mmol/L Final    eGFR 12/05/2023 84  >60 mL/min/1.73m*2 Final    POC Glucose 12/05/2023 113 (H)  70 - 105 MG/DL Final        Assessment/Plan   Left knee pain with associated effusion    Left total knee arthroplasty, 1/22  T12 and L4 compression deformities/fractures  Generalized weakness  Dementia  B12 and vitamin D deficiency  Type 2 diabetes mellitus   Neuropathy history  Obstructive sleep apnea  Gastroesophageal reflux disease history  Of coronary artery disease history  Benign prostatic hypertrophy history  Mood disorder history    Case discussed with neurosurgery and no neurosurgical intervention required.  Case discussed with orthopedic service regarding left knee effusion.  Recommending avoiding arthrocentesis secondary to prior total knee arthroplasty history.  Will provide compression brace.  Follow-up with PT and OT.    Plan  Discontinue MRI  Compression brace, left knee  Pain management as needed  PT and OT  Disposition planning        Electronically signed by: Sami Olsen MD  12/5/2023  11:11 AM

## 2023-12-05 NOTE — PLAN OF CARE
Problem: Safety Adult - Fall  Goal: Free from fall injury  Description: INTERVENTIONS:    Inpatient - Please reference Cares/Safety Flowsheet under [facility] Fall Risk for interventions.  Pediatrics - Please reference Peds Daily Cares/Safety Flowsheet under [facility] Pediatric Fall Assessment Fall Bundle for interventions  LD/OB - Please reference OB Shift Screening Flowsheet under OB Fall Risk for interventions.  Outcome: Progressing     Problem: Daily Care  Goal: Daily care needs are met  Description: INTERVENTIONS:   1. Assess and monitor skin integrity  2. Identify patients at risk for skin breakdown on admission and per policy  3. Assess and monitor ability to perform self care and identify potential discharge needs  4. Assess skin integrity/risk for skin breakdown  5. Assist patient with activities of daily living as needed  6. Encourage independent activity per ability   7. Provide mouth care   8. Include patient/family/caregiver in decisions related to daily care   Outcome: Progressing     Problem: Discharge Barriers  Goal: Patient's discharge needs are met  Description: INTERVENTIONS:  1. Assess patient for self-management skills  2. Encourage participation in management  3. Identify potential discharge barriers on admission and throughout hospital stay  4. Involve patient/family/caregiver in discharge planning process  5. Collaborate with case management/ for discharge needs  Outcome: Progressing     Problem: Potential for Compromised Skin Integrity  Goal: Nutritional status is improving  Description: INTERVENTIONS:  1. Monitor and assess patient for malnutrition (ex- brittle hair, bruises, dry skin, pale skin and conjunctiva, muscle wasting, smooth red tongue, and disorientation)  2. Monitor patient's weight and dietary intake as ordered or per policy  3. Determine patient's food preferences and provide high-protein, high-caloric foods as appropriate  4. Assist patient with eating   5. Allow  adequate time for meals   6. Encourage patient to take dietary supplement as ordered   7. Collaborate with dietitian  8. Include patient/family/caregiver in decisions related to nutrition  Outcome: Progressing

## 2023-12-05 NOTE — NURSING END OF SHIFT
Nursing End of Shift Summary:    Patient: Joseph Wong  MRN: [id number]  : 10/20/1934, Age: 89 y.o.    Location: [address] [place] [address]    Nursing Goals  Clinical Goals for the Shift: safety and comfort; hemodynamic stability    Narrative Summary of Progress Toward Clinical Goals:  Still confused but not aggressive. Able to take his oral meds last night.  With sitter.    Barriers to Goals/Nursing Concerns:  No    New Patient or Family Concerns/Issues:  No    Shift Summary:   Significant Events & Communications to Providers (last 12 hours)       Last 5 Values    No documentation.                 Oxygen Usage (last 12 hours)       Last 5 Values       Row Name 23 0010 23 0332             Room Air or Baseline Oxygen Trial by Nursing    Is Patient on Room Air OR on the Same Amount of O2 as at Home? Yes Yes Yes                    Mobility (last 12 hours)       Last 5 Values       Row Name 23 [id number] 23 0333          Mobility    Patient Position Supine Supine Supine Supine                   Urethral Catheter       Active Urethral Catheter       None                  Active Lines       Active Central venous catheter / Peripherally inserted central catheter / Implantable Port / Hemodialysis catheter / Midline Catheter       None                  Infusing Medications   Medication Dose Last Rate     PRN Medications   Medication Dose Last Admin    sodium chloride  3 mL      sodium chloride  1 spray      sodium chloride-aloe vera  1 Application      acetaminophen  500 mg      oxyCODONE  5 mg      bisacodyL  10 mg      magnesium hydroxide  30 mL      ondansetron  4 mg      Or    ondansetron  4 mg      ALPRAZolam  0.25 mg      dextrose 50 % in water (D50W)  15-30 mL      dextrose  15.2 g      glucagon  1 mg      Or    glucagon  1 mg      haloperidol lactate  2 mg 2 mg at 23 1630    OLANZapine zydis  5 mg

## 2023-12-05 NOTE — PLAN OF CARE
Problem: Safety Adult - Fall  Goal: Free from fall injury  Description: INTERVENTIONS:    Inpatient - Please reference Cares/Safety Flowsheet under [facility] Fall Risk for interventions.  Pediatrics - Please reference Peds Daily Cares/Safety Flowsheet under [facility] Pediatric Fall Assessment Fall Bundle for interventions  LD/OB - Please reference OB Shift Screening Flowsheet under OB Fall Risk for interventions.  12/5/2023 0105 by Katrina Park RN  Outcome: Progressing  12/5/2023 0105 by Katrina Park RN  Outcome: Progressing     Problem: Pain - Adult  Goal: Verbalizes/displays adequate comfort level or baseline comfort level  Description: INTERVENTIONS:  1. Encourage patient to monitor pain and request interventions  2. Assess pain using the appropriate pain scale  3. Administer analgesics based on type and severity of pain and evaluate response  4. Educate/Implement non-pharmacological measures as appropriate and evaluate response  5. Consider cultural, developmental and social influences on pain and pain management  6. Notify Provider if interventions unsuccessful or patient reports new pain  12/5/2023 0105 by Katrina Park RN  Outcome: Progressing  12/5/2023 0105 by Katrina Park RN  Outcome: Progressing     Problem: Daily Care  Goal: Daily care needs are met  Description: INTERVENTIONS:   1. Assess and monitor skin integrity  2. Identify patients at risk for skin breakdown on admission and per policy  3. Assess and monitor ability to perform self care and identify potential discharge needs  4. Assess skin integrity/risk for skin breakdown  5. Assist patient with activities of daily living as needed  6. Encourage independent activity per ability   7. Provide mouth care   8. Include patient/family/caregiver in decisions related to daily care   12/5/2023 0105 by Katrina Park RN  Outcome: Progressing  12/5/2023 0105 by Katrina Park RN  Outcome: Progressing     Problem: Knowledge Deficit  Goal:  Patient/family/caregiver demonstrates understanding of disease process, treatment plan, medications, and discharge instructions  Description: INTERVENTIONS:   1. Complete learning assessment and assess knowledge base  2. Provide teaching at level of understanding   3. Provide teaching via preferred learning methods  12/5/2023 0105 by Katrina Park RN  Outcome: Progressing  12/5/2023 0105 by Katrina Park RN  Outcome: Progressing     Problem: Discharge Barriers  Goal: Patient's discharge needs are met  Description: INTERVENTIONS:  1. Assess patient for self-management skills  2. Encourage participation in management  3. Identify potential discharge barriers on admission and throughout hospital stay  4. Involve patient/family/caregiver in discharge planning process  5. Collaborate with case management/ for discharge needs  12/5/2023 0105 by Katrina Park RN  Outcome: Progressing  12/5/2023 0105 by Katrina Park RN  Outcome: Progressing

## 2023-12-06 ENCOUNTER — APPOINTMENT (OUTPATIENT)
Dept: RADIOLOGY | Facility: HOSPITAL | Age: 88
DRG: 884 | End: 2023-12-06
Payer: MEDICARE

## 2023-12-06 LAB
GLUCOSE BLDC GLUCOMTR-MCNC: 124 MG/DL (ref 70–105)
GLUCOSE BLDC GLUCOMTR-MCNC: 125 MG/DL (ref 70–105)
GLUCOSE BLDC GLUCOMTR-MCNC: 143 MG/DL (ref 70–105)
GLUCOSE BLDC GLUCOMTR-MCNC: 145 MG/DL (ref 70–105)

## 2023-12-06 PROCEDURE — 99232 SBSQ HOSP IP/OBS MODERATE 35: CPT | Performed by: HOSPITALIST

## 2023-12-06 PROCEDURE — 2590000100 HC RX 259: Performed by: FAMILY MEDICINE

## 2023-12-06 PROCEDURE — 6360000200 HC RX 636 W HCPCS (ALT 250 FOR IP): Performed by: FAMILY MEDICINE

## 2023-12-06 PROCEDURE — 97161 PT EVAL LOW COMPLEX 20 MIN: CPT | Mod: GP | Performed by: PHYSICAL THERAPIST

## 2023-12-06 PROCEDURE — 72080 X-RAY EXAM THORACOLMB 2/> VW: CPT

## 2023-12-06 PROCEDURE — 2590000100 HC RX 259: Performed by: HOSPITALIST

## 2023-12-06 PROCEDURE — 82947 ASSAY GLUCOSE BLOOD QUANT: CPT | Mod: QW

## 2023-12-06 PROCEDURE — 6370000100 HC RX 637 (ALT 250 FOR IP): Performed by: FAMILY MEDICINE

## 2023-12-06 PROCEDURE — G0378 HOSPITAL OBSERVATION PER HR: HCPCS

## 2023-12-06 PROCEDURE — 6370000100 HC RX 637 (ALT 250 FOR IP): Performed by: HOSPITALIST

## 2023-12-06 RX ORDER — QUETIAPINE FUMARATE 25 MG/1
50 TABLET, FILM COATED ORAL NIGHTLY
Status: DISCONTINUED | OUTPATIENT
Start: 2023-12-06 | End: 2023-12-07

## 2023-12-06 RX ORDER — QUETIAPINE FUMARATE 25 MG/1
25 TABLET, FILM COATED ORAL EVERY 4 HOURS PRN
Status: DISCONTINUED | OUTPATIENT
Start: 2023-12-06 | End: 2024-01-09 | Stop reason: HOSPADM

## 2023-12-06 RX ADMIN — ENOXAPARIN SODIUM 40 MG: 100 INJECTION SUBCUTANEOUS at 14:11

## 2023-12-06 RX ADMIN — QUETIAPINE FUMARATE 50 MG: 25 TABLET ORAL at 19:39

## 2023-12-06 RX ADMIN — FLUOXETINE HYDROCHLORIDE 40 MG: 20 CAPSULE ORAL at 19:39

## 2023-12-06 RX ADMIN — FAMOTIDINE 10 MG: 20 TABLET, FILM COATED ORAL at 19:39

## 2023-12-06 RX ADMIN — QUETIAPINE FUMARATE 25 MG: 25 TABLET ORAL at 10:12

## 2023-12-06 RX ADMIN — OLANZAPINE 5 MG: 5 TABLET, ORALLY DISINTEGRATING ORAL at 04:10

## 2023-12-06 RX ADMIN — LOSARTAN POTASSIUM 50 MG: 50 TABLET, FILM COATED ORAL at 09:22

## 2023-12-06 RX ADMIN — TAMSULOSIN HYDROCHLORIDE 0.4 MG: 0.4 CAPSULE ORAL at 09:22

## 2023-12-06 RX ADMIN — FAMOTIDINE 10 MG: 20 TABLET, FILM COATED ORAL at 09:22

## 2023-12-06 RX ADMIN — ASPIRIN 81 MG: 81 TABLET ORAL at 09:22

## 2023-12-06 RX ADMIN — FINASTERIDE 5 MG: 5 TABLET, FILM COATED ORAL at 19:39

## 2023-12-06 RX ADMIN — Medication 500 MG: at 04:10

## 2023-12-06 RX ADMIN — QUETIAPINE FUMARATE 25 MG: 25 TABLET ORAL at 14:11

## 2023-12-06 RX ADMIN — SENNOSIDES AND DOCUSATE SODIUM 1 TABLET: 50; 8.6 TABLET ORAL at 09:22

## 2023-12-06 RX ADMIN — SPIRONOLACTONE 25 MG: 25 TABLET ORAL at 09:22

## 2023-12-06 RX ADMIN — POLYETHYLENE GLYCOL 3350 17 G: 17 POWDER, FOR SOLUTION ORAL at 09:22

## 2023-12-06 RX ADMIN — SENNOSIDES AND DOCUSATE SODIUM 1 TABLET: 50; 8.6 TABLET ORAL at 19:40

## 2023-12-06 RX ADMIN — METOPROLOL SUCCINATE 25 MG: 25 TABLET, EXTENDED RELEASE ORAL at 09:22

## 2023-12-06 RX ADMIN — ATORVASTATIN CALCIUM 40 MG: 40 TABLET, FILM COATED ORAL at 19:39

## 2023-12-06 RX ADMIN — AMLODIPINE BESYLATE 5 MG: 5 TABLET ORAL at 09:22

## 2023-12-06 RX ADMIN — TAMSULOSIN HYDROCHLORIDE 0.4 MG: 0.4 CAPSULE ORAL at 19:39

## 2023-12-06 RX ADMIN — Medication 1 TABLET: at 09:22

## 2023-12-06 RX ADMIN — Medication 500 MG: at 19:39

## 2023-12-06 ASSESSMENT — ACTIVITIES OF DAILY LIVING (ADL): ADEQUATE_TO_COMPLETE_ADL: YES

## 2023-12-06 NOTE — NURSING END OF SHIFT
"Nursing End of Shift Summary:    Patient: Joseph Wong  MRN: [id number]  : 10/20/1934, Age: 89 y.o.    Location: [address] [place] [address]    Nursing Goals  Clinical Goals for the Shift: manage behavior/confsuion, maintain safety and comfort    Narrative Summary of Progress Toward Clinical Goals:  Pt is only oriented to self; he keeps tring to get out of bed \"to fix a tire in [place]\" or \"Go down the hill or build a gutter\" ;fidgety, removes gown, also removed is external catheter. Unable to follow commands, pt will say \"Ok\" to instructions but does the opposite. 1:1 sitter ordered and at bedside. Pt was able to sleep from 0000-4640. When asked about pain he responds, \"A little'; Tylenol and Olazapine given 2x.    Barriers to Goals/Nursing Concerns:  Yes - Needs med adjustment for behaviors; Placement    New Patient or Family Concerns/Issues:  Yes - Wife Alessandra was called and updated at ; according to her this is not new; when this behavior occurs at home, he would walk him around w/ a walker, sit him in the chair and hold his hand until he falls asleep. We tried this intervention; it took 3 people to stood him up and transferred him to the to recliner at 2100. He continued to become restless and got him back to bed at 2130 for safety.    Shift Summary:   Significant Events & Communications to Providers (last 12 hours)       Last 5 Values       Row Name 23 17423                Provider Notification    Reason for Communication Family request  -MB Review case  restless/sundowing; need for 1:1 sitter  -ABDULAZIZ       Provider Name Melinda AGUIAR                 User Key  (r) = Recorded By, (t) = Taken By, (c) = Cosigned By      Initials Name    Tiffany Lee RN MB Barquilla, Michelle, RN                  Oxygen Usage (last 12 hours)       Last 5 Values    No documentation.                 Mobility (last 12 hours)       Last 5 Values       Row Name 23 1800 23 " 12/05/23 2100 12/05/23 2200 12/05/23 2771       Mobility    Activity Turn -- Chair Turn --    Level of Assistance -- -- Maximum assist, patient does 25-49% -- --    Length of Time in Chair (min) 0 -- 30 0 --    Distance Ambulated (feet) 0 Feet -- 2 Feet 0 Feet --    Distance Ambulated (Meters Calculated) 0 Meters -- 0.61 Meters 0 Meters --    Patient Position -- Supine Sitting Supine --    Turning Pillow support Turns self -- Turns self Turns self    Distance Ambulated (Meters Calculated)(Do Not Use) 0 Feet -- 0.61 Feet 0 Feet --      Row Name 12/06/23 0200 12/06/23 0338                Mobility    Turning Turns self Turns self                     Urethral Catheter       Active Urethral Catheter       None                  Active Lines       Active Central venous catheter / Peripherally inserted central catheter / Implantable Port / Hemodialysis catheter / Midline Catheter       None                  Infusing Medications   Medication Dose Last Rate     PRN Medications   Medication Dose Last Admin    sodium chloride  3 mL      sodium chloride  1 spray      sodium chloride-aloe vera  1 Application      acetaminophen  500 mg 500 mg at 12/06/23 0410    oxyCODONE  5 mg      magnesium hydroxide  30 mL      ondansetron  4 mg      Or    ondansetron  4 mg      dextrose 50 % in water (D50W)  15-30 mL      dextrose  15.2 g      glucagon  1 mg      Or    glucagon  1 mg      OLANZapine zydis  5 mg 5 mg at 12/06/23 0410

## 2023-12-06 NOTE — PROGRESS NOTES
[address] [address]  Daily Progress Note  Patient name: Joseph Wong  MRN: [id number]   LOS: 0 days     Subjective   Patient resting comfortably.  Left knee pain.  No fever or chills.  Objective   Vitals:Temp:  [36.6 °C (97.9 °F)-36.8 °C (98.3 °F)] 36.8 °C (98.3 °F)  Heart Rate:  [68-72] 68  Resp:  [18-19] 18  SpO2:  [91 %-92 %] 92 %  BP: (132-147)/() 147/101  Physical Exam:  HEENT: No scleral icterus, no conjunctival pallor  Neck: No neck vein distention  Lungs: Clear to auscultation bilaterally  Heart: Regular, nontachycardic  Abdomen: Soft with positive bowel sounds  Genitourinary: Deferred  Extremities: Left knee effusion  Skin: No rash or jaundice  Neurologic: Alert, oriented to person    Review of Systems:  Per subjective, patient with agitation last evening requiring one-on-one  Admission on 12/04/2023   Component Date Value Ref Range Status    WBC 12/04/2023 7.3  3.7 - 9.6 10*3/uL Final    RBC 12/04/2023 4.13  4.10 - 5.80 10*6/µL Final    Hemoglobin 12/04/2023 14.0  13.2 - 17.2 g/dL Final    Hematocrit 12/04/2023 38.5  38.0 - 50.0 % Final    MCV 12/04/2023 93.3  82.0 - 97.0 fL Final    MCH 12/04/2023 33.9  29.0 - 34.0 pg Final    MCHC 12/04/2023 36.3 (H)  32.0 - 36.0 g/dL Final    RDW 12/04/2023 14.0  11.5 - 15.0 % Final    Platelets 12/04/2023 203  130 - 350 10*3/uL Final    MPV 12/04/2023 7.3  6.9 - 10.8 fL Final    Neutrophils% 12/04/2023 73.3 (H)  46.0 - 70.0 % Final    Lymphocytes% 12/04/2023 15.4  15.0 - 47.0 % Final    Monocytes% 12/04/2023 9.2  5.0 - 13.0 % Final    Eosinophils% 12/04/2023 1.4  0.0 - 3.0 % Final    Basophils% 12/04/2023 0.7  0.0 - 2.0 % Final    ANC (auto diff) 12/04/2023 5.30  10*3/UL Final    Lymphocytes Absolute 12/04/2023 1.10  10*3/uL Final    Monocytes Absolute 12/04/2023 0.70  10*3/uL Final    Eosinophils Absolute 12/04/2023 0.10  10*3/uL Final    Basophils Absolute 12/04/2023 0.10  10*3/uL Final    Sodium 12/04/2023 134 (L)   135 - 145 mmol/L Final    Potassium 12/04/2023 4.4  3.5 - 5.1 MMOL/L Final    Chloride 12/04/2023 103  98 - 107 mmol/L Final    CO2 12/04/2023 22  21 - 32 mmol/L Final    Anion Gap 12/04/2023 9  3 - 11 mmol/L Final    BUN 12/04/2023 26 (H)  7 - 25 mg/dL Final    Creatinine 12/04/2023 0.95  0.70 - 1.30 mg/dL Final    Glucose 12/04/2023 119 (H)  70 - 105 mg/dL Final    Calcium 12/04/2023 8.9  8.6 - 10.3 mg/dL Final    AST 12/04/2023 16  <40 U/L Final    ALT (SGPT) 12/04/2023 12  7 - 52 U/L Final    Alkaline Phosphatase 12/04/2023 103  45 - 115 U/L Final    Total Protein 12/04/2023 6.1  6.0 - 8.3 g/dL Final    Albumin 12/04/2023 3.7  3.5 - 5.3 g/dL Final    Total Bilirubin 12/04/2023 0.95  0.20 - 1.40 mg/dL Final    Corrected Calcium 12/04/2023 9.1  8.6 - 10.3 mg/dL Final    eGFR 12/04/2023 77  >60 mL/min/1.73m*2 Final    Lipase 12/04/2023 77  11 - 82 U/L Final    Magnesium 12/04/2023 1.9  1.8 - 2.4 mg/dL Final    POC Lactate 12/04/2023 1.20  0.50 - 1.99 MMOL/L Final    CRP 12/04/2023 <1.0  <=10.0 MG/L Final    Protime 12/04/2023 12.0  9.4 - 12.5 SECONDS Final    INR 12/04/2023 1.1  <=1.1 Final    PTT 12/04/2023 27.6  25.1 - 36.5 SECONDS Final    RBC, Urine 12/04/2023 0-2  None seen, 0-2, Negative /HPF Final    WBC, Urine 12/04/2023 0-4  0 - 4 /HPF Final    WBC Clumps, Urine 12/04/2023 None seen  None seen /HPF Final    Squamous Epithelial, Urine 12/04/2023 Negative  None Seen-9 /HPF Final    Bacteria, Urine 12/04/2023 None seen  None seen, Few /HPF Final    Color, Urine 12/04/2023 Yellow  Yellow Final    Clarity, Urine 12/04/2023 Clear  Clear Final    Specific Gravity, Urine 12/04/2023 1.025  1.003 - 1.030 Final    Leukocytes, Urine 12/04/2023 Negative  Negative Final    Nitrite, Urine 12/04/2023 Negative  Negative Final    Protein, Urine 12/04/2023 10 (A)  Negative MG/DL Final    Ketones, Urine 12/04/2023 Negative  Negative MG/DL Final    Urobilinogen, Urine 12/04/2023 2.0 (A)  <2.0 mg/dL Final    Bilirubin, Urine  12/04/2023 Negative  Negative Final    Blood, Urine 12/04/2023 Negative  Negative Final    Glucose, Urine 12/04/2023 Negative  Negative MG/DL Final    pH, Urine 12/04/2023 5.5  5.0 - 8.0 PH Final    POC Glucose 12/04/2023 119 (H)  70 - 105 MG/DL Final    Uric Acid 12/04/2023 4.0 (L)  4.4 - 7.6 mg/dL Final    Procalcitonin 12/04/2023 <0.02  <0.50 ng/mL Final    Sed Rate 12/04/2023 5  <=20 mm/hr Final    Vitamin B-12 12/04/2023 343  180 - 914 pg/mL Final    Folate 12/04/2023 13.3  >6.6 ng/mL Final    Vit D, 25-Hydroxy 12/04/2023 30  30 - 100 ng/mL Final    TSH 12/04/2023 3.622  0.340 - 4.820 uIU/ml Final    Phosphorus 12/04/2023 3.0  2.5 - 4.9 mg/dL Final    Hemoglobin A1C 12/04/2023 6.4 (H)  4.0 - 6.0 % Final    Estimated Average Glucose 12/04/2023 137.0  mg/dL Final    POC Glucose 12/04/2023 117 (H)  70 - 105 MG/DL Final    WBC 12/05/2023 6.4  3.7 - 9.6 10*3/uL Final    RBC 12/05/2023 3.92 (L)  4.10 - 5.80 10*6/µL Final    Hemoglobin 12/05/2023 13.0 (L)  13.2 - 17.2 g/dL Final    Hematocrit 12/05/2023 36.2 (L)  38.0 - 50.0 % Final    MCV 12/05/2023 92.4  82.0 - 97.0 fL Final    MCH 12/05/2023 33.1  29.0 - 34.0 pg Final    MCHC 12/05/2023 35.8  32.0 - 36.0 g/dL Final    RDW 12/05/2023 13.6  11.5 - 15.0 % Final    Platelets 12/05/2023 213  130 - 350 10*3/uL Final    MPV 12/05/2023 7.3  6.9 - 10.8 fL Final    Neutrophils% 12/05/2023 66.2  46.0 - 70.0 % Final    Lymphocytes% 12/05/2023 18.9  15.0 - 47.0 % Final    Monocytes% 12/05/2023 12.4  5.0 - 13.0 % Final    Eosinophils% 12/05/2023 1.7  0.0 - 3.0 % Final    Basophils% 12/05/2023 0.8  0.0 - 2.0 % Final    ANC (auto diff) 12/05/2023 4.20  10*3/UL Final    Lymphocytes Absolute 12/05/2023 1.20  10*3/uL Final    Monocytes Absolute 12/05/2023 0.80  10*3/uL Final    Eosinophils Absolute 12/05/2023 0.10  10*3/uL Final    Basophils Absolute 12/05/2023 0.10  10*3/uL Final    Sodium 12/05/2023 133 (L)  135 - 145 mmol/L Final    Potassium 12/05/2023 4.0  3.5 - 5.1 MMOL/L Final     Chloride 12/05/2023 103  98 - 107 mmol/L Final    CO2 12/05/2023 24  21 - 32 mmol/L Final    BUN 12/05/2023 16  7 - 25 mg/dL Final    Creatinine 12/05/2023 0.82  0.70 - 1.30 mg/dL Final    Glucose 12/05/2023 106 (H)  70 - 105 mg/dL Final    Calcium 12/05/2023 8.9  8.6 - 10.3 mg/dL Final    Anion Gap 12/05/2023 6  3 - 11 mmol/L Final    eGFR 12/05/2023 84  >60 mL/min/1.73m*2 Final    POC Glucose 12/05/2023 113 (H)  70 - 105 MG/DL Final    POC Glucose 12/05/2023 173 (H)  70 - 105 MG/DL Final    POC Glucose 12/05/2023 135 (H)  70 - 105 MG/DL Final    POC Glucose 12/05/2023 142 (H)  70 - 105 MG/DL Final    POC Glucose 12/06/2023 125 (H)  70 - 105 MG/DL Final    POC Glucose 12/06/2023 143 (H)  70 - 105 MG/DL Final        Assessment/Plan   Left knee pain with associated effusion                       Left total knee arthroplasty, 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  Case discussed with orthopedic service regarding left knee effusion.  Recommending avoiding arthrocentesis secondary to prior total knee arthroplasty history.  Will provide compression brace.  Follow-up with PT and OT.) 12/5/23    Patient observed with therapy and he did not do well with stability and mobility concerns.  Discussed with patient's wife and she is quite concerned about his safety at home and stability.  He has been having worsening dementia and last evening agitation noted requiring one-on-one sitter.  Seroquel initiated to assist with his agitation.     Plan  Pain management as needed  PT and OT  Disposition planning, recommending placement and most likely  will need consideration for dementia unit    Electronically signed by: Sami Olsen MD  12/6/2023  12:51 PM

## 2023-12-06 NOTE — CROSS COVER NOTE
Upright thoracolumbar x-rays are reviewed.  These do remain stable.    No neurosurgical intervention warranted during this visit.    We will arrange follow-up in clinic in approximate 4 weeks with repeat x-rays    Neurosurgery will sign off.  Please let us know if there is anything further we can do to assist in the care of this patient  I will put discharge instructions in the discharge instruction tab as well.  No lifting greater than 5 to 10 pounds, avoid any significant bending or twisting.  No brace necessary.

## 2023-12-06 NOTE — PLAN OF CARE
Problem: Safety Adult - Fall  Goal: Free from fall injury  Description: INTERVENTIONS:    Inpatient - Please reference Cares/Safety Flowsheet under [facility] Fall Risk for interventions.  Pediatrics - Please reference Peds Daily Cares/Safety Flowsheet under [facility] Pediatric Fall Assessment Fall Bundle for interventions  LD/OB - Please reference OB Shift Screening Flowsheet under OB Fall Risk for interventions.  Outcome: Progressing     Problem: Pain - Adult  Goal: Verbalizes/displays adequate comfort level or baseline comfort level  Description: INTERVENTIONS:  1. Encourage patient to monitor pain and request interventions  2. Assess pain using the appropriate pain scale  3. Administer analgesics based on type and severity of pain and evaluate response  4. Educate/Implement non-pharmacological measures as appropriate and evaluate response  5. Consider cultural, developmental and social influences on pain and pain management  6. Notify Provider if interventions unsuccessful or patient reports new pain  Outcome: Progressing     Problem: Daily Care  Goal: Daily care needs are met  Description: INTERVENTIONS:   1. Assess and monitor skin integrity  2. Identify patients at risk for skin breakdown on admission and per policy  3. Assess and monitor ability to perform self care and identify potential discharge needs  4. Assess skin integrity/risk for skin breakdown  5. Assist patient with activities of daily living as needed  6. Encourage independent activity per ability   7. Provide mouth care   8. Include patient/family/caregiver in decisions related to daily care   Outcome: Progressing     Problem: Knowledge Deficit  Goal: Patient/family/caregiver demonstrates understanding of disease process, treatment plan, medications, and discharge instructions  Description: INTERVENTIONS:   1. Complete learning assessment and assess knowledge base  2. Provide teaching at level of understanding   3. Provide teaching via preferred  learning methods  Outcome: Progressing     Problem: Discharge Barriers  Goal: Patient's discharge needs are met  Description: INTERVENTIONS:  1. Assess patient for self-management skills  2. Encourage participation in management  3. Identify potential discharge barriers on admission and throughout hospital stay  4. Involve patient/family/caregiver in discharge planning process  5. Collaborate with case management/ for discharge needs  Outcome: Progressing     Problem: Infection - Adult  Goal: Absence of infection during hospitalization  Description: INTERVENTIONS:  1. Assess and monitor for signs and symptoms of infection  2. Monitor lab/diagnostic results  3. Monitor all insertion sites/wounds/incisions  4. Monitor secretions for changes in amount and color  5. Administer medications as ordered  6. Educate and encourage patient and family to use good hand hygiene technique  7. Identify and educate in appropriate isolation precautions for identified infection/condition  Outcome: Progressing     Problem: Safety Adult  Goal: Patient will remain safe during hospitalization  Description: INTERVENTIONS    1. Assess patient for fall risk and implement interventions if needed  2. Use safe transport techniques  3. Assess patient using the appropriate Harvey skin assessment scale  4. Assess patient for risk of aspiration  5. Assess patient for risk of elopement  6. Assess patient for risk of suicide  Outcome: Progressing

## 2023-12-06 NOTE — DISCHARGE INSTRUCTIONS
Neurosurgery discharge instructions:  No lifting greater than 5 to 10 pounds x 6 weeks  Avoid any significant bending or twisting  You will follow-up in neurosurgery clinic in 1 month for repeat x-rays, our office will contact you without appointment  If you have questions or concerns about your compression fractures in your spine, call our office at [phone]

## 2023-12-06 NOTE — INTERDISCIPLINARY/THERAPY
[facility] [address]  [phone]      Inpatient Physical Therapy Evaluation Note    Date of Service: 12/6/2023  Patient Name: Joseph Wong  Referring Provider: WYATT SRINIVASAN ROBERT  Medicare or Medicaid note, andre has been added.: Yes  Completed Visit Number: 1  SOC Date: 12/06/23  Certification Period: 01/05/24    Medical Diagnosis:   Weakness [R53.1]  Unable to bear weight on left lower extremity [R26.89]  Treatment Diagnoses:  Treatment Diagnosis: Decreased strength, Decreased endurance, Impaired balance, Decreased mobility  Subjective   Family/Caregiver Present  Family/Caregiver Present: Yes  Caregiver: Spouse  Subjective Comments  RN Stated patient is medically cleared for therapy: Yes  Subjective Comments: Patient laying in bed at beginning and end of session. Spouse present throughout. Patient is agreeable to evaluation and does not become agitated throughout session.   Activities Limited by Patient Complaint  Activities limited by patient complaint: Transfers  Comment: patient does not state but difficulty with transfers per spouse report  Social/Occupational/Recreational  Lived With: Spouse  Receives Help From: Family    Pain Assessment Scale  Pain Scale: Kraft-Baker FACES (no outward signs of pain throughout session and none stated)  Kraft-Baker FACES Pain Score: No hurt  Patient's Goal for Therapy  Patient's Goal for Therapy:  (patient does not state)  Oneil Fall Risk Score: 110    Past Medical History:   Diagnosis Date    CAD (coronary artery disease)     Elevated cholesterol     GERD (gastroesophageal reflux disease)     Hiatal hernia     History of shingles     Hypertension     Macular degeneration     Metabolic syndrome     Vertigo        Current Facility-Administered Medications:     QUEtiapine (SEROquel) tablet 50 mg, 50 mg, oral, Nightly, Sami Olsen MD    QUEtiapine (SEROquel) tablet 25 mg, 25 mg, oral, q4h PRN, Sami Olsen MD, 25 mg at 12/06/23  1411    multivitamin with minerals tablet 1 tablet, 1 tablet, oral, Daily, Sami Olsen MD, 1 tablet at 12/06/23 0922    Maintain IV access, , , Until discontinued **AND** Saline lock IV, , , Once **AND** sodium chloride flush 3 mL, 3 mL, intravenous, PRN, Tanya Larry MD    enoxaparin (LOVENOX) syringe 40 mg, 40 mg, subcutaneous, Daily at 1400, Tanya Larry MD, 40 mg at 12/06/23 1411    sodium chloride (OCEAN) 0.65 % nasal spray 1 spray, 1 spray, Each Nostril, PRN, Tanya Larry MD    sodium chloride-aloe vera (AYR) nasal gel 1 Application, 1 Application, Each Nostril, PRN, Tanya Larry MD    lidocaine (LIDODERM) 5 % 1 patch, 1 patch, Topical, Daily, Tanya Larry MD    acetaminophen (TYLENOL) tablet 500 mg, 500 mg, oral, q6h PRN, Tanya Larry MD, 500 mg at 12/06/23 0410    oxyCODONE (ROXICODONE) immediate release tablet 5 mg, 5 mg, oral, q4h PRN, Tanya Larry MD    sennosides-docusate sodium (SENNA PLUS) 8.6-50 mg 1 tablet, 1 tablet, oral, 2x daily, Tanya Larry MD, 1 tablet at 12/06/23 0922    magnesium hydroxide (MILK OF MAGNESIA) 400 mg/5 mL oral suspension 30 mL, 30 mL, oral, Daily PRN, Tanya Larry MD    polyethylene glycol (GLYCOLAX) powder 17 g, 17 g, oral, Daily, Tanya Larry MD, 17 g at 12/06/23 0922    ondansetron (ZOFRAN) tablet 4 mg, 4 mg, oral, q6h PRN **OR** ondansetron (ZOFRAN) injection 4 mg, 4 mg, intravenous, q6h PRN, Tanya Larry MD    dextrose 50 % in water (D50W) syringe 15-30 mL, 15-30 mL, intravenous, q15 min PRN, Tanya Larry MD    dextrose (GLUTOSE) 40 % gel 15.2 g, 15.2 g, oral, q15 min PRN, Tanya Larry MD    glucagon (GLUCAGEN) injection 1 mg, 1 mg, intramuscular, q15 min PRN **OR** glucagon (GLUCAGEN) injection 1 mg, 1 mg, subcutaneous, q15 min PRN, Tayna Larry MD    insulin aspart U-100 (NovoLOG) injection pen (correction dose) 0-15 Units, 0-15 Units, subcutaneous, Insulin: 4x daily with meals, Tanya Larry MD    famotidine  (PEPCID) tablet 10 mg, 10 mg, oral, 2x daily, Tanya Larry MD, 10 mg at 12/06/23 0922    amLODIPine (NORVASC) tablet 5 mg, 5 mg, oral, q AM, Tanya Larry MD, 5 mg at 12/06/23 0922    aspirin EC tablet 81 mg, 81 mg, oral, q AM, Tanya Larry MD, 81 mg at 12/06/23 0922    atorvastatin (LIPITOR) tablet 40 mg, 40 mg, oral, q PM, Tanya Larry MD, 40 mg at 12/05/23 1943    finasteride (PROSCAR) tablet 5 mg, 5 mg, oral, q PM, Tanya Larry MD, 5 mg at 12/05/23 1943    FLUoxetine (PROzac) capsule 40 mg, 40 mg, oral, q PM, Tanya Larry MD, 40 mg at 12/05/23 1943    losartan (COZAAR) tablet 50 mg, 50 mg, oral, q AM, Tanya Larry MD, 50 mg at 12/06/23 0922    metoprolol succinate XL (TOPROL-XL) 24 hr tablet 25 mg, 25 mg, oral, q AM, Tanya Larry MD, 25 mg at 12/06/23 0922    spironolactone (ALDACTONE) tablet 25 mg, 25 mg, oral, q AM, Tanya Larry MD, 25 mg at 12/06/23 0922    tamsulosin (FLOMAX) 24 hr capsule 0.4 mg, 0.4 mg, oral, 2x daily, Tanya Larry MD, 0.4 mg at 12/06/23 0922  No Known Allergies       Objective    Precautions  Other Precautions: fall risk, w/c bound but able to stand pivot transfer prior  Is this a Co-Treatment?: No  Findings:   Co-treat Reasoning:     Gait belt donned for transfer. Patient left in bed with sitter and spouse present     Cognition: Does not follow commands and does not answer questions appropriately. He does respond to visual cues with walker to understand trying to stand    Strength/ROM/Sensation: Unable to assess due to patient's cognition and inability to follow commands    Bed Mobility: Mod A of 2 supine<>sit  Patient requires tactile cues to initiate transition to edge of bed. He requires assistance for BLE in and OOB and trunk support to sit upright.     Transfers: Mod A of 2 sit<>stand with front wheeled walker  Patient stands 10 seconds with mod A to boost and with mod A to maintain standing balance due to posterior lean that is not corrected  despite verbal and tactile cues     Balance: Patient requires min to mod A while seated edge of bed due to posterior and R lean that is corrected briefly with visual cues of the walker but not to verbal or tactile cues.       Treatment:  N/A    Education Documentation  Mobility training, taught by Jess Almazan, PT at 12/6/2023  2:49 PM.  Learner: Significant Other, Patient  Readiness: Unable to Receive  Method: Explanation  Response: Needs Reinforcement, No Evidence of Learning  Comment: Patient unable to receive education. Spouse reports understanding    Education Comments  No comments found.      Time Calculation  Start Time: 1128  Stop Time: 1137  Time Calculation (min): 9 min       Assessment/Plan   Assessment:  Prior Function Comments: Prior level of function obtained from spouse. Patient lives in a house with steps. He had been performing stand pivot transfers with walker to a w/c with assistance from spouse  Current Level of Function: Patient requires mod A for bed mobiltiy, sitting balance and transfer sit to stand. He has posterior lean in standing and is not able to weight shift to attempt to take steps safely at this time  Prognosis: Fair  Barriers to Discharge: Co-morbidities  Assessment: Patient is below reported baseline for functional mobility. He currently requires moderate assistance for bed mobility and transfers. PT will work with patient to progress independence with bed mobility and transfers and improve sitting and standing balance as able to decrease caregiver burden with these.  Multi-Disciplinary Problems (from Physical Therapy)      Active Problems       Problem: TRANSFERS  Start Date: 12/06/23      Goal Start Date Expected End Date End Date    STG - Patient will perform bed mobility 12/06/23 12/21/23 --    Goal Details: Supine<>sit with tactile cues for sequencing        Goal Start Date Expected End Date End Date    LTG - Patient will demonstrate safe transfer techniques 12/06/23  01/05/24 --    Goal Details: With min A for all transfers with use of walker                              Recommendation  Recommendations for Therapy: Continue skilled therapy  Equipment Recommended:  (has w/c and walker at home)  Plan:  Plan  Treatment Interventions: Therapeutic activities, Therapeutic exercises, Caregiver training, Functional transfer training, Equipment evaluation/education  PT Frequency: 2-3x/wk  Plan for next treatment comment: 2 assist: bed mobility mod A, sit to stand transfer with walker mod A, sitting/standing balance, progress to stand pivot transfers as able  Treatment duration will be through Certification Period: 01/05/24

## 2023-12-06 NOTE — NURSING END OF SHIFT
Nursing End of Shift Summary:    Patient: Joseph Wong  MRN: [id number]  : 10/20/1934, Age: 89 y.o.    Location: [address] [place] [address]    Nursing Goals  Clinical Goals for the Shift: manage behavior/confsuion, maintain safety and comfort    Narrative Summary of Progress Toward Clinical Goals:  Pt alert to self only, settled in bed. Fall bundle in placed. Reoriented pt about his situation. Maintained safety and comfort. VSS.    Barriers to Goals/Nursing Concerns:  Yes, placement    New Patient or Family Concerns/Issues:  No.    Shift Summary:   Significant Events & Communications to Providers (last 12 hours)       Last 5 Values    No documentation.                 Oxygen Usage (last 12 hours)       Last 5 Values    No documentation.                 Mobility (last 12 hours)       Last 5 Values       Row Name 23 0618 23 0800 23 1200 23 1214          Mobility    Activity -- Turn Turn --     Length of Time in Chair (min) -- 0 0 --     Distance Ambulated (feet) -- 0 Feet 0 Feet --     Distance Ambulated (Meters Calculated) -- 0 Meters 0 Meters --     Patient Position -- Sitting -- Supine     Turning Pillow support;Back  floated Every 2 hours;Pillow support;Turns self Turns self;Every 2 hours;Pillow support --     Distance Ambulated (Meters Calculated)(Do Not Use) -- 0 Feet 0 Feet --                   Urethral Catheter       Active Urethral Catheter       None                  Active Lines       Active Central venous catheter / Peripherally inserted central catheter / Implantable Port / Hemodialysis catheter / Midline Catheter       None                  Infusing Medications   Medication Dose Last Rate     PRN Medications   Medication Dose Last Admin    sodium chloride  3 mL      sodium chloride  1 spray      sodium chloride-aloe vera  1 Application      acetaminophen  500 mg      oxyCODONE  5 mg      magnesium hydroxide  30 mL      ondansetron  4 mg      Or    ondansetron  4 mg      dextrose 50 % in water  (D50W)  15-30 mL      dextrose  15.2 g      glucagon  1 mg      Or    glucagon  1 mg      OLANZapine zydis  5 mg 5 mg at 12/05/23 2213

## 2023-12-06 NOTE — NURSING END OF SHIFT
Nursing End of Shift Summary:    Patient: Joseph Wong  MRN: [id number]  : 10/20/1934, Age: 89 y.o.    Location: [address] [place] [address]    Nursing Goals  Clinical Goals for the Shift: reorientation, safety and comfort, PT/OT eval    Narrative Summary of Progress Toward Clinical Goals:  Pt had been alert and agitated whole shift. Fidgety and trying to get out of bed. On one-on-one sitter. Started Seroquel for agitation. Able to work with PT for evaluation,able to stand at bedside but unable to hold his wt. Maintained safety and comfort. VSS.    Barriers to Goals/Nursing Concerns:  Yes, placement.    New Patient or Family Concerns/Issues:  Yes, wife wanting to have him place sixto snf as she said she can't take care of him anymore.    Shift Summary:   Significant Events & Communications to Providers (last 12 hours)       Last 5 Values    No documentation.                 Oxygen Usage (last 12 hours)       Last 5 Values       Row Name 23 0800                   Room Air or Baseline Oxygen Trial by Nursing    Is Patient on Room Air OR on the Same Amount of O2 as at Home? Yes                      Mobility (last 12 hours)       Last 5 Values       Row Name 23 0543 23 0700 23 0800 23 0936 23 1200       Mobility    Activity -- -- Turn -- Turn;Stand at bedside    Level of Assistance -- -- Maximum assist, patient does 25-49% -- --    Length of Time in Chair (min) -- -- 0 -- 0    Distance Ambulated (feet) -- -- 0 Feet -- 0 Feet    Distance Ambulated (Meters Calculated) -- -- 0 Meters -- 0 Meters    Patient Position -- Supine Supine -- --    Turning Turns self Turns self Turns self;Pillow support Turns self Turns self    Distance Ambulated (Meters Calculated)(Do Not Use) -- -- 0 Feet -- 0 Feet      Row Name 23 1400 23 1617                Mobility    Patient Position -- Supine       Turning Turns self;Pillow support --                     Urethral Catheter       Active Urethral Catheter        None                  Active Lines       Active Central venous catheter / Peripherally inserted central catheter / Implantable Port / Hemodialysis catheter / Midline Catheter       None                  Infusing Medications   Medication Dose Last Rate     PRN Medications   Medication Dose Last Admin    QUEtiapine  25 mg 25 mg at 12/06/23 1411    sodium chloride  3 mL      sodium chloride  1 spray      sodium chloride-aloe vera  1 Application      acetaminophen  500 mg 500 mg at 12/06/23 0410    oxyCODONE  5 mg      magnesium hydroxide  30 mL      ondansetron  4 mg      Or    ondansetron  4 mg      dextrose 50 % in water (D50W)  15-30 mL      dextrose  15.2 g      glucagon  1 mg      Or    glucagon  1 mg

## 2023-12-06 NOTE — PLAN OF CARE
Problem: Safety Adult - Fall  Goal: Free from fall injury  Description: INTERVENTIONS:    Inpatient - Please reference Cares/Safety Flowsheet under Oneil Fall Risk for interventions.  Pediatrics - Please reference Peds Daily Cares/Safety Flowsheet under Whittaker Pediatric Fall Assessment Fall Bundle for interventions  LD/OB - Please reference OB Shift Screening Flowsheet under OB Fall Risk for interventions.  Outcome: Progressing     Problem: Pain - Adult  Goal: Verbalizes/displays adequate comfort level or baseline comfort level  Description: INTERVENTIONS:  1. Encourage patient to monitor pain and request interventions  2. Assess pain using the appropriate pain scale  3. Administer analgesics based on type and severity of pain and evaluate response  4. Educate/Implement non-pharmacological measures as appropriate and evaluate response  5. Consider cultural, developmental and social influences on pain and pain management  6. Notify Provider if interventions unsuccessful or patient reports new pain  Outcome: Progressing     Problem: Daily Care  Goal: Daily care needs are met  Description: INTERVENTIONS:   1. Assess and monitor skin integrity  2. Identify patients at risk for skin breakdown on admission and per policy  3. Assess and monitor ability to perform self care and identify potential discharge needs  4. Assess skin integrity/risk for skin breakdown  5. Assist patient with activities of daily living as needed  6. Encourage independent activity per ability   7. Provide mouth care   8. Include patient/family/caregiver in decisions related to daily care   Outcome: Progressing     Problem: Discharge Barriers  Goal: Patient's discharge needs are met  Description: INTERVENTIONS:  1. Assess patient for self-management skills  2. Encourage participation in management  3. Identify potential discharge barriers on admission and throughout hospital stay  4. Involve patient/family/caregiver in discharge planning  process  5. Collaborate with case management/ for discharge needs  Outcome: Progressing     Problem: Potential for Compromised Skin Integrity  Goal: Skin Integrity is Maintained or Improved  Description: INTERVENTIONS:  1. Assess and monitor skin integrity  2. Collaborate with interdisciplinary team and initiate plans and interventions as needed  3. Alternate a full bath with partial baths for elderly   4. Monitor patient's hygiene practices   5. Collaborate with wound, ostomy, and continence nurse  Outcome: Progressing

## 2023-12-07 PROBLEM — S32.000A LUMBAR COMPRESSION FRACTURE, CLOSED, INITIAL ENCOUNTER (CMS/HCC): Status: ACTIVE | Noted: 2023-12-07

## 2023-12-07 LAB
GLUCOSE BLDC GLUCOMTR-MCNC: 131 MG/DL (ref 70–105)
GLUCOSE BLDC GLUCOMTR-MCNC: 132 MG/DL (ref 70–105)
GLUCOSE BLDC GLUCOMTR-MCNC: 136 MG/DL (ref 70–105)
GLUCOSE BLDC GLUCOMTR-MCNC: 140 MG/DL (ref 70–105)

## 2023-12-07 PROCEDURE — 2590000100 HC RX 259: Performed by: HOSPITALIST

## 2023-12-07 PROCEDURE — 2590000100 HC RX 259: Performed by: FAMILY MEDICINE

## 2023-12-07 PROCEDURE — 82947 ASSAY GLUCOSE BLOOD QUANT: CPT | Mod: QW

## 2023-12-07 PROCEDURE — 6370000100 HC RX 637 (ALT 250 FOR IP): Performed by: HOSPITALIST

## 2023-12-07 PROCEDURE — 99232 SBSQ HOSP IP/OBS MODERATE 35: CPT | Performed by: HOSPITALIST

## 2023-12-07 PROCEDURE — 6360000200 HC RX 636 W HCPCS (ALT 250 FOR IP): Performed by: FAMILY MEDICINE

## 2023-12-07 PROCEDURE — 6370000100 HC RX 637 (ALT 250 FOR IP): Performed by: FAMILY MEDICINE

## 2023-12-07 PROCEDURE — (BLANK) HC ROOM PRIVATE

## 2023-12-07 RX ORDER — QUETIAPINE FUMARATE 25 MG/1
25 TABLET, FILM COATED ORAL NIGHTLY
Status: DISCONTINUED | OUTPATIENT
Start: 2023-12-07 | End: 2023-12-13

## 2023-12-07 RX ADMIN — FAMOTIDINE 10 MG: 20 TABLET, FILM COATED ORAL at 21:15

## 2023-12-07 RX ADMIN — SPIRONOLACTONE 25 MG: 25 TABLET ORAL at 07:38

## 2023-12-07 RX ADMIN — SENNOSIDES AND DOCUSATE SODIUM 1 TABLET: 50; 8.6 TABLET ORAL at 07:39

## 2023-12-07 RX ADMIN — FINASTERIDE 5 MG: 5 TABLET, FILM COATED ORAL at 21:15

## 2023-12-07 RX ADMIN — QUETIAPINE FUMARATE 25 MG: 25 TABLET ORAL at 17:23

## 2023-12-07 RX ADMIN — METOPROLOL SUCCINATE 25 MG: 25 TABLET, EXTENDED RELEASE ORAL at 07:39

## 2023-12-07 RX ADMIN — LIDOCAINE 1 PATCH: 50 PATCH CUTANEOUS at 07:37

## 2023-12-07 RX ADMIN — Medication 1 TABLET: at 07:39

## 2023-12-07 RX ADMIN — ENOXAPARIN SODIUM 40 MG: 100 INJECTION SUBCUTANEOUS at 13:55

## 2023-12-07 RX ADMIN — TAMSULOSIN HYDROCHLORIDE 0.4 MG: 0.4 CAPSULE ORAL at 21:15

## 2023-12-07 RX ADMIN — ATORVASTATIN CALCIUM 40 MG: 40 TABLET, FILM COATED ORAL at 21:15

## 2023-12-07 RX ADMIN — FLUOXETINE HYDROCHLORIDE 40 MG: 20 CAPSULE ORAL at 21:15

## 2023-12-07 RX ADMIN — LOSARTAN POTASSIUM 50 MG: 50 TABLET, FILM COATED ORAL at 07:39

## 2023-12-07 RX ADMIN — POLYETHYLENE GLYCOL 3350 17 G: 17 POWDER, FOR SOLUTION ORAL at 07:38

## 2023-12-07 RX ADMIN — SENNOSIDES AND DOCUSATE SODIUM 1 TABLET: 50; 8.6 TABLET ORAL at 21:15

## 2023-12-07 RX ADMIN — TAMSULOSIN HYDROCHLORIDE 0.4 MG: 0.4 CAPSULE ORAL at 07:38

## 2023-12-07 NOTE — INTERDISCIPLINARY/THERAPY
"Pt found sleeping supine in bed and difficult to arouse. Pt declines PT and appears confused and only opening eyes intermittently. Pt's breakfast in room and pt states, \"I don't need breakfast.\" PT will continue to follow up as appropriate/able.   "

## 2023-12-07 NOTE — PLAN OF CARE
Problem: Safety Adult - Fall  Goal: Free from fall injury  Description: INTERVENTIONS:    Inpatient - Please reference Cares/Safety Flowsheet under Oneil Fall Risk for interventions.  Pediatrics - Please reference Peds Daily Cares/Safety Flowsheet under Whittaker Pediatric Fall Assessment Fall Bundle for interventions  LD/OB - Please reference OB Shift Screening Flowsheet under OB Fall Risk for interventions.  Outcome: Progressing     Problem: Pain - Adult  Goal: Verbalizes/displays adequate comfort level or baseline comfort level  Description: INTERVENTIONS:  1. Encourage patient to monitor pain and request interventions  2. Assess pain using the appropriate pain scale  3. Administer analgesics based on type and severity of pain and evaluate response  4. Educate/Implement non-pharmacological measures as appropriate and evaluate response  5. Consider cultural, developmental and social influences on pain and pain management  6. Notify Provider if interventions unsuccessful or patient reports new pain  Outcome: Progressing     Problem: Daily Care  Goal: Daily care needs are met  Description: INTERVENTIONS:   1. Assess and monitor skin integrity  2. Identify patients at risk for skin breakdown on admission and per policy  3. Assess and monitor ability to perform self care and identify potential discharge needs  4. Assess skin integrity/risk for skin breakdown  5. Assist patient with activities of daily living as needed  6. Encourage independent activity per ability   7. Provide mouth care   8. Include patient/family/caregiver in decisions related to daily care   Outcome: Progressing     Problem: Knowledge Deficit  Goal: Patient/family/caregiver demonstrates understanding of disease process, treatment plan, medications, and discharge instructions  Description: INTERVENTIONS:   1. Complete learning assessment and assess knowledge base  2. Provide teaching at level of understanding   3. Provide teaching via preferred  BRIEF OPERATIVE NOTE    Date of Procedure: 2/16/2018   Preoperative Diagnosis: SCREENING  Postoperative Diagnosis: DIVERTICULOSIS,     Procedure(s):  COLONOSCOPY  Surgeon(s) and Role:     * Gerda Raza MD - Primary         Assistant Staff: None      Surgical Staff:  Endoscopy Ashley Willson: Robbin Melendez. Geoff Urias CNA  Endoscopy RN-1: Mark Colmenares RN  No case tracking events are documented in the log.   Anesthesia: Con-Sed   Estimated Blood Loss: none  Specimens: * No specimens in log *   Findings: none   Complications: none  Implants: * No implants in log * learning methods  Outcome: Progressing     Problem: Discharge Barriers  Goal: Patient's discharge needs are met  Description: INTERVENTIONS:  1. Assess patient for self-management skills  2. Encourage participation in management  3. Identify potential discharge barriers on admission and throughout hospital stay  4. Involve patient/family/caregiver in discharge planning process  5. Collaborate with case management/ for discharge needs  Outcome: Progressing      normal...

## 2023-12-07 NOTE — NURSING END OF SHIFT
Nursing End of Shift Summary:    Patient: Joseph Wong  MRN: [id number]  : 10/20/1934, Age: 89 y.o.    Location: [address] [place] [address]    Nursing Goals  Clinical Goals for the Shift: reorientation, safety and comfort, PT/OT eval    Narrative Summary of Progress Toward Clinical Goals:  1;1 sitter at bedside; pt pocket's pills; better crushed with applesauce; unable to follow simple commands; continues to be restless and fidgety in bed; slightly combative with brenda-care. Pt settled down/slept at 2300.    Barriers to Goals/Nursing Concerns:  Yes - Placement    New Patient or Family Concerns/Issues:  No    Shift Summary:   Significant Events & Communications to Providers (last 12 hours)       Last 5 Values    No documentation.                 Oxygen Usage (last 12 hours)       Last 5 Values    No documentation.                 Mobility (last 12 hours)       Last 5 Values       Row Name 23 1728 23 1930 23 1950 23 2200 23 2300       Mobility    Activity Turn -- -- Turn --    Length of Time in Chair (min) 0 -- -- 0 --    Distance Ambulated (feet) 0 Feet -- -- 0 Feet --    Distance Ambulated (Meters Calculated) 0 Meters -- -- 0 Meters --    Patient Position -- Supine -- -- Supine    Turning Turns self -- Turns self Turns self;Right lateral;Left lateral --    Distance Ambulated (Meters Calculated)(Do Not Use) 0 Feet -- -- 0 Feet --      Row Name 23 2343 23 0200 23 0350             Mobility    Turning Turns self Turns self Turns self                    Urethral Catheter       Active Urethral Catheter       None                  Active Lines       Active Central venous catheter / Peripherally inserted central catheter / Implantable Port / Hemodialysis catheter / Midline Catheter       None                  Infusing Medications   Medication Dose Last Rate     PRN Medications   Medication Dose Last Admin    QUEtiapine  25 mg 25 mg at 23 1411    sodium chloride  3 mL      sodium chloride   1 spray      sodium chloride-aloe vera  1 Application      acetaminophen  500 mg 500 mg at 12/06/23 1939    oxyCODONE  5 mg      magnesium hydroxide  30 mL      ondansetron  4 mg      Or    ondansetron  4 mg      dextrose 50 % in water (D50W)  15-30 mL      dextrose  15.2 g      glucagon  1 mg      Or    glucagon  1 mg

## 2023-12-07 NOTE — PROGRESS NOTES
[address] [address]  Daily Progress Note  Patient name: Joseph Wong  MRN: [id number]   LOS: 0 days     Subjective   Patient resting comfortably.  Objective   Vitals:Temp:  [36.4 °C (97.6 °F)-36.8 °C (98.2 °F)] 36.4 °C (97.6 °F)  Heart Rate:  [65-82] 65  Resp:  [18-20] 20  SpO2:  [90 %-93 %] 90 %  BP: (116-146)/(53-98) 146/85  Physical Exam:  HEENT: No scleral icterus, no conjunctival pallor  Neck: No neck vein distention  Lungs: Clear to auscultation bilaterally  Heart: Regular, nontachycardic  Abdomen: Soft with positive bowel sounds  Genitourinary: Deferred  Extremities: Left knee effusion  Skin: No rash or jaundice  Neurologic: Alert and oriented to person    Review of Systems:  Negative  Admission on 12/04/2023   Component Date Value Ref Range Status    WBC 12/04/2023 7.3  3.7 - 9.6 10*3/uL Final    RBC 12/04/2023 4.13  4.10 - 5.80 10*6/µL Final    Hemoglobin 12/04/2023 14.0  13.2 - 17.2 g/dL Final    Hematocrit 12/04/2023 38.5  38.0 - 50.0 % Final    MCV 12/04/2023 93.3  82.0 - 97.0 fL Final    MCH 12/04/2023 33.9  29.0 - 34.0 pg Final    MCHC 12/04/2023 36.3 (H)  32.0 - 36.0 g/dL Final    RDW 12/04/2023 14.0  11.5 - 15.0 % Final    Platelets 12/04/2023 203  130 - 350 10*3/uL Final    MPV 12/04/2023 7.3  6.9 - 10.8 fL Final    Neutrophils% 12/04/2023 73.3 (H)  46.0 - 70.0 % Final    Lymphocytes% 12/04/2023 15.4  15.0 - 47.0 % Final    Monocytes% 12/04/2023 9.2  5.0 - 13.0 % Final    Eosinophils% 12/04/2023 1.4  0.0 - 3.0 % Final    Basophils% 12/04/2023 0.7  0.0 - 2.0 % Final    ANC (auto diff) 12/04/2023 5.30  10*3/UL Final    Lymphocytes Absolute 12/04/2023 1.10  10*3/uL Final    Monocytes Absolute 12/04/2023 0.70  10*3/uL Final    Eosinophils Absolute 12/04/2023 0.10  10*3/uL Final    Basophils Absolute 12/04/2023 0.10  10*3/uL Final    Sodium 12/04/2023 134 (L)  135 - 145 mmol/L Final    Potassium 12/04/2023 4.4  3.5 - 5.1 MMOL/L Final    Chloride 12/04/2023  103  98 - 107 mmol/L Final    CO2 12/04/2023 22  21 - 32 mmol/L Final    Anion Gap 12/04/2023 9  3 - 11 mmol/L Final    BUN 12/04/2023 26 (H)  7 - 25 mg/dL Final    Creatinine 12/04/2023 0.95  0.70 - 1.30 mg/dL Final    Glucose 12/04/2023 119 (H)  70 - 105 mg/dL Final    Calcium 12/04/2023 8.9  8.6 - 10.3 mg/dL Final    AST 12/04/2023 16  <40 U/L Final    ALT (SGPT) 12/04/2023 12  7 - 52 U/L Final    Alkaline Phosphatase 12/04/2023 103  45 - 115 U/L Final    Total Protein 12/04/2023 6.1  6.0 - 8.3 g/dL Final    Albumin 12/04/2023 3.7  3.5 - 5.3 g/dL Final    Total Bilirubin 12/04/2023 0.95  0.20 - 1.40 mg/dL Final    Corrected Calcium 12/04/2023 9.1  8.6 - 10.3 mg/dL Final    eGFR 12/04/2023 77  >60 mL/min/1.73m*2 Final    Lipase 12/04/2023 77  11 - 82 U/L Final    Magnesium 12/04/2023 1.9  1.8 - 2.4 mg/dL Final    POC Lactate 12/04/2023 1.20  0.50 - 1.99 MMOL/L Final    CRP 12/04/2023 <1.0  <=10.0 MG/L Final    Protime 12/04/2023 12.0  9.4 - 12.5 SECONDS Final    INR 12/04/2023 1.1  <=1.1 Final    PTT 12/04/2023 27.6  25.1 - 36.5 SECONDS Final    RBC, Urine 12/04/2023 0-2  None seen, 0-2, Negative /HPF Final    WBC, Urine 12/04/2023 0-4  0 - 4 /HPF Final    WBC Clumps, Urine 12/04/2023 None seen  None seen /HPF Final    Squamous Epithelial, Urine 12/04/2023 Negative  None Seen-9 /HPF Final    Bacteria, Urine 12/04/2023 None seen  None seen, Few /HPF Final    Color, Urine 12/04/2023 Yellow  Yellow Final    Clarity, Urine 12/04/2023 Clear  Clear Final    Specific Gravity, Urine 12/04/2023 1.025  1.003 - 1.030 Final    Leukocytes, Urine 12/04/2023 Negative  Negative Final    Nitrite, Urine 12/04/2023 Negative  Negative Final    Protein, Urine 12/04/2023 10 (A)  Negative MG/DL Final    Ketones, Urine 12/04/2023 Negative  Negative MG/DL Final    Urobilinogen, Urine 12/04/2023 2.0 (A)  <2.0 mg/dL Final    Bilirubin, Urine 12/04/2023 Negative  Negative Final    Blood, Urine 12/04/2023 Negative  Negative Final    Glucose,  Urine 12/04/2023 Negative  Negative MG/DL Final    pH, Urine 12/04/2023 5.5  5.0 - 8.0 PH Final    POC Glucose 12/04/2023 119 (H)  70 - 105 MG/DL Final    Uric Acid 12/04/2023 4.0 (L)  4.4 - 7.6 mg/dL Final    Procalcitonin 12/04/2023 <0.02  <0.50 ng/mL Final    Sed Rate 12/04/2023 5  <=20 mm/hr Final    Vitamin B-12 12/04/2023 343  180 - 914 pg/mL Final    Folate 12/04/2023 13.3  >6.6 ng/mL Final    Vit D, 25-Hydroxy 12/04/2023 30  30 - 100 ng/mL Final    TSH 12/04/2023 3.622  0.340 - 4.820 uIU/ml Final    Phosphorus 12/04/2023 3.0  2.5 - 4.9 mg/dL Final    Hemoglobin A1C 12/04/2023 6.4 (H)  4.0 - 6.0 % Final    Estimated Average Glucose 12/04/2023 137.0  mg/dL Final    POC Glucose 12/04/2023 117 (H)  70 - 105 MG/DL Final    WBC 12/05/2023 6.4  3.7 - 9.6 10*3/uL Final    RBC 12/05/2023 3.92 (L)  4.10 - 5.80 10*6/µL Final    Hemoglobin 12/05/2023 13.0 (L)  13.2 - 17.2 g/dL Final    Hematocrit 12/05/2023 36.2 (L)  38.0 - 50.0 % Final    MCV 12/05/2023 92.4  82.0 - 97.0 fL Final    MCH 12/05/2023 33.1  29.0 - 34.0 pg Final    MCHC 12/05/2023 35.8  32.0 - 36.0 g/dL Final    RDW 12/05/2023 13.6  11.5 - 15.0 % Final    Platelets 12/05/2023 213  130 - 350 10*3/uL Final    MPV 12/05/2023 7.3  6.9 - 10.8 fL Final    Neutrophils% 12/05/2023 66.2  46.0 - 70.0 % Final    Lymphocytes% 12/05/2023 18.9  15.0 - 47.0 % Final    Monocytes% 12/05/2023 12.4  5.0 - 13.0 % Final    Eosinophils% 12/05/2023 1.7  0.0 - 3.0 % Final    Basophils% 12/05/2023 0.8  0.0 - 2.0 % Final    ANC (auto diff) 12/05/2023 4.20  10*3/UL Final    Lymphocytes Absolute 12/05/2023 1.20  10*3/uL Final    Monocytes Absolute 12/05/2023 0.80  10*3/uL Final    Eosinophils Absolute 12/05/2023 0.10  10*3/uL Final    Basophils Absolute 12/05/2023 0.10  10*3/uL Final    Sodium 12/05/2023 133 (L)  135 - 145 mmol/L Final    Potassium 12/05/2023 4.0  3.5 - 5.1 MMOL/L Final    Chloride 12/05/2023 103  98 - 107 mmol/L Final    CO2 12/05/2023 24  21 - 32 mmol/L Final    BUN  12/05/2023 16  7 - 25 mg/dL Final    Creatinine 12/05/2023 0.82  0.70 - 1.30 mg/dL Final    Glucose 12/05/2023 106 (H)  70 - 105 mg/dL Final    Calcium 12/05/2023 8.9  8.6 - 10.3 mg/dL Final    Anion Gap 12/05/2023 6  3 - 11 mmol/L Final    eGFR 12/05/2023 84  >60 mL/min/1.73m*2 Final    POC Glucose 12/05/2023 113 (H)  70 - 105 MG/DL Final    POC Glucose 12/05/2023 173 (H)  70 - 105 MG/DL Final    POC Glucose 12/05/2023 135 (H)  70 - 105 MG/DL Final    POC Glucose 12/05/2023 142 (H)  70 - 105 MG/DL Final    POC Glucose 12/06/2023 125 (H)  70 - 105 MG/DL Final    POC Glucose 12/06/2023 143 (H)  70 - 105 MG/DL Final    POC Glucose 12/06/2023 145 (H)  70 - 105 MG/DL Final    POC Glucose 12/06/2023 124 (H)  70 - 105 MG/DL Final    POC Glucose 12/07/2023 136 (H)  70 - 105 MG/DL Final        Assessment/Plan   Left knee pain with associated effusion                       Left total knee arthroplasty, 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  Case discussed with orthopedic service regarding left knee effusion.  Recommending avoiding arthrocentesis secondary to prior total knee arthroplasty history.  Will provide compression brace.  Follow-up with PT and OT.) 12/5/23     (Patient observed with therapy and he did not do well with stability and mobility concerns.  Discussed with patient's wife and she is quite concerned about his safety at home and stability.  He has been having worsening dementia and last evening agitation noted requiring one-on-one sitter.  Seroquel initiated to assist with his agitation.) 12/6/23    Will reduce his nighttime  Seroquel dose.  Continue to monitor.     Plan  Pain management as needed  PT and OT  Disposition planning, recommending placement and most likely will need consideration for dementia unit    Electronically signed by: Sami Olsen MD  12/7/2023  11:39 AM

## 2023-12-07 NOTE — INTERDISCIPLINARY/THERAPY
12/07/23 1002   Time Calculation   Start Time 1002   Subjective Comments   RN Stated patient is medically cleared for therapy No   Subjective Comments RN stated Pt is not following directions, not appropriate for therapy at this time. Conduct OT eval as able

## 2023-12-07 NOTE — SECONDARY REVIEW
Physician Advisor Notes       Joseph Wong is an 89-year-old male with coronary artery disease, hypertension, recent stroke, and dementia who was placed in observation on 12/4/2023, 1221 (care initiated 12/4/2023, 0930) for diagnoses of left knee pain, T12 and L4 compression fractures, generalized weakness, and dementia with agitation. The first midnight of hospitalization is supported based on the need for initial evaluation and management of acute onset of left leg pain and weakness preceded by a fall a few days prior. He also required IM Haldol for hyperactive dementia and delirium. Workup demonstrated T12 and L4 compression fractures but without need for neurosurgical intervention. Given need for a 1:1 sitter beginning 12/5/2023 due to agitation and maximum assistance for standing a medically necessary inpatient hospitalization spanning two midnights is supported as he it at risk of harm to self and others. He remains hospitalized and requires a 1:1 sitter today, 12/7/2023.    We recommend inpatient status for Joseph Wong. INPATIENT.

## 2023-12-07 NOTE — INTERDISCIPLINARY/THERAPY
Case Management Progress Note    Phone # [phone]    Discussed Discharge Needs/Topics: CM called the patient's wife Alessandra Wong.  It is this CM's recollection that the CM asked Alessandra what her ideal discharge plan was.  Alessandra explains to the CM a nursing home, because Alessandra cannot take care of the patient.  CM explained to Alessandra that the patient is in Observation status, and talked about the nursing home stay being out of pocket.  Alessandra expressed that she already knew.  CM discusses with Alessandra the time frame that is given to preferences.  Alessandra wants a referral to [facility], and explains to the CM that she already talked to [facility].  Alessandra is okay with the discharge plan.  Referral sent.  will continue to follow.    Lead CM and the patient's physician updated.    CM was notified by the patient's UR nurse that the patient is Inpatient.     CM meet with Alessandra.  CM notified Alessandra that the patient was inpatient, and talked about the discharge process. CM gave Alessandra the Medicare.gov star ratings.  Alessandra wants referrals sent to [facility].  CM will send referrals to tomorrow morning.        Disposition: Placement.

## 2023-12-08 LAB
GLUCOSE BLDC GLUCOMTR-MCNC: 127 MG/DL (ref 70–105)
GLUCOSE BLDC GLUCOMTR-MCNC: 144 MG/DL (ref 70–105)
GLUCOSE BLDC GLUCOMTR-MCNC: 148 MG/DL (ref 70–105)
GLUCOSE BLDC GLUCOMTR-MCNC: 222 MG/DL (ref 70–105)

## 2023-12-08 PROCEDURE — 6370000100 HC RX 637 (ALT 250 FOR IP): Performed by: FAMILY MEDICINE

## 2023-12-08 PROCEDURE — 6360000200 HC RX 636 W HCPCS (ALT 250 FOR IP): Performed by: FAMILY MEDICINE

## 2023-12-08 PROCEDURE — (BLANK) HC ROOM PRIVATE

## 2023-12-08 PROCEDURE — 97530 THERAPEUTIC ACTIVITIES: CPT | Mod: GP | Performed by: PHYSICAL THERAPIST

## 2023-12-08 PROCEDURE — 82947 ASSAY GLUCOSE BLOOD QUANT: CPT | Mod: QW

## 2023-12-08 PROCEDURE — 2590000100 HC RX 259: Performed by: HOSPITALIST

## 2023-12-08 PROCEDURE — 99232 SBSQ HOSP IP/OBS MODERATE 35: CPT | Performed by: HOSPITALIST

## 2023-12-08 PROCEDURE — 2590000100 HC RX 259: Performed by: FAMILY MEDICINE

## 2023-12-08 PROCEDURE — 6370000100 HC RX 637 (ALT 250 FOR IP): Performed by: HOSPITALIST

## 2023-12-08 RX ADMIN — SPIRONOLACTONE 25 MG: 25 TABLET ORAL at 08:47

## 2023-12-08 RX ADMIN — LOSARTAN POTASSIUM 50 MG: 50 TABLET, FILM COATED ORAL at 08:47

## 2023-12-08 RX ADMIN — INSULIN ASPART 2 UNITS: 100 INJECTION, SOLUTION INTRAVENOUS; SUBCUTANEOUS at 12:05

## 2023-12-08 RX ADMIN — TAMSULOSIN HYDROCHLORIDE 0.4 MG: 0.4 CAPSULE ORAL at 08:47

## 2023-12-08 RX ADMIN — POLYETHYLENE GLYCOL 3350 17 G: 17 POWDER, FOR SOLUTION ORAL at 09:00

## 2023-12-08 RX ADMIN — FAMOTIDINE 10 MG: 20 TABLET, FILM COATED ORAL at 08:47

## 2023-12-08 RX ADMIN — QUETIAPINE FUMARATE 25 MG: 25 TABLET ORAL at 20:30

## 2023-12-08 RX ADMIN — ASPIRIN 81 MG: 81 TABLET ORAL at 08:47

## 2023-12-08 RX ADMIN — SENNOSIDES AND DOCUSATE SODIUM 1 TABLET: 50; 8.6 TABLET ORAL at 08:48

## 2023-12-08 RX ADMIN — ENOXAPARIN SODIUM 40 MG: 100 INJECTION SUBCUTANEOUS at 13:15

## 2023-12-08 RX ADMIN — QUETIAPINE FUMARATE 25 MG: 25 TABLET ORAL at 13:09

## 2023-12-08 RX ADMIN — FLUOXETINE HYDROCHLORIDE 40 MG: 20 CAPSULE ORAL at 20:21

## 2023-12-08 RX ADMIN — ATORVASTATIN CALCIUM 40 MG: 40 TABLET, FILM COATED ORAL at 20:21

## 2023-12-08 RX ADMIN — FINASTERIDE 5 MG: 5 TABLET, FILM COATED ORAL at 20:21

## 2023-12-08 RX ADMIN — METOPROLOL SUCCINATE 25 MG: 25 TABLET, EXTENDED RELEASE ORAL at 08:48

## 2023-12-08 RX ADMIN — AMLODIPINE BESYLATE 5 MG: 5 TABLET ORAL at 08:48

## 2023-12-08 RX ADMIN — Medication 500 MG: at 15:45

## 2023-12-08 RX ADMIN — TAMSULOSIN HYDROCHLORIDE 0.4 MG: 0.4 CAPSULE ORAL at 20:21

## 2023-12-08 RX ADMIN — SENNOSIDES AND DOCUSATE SODIUM 1 TABLET: 50; 8.6 TABLET ORAL at 20:22

## 2023-12-08 RX ADMIN — QUETIAPINE FUMARATE 25 MG: 25 TABLET ORAL at 18:06

## 2023-12-08 RX ADMIN — Medication 1 TABLET: at 08:48

## 2023-12-08 RX ADMIN — FAMOTIDINE 10 MG: 20 TABLET, FILM COATED ORAL at 20:21

## 2023-12-08 NOTE — NURSING END OF SHIFT
Nursing End of Shift Summary:    Patient: Joseph Wong  MRN: [id number]  : 10/20/1934, Age: 89 y.o.    Location: [address] [place] [address]    Nursing Goals  Clinical Goals for the Shift: maintain pt safety and comfort    Narrative Summary of Progress Toward Clinical Goals:  1:1 sitter at bedside; Patient oriented to self; noted to be asleep between 8068-3145; Safety and comfort maintained.     Barriers to Goals/Nursing Concerns:  Yes     New Patient or Family Concerns/Issues:  No    Shift Summary:   Significant Events & Communications to Providers (last 12 hours)       Last 5 Values    No documentation.                 Oxygen Usage (last 12 hours)       Last 5 Values       Row Name 23                   Room Air or Baseline Oxygen Trial by Nursing    Is Patient on Room Air OR on the Same Amount of O2 as at Home? Yes                      Mobility (last 12 hours)       Last 5 Values       Row Name 23 1745 23 1800 23 1846 23 2000 23       Mobility    Activity -- Chair Stand at bedside -- Back to bed    Level of Assistance -- -- Maximum assist, patient does 25-49% -- Maximum assist, patient does 25-49%    Patient Position -- -- -- Other (Comment)  up in chair, transfering back to bed --    Turning Other (Comment)  adjusted bottom on chair and added pillows to left side as pt leans. -- Other (Comment)  up in chair Turns self --      Row Name 23 2200 23 2347 23 0000 23 0200 23 0400       Mobility    Activity Turn;Chair;Back to bed -- -- -- --    Length of Time in Chair (min) 90 -- -- -- --    Distance Ambulated (feet) 0 Feet -- -- -- --    Distance Ambulated (Meters Calculated) 0 Meters -- -- -- --    Patient Position -- Supine -- -- --    Turning Turns self -- Turns self Refused by patient Turns self    Distance Ambulated (Meters Calculated)(Do Not Use) 0 Feet -- -- -- --                  Urethral Catheter       Active Urethral Catheter       None                   Active Lines       Active Central venous catheter / Peripherally inserted central catheter / Implantable Port / Hemodialysis catheter / Midline Catheter       None                  Infusing Medications   Medication Dose Last Rate     PRN Medications   Medication Dose Last Admin    QUEtiapine  25 mg 25 mg at 12/06/23 1411    sodium chloride  3 mL      sodium chloride  1 spray      sodium chloride-aloe vera  1 Application      acetaminophen  500 mg 500 mg at 12/06/23 1939    oxyCODONE  5 mg      magnesium hydroxide  30 mL      ondansetron  4 mg      Or    ondansetron  4 mg      dextrose 50 % in water (D50W)  15-30 mL      dextrose  15.2 g      glucagon  1 mg      Or    glucagon  1 mg

## 2023-12-08 NOTE — PROGRESS NOTES
[address] [address]  Daily Progress Note  Patient name: Joseph Wong  MRN: [id number]   LOS: 1 day     Subjective   Patient resting comfortably.  Objective   Vitals:Temp:  [36.1 °C (97 °F)] 36.1 °C (97 °F)  Heart Rate:  [76-83] 78  Resp:  [16-20] 16  SpO2:  [89 %-93 %] 89 %  BP: (119-159)/(83-96) 159/96  Physical Exam:  HEENT: No scleral icterus, no conjunctival pallor  Neck: No neck vein distention  Lungs: Good auscultation bilaterally  Heart: Regular, nontachycardic  Abdomen: Positive bowel sounds  Genitourinary: Deferred  Extremities: Left knee effusion  Skin: No rash or jaundice  Neurologic: Alert and oriented to person    Review of Systems:  Negative  Admission on 12/04/2023   Component Date Value Ref Range Status    WBC 12/04/2023 7.3  3.7 - 9.6 10*3/uL Final    RBC 12/04/2023 4.13  4.10 - 5.80 10*6/µL Final    Hemoglobin 12/04/2023 14.0  13.2 - 17.2 g/dL Final    Hematocrit 12/04/2023 38.5  38.0 - 50.0 % Final    MCV 12/04/2023 93.3  82.0 - 97.0 fL Final    MCH 12/04/2023 33.9  29.0 - 34.0 pg Final    MCHC 12/04/2023 36.3 (H)  32.0 - 36.0 g/dL Final    RDW 12/04/2023 14.0  11.5 - 15.0 % Final    Platelets 12/04/2023 203  130 - 350 10*3/uL Final    MPV 12/04/2023 7.3  6.9 - 10.8 fL Final    Neutrophils% 12/04/2023 73.3 (H)  46.0 - 70.0 % Final    Lymphocytes% 12/04/2023 15.4  15.0 - 47.0 % Final    Monocytes% 12/04/2023 9.2  5.0 - 13.0 % Final    Eosinophils% 12/04/2023 1.4  0.0 - 3.0 % Final    Basophils% 12/04/2023 0.7  0.0 - 2.0 % Final    ANC (auto diff) 12/04/2023 5.30  10*3/UL Final    Lymphocytes Absolute 12/04/2023 1.10  10*3/uL Final    Monocytes Absolute 12/04/2023 0.70  10*3/uL Final    Eosinophils Absolute 12/04/2023 0.10  10*3/uL Final    Basophils Absolute 12/04/2023 0.10  10*3/uL Final    Sodium 12/04/2023 134 (L)  135 - 145 mmol/L Final    Potassium 12/04/2023 4.4  3.5 - 5.1 MMOL/L Final    Chloride 12/04/2023 103  98 - 107 mmol/L Final    CO2  12/04/2023 22  21 - 32 mmol/L Final    Anion Gap 12/04/2023 9  3 - 11 mmol/L Final    BUN 12/04/2023 26 (H)  7 - 25 mg/dL Final    Creatinine 12/04/2023 0.95  0.70 - 1.30 mg/dL Final    Glucose 12/04/2023 119 (H)  70 - 105 mg/dL Final    Calcium 12/04/2023 8.9  8.6 - 10.3 mg/dL Final    AST 12/04/2023 16  <40 U/L Final    ALT (SGPT) 12/04/2023 12  7 - 52 U/L Final    Alkaline Phosphatase 12/04/2023 103  45 - 115 U/L Final    Total Protein 12/04/2023 6.1  6.0 - 8.3 g/dL Final    Albumin 12/04/2023 3.7  3.5 - 5.3 g/dL Final    Total Bilirubin 12/04/2023 0.95  0.20 - 1.40 mg/dL Final    Corrected Calcium 12/04/2023 9.1  8.6 - 10.3 mg/dL Final    eGFR 12/04/2023 77  >60 mL/min/1.73m*2 Final    Lipase 12/04/2023 77  11 - 82 U/L Final    Magnesium 12/04/2023 1.9  1.8 - 2.4 mg/dL Final    POC Lactate 12/04/2023 1.20  0.50 - 1.99 MMOL/L Final    CRP 12/04/2023 <1.0  <=10.0 MG/L Final    Protime 12/04/2023 12.0  9.4 - 12.5 SECONDS Final    INR 12/04/2023 1.1  <=1.1 Final    PTT 12/04/2023 27.6  25.1 - 36.5 SECONDS Final    RBC, Urine 12/04/2023 0-2  None seen, 0-2, Negative /HPF Final    WBC, Urine 12/04/2023 0-4  0 - 4 /HPF Final    WBC Clumps, Urine 12/04/2023 None seen  None seen /HPF Final    Squamous Epithelial, Urine 12/04/2023 Negative  None Seen-9 /HPF Final    Bacteria, Urine 12/04/2023 None seen  None seen, Few /HPF Final    Color, Urine 12/04/2023 Yellow  Yellow Final    Clarity, Urine 12/04/2023 Clear  Clear Final    Specific Gravity, Urine 12/04/2023 1.025  1.003 - 1.030 Final    Leukocytes, Urine 12/04/2023 Negative  Negative Final    Nitrite, Urine 12/04/2023 Negative  Negative Final    Protein, Urine 12/04/2023 10 (A)  Negative MG/DL Final    Ketones, Urine 12/04/2023 Negative  Negative MG/DL Final    Urobilinogen, Urine 12/04/2023 2.0 (A)  <2.0 mg/dL Final    Bilirubin, Urine 12/04/2023 Negative  Negative Final    Blood, Urine 12/04/2023 Negative  Negative Final    Glucose, Urine 12/04/2023 Negative   Negative MG/DL Final    pH, Urine 12/04/2023 5.5  5.0 - 8.0 PH Final    POC Glucose 12/04/2023 119 (H)  70 - 105 MG/DL Final    Uric Acid 12/04/2023 4.0 (L)  4.4 - 7.6 mg/dL Final    Procalcitonin 12/04/2023 <0.02  <0.50 ng/mL Final    Sed Rate 12/04/2023 5  <=20 mm/hr Final    Vitamin B-12 12/04/2023 343  180 - 914 pg/mL Final    Folate 12/04/2023 13.3  >6.6 ng/mL Final    Vit D, 25-Hydroxy 12/04/2023 30  30 - 100 ng/mL Final    TSH 12/04/2023 3.622  0.340 - 4.820 uIU/ml Final    Phosphorus 12/04/2023 3.0  2.5 - 4.9 mg/dL Final    Hemoglobin A1C 12/04/2023 6.4 (H)  4.0 - 6.0 % Final    Estimated Average Glucose 12/04/2023 137.0  mg/dL Final    POC Glucose 12/04/2023 117 (H)  70 - 105 MG/DL Final    WBC 12/05/2023 6.4  3.7 - 9.6 10*3/uL Final    RBC 12/05/2023 3.92 (L)  4.10 - 5.80 10*6/µL Final    Hemoglobin 12/05/2023 13.0 (L)  13.2 - 17.2 g/dL Final    Hematocrit 12/05/2023 36.2 (L)  38.0 - 50.0 % Final    MCV 12/05/2023 92.4  82.0 - 97.0 fL Final    MCH 12/05/2023 33.1  29.0 - 34.0 pg Final    MCHC 12/05/2023 35.8  32.0 - 36.0 g/dL Final    RDW 12/05/2023 13.6  11.5 - 15.0 % Final    Platelets 12/05/2023 213  130 - 350 10*3/uL Final    MPV 12/05/2023 7.3  6.9 - 10.8 fL Final    Neutrophils% 12/05/2023 66.2  46.0 - 70.0 % Final    Lymphocytes% 12/05/2023 18.9  15.0 - 47.0 % Final    Monocytes% 12/05/2023 12.4  5.0 - 13.0 % Final    Eosinophils% 12/05/2023 1.7  0.0 - 3.0 % Final    Basophils% 12/05/2023 0.8  0.0 - 2.0 % Final    ANC (auto diff) 12/05/2023 4.20  10*3/UL Final    Lymphocytes Absolute 12/05/2023 1.20  10*3/uL Final    Monocytes Absolute 12/05/2023 0.80  10*3/uL Final    Eosinophils Absolute 12/05/2023 0.10  10*3/uL Final    Basophils Absolute 12/05/2023 0.10  10*3/uL Final    Sodium 12/05/2023 133 (L)  135 - 145 mmol/L Final    Potassium 12/05/2023 4.0  3.5 - 5.1 MMOL/L Final    Chloride 12/05/2023 103  98 - 107 mmol/L Final    CO2 12/05/2023 24  21 - 32 mmol/L Final    BUN 12/05/2023 16  7 - 25  mg/dL Final    Creatinine 12/05/2023 0.82  0.70 - 1.30 mg/dL Final    Glucose 12/05/2023 106 (H)  70 - 105 mg/dL Final    Calcium 12/05/2023 8.9  8.6 - 10.3 mg/dL Final    Anion Gap 12/05/2023 6  3 - 11 mmol/L Final    eGFR 12/05/2023 84  >60 mL/min/1.73m*2 Final    POC Glucose 12/05/2023 113 (H)  70 - 105 MG/DL Final    POC Glucose 12/05/2023 173 (H)  70 - 105 MG/DL Final    POC Glucose 12/05/2023 135 (H)  70 - 105 MG/DL Final    POC Glucose 12/05/2023 142 (H)  70 - 105 MG/DL Final    POC Glucose 12/06/2023 125 (H)  70 - 105 MG/DL Final    POC Glucose 12/06/2023 143 (H)  70 - 105 MG/DL Final    POC Glucose 12/06/2023 145 (H)  70 - 105 MG/DL Final    POC Glucose 12/06/2023 124 (H)  70 - 105 MG/DL Final    POC Glucose 12/07/2023 136 (H)  70 - 105 MG/DL Final    POC Glucose 12/07/2023 131 (H)  70 - 105 MG/DL Final    POC Glucose 12/07/2023 140 (H)  70 - 105 MG/DL Final    POC Glucose 12/07/2023 132 (H)  70 - 105 MG/DL Final    POC Glucose 12/08/2023 127 (H)  70 - 105 MG/DL Final    POC Glucose 12/08/2023 222 (H)  70 - 105 MG/DL Final        Assessment/Plan   Left knee pain with associated effusion                       Left total knee arthroplasty, 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  Case discussed with orthopedic service regarding left knee effusion.  Recommending avoiding arthrocentesis secondary to prior total knee arthroplasty history.  Will provide compression brace.  Follow-up with PT and OT.) 12/5/23     (Patient observed with therapy and he did not do well with stability and mobility concerns.  Discussed with patient's wife  and she is quite concerned about his safety at home and stability.  He has been having worsening dementia and last evening agitation noted requiring one-on-one sitter.  Seroquel initiated to assist with his agitation.) 12/6/23     (Will reduce his nighttime Seroquel dose.  Continue to monitor.) 12/7/23    Awaiting disposition.     Plan  Pain management as needed  PT and OT  Disposition planning     Electronically signed by: Sami Olsen MD  12/7/2023  11:39 AM    Electronically signed by: Sami Olsen MD  12/8/2023  1:27 PM

## 2023-12-08 NOTE — PLAN OF CARE
Problem: Safety Adult - Fall  Goal: Free from fall injury  Description: INTERVENTIONS:    Inpatient - Please reference Cares/Safety Flowsheet under Oneil Fall Risk for interventions.  Pediatrics - Please reference Peds Daily Cares/Safety Flowsheet under [facility]aker Pediatric Fall Assessment Fall Bundle for interventions  LD/OB - Please reference OB Shift Screening Flowsheet under OB Fall Risk for interventions.  Outcome: Progressing     Problem: Pain - Adult  Goal: Verbalizes/displays adequate comfort level or baseline comfort level  Description: INTERVENTIONS:  1. Encourage patient to monitor pain and request interventions  2. Assess pain using the appropriate pain scale  3. Administer analgesics based on type and severity of pain and evaluate response  4. Educate/Implement non-pharmacological measures as appropriate and evaluate response  5. Consider cultural, developmental and social influences on pain and pain management  6. Notify Provider if interventions unsuccessful or patient reports new pain  Outcome: Progressing     Problem: Daily Care  Goal: Daily care needs are met  Description: INTERVENTIONS:   1. Assess and monitor skin integrity  2. Identify patients at risk for skin breakdown on admission and per policy  3. Assess and monitor ability to perform self care and identify potential discharge needs  4. Assess skin integrity/risk for skin breakdown  5. Assist patient with activities of daily living as needed  6. Encourage independent activity per ability   7. Provide mouth care   8. Include patient/family/caregiver in decisions related to daily care   Outcome: Progressing     Problem: Knowledge Deficit  Goal: Patient/family/caregiver demonstrates understanding of disease process, treatment plan, medications, and discharge instructions  Description: INTERVENTIONS:   1. Complete learning assessment and assess knowledge base  2. Provide teaching at level of understanding   3. Provide teaching via preferred  learning methods  Outcome: Progressing     Problem: Infection - Adult  Goal: Absence of infection during hospitalization  Description: INTERVENTIONS:  1. Assess and monitor for signs and symptoms of infection  2. Monitor lab/diagnostic results  3. Monitor all insertion sites/wounds/incisions  4. Monitor secretions for changes in amount and color  5. Administer medications as ordered  6. Educate and encourage patient and family to use good hand hygiene technique  7. Identify and educate in appropriate isolation precautions for identified infection/condition  Outcome: Progressing     Problem: Safety Adult  Goal: Patient will remain safe during hospitalization  Description: INTERVENTIONS    1. Assess patient for fall risk and implement interventions if needed  2. Use safe transport techniques  3. Assess patient using the appropriate Harvey skin assessment scale  4. Assess patient for risk of aspiration  5. Assess patient for risk of elopement  6. Assess patient for risk of suicide  Outcome: Progressing     Problem: Potential for Compromised Skin Integrity  Goal: Skin Integrity is Maintained or Improved  Description: INTERVENTIONS:  1. Assess and monitor skin integrity  2. Collaborate with interdisciplinary team and initiate plans and interventions as needed  3. Alternate a full bath with partial baths for elderly   4. Monitor patient's hygiene practices   5. Collaborate with wound, ostomy, and continence nurse  Outcome: Progressing  Goal: Nutritional status is improving  Description: INTERVENTIONS:  1. Monitor and assess patient for malnutrition (ex- brittle hair, bruises, dry skin, pale skin and conjunctiva, muscle wasting, smooth red tongue, and disorientation)  2. Monitor patient's weight and dietary intake as ordered or per policy  3. Determine patient's food preferences and provide high-protein, high-caloric foods as appropriate  4. Assist patient with eating   5. Allow adequate time for meals   6. Encourage  patient to take dietary supplement as ordered   7. Collaborate with dietitian  8. Include patient/family/caregiver in decisions related to nutrition  Outcome: Progressing  Goal: MOBILITY IS MAINTAINED OR IMPROVED  Description: INTERVENTIONS  1. Collaborate with interdisciplinary team and initiate plan and interventions as ordered (PT/OT)  2. Encourage ambulation  3. Up to chair for meals  4. Monitor for signs of deconditioning  Outcome: Progressing     Problem: Urinary Incontinence  Goal: Perineal skin integrity is maintained or improved  Description: INTERVENTIONS:  1. Assess genitourinary system, perineal skin, labs (urinalysis), and history of incontinence to include past management, aggravating, and alleviating factors  2. Collaborate with interdisciplinary team including wound, ostomy, and continence nurse and initiate plans and interventions as needed  4. Consider urine containment device  5. Apply skin protectant   6. Develop skin care regimen  7. Provide privacy when changing patient's incontinence device to maintain their dignity  Outcome: Progressing

## 2023-12-08 NOTE — NURSING END OF SHIFT
Nursing End of Shift Summary:    Patient: Joseph Wong  MRN: [id number]  : 10/20/1934, Age: 89 y.o.    Location: [address] [place] [address]    Nursing Goals  Clinical Goals for the Shift: maintain safety, prevent falls , monitor pain    Narrative Summary of Progress Toward Clinical Goals:  Pain monitored denies being in pain. Confused and not following some orders. Assisted to feed.had one sitter through as he tries to get out of bed.    Barriers to Goals/Nursing Concerns:  no    New Patient or Family Concerns/Issues:    no  Shift Summary:   Significant Events & Communications to Providers (last 12 hours)       Last 5 Values    No documentation.                 Oxygen Usage (last 12 hours)       Last 5 Values       Row Name 23 0738 23 1600                Room Air or Baseline Oxygen Trial by Nursing    Is Patient on Room Air OR on the Same Amount of O2 as at Home? Yes Yes                     Mobility (last 12 hours)       Last 5 Values       Row Name 23 0738 23 1420 23 1500 23 1532          Mobility    Activity -- Dangle;Up ad smita  patient sat at bedside for 5-10 minutes Stand at bedside;Turn;Chair Stand at bedside     Level of Assistance -- Maximum assist, patient does 25-49% Maximum assist, patient does 25-49% Maximum assist, patient does 25-49%     Patient Position Supine -- -- Up in chair     Turning -- Turns self Other (Comment)  up in chair Other (Comment)  up in chair                   Urethral Catheter       Active Urethral Catheter       None                  Active Lines       Active Central venous catheter / Peripherally inserted central catheter / Implantable Port / Hemodialysis catheter / Midline Catheter       None                  Infusing Medications   Medication Dose Last Rate     PRN Medications   Medication Dose Last Admin    QUEtiapine  25 mg 25 mg at 23 1411    sodium chloride  3 mL      sodium chloride  1 spray      sodium chloride-aloe vera  1 Application       acetaminophen  500 mg 500 mg at 12/06/23 1939    oxyCODONE  5 mg      magnesium hydroxide  30 mL      ondansetron  4 mg      Or    ondansetron  4 mg      dextrose 50 % in water (D50W)  15-30 mL      dextrose  15.2 g      glucagon  1 mg      Or    glucagon  1 mg

## 2023-12-08 NOTE — INTERDISCIPLINARY/THERAPY
NUTRITION ASSESSMENT/REASSESSMENT    PERTINENT MEDICAL DIAGNOSIS/PROBLEMS:     Principle problem/diagnosis/procedures:   Left knee pain with associated effusion, Left total knee arthroplasty, 1/22, T12 and L4 compression deformities/fractures, Generalized weakness, Dementia, B12 and vitamin D deficiency, Type 2 diabetes mellitus, Neuropathy history, Obstructive sleep apnea, Gastroesophageal reflux disease history, Of coronary artery disease history, Benign prostatic hypertrophy history, Mood disorder history     PMH:     Past Medical History:   Diagnosis Date    CAD (coronary artery disease)     Elevated cholesterol     GERD (gastroesophageal reflux disease)     Hiatal hernia     History of shingles     Hypertension     Macular degeneration     Metabolic syndrome     Vertigo       Past Surgical History:   Procedure Laterality Date    OTHER SURGICAL HISTORY Left 08/2016    quad tendon repair    QUADRICEPS REPAIR Left 2015    TONSILLECTOMY      TOTAL ANKLE ARTHROPLASTY Left     TOTAL HIP ARTHROPLASTY Right     TOTAL HIP ARTHROPLASTY Left     TOTAL KNEE ARTHROPLASTY  januauary 2nd 2022    TRANSURETHRAL RESECTION OF PROSTATE             NUTRITION FOCUSED PHYSICAL EXAM (NFPE):  Physical Exam  Physical Exam:  (RD to complete NFPE as able.)    Subcutaneous Fat Loss       Muscle Wasting       Physical Findings       MONITORING/EVALUATION:    Labs:  Reviewed 12/8.  Results from last 4 days   Lab Units 12/05/23  0439 12/04/23  1015   POTASSIUM MMOL/L 4.0 4.4   CHLORIDE mmol/L 103 103   SODIUM mmol/L 133* 134*   BUN mg/dL 16 26*   CREATININE mg/dL 0.82 0.95   CO2 mmol/L 24 22   HEMOGLOBIN A1C %  --  6.4*   ANION GAP mmol/L 6 9   GLUCOSE mg/dL 106* 119*   CALCIUM mg/dL 8.9 8.9   AST U/L  --  16   ALT U/L  --  12   ALK PHOS U/L  --  103   TOTAL PROTEIN g/dL  --  6.1   ALBUMIN g/dL  --  3.7   BILIRUBIN TOTAL mg/dL  --  0.95   EGFR mL/min/1.73m*2 84 77   PHOSPHORUS mg/dL  --  3.0   MAGNESIUM mg/dL  --  1.9   VITAMIN B 12 pg/mL  --  " 343   VIT D 25 HYDROXY ng/mL  --  30      Lab Results   Component Value Date    HGBA1C 6.4 (H) 12/04/2023     Lab Results   Component Value Date    POCGLU 127 (H) 12/08/2023    POCGLU 132 (H) 12/07/2023    POCGLU 140 (H) 12/07/2023    POCGLU 131 (H) 12/07/2023    POCGLU 136 (H) 12/07/2023     Pertinent Meds: Scheduled Meds:QUEtiapine, 25 mg, oral, Nightly  multivitamin with minerals, 1 tablet, oral, Daily  enoxaparin, 40 mg, subcutaneous, Daily at 1400  lidocaine, 1 patch, Topical, Daily  sennosides-docusate sodium, 1 tablet, oral, 2x daily  polyethylene glycol, 17 g, oral, Daily  insulin short-acting 100 unit/mL SQ injection (correction dose), 0-15 Units, subcutaneous, Insulin: 4x daily with meals  famotidine, 10 mg, oral, 2x daily  amLODIPine, 5 mg, oral, q AM  aspirin, 81 mg, oral, q AM  atorvastatin, 40 mg, oral, q PM  finasteride, 5 mg, oral, q PM  FLUoxetine, 40 mg, oral, q PM  losartan, 50 mg, oral, q AM  metoprolol succinate XL, 25 mg, oral, q AM  spironolactone, 25 mg, oral, q AM  tamsulosin, 0.4 mg, oral, 2x daily      Continuous Infusions:   PRN Meds: Milk of magnesia, Zofran (none given).  Neuro status: Alert & disoriented x4  Respiratory status:   RA  GI findings: Last BM: 12/7  Wounds:    none  Edema (per nursing documentation):  no edema as of 12/8 (no edema on admit)    ANTHROPOMETRICS:  Wt Change in hospital:  12/8: -2.1 kg weight loss since admit,   Ht Readings from Last 3 Encounters:   12/04/23 1.829 m (6')   08/29/23 1.803 m (5' 11\")   08/07/23 1.803 m (5' 11\")     Weights (Current Encounter Only) (last 180 days)       Date/Time Weight    12/08/23 0400 75.9 kg (167 lb 4.8 oz)    12/08/23 0000 75.8 kg (167 lb 1.6 oz)    12/06/23 0001 76.9 kg (169 lb 9.6 oz)    12/04/23 1505 78 kg (171 lb 14.4 oz)          Wt Readings from Last 10 Encounters:   12/08/23 75.9 kg (167 lb 4.8 oz)   08/29/23 86.6 kg (191 lb)   08/07/23 86.8 kg (191 lb 5.8 oz)   04/21/23 86.8 kg (191 lb 5.8 oz)   02/26/23 86.8 kg (191 " lb 5.8 oz)   01/14/23 80.3 kg (177 lb 0.5 oz)   09/02/22 82.6 kg (182 lb)   08/05/22 86.6 kg (191 lb)   03/21/22 86.6 kg (191 lb)   12/20/21 86.6 kg (191 lb)     Admit Weight: 78 kg (171 lb 14.4 oz) 12/4/2023  Admit BMI (kg/m2): 23.31  IBW/kg (Calculated) Male: 80.8 kg  Weight Category: Acceptable  Wt Change Hx: -8.6 kg weight loss (10%) x 3 months prior to admission (8/29/23- 12/04/23), clinically significant  Per wife, UBW: 190 lbs 6 months ago    ORAL/DENTAL STATUS:  Teeth: Missing teeth       FOOD ALLERGIES:    No Known Allergies  Adventist/CULTURAL REQUESTS:      None  Cultural Requests During Hospitalization: none  Spiritual Requests During Hospitalization: none    Social Determinants of Health with Concerns     Tobacco Use: Medium Risk (12/4/2023)    Patient History     Smoking Tobacco Use: Former     Smokeless Tobacco Use: Never     Passive Exposure: Not on file   Alcohol Use: Not on file   Financial Resource Strain: Not on file   Physical Activity: Not on file   Stress: Not on file   Social Connections: Not on file   Depression: Not on file   Postpartum Depression: Not on file     Hunger Screen:        Meal/Supplement Intake:  12/8: Pt with sporadic yet inadequate intakes, averaging 46% meals and 30% supplements. Pt ate 15% breakfast this morning and drank 90% supplement (Boost Plus TID with meals)    Average Percent Meals Eaten (%): 45.5 Avg %  Average Percent Supplement Eaten (%): 30 Avg %    NUTRITION PRESCRIPTION:       Dietary Orders   (From admission, onward)                 Start     Ordered    12/05/23 1642  Meal Tray Service  Continuous        Question:  Meal Tray Service:  Answer:  Automatic Tray    12/05/23 1641    12/05/23 0621  Diet Regular  Diet effective now        Comments: Automatic trays   Question Answer Comment   Nutrition Therapy Protocol (Dietitian May Adjust Diet and Nourishments) Yes    Diet type Regular        12/05/23 0621                  Estimated Needs:  Total Energy Estimated  Needs: 1560- 1794 kcal/day (20-23 kcal/kg ABW)  Total Protein Estimated Needs: 78- 94 g/day (1-1.2 g/kg ABW)  Total Fluid Estimated Needs: 1950 mL/day (25 mL/kg ABW)      SUMMARY 12/5: Pt seen by Nutrition Services for +Consult to Nutrition Services. Pt presents with extremity weakness, LLE pain. Pt presents with failure to thrive, decreased appetite, poor nutrition and inactivity along with age related physical debility, malnutrition suspected per admission note. Per Care Everywhere data, pt with -8.6 kg weight loss (10%) x 3 months prior to admission (8/29/23- 12/04/23), clinically significant. Per discussion with patient's wife, he has had a decreased appetite over the past few months. She reports he drinks Ensure supplements at home, various flavors. Pt UBW: 190 lbs but has been gradually losing over the past few months. Disposition: DOP    12/8: Pt with sporadic and inadequate intakes over the past few days, averaging 46% meals and 30% supplements. Pt with worsening dementia and agitation requiring one-on-one sitter last night, Seroquel initiated to assist with his agitation on 12/6. Disposition: SNF, referrals send, pending approval and acceptance, possible need for dementia unit    ____________________________________________________________________  NUTRITION CARE PLAN     MALNUTRITION:  Malnutrition Present On Admission: Yes  Parameters for Malnutrition: Severe Hnstaidhykrs-Iybfqyhrji-kcsnvfb (NC-4.1.1.2)  Etiology: decreased appetite/ intakes related to failure to thrive, advanced age with some confusion  Signs/Symptoms:     Weight Loss: -8.6 kg weight loss (10%) x 3 months prior to admission (8/29/23- 12/04/23)  Energy Intake: <75% compared with estimated needs for >1 month prior to admission    Goals: Pt to consume >75% meals during admission, no weight loss below 78 kg  Progress: Pt not meeting goal as of 12/8 with poor intakes and further weight loss, new interventions added  Care Plan Documented:   yes    Interventions:  12/8:   -Diet: Regular  -Nutrition Supplementation: continue Medical Nutrition Suppl. (ND-3.1.1):  Boost Plus 8 oz TID with meal trays (14g PRO, 45g CHO, 360 Kehinde, 1gm fiber per 8 oz)  -Medical Nutrition Suppl. (ND-3.1.2): add Magic Cup with lunch tray (9g PRO, 38g CHO, 290 Kehinde, no fiber per svg, honey thick)  -Continue daily multivitamin with minerals             Discharge nutrition recommendations: Regular diet with oral nutrition supplementation as needed for poor appetite.

## 2023-12-08 NOTE — INTERDISCIPLINARY/THERAPY
Case Management Skilled Nursing Facilities Note    Phone # [phone]    Skilled Nursing Home Referrals: Yes.  TCU Referrals: No.  Reason for Referrals: PT/OT for short term stay rehab.  SNF/ TCU Preference Form Provided: no.  SNF/ TCU Preferences:   Medicare.gov Ratings Provided: Yes. It was given yesterday.      Referrals sent.      Covid-19 Vaccination Status:  (If this below section is blank, there are no Covid-19 Vaccinations documented in the patient's [facility] Electronic Medical Record.)    Current Series       Start at 5 years+ series       Dose 1  02/18/2021 (PFIZER-[employer] SARS-COV-2 PURPLE CAP VACCINATION (D1, D2, Immuno))        Dose 2  03/11/2021 (PFIZER-[employer] SARS-COV-2 PURPLE CAP VACCINATION (D1, D2, Immuno))        Dose 3  09/30/2021 (PFIZER-[employer] SARS-COV-2 PURPLE CAP VACCINATION (D1, D2, Immuno))        Dose 4  06/30/2022 (PFIZER[employer] SARS-COV-2 PURPLE CAP VACCINATION (D1, D2, Immuno))        Dose 5  10/05/2022 (PFIZER-[employer] BIVALENT BOOSTER SARS-COV-2 VACCINATION (Booster))        Dose 6  Recommended (Recurring): 09/01/2023   Valid: 09/01/2023 - 12/12/2112

## 2023-12-08 NOTE — INTERDISCIPLINARY/THERAPY
[address] [facility] [address]  [phone]      Inpatient Physical Therapy Daily Treatment Note    Date of Service: 12/8/2023  Patient Name: Joseph Wong  Referring Provider: WYATT SRINIVASAN ROBERT  Completed Visit Number: 2  SOC Date: 12/06/23  Certification Period: 01/05/24    Medical Diagnosis:   Weakness [R53.1]  Unable to bear weight on left lower extremity [R26.89]  Lumbar compression fracture, closed, initial encounter (CMS/[facility]) [S32.000A]  Treatment Diagnoses:  Treatment Diagnosis: Decreased strength, Decreased endurance, Impaired balance, Decreased mobility  Subjective     Subjective Comments  RN Stated patient is medically cleared for therapy: Yes  Subjective Comments: Patient sitting in recliner at beginning and end of session with family and sitter present throughout. Patient agreeable to treatment and reports that his L knee is not currently hurting. He declines further activity after short bout of gait due to LE fatigue.     Pain Assessment Scale  Pain Scale: 0-10  0-10 Pain Score: 0 - No pain  Oneil Fall Risk Score: 100         Objective    Precautions  Other Precautions: fall risk, w/c bound but able to stand pivot transfer prior    Treatments:  Gait belt donned for transfers/gait    Cognition: Able to follow commands this session    Bed Mobility: Not assessed- in recliner    Transfers: Min A sit<>Stand from recliner with use of walker  He requires min A to boost and for stability in initial stand    Ambulation: 2 m with min-mod A with front wheeled walker  1 m forward min A for stability, 1 m backward with mod A for stability and increased difficulty taking steps back to the chair.     Balance: Patient stood 1' with front wheeled walker and contact guard assist-min A prior to attempting gait    Other Activities: Patient declines further activity after ambulation due to being too tired.     Time Calculation  Start Time: 1017  Stop Time: 1025  Time Calculation (min): 8  min  Therapeutic Interventions (Time Spent in Minutes)  Therapeutic Activity: 8       Assessment/Plan   Assessment:    Level of Function and Prognosis  Assessment: Patient with significant improvement in cognition and ability to participate in skilled PT this session. He is able to stand and take steps with use of walker this session. He does continue to require physical assistance for mobility and would benefit from continued skilled PT to progress functional mobility as able with decreased caregiver assistance.  Recommendation  Recommendations for Therapy: Continue skilled therapy  Equipment Recommended:  (has w/c and walker at home)  Plan:    Plan  PT Frequency: 2-3x/wk  Plan for next treatment comment: 2 assist: bed mobility mod A, transfers min-mod A, gait 1 m forward/back min-mod A  Treatment duration will be through Certification Period: 01/05/24

## 2023-12-09 LAB
GLUCOSE BLDC GLUCOMTR-MCNC: 126 MG/DL (ref 70–105)
GLUCOSE BLDC GLUCOMTR-MCNC: 140 MG/DL (ref 70–105)
GLUCOSE BLDC GLUCOMTR-MCNC: 148 MG/DL (ref 70–105)

## 2023-12-09 PROCEDURE — 6370000100 HC RX 637 (ALT 250 FOR IP): Performed by: HOSPITALIST

## 2023-12-09 PROCEDURE — 2590000100 HC RX 259: Performed by: FAMILY MEDICINE

## 2023-12-09 PROCEDURE — 6360000200 HC RX 636 W HCPCS (ALT 250 FOR IP): Performed by: FAMILY MEDICINE

## 2023-12-09 PROCEDURE — 2590000100 HC RX 259: Performed by: HOSPITALIST

## 2023-12-09 PROCEDURE — 6370000100 HC RX 637 (ALT 250 FOR IP): Performed by: FAMILY MEDICINE

## 2023-12-09 PROCEDURE — 82947 ASSAY GLUCOSE BLOOD QUANT: CPT | Mod: QW

## 2023-12-09 PROCEDURE — 99232 SBSQ HOSP IP/OBS MODERATE 35: CPT | Performed by: HOSPITALIST

## 2023-12-09 PROCEDURE — 51798 US URINE CAPACITY MEASURE: CPT

## 2023-12-09 PROCEDURE — (BLANK) HC ROOM PRIVATE

## 2023-12-09 PROCEDURE — 6360000200 HC RX 636 W HCPCS (ALT 250 FOR IP): Mod: JZ

## 2023-12-09 PROCEDURE — 4A0D7LZ MEASUREMENT OF URINARY VOLUME, VIA NATURAL OR ARTIFICIAL OPENING: ICD-10-PCS | Performed by: HOSPITALIST

## 2023-12-09 RX ORDER — HALOPERIDOL 5 MG/ML
2 INJECTION INTRAMUSCULAR ONCE
Status: COMPLETED | OUTPATIENT
Start: 2023-12-09 | End: 2023-12-09

## 2023-12-09 RX ADMIN — AMLODIPINE BESYLATE 5 MG: 5 TABLET ORAL at 09:31

## 2023-12-09 RX ADMIN — SENNOSIDES AND DOCUSATE SODIUM 1 TABLET: 50; 8.6 TABLET ORAL at 09:31

## 2023-12-09 RX ADMIN — TAMSULOSIN HYDROCHLORIDE 0.4 MG: 0.4 CAPSULE ORAL at 09:31

## 2023-12-09 RX ADMIN — LOSARTAN POTASSIUM 50 MG: 50 TABLET, FILM COATED ORAL at 09:31

## 2023-12-09 RX ADMIN — POLYETHYLENE GLYCOL 3350 17 G: 17 POWDER, FOR SOLUTION ORAL at 09:31

## 2023-12-09 RX ADMIN — Medication 500 MG: at 09:25

## 2023-12-09 RX ADMIN — TAMSULOSIN HYDROCHLORIDE 0.4 MG: 0.4 CAPSULE ORAL at 19:49

## 2023-12-09 RX ADMIN — SPIRONOLACTONE 25 MG: 25 TABLET ORAL at 09:31

## 2023-12-09 RX ADMIN — LIDOCAINE 1 PATCH: 50 PATCH CUTANEOUS at 09:32

## 2023-12-09 RX ADMIN — QUETIAPINE FUMARATE 25 MG: 25 TABLET ORAL at 17:59

## 2023-12-09 RX ADMIN — Medication 1 TABLET: at 09:31

## 2023-12-09 RX ADMIN — ENOXAPARIN SODIUM 40 MG: 100 INJECTION SUBCUTANEOUS at 13:19

## 2023-12-09 RX ADMIN — FAMOTIDINE 10 MG: 20 TABLET, FILM COATED ORAL at 09:31

## 2023-12-09 RX ADMIN — ASPIRIN 81 MG: 81 TABLET ORAL at 09:31

## 2023-12-09 RX ADMIN — Medication 500 MG: at 17:59

## 2023-12-09 RX ADMIN — HALOPERIDOL LACTATE 2 MG: 5 INJECTION, SOLUTION INTRAMUSCULAR at 20:10

## 2023-12-09 RX ADMIN — METOPROLOL SUCCINATE 25 MG: 25 TABLET, EXTENDED RELEASE ORAL at 09:31

## 2023-12-09 NOTE — PLAN OF CARE
Problem: Safety Adult - Fall  Goal: Free from fall injury  Description: INTERVENTIONS:    Inpatient - Please reference Cares/Safety Flowsheet under Oneil Fall Risk for interventions.  Pediatrics - Please reference Peds Daily Cares/Safety Flowsheet under [facility]aker Pediatric Fall Assessment Fall Bundle for interventions  LD/OB - Please reference OB Shift Screening Flowsheet under OB Fall Risk for interventions.  Outcome: Progressing     Problem: Daily Care  Goal: Daily care needs are met  Description: INTERVENTIONS:   1. Assess and monitor skin integrity  2. Identify patients at risk for skin breakdown on admission and per policy  3. Assess and monitor ability to perform self care and identify potential discharge needs  4. Assess skin integrity/risk for skin breakdown  5. Assist patient with activities of daily living as needed  6. Encourage independent activity per ability   7. Provide mouth care   8. Include patient/family/caregiver in decisions related to daily care   Outcome: Progressing     Problem: Daily Care  Goal: Daily care needs are met  Description: INTERVENTIONS:   1. Assess and monitor skin integrity  2. Identify patients at risk for skin breakdown on admission and per policy  3. Assess and monitor ability to perform self care and identify potential discharge needs  4. Assess skin integrity/risk for skin breakdown  5. Assist patient with activities of daily living as needed  6. Encourage independent activity per ability   7. Provide mouth care   8. Include patient/family/caregiver in decisions related to daily care   Outcome: Progressing     Problem: Safety Adult  Goal: Patient will remain safe during hospitalization  Description: INTERVENTIONS    1. Assess patient for fall risk and implement interventions if needed  2. Use safe transport techniques  3. Assess patient using the appropriate Harvey skin assessment scale  4. Assess patient for risk of aspiration  5. Assess patient for risk of elopement  6.  Assess patient for risk of suicide  Outcome: Progressing

## 2023-12-09 NOTE — NURSING END OF SHIFT
Nursing End of Shift Summary:    Patient: Joseph Wong  MRN: [id number]  : 10/20/1934, Age: 89 y.o.    Location: [address] [place] [address]    Nursing Goals  Clinical Goals for the Shift: mobilize out of bed, maintain safety and comfort, telesitter    Narrative Summary of Progress Toward Clinical Goals:  Safety maintained . He's been agitated this afternoon and not following instructions managed to sit him on the chair and he tolerated well.Tele sitter in the room.    Barriers to Goals/Nursing Concerns:  no    New Patient or Family Concerns/Issues:    no  Shift Summary:   Significant Events & Communications to Providers (last 12 hours)       Last 5 Values    No documentation.                 Oxygen Usage (last 12 hours)       Last 5 Values       Row Name 23 1300 23 1620                Room Air or Baseline Oxygen Trial by Nursing    Is Patient on Room Air OR on the Same Amount of O2 as at Home? Yes Yes                     Mobility (last 12 hours)       Last 5 Values       Row Name 23 0718 23 0800 23 1345 23 1735          Mobility    Activity -- Turn Back to bed --     Level of Assistance -- -- Maximum assist, patient does 25-49% --     Patient Position Supine -- -- Supine     Turning -- Pillow support Pillow support --                   Urethral Catheter       Active Urethral Catheter       None                  Active Lines       Active Central venous catheter / Peripherally inserted central catheter / Implantable Port / Hemodialysis catheter / Midline Catheter       None                  Infusing Medications   Medication Dose Last Rate     PRN Medications   Medication Dose Last Admin    QUEtiapine  25 mg 25 mg at 23 1309    sodium chloride  3 mL      sodium chloride  1 spray      sodium chloride-aloe vera  1 Application      acetaminophen  500 mg 500 mg at 23 1545    oxyCODONE  5 mg      magnesium hydroxide  30 mL      ondansetron  4 mg      Or    ondansetron  4 mg      dextrose 50 %  in water (D50W)  15-30 mL      dextrose  15.2 g      glucagon  1 mg      Or    glucagon  1 mg

## 2023-12-09 NOTE — PLAN OF CARE
Problem: Safety Adult - Fall  Goal: Free from fall injury  Description: INTERVENTIONS:    Inpatient - Please reference Cares/Safety Flowsheet under [facility] Fall Risk for interventions.  Pediatrics - Please reference Peds Daily Cares/Safety Flowsheet under [facility] Pediatric Fall Assessment Fall Bundle for interventions  LD/OB - Please reference OB Shift Screening Flowsheet under OB Fall Risk for interventions.  Outcome: Progressing     Problem: Daily Care  Goal: Daily care needs are met  Description: INTERVENTIONS:   1. Assess and monitor skin integrity  2. Identify patients at risk for skin breakdown on admission and per policy  3. Assess and monitor ability to perform self care and identify potential discharge needs  4. Assess skin integrity/risk for skin breakdown  5. Assist patient with activities of daily living as needed  6. Encourage independent activity per ability   7. Provide mouth care   8. Include patient/family/caregiver in decisions related to daily care   Outcome: Progressing     Problem: Knowledge Deficit  Goal: Patient/family/caregiver demonstrates understanding of disease process, treatment plan, medications, and discharge instructions  Description: INTERVENTIONS:   1. Complete learning assessment and assess knowledge base  2. Provide teaching at level of understanding   3. Provide teaching via preferred learning methods  Outcome: Progressing     Problem: Discharge Barriers  Goal: Patient's discharge needs are met  Description: INTERVENTIONS:  1. Assess patient for self-management skills  2. Encourage participation in management  3. Identify potential discharge barriers on admission and throughout hospital stay  4. Involve patient/family/caregiver in discharge planning process  5. Collaborate with case management/ for discharge needs  Outcome: Progressing     Problem: Potential for Compromised Skin Integrity  Goal: Skin Integrity is Maintained or Improved  Description:  INTERVENTIONS:  1. Assess and monitor skin integrity  2. Collaborate with interdisciplinary team and initiate plans and interventions as needed  3. Alternate a full bath with partial baths for elderly   4. Monitor patient's hygiene practices   5. Collaborate with wound, ostomy, and continence nurse  Outcome: Progressing  Goal: Nutritional status is improving  Description: INTERVENTIONS:  1. Monitor and assess patient for malnutrition (ex- brittle hair, bruises, dry skin, pale skin and conjunctiva, muscle wasting, smooth red tongue, and disorientation)  2. Monitor patient's weight and dietary intake as ordered or per policy  3. Determine patient's food preferences and provide high-protein, high-caloric foods as appropriate  4. Assist patient with eating   5. Allow adequate time for meals   6. Encourage patient to take dietary supplement as ordered   7. Collaborate with dietitian  8. Include patient/family/caregiver in decisions related to nutrition  Outcome: Progressing  Goal: MOBILITY IS MAINTAINED OR IMPROVED  Description: INTERVENTIONS  1. Collaborate with interdisciplinary team and initiate plan and interventions as ordered (PT/OT)  2. Encourage ambulation  3. Up to chair for meals  4. Monitor for signs of deconditioning  Outcome: Progressing     Problem: Urinary Incontinence  Goal: Perineal skin integrity is maintained or improved  Description: INTERVENTIONS:  1. Assess genitourinary system, perineal skin, labs (urinalysis), and history of incontinence to include past management, aggravating, and alleviating factors  2. Collaborate with interdisciplinary team including wound, ostomy, and continence nurse and initiate plans and interventions as needed  4. Consider urine containment device  5. Apply skin protectant   6. Develop skin care regimen  7. Provide privacy when changing patient's incontinence device to maintain their dignity  Outcome: Progressing     Problem: Infection - Adult  Goal: Absence of infection  during hospitalization  Description: INTERVENTIONS:  1. Assess and monitor for signs and symptoms of infection  2. Monitor lab/diagnostic results  3. Monitor all insertion sites/wounds/incisions  4. Monitor secretions for changes in amount and color  5. Administer medications as ordered  6. Educate and encourage patient and family to use good hand hygiene technique  7. Identify and educate in appropriate isolation precautions for identified infection/condition  Outcome: Progressing     Problem: Safety Adult  Goal: Patient will remain safe during hospitalization  Description: INTERVENTIONS    1. Assess patient for fall risk and implement interventions if needed  2. Use safe transport techniques  3. Assess patient using the appropriate Harvey skin assessment scale  4. Assess patient for risk of aspiration  5. Assess patient for risk of elopement  6. Assess patient for risk of suicide  Outcome: Progressing     Problem: TRANSFERS  Goal: STG - Patient will perform bed mobility  Description: Supine<>sit with tactile cues for sequencing  Outcome: Progressing  Goal: LTG - Patient will demonstrate safe transfer techniques  Description: With min A for all transfers with use of walker   Outcome: Progressing

## 2023-12-09 NOTE — NURSING END OF SHIFT
Nursing End of Shift Summary:    Patient: Joseph Wong  MRN: [id number]  : 10/20/1934, Age: 89 y.o.    Location: [address] [place] [address]    Nursing Goals  Clinical Goals for the Shift: Monitor safety and comfort, monitor for signs of pain, no falls    Narrative Summary of Progress Toward Clinical Goals:  Patient was restless and agitated beginning of shift. Once seroquel was given, patient calmed down considerably. 1:1 sitter still in place. No further concerns at this time.     Barriers to Goals/Nursing Concerns:  No    New Patient or Family Concerns/Issues:  No    Shift Summary:   Significant Events & Communications to Providers (last 12 hours)       Last 5 Values    No documentation.                 Oxygen Usage (last 12 hours)       Last 5 Values       Row Name 23 1620 23 2100                Room Air or Baseline Oxygen Trial by Nursing    Is Patient on Room Air OR on the Same Amount of O2 as at Home? Yes Yes                     Mobility (last 12 hours)       Last 5 Values       Row Name 23 1735 23 1800 23 2000 23 2200 23 2251       Mobility    Activity -- Chair -- Bedrest --    Length of Time in Chair (min) -- 90 -- 0 --    Distance Ambulated (feet) -- -- -- 0 Feet --    Distance Ambulated (Meters Calculated) -- -- -- 0 Meters --    Patient Position Supine -- -- Supine Supine    Turning -- Turns self Turns self Refused by patient --    Distance Ambulated (Meters Calculated)(Do Not Use) -- -- -- 0 Feet --      Row Name 23 0000 23 0200                Mobility    Turning Turns self Turns self                     Urethral Catheter       Active Urethral Catheter       None                  Active Lines       Active Central venous catheter / Peripherally inserted central catheter / Implantable Port / Hemodialysis catheter / Midline Catheter       None                  Infusing Medications   Medication Dose Last Rate     PRN Medications   Medication Dose Last Admin    QUEtiapine   25 mg 25 mg at 12/08/23 2030    sodium chloride  3 mL      sodium chloride  1 spray      sodium chloride-aloe vera  1 Application      acetaminophen  500 mg 500 mg at 12/08/23 1545    oxyCODONE  5 mg      magnesium hydroxide  30 mL      ondansetron  4 mg      Or    ondansetron  4 mg      dextrose 50 % in water (D50W)  15-30 mL      dextrose  15.2 g      glucagon  1 mg      Or    glucagon  1 mg

## 2023-12-10 LAB
GLUCOSE BLDC GLUCOMTR-MCNC: 130 MG/DL (ref 70–105)
GLUCOSE BLDC GLUCOMTR-MCNC: 138 MG/DL (ref 70–105)

## 2023-12-10 PROCEDURE — 99232 SBSQ HOSP IP/OBS MODERATE 35: CPT | Performed by: HOSPITALIST

## 2023-12-10 PROCEDURE — 6370000100 HC RX 637 (ALT 250 FOR IP): Performed by: HOSPITALIST

## 2023-12-10 PROCEDURE — 6370000100 HC RX 637 (ALT 250 FOR IP): Performed by: FAMILY MEDICINE

## 2023-12-10 PROCEDURE — 2590000100 HC RX 259: Performed by: FAMILY MEDICINE

## 2023-12-10 PROCEDURE — 6360000200 HC RX 636 W HCPCS (ALT 250 FOR IP): Performed by: FAMILY MEDICINE

## 2023-12-10 PROCEDURE — (BLANK) HC ROOM PRIVATE

## 2023-12-10 PROCEDURE — 51798 US URINE CAPACITY MEASURE: CPT

## 2023-12-10 PROCEDURE — 82947 ASSAY GLUCOSE BLOOD QUANT: CPT | Mod: QW

## 2023-12-10 PROCEDURE — 51701 INSERT BLADDER CATHETER: CPT

## 2023-12-10 PROCEDURE — 2590000100 HC RX 259: Performed by: HOSPITALIST

## 2023-12-10 RX ADMIN — FAMOTIDINE 10 MG: 20 TABLET, FILM COATED ORAL at 10:10

## 2023-12-10 RX ADMIN — SPIRONOLACTONE 25 MG: 25 TABLET ORAL at 10:10

## 2023-12-10 RX ADMIN — METOPROLOL SUCCINATE 25 MG: 25 TABLET, EXTENDED RELEASE ORAL at 10:10

## 2023-12-10 RX ADMIN — POLYETHYLENE GLYCOL 3350 17 G: 17 POWDER, FOR SOLUTION ORAL at 10:10

## 2023-12-10 RX ADMIN — TAMSULOSIN HYDROCHLORIDE 0.4 MG: 0.4 CAPSULE ORAL at 10:10

## 2023-12-10 RX ADMIN — ATORVASTATIN CALCIUM 40 MG: 40 TABLET, FILM COATED ORAL at 19:33

## 2023-12-10 RX ADMIN — FAMOTIDINE 10 MG: 20 TABLET, FILM COATED ORAL at 19:33

## 2023-12-10 RX ADMIN — QUETIAPINE FUMARATE 25 MG: 25 TABLET ORAL at 16:34

## 2023-12-10 RX ADMIN — TAMSULOSIN HYDROCHLORIDE 0.4 MG: 0.4 CAPSULE ORAL at 19:33

## 2023-12-10 RX ADMIN — Medication 1 TABLET: at 10:10

## 2023-12-10 RX ADMIN — ENOXAPARIN SODIUM 40 MG: 100 INJECTION SUBCUTANEOUS at 13:49

## 2023-12-10 RX ADMIN — ASPIRIN 81 MG: 81 TABLET ORAL at 10:10

## 2023-12-10 RX ADMIN — FINASTERIDE 5 MG: 5 TABLET, FILM COATED ORAL at 19:33

## 2023-12-10 RX ADMIN — SENNOSIDES AND DOCUSATE SODIUM 1 TABLET: 50; 8.6 TABLET ORAL at 10:10

## 2023-12-10 RX ADMIN — LOSARTAN POTASSIUM 50 MG: 50 TABLET, FILM COATED ORAL at 10:10

## 2023-12-10 RX ADMIN — Medication 500 MG: at 19:25

## 2023-12-10 RX ADMIN — FLUOXETINE HYDROCHLORIDE 40 MG: 20 CAPSULE ORAL at 19:30

## 2023-12-10 RX ADMIN — AMLODIPINE BESYLATE 5 MG: 5 TABLET ORAL at 10:10

## 2023-12-10 RX ADMIN — QUETIAPINE FUMARATE 25 MG: 25 TABLET ORAL at 17:21

## 2023-12-10 NOTE — PLAN OF CARE
Problem: Safety Adult - Fall  Goal: Free from fall injury  Description: INTERVENTIONS:    Inpatient - Please reference Cares/Safety Flowsheet under [facility] Fall Risk for interventions.  Pediatrics - Please reference Peds Daily Cares/Safety Flowsheet under [facility] Pediatric Fall Assessment Fall Bundle for interventions  LD/OB - Please reference OB Shift Screening Flowsheet under OB Fall Risk for interventions.  Outcome: Progressing     Problem: Pain - Adult  Goal: Verbalizes/displays adequate comfort level or baseline comfort level  Description: INTERVENTIONS:  1. Encourage patient to monitor pain and request interventions  2. Assess pain using the appropriate pain scale  3. Administer analgesics based on type and severity of pain and evaluate response  4. Educate/Implement non-pharmacological measures as appropriate and evaluate response  5. Consider cultural, developmental and social influences on pain and pain management  6. Notify Provider if interventions unsuccessful or patient reports new pain  Outcome: Progressing     Problem: Discharge Barriers  Goal: Patient's discharge needs are met  Description: INTERVENTIONS:  1. Assess patient for self-management skills  2. Encourage participation in management  3. Identify potential discharge barriers on admission and throughout hospital stay  4. Involve patient/family/caregiver in discharge planning process  5. Collaborate with case management/ for discharge needs  Outcome: Not Progressing

## 2023-12-10 NOTE — NURSING END OF SHIFT
"Nursing End of Shift Summary:    Patient: Joseph Wong  MRN: [id number]  : 10/20/1934, Age: 89 y.o.    Location: [address] [place] [address]    Nursing Goals  Clinical Goals for the Shift: Comfort and safety; 1:1 sitter    Narrative Summary of Progress Toward Clinical Goals:  Pt was combative and restless in the beginning of the shift; he was trying to get out of bed \" to go home with the kids\" as we were trying to get him safety in bed he said \"I will kick you if you don't get out of the way\"; IV Haldol given once; pt refused to open his mouth for his oral meds; Pt slept from 2300 to 0600; no Urine output , BS done revealed 380- SC done. Pt woke up without recollection of being in the hospital; he is confused and looking for his wife but calm and cooperative.    Barriers to Goals/Nursing Concerns:  Yes - Placement    New Patient or Family Concerns/Issues:  No    Shift Summary:   Significant Events & Communications to Providers (last 12 hours)       Last 5 Values       Row Name 23                   Provider Notification    Reason for Communication Aggressive behavior  -ABDULAZIZ        Provider Name Vicenta AGUIAR                  User Key  (r) = Recorded By, (t) = Taken By, (c) = Cosigned By      Initials Name    Tiffany Lee RN                  Oxygen Usage (last 12 hours)       Last 5 Values    No documentation.                 Mobility (last 12 hours)       Last 5 Values       Row Name 23 19023 193238 23 2330       Mobility    Activity -- -- -- Turn --    Length of Time in Chair (min) -- -- -- 0 --    Distance Ambulated (feet) -- -- -- 0 Feet --    Distance Ambulated (Meters Calculated) -- -- -- 0 Meters --    Patient Position Supine -- Supine -- --    Turning Turns self Turns self -- Turns self Back    Distance Ambulated (Meters Calculated)(Do Not Use) -- -- -- 0 Feet --      Row Name 12/10/23 0200 12/10/23 0347                Mobility    Turning Refused by patient " Back                     Urethral Catheter       Active Urethral Catheter       None                  Active Lines       Active Central venous catheter / Peripherally inserted central catheter / Implantable Port / Hemodialysis catheter / Midline Catheter       None                  Infusing Medications   Medication Dose Last Rate     PRN Medications   Medication Dose Last Admin    QUEtiapine  25 mg 25 mg at 12/08/23 2030    sodium chloride  3 mL      sodium chloride  1 spray      sodium chloride-aloe vera  1 Application      acetaminophen  500 mg 500 mg at 12/09/23 1759    oxyCODONE  5 mg      magnesium hydroxide  30 mL      ondansetron  4 mg      Or    ondansetron  4 mg      dextrose 50 % in water (D50W)  15-30 mL      dextrose  15.2 g      glucagon  1 mg      Or    glucagon  1 mg

## 2023-12-10 NOTE — PLAN OF CARE
Problem: Safety Adult - Fall  Goal: Free from fall injury  Description: INTERVENTIONS:    Inpatient - Please reference Cares/Safety Flowsheet under [facility] Fall Risk for interventions.  Pediatrics - Please reference Peds Daily Cares/Safety Flowsheet under [facility] Pediatric Fall Assessment Fall Bundle for interventions  LD/OB - Please reference OB Shift Screening Flowsheet under OB Fall Risk for interventions.  Outcome: Progressing     Problem: Pain - Adult  Goal: Verbalizes/displays adequate comfort level or baseline comfort level  Description: INTERVENTIONS:  1. Encourage patient to monitor pain and request interventions  2. Assess pain using the appropriate pain scale  3. Administer analgesics based on type and severity of pain and evaluate response  4. Educate/Implement non-pharmacological measures as appropriate and evaluate response  5. Consider cultural, developmental and social influences on pain and pain management  6. Notify Provider if interventions unsuccessful or patient reports new pain  Outcome: Progressing  Flowsheets (Taken 12/10/2023 1116)  Verbalizes/displays adequate comfort level or baseline comfort level:   Assess pain using the appropriate pain scale   Encourage patient to monitor pain and request interventions   Notify Provider if interventions unsuccessful or patient reports new pain   Administer analgesics based on type and severity of pain and evaluate response     Problem: Daily Care  Goal: Daily care needs are met  Description: INTERVENTIONS:   1. Assess and monitor skin integrity  2. Identify patients at risk for skin breakdown on admission and per policy  3. Assess and monitor ability to perform self care and identify potential discharge needs  4. Assess skin integrity/risk for skin breakdown  5. Assist patient with activities of daily living as needed  6. Encourage independent activity per ability   7. Provide mouth care   8. Include patient/family/caregiver in decisions related to  daily care   Outcome: Progressing  Flowsheets (Taken 12/10/2023 1116)  Daily care needs are met:   Assess and monitor skin integrity   Assess skin integrity/risk for skin breakdown   Assist patient with activities of daily living as needed   Identify patients at risk for skin breakdown on admission and per policy     Problem: Knowledge Deficit  Goal: Patient/family/caregiver demonstrates understanding of disease process, treatment plan, medications, and discharge instructions  Description: INTERVENTIONS:   1. Complete learning assessment and assess knowledge base  2. Provide teaching at level of understanding   3. Provide teaching via preferred learning methods  Outcome: Progressing  Flowsheets (Taken 12/10/2023 1116)  Patient/family/caregiver demonstrates understanding of disease process, treatment plan, medications, and discharge instructions: Provide teaching at level of understanding     Problem: Discharge Barriers  Goal: Patient's discharge needs are met  Description: INTERVENTIONS:  1. Assess patient for self-management skills  2. Encourage participation in management  3. Identify potential discharge barriers on admission and throughout hospital stay  4. Involve patient/family/caregiver in discharge planning process  5. Collaborate with case management/ for discharge needs  Outcome: Progressing  Flowsheets (Taken 12/10/2023 1116)  Patient discharge needs are met:   Assess patient for self-management skills   Identify potential discharge barriers on admission and throughout hospital stay   Encourage participation in management     Problem: Potential for Compromised Skin Integrity  Goal: Skin Integrity is Maintained or Improved  Description: INTERVENTIONS:  1. Assess and monitor skin integrity  2. Collaborate with interdisciplinary team and initiate plans and interventions as needed  3. Alternate a full bath with partial baths for elderly   4. Monitor patient's hygiene practices   5. Collaborate  with wound, ostomy, and continence nurse  Outcome: Progressing  Flowsheets (Taken 12/10/2023 1116)  Skin integrity is maintained or improved:   Assess and monitor skin integrity   Alternate a full bath with partial baths for elderly   Monitor patient's hygiene practices  Goal: Nutritional status is improving  Description: INTERVENTIONS:  1. Monitor and assess patient for malnutrition (ex- brittle hair, bruises, dry skin, pale skin and conjunctiva, muscle wasting, smooth red tongue, and disorientation)  2. Monitor patient's weight and dietary intake as ordered or per policy  3. Determine patient's food preferences and provide high-protein, high-caloric foods as appropriate  4. Assist patient with eating   5. Allow adequate time for meals   6. Encourage patient to take dietary supplement as ordered   7. Collaborate with dietitian  8. Include patient/family/caregiver in decisions related to nutrition  Outcome: Progressing  Flowsheets (Taken 12/10/2023 1116)  Nutritional status is improving:   Monitor patient's weight and dietary intake as ordered or per policy   Monitor and assess patient for malnutrition (ex- brittle hair, bruises, dry skin, pale skin and conjunctiva, muscle wasting, smooth red tongue, and disorientation)   Assist patient with eating   Include patient/family/caregiver in decisions related to nutrition   Determine patient's food preferences and provide high-protein, high-caloric foods as appropriate  Goal: MOBILITY IS MAINTAINED OR IMPROVED  Description: INTERVENTIONS  1. Collaborate with interdisciplinary team and initiate plan and interventions as ordered (PT/OT)  2. Encourage ambulation  3. Up to chair for meals  4. Monitor for signs of deconditioning  Outcome: Progressing  Flowsheets (Taken 12/10/2023 1116)  Mobility is Maintained or Improved:   Monitor for signs of deconditioning   Collaborate with interdisciplinary team and initiate plan and interventions as ordered (PT/OT)     Problem: Urinary  Incontinence  Goal: Perineal skin integrity is maintained or improved  Description: INTERVENTIONS:  1. Assess genitourinary system, perineal skin, labs (urinalysis), and history of incontinence to include past management, aggravating, and alleviating factors  2. Collaborate with interdisciplinary team including wound, ostomy, and continence nurse and initiate plans and interventions as needed  4. Consider urine containment device  5. Apply skin protectant   6. Develop skin care regimen  7. Provide privacy when changing patient's incontinence device to maintain their dignity  Outcome: Progressing  Flowsheets (Taken 12/10/2023 1116)  Perineal skin integrity is maintained or improved:   Assess genitourinary system, perineal skin, labs (urinalysis), and history of incontinence to include past management, aggravating, and alleviating factors   Consider urine containment device   Apply skin protectant     Problem: Infection - Adult  Goal: Absence of infection during hospitalization  Description: INTERVENTIONS:  1. Assess and monitor for signs and symptoms of infection  2. Monitor lab/diagnostic results  3. Monitor all insertion sites/wounds/incisions  4. Monitor secretions for changes in amount and color  5. Administer medications as ordered  6. Educate and encourage patient and family to use good hand hygiene technique  7. Identify and educate in appropriate isolation precautions for identified infection/condition  Outcome: Progressing  Flowsheets (Taken 12/10/2023 1116)  Absence of infection during hospitalization:   Assess and monitor for signs and symptoms of infection   Monitor lab/diagnostic results   Monitor all insertion sites/wounds/incisions   Administer medications as ordered     Problem: Safety Adult  Goal: Patient will remain safe during hospitalization  Description: INTERVENTIONS    1. Assess patient for fall risk and implement interventions if needed  2. Use safe transport techniques  3. Assess patient  using the appropriate Harvey skin assessment scale  4. Assess patient for risk of aspiration  5. Assess patient for risk of elopement  6. Assess patient for risk of suicide  Outcome: Progressing  Flowsheets (Taken 12/10/2023 1116)  Patient will remain safe durning hospitalization:   Assess patient for Fall Risk   Use safe transport   Assess Patient using the appropriate Harvey scale   Assess Patient for Aspirations     Problem: TRANSFERS  Goal: STG - Patient will perform bed mobility  Description: Supine<>sit with tactile cues for sequencing  Outcome: Progressing  Goal: LTG - Patient will demonstrate safe transfer techniques  Description: With min A for all transfers with use of walker   Outcome: Progressing

## 2023-12-10 NOTE — PROGRESS NOTES
[address] [address]  Daily Progress Note  Patient name: Joseph Wong  MRN: [id number]   LOS: 3 days     Subjective   Patient resting comfortably.  Objective   Vitals:Temp:  [36.5 °C (97.7 °F)] 36.5 °C (97.7 °F)  Heart Rate:  [67-73] 67  Resp:  [18-20] 18  SpO2:  [93 %-96 %] 96 %  BP: (124-153)/() 136/84  Physical Exam:  HEENT: Negative  Neck: No neck vein distention  Lungs: Auscultation  Heart: Regular  Abdomen: Soft with positive bowel sounds  Genitourinary: Deferred  Extremities:'s mild left knee effusion  Skin: No rash or jaundice  Neurologic: Alert    Review of Systems:  Negative  Admission on 12/04/2023   Component Date Value Ref Range Status    WBC 12/04/2023 7.3  3.7 - 9.6 10*3/uL Final    RBC 12/04/2023 4.13  4.10 - 5.80 10*6/µL Final    Hemoglobin 12/04/2023 14.0  13.2 - 17.2 g/dL Final    Hematocrit 12/04/2023 38.5  38.0 - 50.0 % Final    MCV 12/04/2023 93.3  82.0 - 97.0 fL Final    MCH 12/04/2023 33.9  29.0 - 34.0 pg Final    MCHC 12/04/2023 36.3 (H)  32.0 - 36.0 g/dL Final    RDW 12/04/2023 14.0  11.5 - 15.0 % Final    Platelets 12/04/2023 203  130 - 350 10*3/uL Final    MPV 12/04/2023 7.3  6.9 - 10.8 fL Final    Neutrophils% 12/04/2023 73.3 (H)  46.0 - 70.0 % Final    Lymphocytes% 12/04/2023 15.4  15.0 - 47.0 % Final    Monocytes% 12/04/2023 9.2  5.0 - 13.0 % Final    Eosinophils% 12/04/2023 1.4  0.0 - 3.0 % Final    Basophils% 12/04/2023 0.7  0.0 - 2.0 % Final    ANC (auto diff) 12/04/2023 5.30  10*3/UL Final    Lymphocytes Absolute 12/04/2023 1.10  10*3/uL Final    Monocytes Absolute 12/04/2023 0.70  10*3/uL Final    Eosinophils Absolute 12/04/2023 0.10  10*3/uL Final    Basophils Absolute 12/04/2023 0.10  10*3/uL Final    Sodium 12/04/2023 134 (L)  135 - 145 mmol/L Final    Potassium 12/04/2023 4.4  3.5 - 5.1 MMOL/L Final    Chloride 12/04/2023 103  98 - 107 mmol/L Final    CO2 12/04/2023 22  21 - 32 mmol/L Final    Anion Gap 12/04/2023 9  3 - 11  mmol/L Final    BUN 12/04/2023 26 (H)  7 - 25 mg/dL Final    Creatinine 12/04/2023 0.95  0.70 - 1.30 mg/dL Final    Glucose 12/04/2023 119 (H)  70 - 105 mg/dL Final    Calcium 12/04/2023 8.9  8.6 - 10.3 mg/dL Final    AST 12/04/2023 16  <40 U/L Final    ALT (SGPT) 12/04/2023 12  7 - 52 U/L Final    Alkaline Phosphatase 12/04/2023 103  45 - 115 U/L Final    Total Protein 12/04/2023 6.1  6.0 - 8.3 g/dL Final    Albumin 12/04/2023 3.7  3.5 - 5.3 g/dL Final    Total Bilirubin 12/04/2023 0.95  0.20 - 1.40 mg/dL Final    Corrected Calcium 12/04/2023 9.1  8.6 - 10.3 mg/dL Final    eGFR 12/04/2023 77  >60 mL/min/1.73m*2 Final    Lipase 12/04/2023 77  11 - 82 U/L Final    Magnesium 12/04/2023 1.9  1.8 - 2.4 mg/dL Final    POC Lactate 12/04/2023 1.20  0.50 - 1.99 MMOL/L Final    CRP 12/04/2023 <1.0  <=10.0 MG/L Final    Protime 12/04/2023 12.0  9.4 - 12.5 SECONDS Final    INR 12/04/2023 1.1  <=1.1 Final    PTT 12/04/2023 27.6  25.1 - 36.5 SECONDS Final    RBC, Urine 12/04/2023 0-2  None seen, 0-2, Negative /HPF Final    WBC, Urine 12/04/2023 0-4  0 - 4 /HPF Final    WBC Clumps, Urine 12/04/2023 None seen  None seen /HPF Final    Squamous Epithelial, Urine 12/04/2023 Negative  None Seen-9 /HPF Final    Bacteria, Urine 12/04/2023 None seen  None seen, Few /HPF Final    Color, Urine 12/04/2023 Yellow  Yellow Final    Clarity, Urine 12/04/2023 Clear  Clear Final    Specific Gravity, Urine 12/04/2023 1.025  1.003 - 1.030 Final    Leukocytes, Urine 12/04/2023 Negative  Negative Final    Nitrite, Urine 12/04/2023 Negative  Negative Final    Protein, Urine 12/04/2023 10 (A)  Negative MG/DL Final    Ketones, Urine 12/04/2023 Negative  Negative MG/DL Final    Urobilinogen, Urine 12/04/2023 2.0 (A)  <2.0 mg/dL Final    Bilirubin, Urine 12/04/2023 Negative  Negative Final    Blood, Urine 12/04/2023 Negative  Negative Final    Glucose, Urine 12/04/2023 Negative  Negative MG/DL Final    pH, Urine 12/04/2023 5.5  5.0 - 8.0 PH Final    POC  Glucose 12/04/2023 119 (H)  70 - 105 MG/DL Final    Uric Acid 12/04/2023 4.0 (L)  4.4 - 7.6 mg/dL Final    Procalcitonin 12/04/2023 <0.02  <0.50 ng/mL Final    Sed Rate 12/04/2023 5  <=20 mm/hr Final    Vitamin B-12 12/04/2023 343  180 - 914 pg/mL Final    Folate 12/04/2023 13.3  >6.6 ng/mL Final    Vit D, 25-Hydroxy 12/04/2023 30  30 - 100 ng/mL Final    TSH 12/04/2023 3.622  0.340 - 4.820 uIU/ml Final    Phosphorus 12/04/2023 3.0  2.5 - 4.9 mg/dL Final    Hemoglobin A1C 12/04/2023 6.4 (H)  4.0 - 6.0 % Final    Estimated Average Glucose 12/04/2023 137.0  mg/dL Final    POC Glucose 12/04/2023 117 (H)  70 - 105 MG/DL Final    WBC 12/05/2023 6.4  3.7 - 9.6 10*3/uL Final    RBC 12/05/2023 3.92 (L)  4.10 - 5.80 10*6/µL Final    Hemoglobin 12/05/2023 13.0 (L)  13.2 - 17.2 g/dL Final    Hematocrit 12/05/2023 36.2 (L)  38.0 - 50.0 % Final    MCV 12/05/2023 92.4  82.0 - 97.0 fL Final    MCH 12/05/2023 33.1  29.0 - 34.0 pg Final    MCHC 12/05/2023 35.8  32.0 - 36.0 g/dL Final    RDW 12/05/2023 13.6  11.5 - 15.0 % Final    Platelets 12/05/2023 213  130 - 350 10*3/uL Final    MPV 12/05/2023 7.3  6.9 - 10.8 fL Final    Neutrophils% 12/05/2023 66.2  46.0 - 70.0 % Final    Lymphocytes% 12/05/2023 18.9  15.0 - 47.0 % Final    Monocytes% 12/05/2023 12.4  5.0 - 13.0 % Final    Eosinophils% 12/05/2023 1.7  0.0 - 3.0 % Final    Basophils% 12/05/2023 0.8  0.0 - 2.0 % Final    ANC (auto diff) 12/05/2023 4.20  10*3/UL Final    Lymphocytes Absolute 12/05/2023 1.20  10*3/uL Final    Monocytes Absolute 12/05/2023 0.80  10*3/uL Final    Eosinophils Absolute 12/05/2023 0.10  10*3/uL Final    Basophils Absolute 12/05/2023 0.10  10*3/uL Final    Sodium 12/05/2023 133 (L)  135 - 145 mmol/L Final    Potassium 12/05/2023 4.0  3.5 - 5.1 MMOL/L Final    Chloride 12/05/2023 103  98 - 107 mmol/L Final    CO2 12/05/2023 24  21 - 32 mmol/L Final    BUN 12/05/2023 16  7 - 25 mg/dL Final    Creatinine 12/05/2023 0.82  0.70 - 1.30 mg/dL Final    Glucose  12/05/2023 106 (H)  70 - 105 mg/dL Final    Calcium 12/05/2023 8.9  8.6 - 10.3 mg/dL Final    Anion Gap 12/05/2023 6  3 - 11 mmol/L Final    eGFR 12/05/2023 84  >60 mL/min/1.73m*2 Final    POC Glucose 12/05/2023 113 (H)  70 - 105 MG/DL Final    POC Glucose 12/05/2023 173 (H)  70 - 105 MG/DL Final    POC Glucose 12/05/2023 135 (H)  70 - 105 MG/DL Final    POC Glucose 12/05/2023 142 (H)  70 - 105 MG/DL Final    POC Glucose 12/06/2023 125 (H)  70 - 105 MG/DL Final    POC Glucose 12/06/2023 143 (H)  70 - 105 MG/DL Final    POC Glucose 12/06/2023 145 (H)  70 - 105 MG/DL Final    POC Glucose 12/06/2023 124 (H)  70 - 105 MG/DL Final    POC Glucose 12/07/2023 136 (H)  70 - 105 MG/DL Final    POC Glucose 12/07/2023 131 (H)  70 - 105 MG/DL Final    POC Glucose 12/07/2023 140 (H)  70 - 105 MG/DL Final    POC Glucose 12/07/2023 132 (H)  70 - 105 MG/DL Final    POC Glucose 12/08/2023 127 (H)  70 - 105 MG/DL Final    POC Glucose 12/08/2023 222 (H)  70 - 105 MG/DL Final    POC Glucose 12/08/2023 144 (H)  70 - 105 MG/DL Final    POC Glucose 12/08/2023 148 (H)  70 - 105 MG/DL Final    POC Glucose 12/09/2023 140 (H)  70 - 105 MG/DL Final    POC Glucose 12/09/2023 126 (H)  70 - 105 MG/DL Final    POC Glucose 12/09/2023 148 (H)  70 - 105 MG/DL Final    POC Glucose 12/10/2023 130 (H)  70 - 105 MG/DL Final        Assessment/Plan   Left knee pain with associated effusion                       Left total knee arthroplasty, 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  Case discussed with orthopedic service regarding left knee effusion.  Recommending avoiding  arthrocentesis secondary to prior total knee arthroplasty history.  Will provide compression brace.  Follow-up with PT and OT.) 12/5/23     (Patient observed with therapy and he did not do well with stability and mobility concerns.  Discussed with patient's wife and she is quite concerned about his safety at home and stability.  He has been having worsening dementia and last evening agitation noted requiring one-on-one sitter.  Seroquel initiated to assist with his agitation.) 12/6/23     (Will reduce his nighttime Seroquel dose.  Continue to monitor.) 12/7/23     (Awaiting disposition.) 12/8/23     (Awaiting disposition.) 12/9/23    Anticipate nursing home placement in the morning.     Plan  Pain management as needed  PT and OT  Disposition planning       Electronically signed by: Sami Olsen MD  12/10/2023  12:58 PM

## 2023-12-10 NOTE — NURSING END OF SHIFT
Nursing End of Shift Summary:    Patient: Joseph Wong  MRN: [id number]  : 10/20/1934, Age: 89 y.o.    Location: [address] [place] [address]    Nursing Goals  Clinical Goals for the Shift: comfort and safety    Narrative Summary of Progress Toward Clinical Goals:  Pt pleasantly confused, cooperative w/ care; able to swallow all pills whole w/ water. Sitter continued. Pt straight cath x1 today for bladder scan volume of 451.    Barriers to Goals/Nursing Concerns:  No    New Patient or Family Concerns/Issues:  No    Shift Summary:   Significant Events & Communications to Providers (last 12 hours)       Last 5 Values    No documentation.                 Oxygen Usage (last 12 hours)       Last 5 Values       Row Name 23 0800                   Room Air or Baseline Oxygen Trial by Nursing    Is Patient on Room Air OR on the Same Amount of O2 as at Home? Yes                      Mobility (last 12 hours)       Last 5 Values       Row Name 23 0800 23 0908 23 1200 23 1401 23 1619       Mobility    Activity -- -- -- Chair position in bed --    Level of Assistance -- -- -- Minimal assist, patient does 75% or more --    Patient Position -- Supine -- -- Supine    Turning Turns self Back Turns self Turns self --      Row Name 23 1800                   Mobility    Turning Turns self                      Urethral Catheter       Active Urethral Catheter       None                  Active Lines       Active Central venous catheter / Peripherally inserted central catheter / Implantable Port / Hemodialysis catheter / Midline Catheter       None                  Infusing Medications   Medication Dose Last Rate     PRN Medications   Medication Dose Last Admin    QUEtiapine  25 mg 25 mg at 23 2030    sodium chloride  3 mL      sodium chloride  1 spray      sodium chloride-aloe vera  1 Application      acetaminophen  500 mg 500 mg at 23 175    oxyCODONE  5 mg      magnesium hydroxide  30 mL       ondansetron  4 mg      Or    ondansetron  4 mg      dextrose 50 % in water (D50W)  15-30 mL      dextrose  15.2 g      glucagon  1 mg      Or    glucagon  1 mg

## 2023-12-11 LAB
GLUCOSE BLDC GLUCOMTR-MCNC: 138 MG/DL (ref 70–105)
GLUCOSE BLDC GLUCOMTR-MCNC: 145 MG/DL (ref 70–105)
GLUCOSE BLDC GLUCOMTR-MCNC: 155 MG/DL (ref 70–105)
GLUCOSE BLDC GLUCOMTR-MCNC: 174 MG/DL (ref 70–105)

## 2023-12-11 PROCEDURE — 2590000100 HC RX 259: Performed by: HOSPITALIST

## 2023-12-11 PROCEDURE — 2590000100 HC RX 259: Performed by: FAMILY MEDICINE

## 2023-12-11 PROCEDURE — 6370000100 HC RX 637 (ALT 250 FOR IP): Performed by: HOSPITALIST

## 2023-12-11 PROCEDURE — (BLANK) HC ROOM PRIVATE

## 2023-12-11 PROCEDURE — 6370000100 HC RX 637 (ALT 250 FOR IP): Performed by: FAMILY MEDICINE

## 2023-12-11 PROCEDURE — 97116 GAIT TRAINING THERAPY: CPT | Mod: GP | Performed by: PHYSICAL THERAPIST

## 2023-12-11 PROCEDURE — 82947 ASSAY GLUCOSE BLOOD QUANT: CPT | Mod: QW

## 2023-12-11 PROCEDURE — 6360000200 HC RX 636 W HCPCS (ALT 250 FOR IP): Performed by: FAMILY MEDICINE

## 2023-12-11 PROCEDURE — 99232 SBSQ HOSP IP/OBS MODERATE 35: CPT | Performed by: HOSPITALIST

## 2023-12-11 RX ADMIN — ATORVASTATIN CALCIUM 40 MG: 40 TABLET, FILM COATED ORAL at 19:38

## 2023-12-11 RX ADMIN — POLYETHYLENE GLYCOL 3350 17 G: 17 POWDER, FOR SOLUTION ORAL at 07:34

## 2023-12-11 RX ADMIN — Medication 1 TABLET: at 07:35

## 2023-12-11 RX ADMIN — QUETIAPINE FUMARATE 25 MG: 25 TABLET ORAL at 16:39

## 2023-12-11 RX ADMIN — FLUOXETINE HYDROCHLORIDE 40 MG: 20 CAPSULE ORAL at 19:37

## 2023-12-11 RX ADMIN — TAMSULOSIN HYDROCHLORIDE 0.4 MG: 0.4 CAPSULE ORAL at 07:34

## 2023-12-11 RX ADMIN — FAMOTIDINE 10 MG: 20 TABLET, FILM COATED ORAL at 07:34

## 2023-12-11 RX ADMIN — AMLODIPINE BESYLATE 5 MG: 5 TABLET ORAL at 07:35

## 2023-12-11 RX ADMIN — LOSARTAN POTASSIUM 50 MG: 50 TABLET, FILM COATED ORAL at 07:34

## 2023-12-11 RX ADMIN — INSULIN ASPART 1 UNITS: 100 INJECTION, SOLUTION INTRAVENOUS; SUBCUTANEOUS at 11:56

## 2023-12-11 RX ADMIN — FAMOTIDINE 10 MG: 20 TABLET, FILM COATED ORAL at 19:37

## 2023-12-11 RX ADMIN — TAMSULOSIN HYDROCHLORIDE 0.4 MG: 0.4 CAPSULE ORAL at 19:38

## 2023-12-11 RX ADMIN — ENOXAPARIN SODIUM 40 MG: 100 INJECTION SUBCUTANEOUS at 14:32

## 2023-12-11 RX ADMIN — SPIRONOLACTONE 25 MG: 25 TABLET ORAL at 07:34

## 2023-12-11 RX ADMIN — FINASTERIDE 5 MG: 5 TABLET, FILM COATED ORAL at 19:37

## 2023-12-11 RX ADMIN — INSULIN ASPART 1 UNITS: 100 INJECTION, SOLUTION INTRAVENOUS; SUBCUTANEOUS at 16:40

## 2023-12-11 RX ADMIN — SENNOSIDES AND DOCUSATE SODIUM 1 TABLET: 50; 8.6 TABLET ORAL at 19:37

## 2023-12-11 RX ADMIN — LIDOCAINE 1 PATCH: 50 PATCH CUTANEOUS at 07:35

## 2023-12-11 RX ADMIN — SENNOSIDES AND DOCUSATE SODIUM 1 TABLET: 50; 8.6 TABLET ORAL at 07:34

## 2023-12-11 RX ADMIN — QUETIAPINE FUMARATE 25 MG: 25 TABLET ORAL at 17:02

## 2023-12-11 RX ADMIN — ASPIRIN 81 MG: 81 TABLET ORAL at 07:35

## 2023-12-11 NOTE — PROGRESS NOTES
[address] [address]  Daily Progress Note  Patient name: Joseph Wong  MRN: [id number]   LOS: 4 days     Subjective   Patient resting comfortably.  Objective   Vitals:Temp:  [36.4 °C (97.5 °F)-36.9 °C (98.4 °F)] 36.6 °C (97.9 °F)  Heart Rate:  [54-81] 54  Resp:  [18-20] 18  SpO2:  [92 %-95 %] 95 %  BP: (109-132)/() 109/80  Physical Exam:  HEENT: Negative  Neck: No neck vein distention  Lungs: Clear to auscultation  Heart: Regular  Abdomen: Soft with positive bowel sounds  Genitourinary: Deferred  Extremities: Minimal left knee effusion  Skin: No rash or jaundice  Neurologic: Alert    Review of Systems:  Negative  Admission on 12/04/2023   Component Date Value Ref Range Status    WBC 12/04/2023 7.3  3.7 - 9.6 10*3/uL Final    RBC 12/04/2023 4.13  4.10 - 5.80 10*6/µL Final    Hemoglobin 12/04/2023 14.0  13.2 - 17.2 g/dL Final    Hematocrit 12/04/2023 38.5  38.0 - 50.0 % Final    MCV 12/04/2023 93.3  82.0 - 97.0 fL Final    MCH 12/04/2023 33.9  29.0 - 34.0 pg Final    MCHC 12/04/2023 36.3 (H)  32.0 - 36.0 g/dL Final    RDW 12/04/2023 14.0  11.5 - 15.0 % Final    Platelets 12/04/2023 203  130 - 350 10*3/uL Final    MPV 12/04/2023 7.3  6.9 - 10.8 fL Final    Neutrophils% 12/04/2023 73.3 (H)  46.0 - 70.0 % Final    Lymphocytes% 12/04/2023 15.4  15.0 - 47.0 % Final    Monocytes% 12/04/2023 9.2  5.0 - 13.0 % Final    Eosinophils% 12/04/2023 1.4  0.0 - 3.0 % Final    Basophils% 12/04/2023 0.7  0.0 - 2.0 % Final    ANC (auto diff) 12/04/2023 5.30  10*3/UL Final    Lymphocytes Absolute 12/04/2023 1.10  10*3/uL Final    Monocytes Absolute 12/04/2023 0.70  10*3/uL Final    Eosinophils Absolute 12/04/2023 0.10  10*3/uL Final    Basophils Absolute 12/04/2023 0.10  10*3/uL Final    Sodium 12/04/2023 134 (L)  135 - 145 mmol/L Final    Potassium 12/04/2023 4.4  3.5 - 5.1 MMOL/L Final    Chloride 12/04/2023 103  98 - 107 mmol/L Final    CO2 12/04/2023 22  21 - 32 mmol/L Final     Anion Gap 12/04/2023 9  3 - 11 mmol/L Final    BUN 12/04/2023 26 (H)  7 - 25 mg/dL Final    Creatinine 12/04/2023 0.95  0.70 - 1.30 mg/dL Final    Glucose 12/04/2023 119 (H)  70 - 105 mg/dL Final    Calcium 12/04/2023 8.9  8.6 - 10.3 mg/dL Final    AST 12/04/2023 16  <40 U/L Final    ALT (SGPT) 12/04/2023 12  7 - 52 U/L Final    Alkaline Phosphatase 12/04/2023 103  45 - 115 U/L Final    Total Protein 12/04/2023 6.1  6.0 - 8.3 g/dL Final    Albumin 12/04/2023 3.7  3.5 - 5.3 g/dL Final    Total Bilirubin 12/04/2023 0.95  0.20 - 1.40 mg/dL Final    Corrected Calcium 12/04/2023 9.1  8.6 - 10.3 mg/dL Final    eGFR 12/04/2023 77  >60 mL/min/1.73m*2 Final    Lipase 12/04/2023 77  11 - 82 U/L Final    Magnesium 12/04/2023 1.9  1.8 - 2.4 mg/dL Final    POC Lactate 12/04/2023 1.20  0.50 - 1.99 MMOL/L Final    CRP 12/04/2023 <1.0  <=10.0 MG/L Final    Protime 12/04/2023 12.0  9.4 - 12.5 SECONDS Final    INR 12/04/2023 1.1  <=1.1 Final    PTT 12/04/2023 27.6  25.1 - 36.5 SECONDS Final    RBC, Urine 12/04/2023 0-2  None seen, 0-2, Negative /HPF Final    WBC, Urine 12/04/2023 0-4  0 - 4 /HPF Final    WBC Clumps, Urine 12/04/2023 None seen  None seen /HPF Final    Squamous Epithelial, Urine 12/04/2023 Negative  None Seen-9 /HPF Final    Bacteria, Urine 12/04/2023 None seen  None seen, Few /HPF Final    Color, Urine 12/04/2023 Yellow  Yellow Final    Clarity, Urine 12/04/2023 Clear  Clear Final    Specific Gravity, Urine 12/04/2023 1.025  1.003 - 1.030 Final    Leukocytes, Urine 12/04/2023 Negative  Negative Final    Nitrite, Urine 12/04/2023 Negative  Negative Final    Protein, Urine 12/04/2023 10 (A)  Negative MG/DL Final    Ketones, Urine 12/04/2023 Negative  Negative MG/DL Final    Urobilinogen, Urine 12/04/2023 2.0 (A)  <2.0 mg/dL Final    Bilirubin, Urine 12/04/2023 Negative  Negative Final    Blood, Urine 12/04/2023 Negative  Negative Final    Glucose, Urine 12/04/2023 Negative  Negative MG/DL Final    pH, Urine 12/04/2023  5.5  5.0 - 8.0 PH Final    POC Glucose 12/04/2023 119 (H)  70 - 105 MG/DL Final    Uric Acid 12/04/2023 4.0 (L)  4.4 - 7.6 mg/dL Final    Procalcitonin 12/04/2023 <0.02  <0.50 ng/mL Final    Sed Rate 12/04/2023 5  <=20 mm/hr Final    Vitamin B-12 12/04/2023 343  180 - 914 pg/mL Final    Folate 12/04/2023 13.3  >6.6 ng/mL Final    Vit D, 25-Hydroxy 12/04/2023 30  30 - 100 ng/mL Final    TSH 12/04/2023 3.622  0.340 - 4.820 uIU/ml Final    Phosphorus 12/04/2023 3.0  2.5 - 4.9 mg/dL Final    Hemoglobin A1C 12/04/2023 6.4 (H)  4.0 - 6.0 % Final    Estimated Average Glucose 12/04/2023 137.0  mg/dL Final    POC Glucose 12/04/2023 117 (H)  70 - 105 MG/DL Final    WBC 12/05/2023 6.4  3.7 - 9.6 10*3/uL Final    RBC 12/05/2023 3.92 (L)  4.10 - 5.80 10*6/µL Final    Hemoglobin 12/05/2023 13.0 (L)  13.2 - 17.2 g/dL Final    Hematocrit 12/05/2023 36.2 (L)  38.0 - 50.0 % Final    MCV 12/05/2023 92.4  82.0 - 97.0 fL Final    MCH 12/05/2023 33.1  29.0 - 34.0 pg Final    MCHC 12/05/2023 35.8  32.0 - 36.0 g/dL Final    RDW 12/05/2023 13.6  11.5 - 15.0 % Final    Platelets 12/05/2023 213  130 - 350 10*3/uL Final    MPV 12/05/2023 7.3  6.9 - 10.8 fL Final    Neutrophils% 12/05/2023 66.2  46.0 - 70.0 % Final    Lymphocytes% 12/05/2023 18.9  15.0 - 47.0 % Final    Monocytes% 12/05/2023 12.4  5.0 - 13.0 % Final    Eosinophils% 12/05/2023 1.7  0.0 - 3.0 % Final    Basophils% 12/05/2023 0.8  0.0 - 2.0 % Final    ANC (auto diff) 12/05/2023 4.20  10*3/UL Final    Lymphocytes Absolute 12/05/2023 1.20  10*3/uL Final    Monocytes Absolute 12/05/2023 0.80  10*3/uL Final    Eosinophils Absolute 12/05/2023 0.10  10*3/uL Final    Basophils Absolute 12/05/2023 0.10  10*3/uL Final    Sodium 12/05/2023 133 (L)  135 - 145 mmol/L Final    Potassium 12/05/2023 4.0  3.5 - 5.1 MMOL/L Final    Chloride 12/05/2023 103  98 - 107 mmol/L Final    CO2 12/05/2023 24  21 - 32 mmol/L Final    BUN 12/05/2023 16  7 - 25 mg/dL Final    Creatinine 12/05/2023 0.82  0.70 -  1.30 mg/dL Final    Glucose 12/05/2023 106 (H)  70 - 105 mg/dL Final    Calcium 12/05/2023 8.9  8.6 - 10.3 mg/dL Final    Anion Gap 12/05/2023 6  3 - 11 mmol/L Final    eGFR 12/05/2023 84  >60 mL/min/1.73m*2 Final    POC Glucose 12/05/2023 113 (H)  70 - 105 MG/DL Final    POC Glucose 12/05/2023 173 (H)  70 - 105 MG/DL Final    POC Glucose 12/05/2023 135 (H)  70 - 105 MG/DL Final    POC Glucose 12/05/2023 142 (H)  70 - 105 MG/DL Final    POC Glucose 12/06/2023 125 (H)  70 - 105 MG/DL Final    POC Glucose 12/06/2023 143 (H)  70 - 105 MG/DL Final    POC Glucose 12/06/2023 145 (H)  70 - 105 MG/DL Final    POC Glucose 12/06/2023 124 (H)  70 - 105 MG/DL Final    POC Glucose 12/07/2023 136 (H)  70 - 105 MG/DL Final    POC Glucose 12/07/2023 131 (H)  70 - 105 MG/DL Final    POC Glucose 12/07/2023 140 (H)  70 - 105 MG/DL Final    POC Glucose 12/07/2023 132 (H)  70 - 105 MG/DL Final    POC Glucose 12/08/2023 127 (H)  70 - 105 MG/DL Final    POC Glucose 12/08/2023 222 (H)  70 - 105 MG/DL Final    POC Glucose 12/08/2023 144 (H)  70 - 105 MG/DL Final    POC Glucose 12/08/2023 148 (H)  70 - 105 MG/DL Final    POC Glucose 12/09/2023 140 (H)  70 - 105 MG/DL Final    POC Glucose 12/09/2023 126 (H)  70 - 105 MG/DL Final    POC Glucose 12/09/2023 148 (H)  70 - 105 MG/DL Final    POC Glucose 12/10/2023 130 (H)  70 - 105 MG/DL Final    POC Glucose 12/10/2023 138 (H)  70 - 105 MG/DL Final    POC Glucose 12/11/2023 145 (H)  70 - 105 MG/DL Final    POC Glucose 12/11/2023 155 (H)  70 - 105 MG/DL Final        Assessment/Plan   Left knee pain with associated effusion                       Left total knee arthroplasty, 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  Case discussed with orthopedic service regarding left knee effusion.  Recommending avoiding arthrocentesis secondary to prior total knee arthroplasty history.  Will provide compression brace.  Follow-up with PT and OT.) 12/5/23     (Patient observed with therapy and he did not do well with stability and mobility concerns.  Discussed with patient's wife and she is quite concerned about his safety at home and stability.  He has been having worsening dementia and last evening agitation noted requiring one-on-one sitter.  Seroquel initiated to assist with his agitation.) 12/6/23     (Will reduce his nighttime Seroquel dose.  Continue to monitor.) 12/7/23     (Awaiting disposition.) 12/8/23     (Awaiting disposition.) 12/9/23     (Anticipate nursing home placement in the morning.) 12/10/23    Awaiting nursing home placement.  No significant pain complaint.  His knee effusion is markedly improved.     Plan  Nursing home placement    Electronically signed by: Sami Olsen MD  12/11/2023  1:59 PM

## 2023-12-11 NOTE — PLAN OF CARE
Problem: Safety Adult - Fall  Goal: Free from fall injury  Description: INTERVENTIONS:    Inpatient - Please reference Cares/Safety Flowsheet under [facility] Fall Risk for interventions.  Pediatrics - Please reference Peds Daily Cares/Safety Flowsheet under [facility] Pediatric Fall Assessment Fall Bundle for interventions  LD/OB - Please reference OB Shift Screening Flowsheet under OB Fall Risk for interventions.  Outcome: Progressing     Problem: Daily Care  Goal: Daily care needs are met  Description: INTERVENTIONS:   1. Assess and monitor skin integrity  2. Identify patients at risk for skin breakdown on admission and per policy  3. Assess and monitor ability to perform self care and identify potential discharge needs  4. Assess skin integrity/risk for skin breakdown  5. Assist patient with activities of daily living as needed  6. Encourage independent activity per ability   7. Provide mouth care   8. Include patient/family/caregiver in decisions related to daily care   Outcome: Progressing     Problem: Knowledge Deficit  Goal: Patient/family/caregiver demonstrates understanding of disease process, treatment plan, medications, and discharge instructions  Description: INTERVENTIONS:   1. Complete learning assessment and assess knowledge base  2. Provide teaching at level of understanding   3. Provide teaching via preferred learning methods  Outcome: Progressing     Problem: Discharge Barriers  Goal: Patient's discharge needs are met  Description: INTERVENTIONS:  1. Assess patient for self-management skills  2. Encourage participation in management  3. Identify potential discharge barriers on admission and throughout hospital stay  4. Involve patient/family/caregiver in discharge planning process  5. Collaborate with case management/ for discharge needs  Outcome: Progressing     Problem: Infection - Adult  Goal: Absence of infection during hospitalization  Description: INTERVENTIONS:  1. Assess and  monitor for signs and symptoms of infection  2. Monitor lab/diagnostic results  3. Monitor all insertion sites/wounds/incisions  4. Monitor secretions for changes in amount and color  5. Administer medications as ordered  6. Educate and encourage patient and family to use good hand hygiene technique  7. Identify and educate in appropriate isolation precautions for identified infection/condition  Outcome: Progressing

## 2023-12-11 NOTE — NURSING END OF SHIFT
Nursing End of Shift Summary:    Patient: Joseph Wong  MRN: [id number]  : 10/20/1934, Age: 89 y.o.    Location: [address] [place] [address]    Nursing Goals  Clinical Goals for the Shift: comfort and safety, turn, pain management, sitter    Narrative Summary of Progress Toward Clinical Goals:  Pt woke up at 10am; remained calm and cooperative in the morning. Pt started to show signs of agitation and restlessness after the wife left around 4:40pm. Due and PRN Quetiapine given. Pt continues to stay awake but not combative. Bladder Scan done. Blood sugars taken.     Barriers to Goals/Nursing Concerns:  Yes - Agitation and restlessness in the afternoon. Placement.    New Patient or Family Concerns/Issues:  Yes - not being able to take evening medications due to restlessness.    Shift Summary:   Significant Events & Communications to Providers (last 12 hours)       Last 5 Values    No documentation.                 Oxygen Usage (last 12 hours)       Last 5 Values       Row Name 12/10/23 1100                   Room Air or Baseline Oxygen Trial by Nursing    Is Patient on Room Air OR on the Same Amount of O2 as at Home? Yes                      Mobility (last 12 hours)       Last 5 Values       Row Name 12/10/23 0800 12/10/23 1000 12/10/23 1044 12/10/23 1145 12/10/23 1200       Mobility    Activity Sleeping -- -- Turn Commode    Length of Time in Chair (min) 0 -- -- -- 0    Distance Ambulated (feet) 0 Feet -- -- -- 5 Feet    Distance Ambulated (Meters Calculated) 0 Meters -- -- -- 1.52 Meters    Patient Position -- Supine -- -- --    Turning Back;Pillow support Turns self;Pillow support Pillow support Pillow support Turns self;Pillow support    Distance Ambulated (Meters Calculated)(Do Not Use) 0 Feet -- -- -- 1.52 Feet      Row Name 12/10/23 1400 12/10/23 1500 12/10/23 1535 12/10/23 1544 12/10/23 1600       Mobility    Activity -- Ambulate in room;Bathroom privileges;Ambulate in carmichael;Back to bed Chair;Back to bed -- --    Level of  Assistance -- Minimal assist, patient does 75% or more Minimal assist, patient does 75% or more -- --    Length of Time in Chair (min) -- -- 30 -- --    Distance Ambulated (feet) -- 45 Feet 12 Feet -- --    Distance Ambulated (Meters Calculated) -- 13.72 Meters 3.66 Meters -- --    Patient Position -- -- -- Supine --    Turning Back;Pillow support -- -- -- Back;Pillow support    Distance Ambulated (Meters Calculated)(Do Not Use) -- 13.72 Feet 3.66 Feet -- --      Row Name 12/10/23 1708                   Mobility    Activity Turn        Length of Time in Chair (min) 0        Distance Ambulated (feet) 0 Feet        Distance Ambulated (Meters Calculated) 0 Meters        Turning Turns self;Right lateral;Pillow support        Distance Ambulated (Meters Calculated)(Do Not Use) 0 Feet                      Urethral Catheter       Active Urethral Catheter       None                  Active Lines       Active Central venous catheter / Peripherally inserted central catheter / Implantable Port / Hemodialysis catheter / Midline Catheter       None                  Infusing Medications   Medication Dose Last Rate     PRN Medications   Medication Dose Last Admin    QUEtiapine  25 mg 25 mg at 12/10/23 1721    sodium chloride  3 mL      sodium chloride  1 spray      sodium chloride-aloe vera  1 Application      acetaminophen  500 mg 500 mg at 12/09/23 1759    oxyCODONE  5 mg      magnesium hydroxide  30 mL      ondansetron  4 mg      Or    ondansetron  4 mg      dextrose 50 % in water (D50W)  15-30 mL      dextrose  15.2 g      glucagon  1 mg      Or    glucagon  1 mg

## 2023-12-11 NOTE — PLAN OF CARE
Problem: Daily Care  Goal: Daily care needs are met  Description: INTERVENTIONS:   1. Assess and monitor skin integrity  2. Identify patients at risk for skin breakdown on admission and per policy  3. Assess and monitor ability to perform self care and identify potential discharge needs  4. Assess skin integrity/risk for skin breakdown  5. Assist patient with activities of daily living as needed  6. Encourage independent activity per ability   7. Provide mouth care   8. Include patient/family/caregiver in decisions related to daily care   Outcome: Progressing  Flowsheets (Taken 12/11/2023 0756)  Daily care needs are met:   Assess skin integrity/risk for skin breakdown   Identify patients at risk for skin breakdown on admission and per policy   Include patient/family/caregiver in decisions related to daily care     Problem: Safety Adult - Fall  Goal: Free from fall injury  Description: INTERVENTIONS:    Inpatient - Please reference Cares/Safety Flowsheet under Oneil Fall Risk for interventions.  Pediatrics - Please reference Peds Daily Cares/Safety Flowsheet under [facility] Pediatric Fall Assessment Fall Bundle for interventions  LD/OB - Please reference OB Shift Screening Flowsheet under OB Fall Risk for interventions.  Outcome: Progressing     Problem: Discharge Barriers  Goal: Patient's discharge needs are met  Description: INTERVENTIONS:  1. Assess patient for self-management skills  2. Encourage participation in management  3. Identify potential discharge barriers on admission and throughout hospital stay  4. Involve patient/family/caregiver in discharge planning process  5. Collaborate with case management/ for discharge needs  Outcome: Progressing  Flowsheets (Taken 12/11/2023 0756)  Patient discharge needs are met: Identify potential discharge barriers on admission and throughout hospital stay

## 2023-12-11 NOTE — INTERDISCIPLINARY/THERAPY
[address] [facility] [address]  [phone]      Inpatient Physical Therapy Daily Treatment Note    Date of Service: 12/11/2023  Patient Name: Joseph Wong  Referring Provider: WYATT SRINIVASAN ROBERT  Completed Visit Number: 3  SOC Date: 12/06/23  Certification Period: 01/05/24    Medical Diagnosis:   Weakness [R53.1]  Unable to bear weight on left lower extremity [R26.89]  Lumbar compression fracture, closed, initial encounter (CMS/[facility], located within [facility]) [S32.000A]  Treatment Diagnoses:  Treatment Diagnosis: Decreased strength, Decreased endurance, Impaired balance, Decreased mobility  Subjective   Family/Caregiver Present  Family/Caregiver Present: No  Subjective Comments  RN Stated patient is medically cleared for therapy: Yes  Subjective Comments: Patient laying in bed and agreeable to PT. He ends session in the bed with call light on hand, needs within reach and PCT in the room. Patient reports no pain in his left knee throughout session     Pain Assessment Scale  Pain Scale: 0-10  0-10 Pain Score: 0 - No pain  Oneil Fall Risk Score: 110         Objective    Precautions  Other Precautions: fall risk, w/c bound at baseline (ambulated short distance)    Treatments:  Gait belt donned for transfers/gait    Cognition: Pleasant and cooperative    Bed Mobility: SBA supine<>sit with extra time and verbal cues for sequencing. He utilizes bed rail for assistance with bed flat     Transfers: min A of 1 sit<>stand and stand pivot transfer  Patient able to stand from edge of bed with front wheeled walker and min A for stability/boost. He performs stand pivot transfer from recliner to bed with use of bed rail and min A to boost and stabilize throughout transfer with use of bed rail throughout    Ambulation: 15 m with contact guard assist-min A with front wheeled walker  Patient requires contact guard assist and chair follow for safety. He has a few instances of posterior lean that requires min A to maintain standing.  He has flexed posture throughout but with verbal cues is able to stand taller but is not able to maintain this with ambulation.     Activity Tolerance: Patient declines further activity after ambulation due to fatigue     Time Calculation  Start Time: 1430  Stop Time: 1440  Time Calculation (min): 10 min  Therapeutic Interventions (Time Spent in Minutes)  Gait/Mobility: 10       Assessment/Plan   Assessment:    Level of Function and Prognosis  Assessment: Patient continues to improve with his overall mobility. He does require assistance for transfers and gait but is able to increase activity tolerance. He would benefit from continued skilled PT to progress independence with functional mobility as able and decrease caregiver burden.  Recommendation  Recommendations for Therapy: Continue skilled therapy  Equipment Recommended:  (has w/c and walker at home)  Plan:    Plan  PT Frequency: 2-3x/wk  Plan for next treatment comment: 1-2 assist: (chair follow for ambulation) bed mobility SBA, transfers min A, gait min A 15 m with walker, standing balance, LE exercises  Treatment duration will be through Certification Period: 01/05/24

## 2023-12-11 NOTE — INTERDISCIPLINARY/THERAPY
Case Management Progress Note    Phone # [phone]    Discussed Discharge Needs/Topics: Awaiting placement.  CM meet with the patient's wife Alessandra Wong.  CM explained to Alessandra the need for expanding referrals regarding insurance requirements.  Alessandra gives the CM permission to expand referrals to SNFs in [place].  Referrals sent.  Lead CM updated that the referrals were expanded.      CM called Philly Hampton at [facility] to see if they could take the patient.  CM left a voicemail message to call the CM back.   will continue to follow.    CM spoke with Philly.  Philly explained that they have no male beds but will definitely be taking a look at the patient again.  CM updated Alessandra, and talked about the process in regards to insurance billing requirements.      Disposition: Placement.

## 2023-12-12 LAB
GLUCOSE BLDC GLUCOMTR-MCNC: 135 MG/DL (ref 70–105)
GLUCOSE BLDC GLUCOMTR-MCNC: 145 MG/DL (ref 70–105)
GLUCOSE BLDC GLUCOMTR-MCNC: 146 MG/DL (ref 70–105)
GLUCOSE BLDC GLUCOMTR-MCNC: 161 MG/DL (ref 70–105)

## 2023-12-12 PROCEDURE — 6370000100 HC RX 637 (ALT 250 FOR IP): Performed by: NURSE PRACTITIONER

## 2023-12-12 PROCEDURE — 82947 ASSAY GLUCOSE BLOOD QUANT: CPT | Mod: QW

## 2023-12-12 PROCEDURE — 6370000100 HC RX 637 (ALT 250 FOR IP): Performed by: HOSPITALIST

## 2023-12-12 PROCEDURE — 6370000100 HC RX 637 (ALT 250 FOR IP): Performed by: FAMILY MEDICINE

## 2023-12-12 PROCEDURE — 6360000200 HC RX 636 W HCPCS (ALT 250 FOR IP): Performed by: FAMILY MEDICINE

## 2023-12-12 PROCEDURE — 2590000100 HC RX 259: Performed by: FAMILY MEDICINE

## 2023-12-12 PROCEDURE — (BLANK) HC ROOM PRIVATE

## 2023-12-12 PROCEDURE — 99232 SBSQ HOSP IP/OBS MODERATE 35: CPT | Performed by: HOSPITALIST

## 2023-12-12 PROCEDURE — 51798 US URINE CAPACITY MEASURE: CPT

## 2023-12-12 PROCEDURE — 2590000100 HC RX 259: Performed by: HOSPITALIST

## 2023-12-12 PROCEDURE — 97165 OT EVAL LOW COMPLEX 30 MIN: CPT | Mod: GO | Performed by: OCCUPATIONAL THERAPIST

## 2023-12-12 RX ORDER — TALC
3 POWDER (GRAM) TOPICAL ONCE
Status: DISCONTINUED | OUTPATIENT
Start: 2023-12-12 | End: 2023-12-19

## 2023-12-12 RX ADMIN — INSULIN ASPART 1 UNITS: 100 INJECTION, SOLUTION INTRAVENOUS; SUBCUTANEOUS at 11:58

## 2023-12-12 RX ADMIN — LOSARTAN POTASSIUM 50 MG: 50 TABLET, FILM COATED ORAL at 09:09

## 2023-12-12 RX ADMIN — Medication 1 TABLET: at 09:09

## 2023-12-12 RX ADMIN — ATORVASTATIN CALCIUM 40 MG: 40 TABLET, FILM COATED ORAL at 19:33

## 2023-12-12 RX ADMIN — AMLODIPINE BESYLATE 5 MG: 5 TABLET ORAL at 09:09

## 2023-12-12 RX ADMIN — LIDOCAINE 1 PATCH: 50 PATCH CUTANEOUS at 09:09

## 2023-12-12 RX ADMIN — QUETIAPINE FUMARATE 25 MG: 25 TABLET ORAL at 17:26

## 2023-12-12 RX ADMIN — Medication 500 MG: at 19:33

## 2023-12-12 RX ADMIN — ENOXAPARIN SODIUM 40 MG: 100 INJECTION SUBCUTANEOUS at 13:37

## 2023-12-12 RX ADMIN — ASPIRIN 81 MG: 81 TABLET ORAL at 09:09

## 2023-12-12 RX ADMIN — FAMOTIDINE 10 MG: 20 TABLET, FILM COATED ORAL at 09:09

## 2023-12-12 RX ADMIN — FINASTERIDE 5 MG: 5 TABLET, FILM COATED ORAL at 19:33

## 2023-12-12 RX ADMIN — SPIRONOLACTONE 25 MG: 25 TABLET ORAL at 09:09

## 2023-12-12 RX ADMIN — QUETIAPINE FUMARATE 25 MG: 25 TABLET ORAL at 21:42

## 2023-12-12 RX ADMIN — TAMSULOSIN HYDROCHLORIDE 0.4 MG: 0.4 CAPSULE ORAL at 09:09

## 2023-12-12 RX ADMIN — METOPROLOL SUCCINATE 25 MG: 25 TABLET, EXTENDED RELEASE ORAL at 09:09

## 2023-12-12 RX ADMIN — POLYETHYLENE GLYCOL 3350 17 G: 17 POWDER, FOR SOLUTION ORAL at 09:09

## 2023-12-12 RX ADMIN — SENNOSIDES AND DOCUSATE SODIUM 1 TABLET: 50; 8.6 TABLET ORAL at 09:09

## 2023-12-12 RX ADMIN — FLUOXETINE HYDROCHLORIDE 40 MG: 20 CAPSULE ORAL at 19:33

## 2023-12-12 RX ADMIN — SENNOSIDES AND DOCUSATE SODIUM 1 TABLET: 50; 8.6 TABLET ORAL at 19:33

## 2023-12-12 RX ADMIN — FAMOTIDINE 10 MG: 20 TABLET, FILM COATED ORAL at 19:33

## 2023-12-12 RX ADMIN — TAMSULOSIN HYDROCHLORIDE 0.4 MG: 0.4 CAPSULE ORAL at 19:33

## 2023-12-12 NOTE — INTERDISCIPLINARY/THERAPY
[address] [facility] [address]  [phone]      Inpatient Occupational Therapy Initial Evaluation Note    Date of Service: 12/12/23  Patient Name: Joseph Wong  Referring Provider: WYATT SRINIVASAN ROBERT  Medicare or Medicaid note, cosigner has been added.: N/A  SOC Date: 12/12/23  Completed Visit Number: 1  Certification Date: 01/11/24    Medical Diagnosis:    Weakness [R53.1]  Unable to bear weight on left lower extremity [R26.89]  Lumbar compression fracture, closed, initial encounter (CMS/[facility]) [S32.000A]  Treatment Diagnoses:  Treatment Diagnosis: Decreased ADL status, Decreased upper extremity strength, Decreased cognition, Decreased endurance, Decreased functional mobility, Decreased safe judgment during ADL  Subjective   Family/Caregiver Present  Family/Caregiver Present: No  Subjective Comments  RN Stated patient is medically cleared for therapy: Yes  Subjective Comments: Pt in bed when therapist arrived,  agreeable to participate.  Pt in recliner with all needs within  reach,  chair alarm on   Comment: Pt does not state  Social/Occupational/Recreational  Lived With: Spouse  Receives Help From: Family  Current Assistive or Adaptive Equipment  Equipment:  (pt not able to state)  Pain Assessment Scale  Pain Scale: 0-10  0-10 Pain Score: 0 - No pain  Patient's Goal for Therapy: pt not able to state      Past Medical History:   Diagnosis Date    CAD (coronary artery disease)     Elevated cholesterol     GERD (gastroesophageal reflux disease)     Hiatal hernia     History of shingles     Hypertension     Macular degeneration     Metabolic syndrome     Vertigo        Current Facility-Administered Medications:     QUEtiapine (SEROquel) tablet 25 mg, 25 mg, oral, Nightly, Sami Olsen MD, 25 mg at 12/11/23 1702    QUEtiapine (SEROquel) tablet 25 mg, 25 mg, oral, q4h PRN, Sami Olsen MD, 25 mg at 12/11/23 1639    multivitamin with minerals tablet 1 tablet, 1 tablet, oral, Daily,  Sami Olsen MD, 1 tablet at 12/12/23 0909    Maintain IV access, , , Until discontinued **AND** Saline lock IV, , , Once **AND** sodium chloride flush 3 mL, 3 mL, intravenous, PRN, Tanya Larry MD    enoxaparin (LOVENOX) syringe 40 mg, 40 mg, subcutaneous, Daily at 1400, Tanya Larry MD, 40 mg at 12/12/23 1337    sodium chloride (OCEAN) 0.65 % nasal spray 1 spray, 1 spray, Each Nostril, PRN, Tanya Larry MD    sodium chloride-aloe vera (AYR) nasal gel 1 Application, 1 Application, Each Nostril, PRN, Tanya Larry MD    lidocaine (LIDODERM) 5 % 1 patch, 1 patch, Topical, Daily, Tanya Larry MD, 1 patch at 12/12/23 0909    acetaminophen (TYLENOL) tablet 500 mg, 500 mg, oral, q6h PRN, Tanya Larry MD, 500 mg at 12/10/23 1925    oxyCODONE (ROXICODONE) immediate release tablet 5 mg, 5 mg, oral, q4h PRN, Tanya Larry MD    sennosides-docusate sodium (SENNA PLUS) 8.6-50 mg 1 tablet, 1 tablet, oral, 2x daily, Tanya Larry MD, 1 tablet at 12/12/23 0909    magnesium hydroxide (MILK OF MAGNESIA) 400 mg/5 mL oral suspension 30 mL, 30 mL, oral, Daily PRN, Tanya Larry MD    polyethylene glycol (GLYCOLAX) powder 17 g, 17 g, oral, Daily, Tanya Larry MD, 17 g at 12/12/23 0909    ondansetron (ZOFRAN) tablet 4 mg, 4 mg, oral, q6h PRN **OR** ondansetron (ZOFRAN) injection 4 mg, 4 mg, intravenous, q6h PRN, Tanya Larry MD    dextrose 50 % in water (D50W) syringe 15-30 mL, 15-30 mL, intravenous, q15 min PRN, Tanya Larry MD    dextrose (GLUTOSE) 40 % gel 15.2 g, 15.2 g, oral, q15 min PRN, Tanya Larry MD    glucagon (GLUCAGEN) injection 1 mg, 1 mg, intramuscular, q15 min PRN **OR** glucagon (GLUCAGEN) injection 1 mg, 1 mg, subcutaneous, q15 min PRN, Tanya Larry MD    insulin aspart U-100 (NovoLOG) injection pen (correction dose) 0-15 Units, 0-15 Units, subcutaneous, Insulin: 4x daily with meals, Tanya Larry MD, 1 Units at 12/12/23 1158    famotidine (PEPCID) tablet 10 mg,  10 mg, oral, 2x daily, Tanya Larry MD, 10 mg at 12/12/23 0909    amLODIPine (NORVASC) tablet 5 mg, 5 mg, oral, q AM, Tanya Larry MD, 5 mg at 12/12/23 0909    aspirin EC tablet 81 mg, 81 mg, oral, q AM, Tanya Larry MD, 81 mg at 12/12/23 0909    atorvastatin (LIPITOR) tablet 40 mg, 40 mg, oral, q PM, Tanya Larry MD, 40 mg at 12/11/23 1938    finasteride (PROSCAR) tablet 5 mg, 5 mg, oral, q PM, Tanya Larry MD, 5 mg at 12/11/23 1937    FLUoxetine (PROzac) capsule 40 mg, 40 mg, oral, q PM, Tanya Larry MD, 40 mg at 12/11/23 1937    losartan (COZAAR) tablet 50 mg, 50 mg, oral, q AM, Tanya Larry MD, 50 mg at 12/12/23 0909    metoprolol succinate XL (TOPROL-XL) 24 hr tablet 25 mg, 25 mg, oral, q AM, Tanya Larry MD, 25 mg at 12/12/23 0909    spironolactone (ALDACTONE) tablet 25 mg, 25 mg, oral, q AM, Tanya Larry MD, 25 mg at 12/12/23 0909    tamsulosin (FLOMAX) 24 hr capsule 0.4 mg, 0.4 mg, oral, 2x daily, Tanya Larry MD, 0.4 mg at 12/12/23 0909  No Known Allergies       Objective    Precautions  Reinforced Precautions: Yes  Other Precautions: fall, confusion  Weight Bearing Precautions: No  Is this a Co-Treatment?: No  Findings:    Precautions: Other Precautions: fall, confusion    Cognition: Pt was able to state he lives in [place],  not oriented to being in hospital.  Not able to give date, pt was able to state his birth date.     Bed Mobility: stand by assistance to transition to edge of bed with head of bed raised,  required increased time and cueing to complete    Transfers: Min assist to stand from edge of bed x 2, transferred to recliner     Mobility: 1m x2, ambulated 1m then sat edge of bed to rest,  ambulated 1m then transferred to chair.      Balance: min assist for balance with front wheeled walker>     ADLs:  Dressing: Dependent to don socks at bed level     Upper Extremity Assessment:   AROM within functional limits for Activities of daily living            Ambulation 1  Ambulation Distance (meters): 2 meters    Education Documentation  ADL training, taught by Destinee Stone OTR at 12/12/2023  1:41 PM.  Learner: Patient  Readiness: Acceptance  Method: Explanation  Response: Needs Reinforcement    Precautions, taught by PersonDestinee OTR at 12/12/2023  1:41 PM.  Learner: Patient  Readiness: Acceptance  Method: Explanation  Response: Needs Reinforcement    Education Comments  No comments found.      Time Calculation  Start Time: 0943  Stop Time: 0956  Time Calculation (min): 13 min        Assessment/Plan   Assessment:  Level of Function and Prognosis  Prior Function Comments: Pt not able to provide prior level of function, no family in room.  Per medical record,  pt lives with spouse in house with steps to enter.  Pt was performing stand pivot transfers to w/c with assist from spouse  Current Level of Function: EOB with SBA<  required min assist for sit to stand and ambulated 1m with min assist  Prognosis: Fair  Barriers to Discharge: Co-morbidities  Assessment: Pt with impaired cognition, decreased tolerance for activity,  decreased independence with activies of daily living and functional mobilty, transfers,  pt will benefit from skilled OT to ensure safe d/c plan  Multi-Disciplinary Problems (from Occupational Therapy)      Active Problems       Problem: OT Misc  Start Date: 12/12/23      Goal Start Date Expected End Date End Date    LTG - Misc 1 12/12/23 01/11/24 --    Goal Details: Pt will complete toilet transfers with stand by assistance with assitive device as needed.                             Plan:  Plan  Plan for next treatment comment: functional transfers,  grooming at edge of bed  Treatment Interventions: ADL retraining, Cognitive skills development, Endurance training, Therapeutic activity, Therapeutic exercise, UE strengthening/ROM, Self-Care Home Management  OT Frequency: up to 3x/wk  Recommendation  Recommendations for Therapy: Continue skilled  therapy  Treatment duration will be through Certification Date: 01/11/24

## 2023-12-12 NOTE — PROGRESS NOTES
[address] [address]  Daily Progress Note  Patient name: Joseph Wong  MRN: [id number]   LOS: 5 days     Subjective   Patient resting comfortably.  Objective   Vitals:Temp:  [36.3 °C (97.3 °F)-36.5 °C (97.7 °F)] 36.3 °C (97.3 °F)  Heart Rate:  [89-91] 89  Resp:  [18] 18  SpO2:  [90 %-91 %] 90 %  BP: (138-172)/() 138/99  Physical Exam:  HEENT: Negative  Neck: No neck vein distention  Lungs: Clear to auscultation  Heart: Regular  Abdomen: Soft with positive bowel sounds  Genitourinary: Deferred  Extremities: Minimal left knee effusion  Skin: No rash or jaundice  Neurologic: Alert    Review of Systems:  Negative  Admission on 12/04/2023   Component Date Value Ref Range Status    WBC 12/04/2023 7.3  3.7 - 9.6 10*3/uL Final    RBC 12/04/2023 4.13  4.10 - 5.80 10*6/µL Final    Hemoglobin 12/04/2023 14.0  13.2 - 17.2 g/dL Final    Hematocrit 12/04/2023 38.5  38.0 - 50.0 % Final    MCV 12/04/2023 93.3  82.0 - 97.0 fL Final    MCH 12/04/2023 33.9  29.0 - 34.0 pg Final    MCHC 12/04/2023 36.3 (H)  32.0 - 36.0 g/dL Final    RDW 12/04/2023 14.0  11.5 - 15.0 % Final    Platelets 12/04/2023 203  130 - 350 10*3/uL Final    MPV 12/04/2023 7.3  6.9 - 10.8 fL Final    Neutrophils% 12/04/2023 73.3 (H)  46.0 - 70.0 % Final    Lymphocytes% 12/04/2023 15.4  15.0 - 47.0 % Final    Monocytes% 12/04/2023 9.2  5.0 - 13.0 % Final    Eosinophils% 12/04/2023 1.4  0.0 - 3.0 % Final    Basophils% 12/04/2023 0.7  0.0 - 2.0 % Final    ANC (auto diff) 12/04/2023 5.30  10*3/UL Final    Lymphocytes Absolute 12/04/2023 1.10  10*3/uL Final    Monocytes Absolute 12/04/2023 0.70  10*3/uL Final    Eosinophils Absolute 12/04/2023 0.10  10*3/uL Final    Basophils Absolute 12/04/2023 0.10  10*3/uL Final    Sodium 12/04/2023 134 (L)  135 - 145 mmol/L Final    Potassium 12/04/2023 4.4  3.5 - 5.1 MMOL/L Final    Chloride 12/04/2023 103  98 - 107 mmol/L Final    CO2 12/04/2023 22  21 - 32 mmol/L Final    Anion  Gap 12/04/2023 9  3 - 11 mmol/L Final    BUN 12/04/2023 26 (H)  7 - 25 mg/dL Final    Creatinine 12/04/2023 0.95  0.70 - 1.30 mg/dL Final    Glucose 12/04/2023 119 (H)  70 - 105 mg/dL Final    Calcium 12/04/2023 8.9  8.6 - 10.3 mg/dL Final    AST 12/04/2023 16  <40 U/L Final    ALT (SGPT) 12/04/2023 12  7 - 52 U/L Final    Alkaline Phosphatase 12/04/2023 103  45 - 115 U/L Final    Total Protein 12/04/2023 6.1  6.0 - 8.3 g/dL Final    Albumin 12/04/2023 3.7  3.5 - 5.3 g/dL Final    Total Bilirubin 12/04/2023 0.95  0.20 - 1.40 mg/dL Final    Corrected Calcium 12/04/2023 9.1  8.6 - 10.3 mg/dL Final    eGFR 12/04/2023 77  >60 mL/min/1.73m*2 Final    Lipase 12/04/2023 77  11 - 82 U/L Final    Magnesium 12/04/2023 1.9  1.8 - 2.4 mg/dL Final    POC Lactate 12/04/2023 1.20  0.50 - 1.99 MMOL/L Final    CRP 12/04/2023 <1.0  <=10.0 MG/L Final    Protime 12/04/2023 12.0  9.4 - 12.5 SECONDS Final    INR 12/04/2023 1.1  <=1.1 Final    PTT 12/04/2023 27.6  25.1 - 36.5 SECONDS Final    RBC, Urine 12/04/2023 0-2  None seen, 0-2, Negative /HPF Final    WBC, Urine 12/04/2023 0-4  0 - 4 /HPF Final    WBC Clumps, Urine 12/04/2023 None seen  None seen /HPF Final    Squamous Epithelial, Urine 12/04/2023 Negative  None Seen-9 /HPF Final    Bacteria, Urine 12/04/2023 None seen  None seen, Few /HPF Final    Color, Urine 12/04/2023 Yellow  Yellow Final    Clarity, Urine 12/04/2023 Clear  Clear Final    Specific Gravity, Urine 12/04/2023 1.025  1.003 - 1.030 Final    Leukocytes, Urine 12/04/2023 Negative  Negative Final    Nitrite, Urine 12/04/2023 Negative  Negative Final    Protein, Urine 12/04/2023 10 (A)  Negative MG/DL Final    Ketones, Urine 12/04/2023 Negative  Negative MG/DL Final    Urobilinogen, Urine 12/04/2023 2.0 (A)  <2.0 mg/dL Final    Bilirubin, Urine 12/04/2023 Negative  Negative Final    Blood, Urine 12/04/2023 Negative  Negative Final    Glucose, Urine 12/04/2023 Negative  Negative MG/DL Final    pH, Urine 12/04/2023 5.5   5.0 - 8.0 PH Final    POC Glucose 12/04/2023 119 (H)  70 - 105 MG/DL Final    Uric Acid 12/04/2023 4.0 (L)  4.4 - 7.6 mg/dL Final    Procalcitonin 12/04/2023 <0.02  <0.50 ng/mL Final    Sed Rate 12/04/2023 5  <=20 mm/hr Final    Vitamin B-12 12/04/2023 343  180 - 914 pg/mL Final    Folate 12/04/2023 13.3  >6.6 ng/mL Final    Vit D, 25-Hydroxy 12/04/2023 30  30 - 100 ng/mL Final    TSH 12/04/2023 3.622  0.340 - 4.820 uIU/ml Final    Phosphorus 12/04/2023 3.0  2.5 - 4.9 mg/dL Final    Hemoglobin A1C 12/04/2023 6.4 (H)  4.0 - 6.0 % Final    Estimated Average Glucose 12/04/2023 137.0  mg/dL Final    POC Glucose 12/04/2023 117 (H)  70 - 105 MG/DL Final    WBC 12/05/2023 6.4  3.7 - 9.6 10*3/uL Final    RBC 12/05/2023 3.92 (L)  4.10 - 5.80 10*6/µL Final    Hemoglobin 12/05/2023 13.0 (L)  13.2 - 17.2 g/dL Final    Hematocrit 12/05/2023 36.2 (L)  38.0 - 50.0 % Final    MCV 12/05/2023 92.4  82.0 - 97.0 fL Final    MCH 12/05/2023 33.1  29.0 - 34.0 pg Final    MCHC 12/05/2023 35.8  32.0 - 36.0 g/dL Final    RDW 12/05/2023 13.6  11.5 - 15.0 % Final    Platelets 12/05/2023 213  130 - 350 10*3/uL Final    MPV 12/05/2023 7.3  6.9 - 10.8 fL Final    Neutrophils% 12/05/2023 66.2  46.0 - 70.0 % Final    Lymphocytes% 12/05/2023 18.9  15.0 - 47.0 % Final    Monocytes% 12/05/2023 12.4  5.0 - 13.0 % Final    Eosinophils% 12/05/2023 1.7  0.0 - 3.0 % Final    Basophils% 12/05/2023 0.8  0.0 - 2.0 % Final    ANC (auto diff) 12/05/2023 4.20  10*3/UL Final    Lymphocytes Absolute 12/05/2023 1.20  10*3/uL Final    Monocytes Absolute 12/05/2023 0.80  10*3/uL Final    Eosinophils Absolute 12/05/2023 0.10  10*3/uL Final    Basophils Absolute 12/05/2023 0.10  10*3/uL Final    Sodium 12/05/2023 133 (L)  135 - 145 mmol/L Final    Potassium 12/05/2023 4.0  3.5 - 5.1 MMOL/L Final    Chloride 12/05/2023 103  98 - 107 mmol/L Final    CO2 12/05/2023 24  21 - 32 mmol/L Final    BUN 12/05/2023 16  7 - 25 mg/dL Final    Creatinine 12/05/2023 0.82  0.70 - 1.30  mg/dL Final    Glucose 12/05/2023 106 (H)  70 - 105 mg/dL Final    Calcium 12/05/2023 8.9  8.6 - 10.3 mg/dL Final    Anion Gap 12/05/2023 6  3 - 11 mmol/L Final    eGFR 12/05/2023 84  >60 mL/min/1.73m*2 Final    POC Glucose 12/05/2023 113 (H)  70 - 105 MG/DL Final    POC Glucose 12/05/2023 173 (H)  70 - 105 MG/DL Final    POC Glucose 12/05/2023 135 (H)  70 - 105 MG/DL Final    POC Glucose 12/05/2023 142 (H)  70 - 105 MG/DL Final    POC Glucose 12/06/2023 125 (H)  70 - 105 MG/DL Final    POC Glucose 12/06/2023 143 (H)  70 - 105 MG/DL Final    POC Glucose 12/06/2023 145 (H)  70 - 105 MG/DL Final    POC Glucose 12/06/2023 124 (H)  70 - 105 MG/DL Final    POC Glucose 12/07/2023 136 (H)  70 - 105 MG/DL Final    POC Glucose 12/07/2023 131 (H)  70 - 105 MG/DL Final    POC Glucose 12/07/2023 140 (H)  70 - 105 MG/DL Final    POC Glucose 12/07/2023 132 (H)  70 - 105 MG/DL Final    POC Glucose 12/08/2023 127 (H)  70 - 105 MG/DL Final    POC Glucose 12/08/2023 222 (H)  70 - 105 MG/DL Final    POC Glucose 12/08/2023 144 (H)  70 - 105 MG/DL Final    POC Glucose 12/08/2023 148 (H)  70 - 105 MG/DL Final    POC Glucose 12/09/2023 140 (H)  70 - 105 MG/DL Final    POC Glucose 12/09/2023 126 (H)  70 - 105 MG/DL Final    POC Glucose 12/09/2023 148 (H)  70 - 105 MG/DL Final    POC Glucose 12/10/2023 130 (H)  70 - 105 MG/DL Final    POC Glucose 12/10/2023 138 (H)  70 - 105 MG/DL Final    POC Glucose 12/11/2023 145 (H)  70 - 105 MG/DL Final    POC Glucose 12/11/2023 155 (H)  70 - 105 MG/DL Final    POC Glucose 12/11/2023 174 (H)  70 - 105 MG/DL Final    POC Glucose 12/11/2023 138 (H)  70 - 105 MG/DL Final    POC Glucose 12/12/2023 145 (H)  70 - 105 MG/DL Final    POC Glucose 12/12/2023 161 (H)  70 - 105 MG/DL Final        Assessment/Plan   Left knee pain with associated effusion                       Left total knee arthroplasty, 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  Case discussed with orthopedic service regarding left knee effusion.  Recommending avoiding arthrocentesis secondary to prior total knee arthroplasty history.  Will provide compression brace.  Follow-up with PT and OT.) 12/5/23     (Patient observed with therapy and he did not do well with stability and mobility concerns.  Discussed with patient's wife and she is quite concerned about his safety at home and stability.  He has been having worsening dementia and last evening agitation noted requiring one-on-one sitter.  Seroquel initiated to assist with his agitation.) 12/6/23     (Will reduce his nighttime Seroquel dose.  Continue to monitor.) 12/7/23     (Awaiting disposition.) 12/8/23     (Awaiting disposition.) 12/9/23     (Anticipate nursing home placement in the morning.) 12/10/23     (Awaiting nursing home placement.  No significant pain complaint.  His knee effusion is markedly improved.) 12/11/23    Awaiting nursing home placement.     Plan  Nursing home placement       Electronically signed by: Sami Olsen MD  12/12/2023  3:00 PM

## 2023-12-12 NOTE — PLAN OF CARE
Problem: Safety Adult - Fall  Goal: Free from fall injury  Description: INTERVENTIONS:    Inpatient - Please reference Cares/Safety Flowsheet under [facility] Fall Risk for interventions.  Pediatrics - Please reference Peds Daily Cares/Safety Flowsheet under [facility] Pediatric Fall Assessment Fall Bundle for interventions  LD/OB - Please reference OB Shift Screening Flowsheet under OB Fall Risk for interventions.  Outcome: Progressing     Problem: Pain - Adult  Goal: Verbalizes/displays adequate comfort level or baseline comfort level  Description: INTERVENTIONS:  1. Encourage patient to monitor pain and request interventions  2. Assess pain using the appropriate pain scale  3. Administer analgesics based on type and severity of pain and evaluate response  4. Educate/Implement non-pharmacological measures as appropriate and evaluate response  5. Consider cultural, developmental and social influences on pain and pain management  6. Notify Provider if interventions unsuccessful or patient reports new pain  Outcome: Progressing     Problem: Daily Care  Goal: Daily care needs are met  Description: INTERVENTIONS:   1. Assess and monitor skin integrity  2. Identify patients at risk for skin breakdown on admission and per policy  3. Assess and monitor ability to perform self care and identify potential discharge needs  4. Assess skin integrity/risk for skin breakdown  5. Assist patient with activities of daily living as needed  6. Encourage independent activity per ability   7. Provide mouth care   8. Include patient/family/caregiver in decisions related to daily care   Outcome: Progressing     Problem: Knowledge Deficit  Goal: Patient/family/caregiver demonstrates understanding of disease process, treatment plan, medications, and discharge instructions  Description: INTERVENTIONS:   1. Complete learning assessment and assess knowledge base  2. Provide teaching at level of understanding   3. Provide teaching via preferred  learning methods  Outcome: Progressing     Problem: Discharge Barriers  Goal: Patient's discharge needs are met  Description: INTERVENTIONS:  1. Assess patient for self-management skills  2. Encourage participation in management  3. Identify potential discharge barriers on admission and throughout hospital stay  4. Involve patient/family/caregiver in discharge planning process  5. Collaborate with case management/ for discharge needs  Outcome: Progressing     Problem: Potential for Compromised Skin Integrity  Goal: Skin Integrity is Maintained or Improved  Description: INTERVENTIONS:  1. Assess and monitor skin integrity  2. Collaborate with interdisciplinary team and initiate plans and interventions as needed  3. Alternate a full bath with partial baths for elderly   4. Monitor patient's hygiene practices   5. Collaborate with wound, ostomy, and continence nurse  Outcome: Progressing  Goal: Nutritional status is improving  Description: INTERVENTIONS:  1. Monitor and assess patient for malnutrition (ex- brittle hair, bruises, dry skin, pale skin and conjunctiva, muscle wasting, smooth red tongue, and disorientation)  2. Monitor patient's weight and dietary intake as ordered or per policy  3. Determine patient's food preferences and provide high-protein, high-caloric foods as appropriate  4. Assist patient with eating   5. Allow adequate time for meals   6. Encourage patient to take dietary supplement as ordered   7. Collaborate with dietitian  8. Include patient/family/caregiver in decisions related to nutrition  Outcome: Progressing  Goal: MOBILITY IS MAINTAINED OR IMPROVED  Description: INTERVENTIONS  1. Collaborate with interdisciplinary team and initiate plan and interventions as ordered (PT/OT)  2. Encourage ambulation  3. Up to chair for meals  4. Monitor for signs of deconditioning  Outcome: Progressing     Problem: Urinary Incontinence  Goal: Perineal skin integrity is maintained or  improved  Description: INTERVENTIONS:  1. Assess genitourinary system, perineal skin, labs (urinalysis), and history of incontinence to include past management, aggravating, and alleviating factors  2. Collaborate with interdisciplinary team including wound, ostomy, and continence nurse and initiate plans and interventions as needed  4. Consider urine containment device  5. Apply skin protectant   6. Develop skin care regimen  7. Provide privacy when changing patient's incontinence device to maintain their dignity  Outcome: Progressing     Problem: Infection - Adult  Goal: Absence of infection during hospitalization  Description: INTERVENTIONS:  1. Assess and monitor for signs and symptoms of infection  2. Monitor lab/diagnostic results  3. Monitor all insertion sites/wounds/incisions  4. Monitor secretions for changes in amount and color  5. Administer medications as ordered  6. Educate and encourage patient and family to use good hand hygiene technique  7. Identify and educate in appropriate isolation precautions for identified infection/condition  Outcome: Progressing     Problem: Safety Adult  Goal: Patient will remain safe during hospitalization  Description: INTERVENTIONS    1. Assess patient for fall risk and implement interventions if needed  2. Use safe transport techniques  3. Assess patient using the appropriate Harvey skin assessment scale  4. Assess patient for risk of aspiration  5. Assess patient for risk of elopement  6. Assess patient for risk of suicide  Outcome: Progressing     Problem: TRANSFERS  Goal: STG - Patient will perform bed mobility  Description: Supine<>sit with tactile cues for sequencing  Outcome: Progressing  Goal: LTG - Patient will demonstrate safe transfer techniques  Description: With min A for all transfers with use of walker   Outcome: Progressing

## 2023-12-12 NOTE — PLAN OF CARE
Problem: Safety Adult  Goal: Patient will remain safe during hospitalization  Description: INTERVENTIONS    1. Assess patient for fall risk and implement interventions if needed  2. Use safe transport techniques  3. Assess patient using the appropriate Harvey skin assessment scale  4. Assess patient for risk of aspiration  5. Assess patient for risk of elopement  6. Assess patient for risk of suicide  Outcome: Progressing  Flowsheets (Taken 12/11/2023 7953)  Patient will remain safe durning hospitalization:   Assess patient for Fall Risk   Assess Patient for Aspirations   Use safe transport   Assess Patient for Risk of Elopement   Assess Patient using the appropriate Harvey scale   Assess Patient for Risk of Suicide

## 2023-12-12 NOTE — NURSING END OF SHIFT
Nursing End of Shift Summary:    Patient: Joseph Wong  MRN: [id number]  : 10/20/1934, Age: 89 y.o.    Location: [address] [place] [address]    Nursing Goals  Clinical Goals for the Shift: manage as per care plans    Narrative Summary of Progress Toward Clinical Goals:  Metoprolol held in the AM due to low pulse rate.  Patient showered today.  1:1 sitter in the afternoon returned after increasing agitation after wife left @ 16.30.  PRN olanzapine utilized.    Barriers to Goals/Nursing Concerns:  No    New Patient or Family Concerns/Issues:  No    Shift Summary:   Significant Events & Communications to Providers (last 12 hours)       Last 5 Values    No documentation.                 Oxygen Usage (last 12 hours)       Last 5 Values    No documentation.                 Mobility (last 12 hours)       Last 5 Values       Row Name 23 0729 23 0800 23 1200 23 1441          Mobility    Activity -- Chair;Ambulate in room Chair;Bathroom privileges Ambulate in carmichael;Chair  worked with physical therapy     Level of Assistance -- -- -- Moderate assist, patient does 50-74%     Length of Time in Chair (min) -- 60 60 --     Distance Ambulated (feet) -- 6 Feet 12 Feet 41 Feet     Distance Ambulated (Meters Calculated) -- 1.83 Meters 3.66 Meters 12.5 Meters     Patient Position Sitting -- -- --     Turning Turns self Back Back --     Distance Ambulated (Meters Calculated)(Do Not Use) -- 1.83 Feet 3.66 Feet 12.5 Feet                   Urethral Catheter       Active Urethral Catheter       None                  Active Lines       Active Central venous catheter / Peripherally inserted central catheter / Implantable Port / Hemodialysis catheter / Midline Catheter       None                  Infusing Medications   Medication Dose Last Rate     PRN Medications   Medication Dose Last Admin    QUEtiapine  25 mg 25 mg at 23 1639    sodium chloride  3 mL      sodium chloride  1 spray      sodium chloride-aloe vera  1 Application       acetaminophen  500 mg 500 mg at 12/10/23 1925    oxyCODONE  5 mg      magnesium hydroxide  30 mL      ondansetron  4 mg      Or    ondansetron  4 mg      dextrose 50 % in water (D50W)  15-30 mL      dextrose  15.2 g      glucagon  1 mg      Or    glucagon  1 mg

## 2023-12-12 NOTE — NURSING END OF SHIFT
Nursing End of Shift Summary:    Patient: Joseph Wong  MRN: [id number]  : 10/20/1934, Age: 89 y.o.    Location: [address] [place] [address]    Nursing Goals  Clinical Goals for the Shift: Ensure safety, promote comfort    Narrative Summary of Progress Toward Clinical Goals:  Patient was redirectable and cooperative in the beginning of the shift. He slept from 2000 to 0600. Bladder scan 700cc, did straight cath. Denies any pain or discomfort.     Barriers to Goals/Nursing Concerns:  Yes - placement    New Patient or Family Concerns/Issues:  No    Shift Summary:   Significant Events & Communications to Providers (last 12 hours)       Last 5 Values    No documentation.                 Oxygen Usage (last 12 hours)       Last 5 Values       Row Name 23                   Room Air or Baseline Oxygen Trial by Nursing    Is Patient on Room Air OR on the Same Amount of O2 as at Home? Yes                      Mobility (last 12 hours)       Last 5 Values       Row Name 23 2331                Mobility    Activity Ambulate in room --       Level of Assistance Moderate assist, patient does 50-74% --       Length of Time in Chair (min) 0 --       Distance Ambulated (feet) 0 Feet --       Distance Ambulated (Meters Calculated) 0 Meters --       Patient Position Sitting Supine       Turning Back;Turns self --       Distance Ambulated (Meters Calculated)(Do Not Use) 0 Feet --                     Urethral Catheter       Active Urethral Catheter       None                  Active Lines       Active Central venous catheter / Peripherally inserted central catheter / Implantable Port / Hemodialysis catheter / Midline Catheter       None                  Infusing Medications   Medication Dose Last Rate     PRN Medications   Medication Dose Last Admin    QUEtiapine  25 mg 25 mg at 23 1639    sodium chloride  3 mL      sodium chloride  1 spray      sodium chloride-aloe vera  1 Application      acetaminophen  500 mg 500 mg  at 12/10/23 1925    oxyCODONE  5 mg      magnesium hydroxide  30 mL      ondansetron  4 mg      Or    ondansetron  4 mg      dextrose 50 % in water (D50W)  15-30 mL      dextrose  15.2 g      glucagon  1 mg      Or    glucagon  1 mg

## 2023-12-13 LAB
GLUCOSE BLDC GLUCOMTR-MCNC: 139 MG/DL (ref 70–105)

## 2023-12-13 PROCEDURE — 2590000100 HC RX 259: Performed by: FAMILY MEDICINE

## 2023-12-13 PROCEDURE — 99232 SBSQ HOSP IP/OBS MODERATE 35: CPT | Performed by: HOSPITALIST

## 2023-12-13 PROCEDURE — (BLANK) HC ROOM PRIVATE

## 2023-12-13 PROCEDURE — 6370000100 HC RX 637 (ALT 250 FOR IP): Performed by: HOSPITALIST

## 2023-12-13 PROCEDURE — 6360000200 HC RX 636 W HCPCS (ALT 250 FOR IP): Performed by: FAMILY MEDICINE

## 2023-12-13 PROCEDURE — 6360000200 HC RX 636 W HCPCS (ALT 250 FOR IP): Mod: JZ | Performed by: NURSE PRACTITIONER

## 2023-12-13 PROCEDURE — 2590000100 HC RX 259: Performed by: HOSPITALIST

## 2023-12-13 PROCEDURE — 6370000100 HC RX 637 (ALT 250 FOR IP): Performed by: FAMILY MEDICINE

## 2023-12-13 PROCEDURE — 82947 ASSAY GLUCOSE BLOOD QUANT: CPT | Mod: QW

## 2023-12-13 RX ORDER — QUETIAPINE FUMARATE 25 MG/1
50 TABLET, FILM COATED ORAL NIGHTLY
Status: DISCONTINUED | OUTPATIENT
Start: 2023-12-13 | End: 2023-12-13

## 2023-12-13 RX ORDER — QUETIAPINE FUMARATE 25 MG/1
50 TABLET, FILM COATED ORAL
Status: DISCONTINUED | OUTPATIENT
Start: 2023-12-13 | End: 2023-12-17

## 2023-12-13 RX ORDER — HALOPERIDOL 5 MG/ML
2 INJECTION INTRAMUSCULAR ONCE
Status: COMPLETED | OUTPATIENT
Start: 2023-12-13 | End: 2023-12-13

## 2023-12-13 RX ADMIN — SENNOSIDES AND DOCUSATE SODIUM 1 TABLET: 50; 8.6 TABLET ORAL at 09:19

## 2023-12-13 RX ADMIN — ENOXAPARIN SODIUM 40 MG: 100 INJECTION SUBCUTANEOUS at 15:04

## 2023-12-13 RX ADMIN — Medication 1 TABLET: at 09:19

## 2023-12-13 RX ADMIN — HALOPERIDOL LACTATE 2 MG: 5 INJECTION, SOLUTION INTRAMUSCULAR at 03:50

## 2023-12-13 RX ADMIN — FAMOTIDINE 10 MG: 20 TABLET, FILM COATED ORAL at 09:19

## 2023-12-13 RX ADMIN — QUETIAPINE FUMARATE 50 MG: 25 TABLET ORAL at 18:44

## 2023-12-13 RX ADMIN — POLYETHYLENE GLYCOL 3350 17 G: 17 POWDER, FOR SOLUTION ORAL at 09:19

## 2023-12-13 RX ADMIN — AMLODIPINE BESYLATE 5 MG: 5 TABLET ORAL at 09:19

## 2023-12-13 RX ADMIN — LOSARTAN POTASSIUM 50 MG: 50 TABLET, FILM COATED ORAL at 09:19

## 2023-12-13 RX ADMIN — ASPIRIN 81 MG: 81 TABLET ORAL at 09:20

## 2023-12-13 RX ADMIN — TAMSULOSIN HYDROCHLORIDE 0.4 MG: 0.4 CAPSULE ORAL at 09:19

## 2023-12-13 RX ADMIN — METOPROLOL SUCCINATE 25 MG: 25 TABLET, EXTENDED RELEASE ORAL at 09:19

## 2023-12-13 RX ADMIN — LIDOCAINE 1 PATCH: 50 PATCH CUTANEOUS at 09:29

## 2023-12-13 RX ADMIN — SPIRONOLACTONE 25 MG: 25 TABLET ORAL at 09:19

## 2023-12-13 NOTE — INTERDISCIPLINARY/THERAPY
NUTRITION ASSESSMENT/REASSESSMENT    PERTINENT MEDICAL DIAGNOSIS/PROBLEMS:     Principle problem/diagnosis/procedures:   Left knee pain with associated effusion, Left total knee arthroplasty, 1/22, T12 and L4 compression deformities/fractures, Generalized weakness, Dementia, B12 and vitamin D deficiency, Type 2 diabetes mellitus, Neuropathy history, Obstructive sleep apnea, Gastroesophageal reflux disease history, Of coronary artery disease history, Benign prostatic hypertrophy history, Mood disorder history     PMH:     Past Medical History:   Diagnosis Date    CAD (coronary artery disease)     Elevated cholesterol     GERD (gastroesophageal reflux disease)     Hiatal hernia     History of shingles     Hypertension     Macular degeneration     Metabolic syndrome     Vertigo       Past Surgical History:   Procedure Laterality Date    OTHER SURGICAL HISTORY Left 08/2016    quad tendon repair    QUADRICEPS REPAIR Left 2015    TONSILLECTOMY      TOTAL ANKLE ARTHROPLASTY Left     TOTAL HIP ARTHROPLASTY Right     TOTAL HIP ARTHROPLASTY Left     TOTAL KNEE ARTHROPLASTY  januauary 2nd 2022    TRANSURETHRAL RESECTION OF PROSTATE             NUTRITION FOCUSED PHYSICAL EXAM (NFPE):  Physical Exam  Physical Exam:  (RD to complete NFPE as able.)    Subcutaneous Fat Loss       Muscle Wasting       Physical Findings       MONITORING/EVALUATION:    Labs:  Reviewed 12/13.         Lab Results   Component Value Date    HGBA1C 6.4 (H) 12/04/2023     Lab Results   Component Value Date    POCGLU 139 (H) 12/13/2023    POCGLU 146 (H) 12/12/2023    POCGLU 135 (H) 12/12/2023    POCGLU 161 (H) 12/12/2023    POCGLU 145 (H) 12/12/2023     Pertinent Meds: Scheduled Meds:QUEtiapine, 50 mg, oral, Daily with dinner  melatonin, 3 mg, oral, Once  multivitamin with minerals, 1 tablet, oral, Daily  enoxaparin, 40 mg, subcutaneous, Daily at 1400  lidocaine, 1 patch, Topical, Daily  sennosides-docusate sodium, 1 tablet, oral, 2x daily  polyethylene  "glycol, 17 g, oral, Daily  insulin short-acting 100 unit/mL SQ injection (correction dose), 0-15 Units, subcutaneous, Insulin: 4x daily with meals  famotidine, 10 mg, oral, 2x daily  amLODIPine, 5 mg, oral, q AM  aspirin, 81 mg, oral, q AM  atorvastatin, 40 mg, oral, q PM  finasteride, 5 mg, oral, q PM  FLUoxetine, 40 mg, oral, q PM  losartan, 50 mg, oral, q AM  metoprolol succinate XL, 25 mg, oral, q AM  spironolactone, 25 mg, oral, q AM  tamsulosin, 0.4 mg, oral, 2x daily      Continuous Infusions:   PRN Meds: Milk of magnesia, Zofran (none given).  Neuro status: Alert & disoriented x4  Respiratory status:   RA  GI findings: Last BM: 12/11  Wounds:    none  Edema (per nursing documentation):  no edema as of 12/12 (no edema on admit)    ANTHROPOMETRICS:  Wt Change in hospital:  12/12: -2.1 kg weight loss since admit, no updated weights since 12/8  Ht Readings from Last 3 Encounters:   12/04/23 1.829 m (6')   08/29/23 1.803 m (5' 11\")   08/07/23 1.803 m (5' 11\")     Weights (Current Encounter Only) (last 180 days)       Date/Time Weight    12/08/23 0400 75.9 kg (167 lb 4.8 oz)    12/08/23 0000 75.8 kg (167 lb 1.6 oz)    12/06/23 0001 76.9 kg (169 lb 9.6 oz)    12/04/23 1505 78 kg (171 lb 14.4 oz)          Wt Readings from Last 10 Encounters:   12/08/23 75.9 kg (167 lb 4.8 oz)   08/29/23 86.6 kg (191 lb)   08/07/23 86.8 kg (191 lb 5.8 oz)   04/21/23 86.8 kg (191 lb 5.8 oz)   02/26/23 86.8 kg (191 lb 5.8 oz)   01/14/23 80.3 kg (177 lb 0.5 oz)   09/02/22 82.6 kg (182 lb)   08/05/22 86.6 kg (191 lb)   03/21/22 86.6 kg (191 lb)   12/20/21 86.6 kg (191 lb)     Admit Weight: 78 kg (171 lb 14.4 oz) 12/4/2023  Admit BMI (kg/m2): 23.31  IBW/kg (Calculated) Male: 80.8 kg  Weight Category: Acceptable  Wt Change Hx: -8.6 kg weight loss (10%) x 3 months prior to admission (8/29/23- 12/04/23), clinically significant  Per wife, UBW: 190 lbs 6 months ago    ORAL/DENTAL STATUS:  Teeth: Missing teeth       FOOD ALLERGIES:    No Known " Allergies  Jewish/CULTURAL REQUESTS:      None  Cultural Requests During Hospitalization: no  Spiritual Requests During Hospitalization: no    Social Determinants of Health with Concerns     Tobacco Use: Medium Risk (12/4/2023)    Patient History     Smoking Tobacco Use: Former     Smokeless Tobacco Use: Never     Passive Exposure: Not on file   Alcohol Use: Not on file   Financial Resource Strain: Not on file   Physical Activity: Not on file   Stress: Not on file   Social Connections: Not on file   Depression: Not on file   Postpartum Depression: Not on file     Hunger Screen:        Meal/Supplement Intake:  12/12: Pt with sporadic yet inadequate intakes, averaging 42% meals and 69% supplements. Pt with improved supplement intake (Boost Plus TID with meals and Magic Cup once daily)    Average Percent Meals Eaten (%): 41.82 Avg %  Average Percent Supplement Eaten (%): 69.17 Avg %    NUTRITION PRESCRIPTION:       Dietary Orders   (From admission, onward)                 Start     Ordered    12/05/23 1642  Meal Tray Service  Continuous        Question:  Meal Tray Service:  Answer:  Automatic Tray    12/05/23 1641    12/05/23 0621  Diet Regular  Diet effective now        Comments: Automatic trays   Question Answer Comment   Nutrition Therapy Protocol (Dietitian May Adjust Diet and Nourishments) Yes    Diet type Regular        12/05/23 0621                  Estimated Needs:  Total Energy Estimated Needs: 1560- 1794 kcal/day (20-23 kcal/kg ABW)  Total Protein Estimated Needs: 78- 94 g/day (1-1.2 g/kg ABW)  Total Fluid Estimated Needs: 1950 mL/day (25 mL/kg ABW)      SUMMARY 12/5: Pt seen by Nutrition Services for +Consult to Nutrition Services. Pt presents with extremity weakness, LLE pain. Pt presents with failure to thrive, decreased appetite, poor nutrition and inactivity along with age related physical debility, malnutrition suspected per admission note. Per Care Everywhere data, pt with -8.6 kg weight loss (10%)  x 3 months prior to admission (8/29/23- 12/04/23), clinically significant. Per discussion with patient's wife, he has had a decreased appetite over the past few months. She reports he drinks Ensure supplements at home, various flavors. Pt UBW: 190 lbs but has been gradually losing over the past few months. Disposition: DOP    12/12: Pt continues with sporadic and inadequate intakes over the past few days, averaging 42% meals and 69% supplements. Pt has improved supplemental intake helping pt better meet nutrient needs.  Disposition: awaiting nursing home placement    ____________________________________________________________________  NUTRITION CARE PLAN     MALNUTRITION:  Malnutrition Present On Admission: Yes  Parameters for Malnutrition: Severe Dtntzwfiajqa-Hajnoxxdhb-gyawhic (NC-4.1.1.2)  Etiology: decreased appetite/ intakes related to failure to thrive, advanced age with some confusion  Signs/Symptoms:     Weight Loss: -8.6 kg weight loss (10%) x 3 months prior to admission (8/29/23- 12/04/23)  Energy Intake: <75% compared with estimated needs for >1 month prior to admission    Goals: Pt to consume >75% meals during admission, no weight loss below 78 kg  Progress: Pt not meeting goal as of 12/12 with poor intakes but improved supplemental intakes  Care Plan Documented:  yes    Interventions:  12/12:   -Diet: Regular  -Nutrition Supplementation: continue Medical Nutrition Suppl. (ND-3.1.1):  Boost Plus 8 oz TID with meal trays (14g PRO, 45g CHO, 360 Kehinde, 1gm fiber per 8 oz)  -Medical Nutrition Suppl. (ND-3.1.2): continue Magic Cup with lunch tray (9g PRO, 38g CHO, 290 Kehinde, no fiber per svg, honey thick)  -Continue daily multivitamin with minerals             Discharge nutrition recommendations: Regular diet with oral nutrition supplementation as needed for poor appetite.

## 2023-12-13 NOTE — INTERDISCIPLINARY/THERAPY
Case Management Progress Note    Phone # [phone]      Discussed Discharge Needs/Topics: CM called Blanche at [facility], Philly at [facility], and Alisha at [facility] and left a message to call the CM back.      CM updated the patient's wife Alessandra Wong regarding the patient's discharge plan.  CM asked if the CM could expand referrals.  Alessandra declined due to the driving distance in the winter time.    CM received a call from Blanche.  Blanche explained that [facility] is full.  CM will resend a referral next week if the patient is still here.    Disposition: Placement.

## 2023-12-13 NOTE — NURSING NOTE
Patient was verbally aggressive, restless, tries to get out of bed and kick staff. 1:1 sitter in place but unable to follow commands and refused oral medications (melatonin and seroquel). He didn't sleep tonight. Notified DANDRE, IM Haldol given once with CRN at bedside.

## 2023-12-13 NOTE — NURSING END OF SHIFT
Nursing End of Shift Summary:    Patient: Joseph Wong  MRN: [id number]  : 10/20/1934, Age: 89 y.o.    Location: [address] [place] [address]    Nursing Goals  Clinical Goals for the Shift: ensure safety    Narrative Summary of Progress Toward Clinical Goals:  Pt restless and agitated in the beginning of the shift, tries to get out of bed. He was redirectable with a sitter for a while. At 4am, pt was verbally aggressive and tries to kick staff again, non-redirectable and refuses to take oral pills. IM Haldol given per order. Pt resting afterwards.     Barriers to Goals/Nursing Concerns:  Yes - placement    New Patient or Family Concerns/Issues:  No    Shift Summary:   Significant Events & Communications to Providers (last 12 hours)       Last 5 Values       Row Name 23 2317 23 0334                Provider Notification    Reason for Communication Review case  melatonin  -DC Aggressive behavior  -DC       Provider Name Melinda Roberts  -Melinda Ridley                 User Key  (r) = Recorded By, (t) = Taken By, (c) = Cosigned By      Initials Name    Nimo Coleman, RN                  Oxygen Usage (last 12 hours)       Last 5 Values       Row Name 23                   Room Air or Baseline Oxygen Trial by Nursing    Is Patient on Room Air OR on the Same Amount of O2 as at Home? Yes                      Mobility (last 12 hours)       Last 5 Values       Row Name 23 [id number] 2230 23 2259 23 0037 23 0203       Mobility    Activity Turn Turn -- Turn Turn    Level of Assistance Maximum assist, patient does 25-49% Maximum assist, patient does 25-49% -- Maximum assist, patient does 25-49% Moderate assist, patient does 50-74%    Length of Time in Chair (min) 0 -- -- -- --    Distance Ambulated (feet) 0 Feet -- -- -- --    Distance Ambulated (Meters Calculated) 0 Meters -- -- -- --    Patient Position Supine -- Supine -- --    Turning Turns self Turns self -- Turns self  Turns self    Distance Ambulated (Meters Calculated)(Do Not Use) 0 Feet -- -- -- --      Row Name 12/13/23 0345                   Mobility    Activity Ambulate in room;Bathroom privileges        Level of Assistance Moderate assist, patient does 50-74%        Distance Ambulated (feet) 20 Feet        Distance Ambulated (Meters Calculated) 6.1 Meters        Turning Turns self        Distance Ambulated (Meters Calculated)(Do Not Use) 6.1 Feet                      Urethral Catheter       Active Urethral Catheter       None                  Active Lines       Active Central venous catheter / Peripherally inserted central catheter / Implantable Port / Hemodialysis catheter / Midline Catheter       None                  Infusing Medications   Medication Dose Last Rate     PRN Medications   Medication Dose Last Admin    QUEtiapine  25 mg 25 mg at 12/12/23 2142    sodium chloride  3 mL      sodium chloride  1 spray      sodium chloride-aloe vera  1 Application      acetaminophen  500 mg 500 mg at 12/12/23 1933    oxyCODONE  5 mg      magnesium hydroxide  30 mL      ondansetron  4 mg      Or    ondansetron  4 mg      dextrose 50 % in water (D50W)  15-30 mL      dextrose  15.2 g      glucagon  1 mg      Or    glucagon  1 mg

## 2023-12-13 NOTE — PLAN OF CARE
Problem: Safety Adult - Fall  Goal: Free from fall injury  Description: INTERVENTIONS:    Inpatient - Please reference Cares/Safety Flowsheet under [facility] Fall Risk for interventions.  Pediatrics - Please reference Peds Daily Cares/Safety Flowsheet under [facility] Pediatric Fall Assessment Fall Bundle for interventions  LD/OB - Please reference OB Shift Screening Flowsheet under OB Fall Risk for interventions.  Outcome: Progressing     Problem: Pain - Adult  Goal: Verbalizes/displays adequate comfort level or baseline comfort level  Description: INTERVENTIONS:  1. Encourage patient to monitor pain and request interventions  2. Assess pain using the appropriate pain scale  3. Administer analgesics based on type and severity of pain and evaluate response  4. Educate/Implement non-pharmacological measures as appropriate and evaluate response  5. Consider cultural, developmental and social influences on pain and pain management  6. Notify Provider if interventions unsuccessful or patient reports new pain  Outcome: Progressing  Flowsheets (Taken 12/12/2023 2133)  Verbalizes/displays adequate comfort level or baseline comfort level:   Encourage patient to monitor pain and request interventions   Assess pain using the appropriate pain scale   Administer analgesics based on type and severity of pain and evaluate response   Educate/Implement non-pharmacological measures as appropriate and evaluate response   Consider cultural, developmental and social influences on pain and pain management   Notify Provider if interventions unsuccessful or patient reports new pain     Problem: Urinary Incontinence  Goal: Perineal skin integrity is maintained or improved  Description: INTERVENTIONS:  1. Assess genitourinary system, perineal skin, labs (urinalysis), and history of incontinence to include past management, aggravating, and alleviating factors  2. Collaborate with interdisciplinary team including wound, ostomy, and continence  nurse and initiate plans and interventions as needed  4. Consider urine containment device  5. Apply skin protectant   6. Develop skin care regimen  7. Provide privacy when changing patient's incontinence device to maintain their dignity  Outcome: Progressing  Flowsheets (Taken 12/12/2023 2133)  Perineal skin integrity is maintained or improved:   Assess genitourinary system, perineal skin, labs (urinalysis), and history of incontinence to include past management, aggravating, and alleviating factors   Collaborate with interdisciplinary team including wound, ostomy, and continence nurse and initiate plans and interventions as needed   Consider urine containment device   Apply skin protectant   Develop skin care regimen   Provide privacy when changing patient's incontinence device to maintain their dignity     Problem: Genitourinary - Adult  Goal: Absence of urinary retention  Description: INTERVENTIONS:  1. Assess patient’s ability to void and empty bladder.  2. Monitor intake/output and perform bladder scan if indicated.  3. Place urinary catheter per order and initiate and maintain CAUTI bundle.  4. Administer medications to alleviate retention as needed.  5. Discuss catheterization for long term situations as appropriate.    Outcome: Progressing  Flowsheets (Taken 12/12/2023 2134)  Absence of urinary retention:   Assess patient’s ability to void and empty bladder   Monitor intake/output and perform bladder scan if indicated   Administer medications to alleviate retention as needed   Discuss catheterization for long term situations as appropriate

## 2023-12-13 NOTE — PROGRESS NOTES
[address] [place] [address]  Daily Progress Note  Patient name: Joseph Wong  MRN: [id number]   LOS: 6 days     Subjective   Patient agitated and kicking overnight.  Objective   Vitals:Temp:  [36.1 °C (96.9 °F)-36.7 °C (98.1 °F)] 36.1 °C (96.9 °F)  Heart Rate:  [68-79] 68  Resp:  [18] 18  SpO2:  [90 %-91 %] 90 %  BP: (118-164)/(80-96) 124/84  Physical Exam:   HEENT: Pupils equal round and reactive to light and accommodation.  Extraocular movements are intact.  Oropharynx clear with no erythema or exudate.  Neck: Supple nontender without palpable lymphadenopathy JVD bruits or goiter appreciated  Lungs: Clear to auscultation bilaterally  Heart: Regular rate and rhythm with normal S1 and S2  Abdomen: Soft nontender. normoactive bowel sounds. no hepatosplenomegaly  Extremities: No clubbing, cyanosis, or edema.  Neurologic: dementia.  CN II through XII intact.  Nonfocal, generalized debility and weakness      Assessment/Plan   Dementia with agitation  Seroquel adjusted  CT head without IV contrast  Stable exam without evidence of acute intracranial injury   acute left knee pain and associated mild effusion  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  They will arrange follow-up in clinic approximately 4 weeks with repeat x-rays.  X-ray thoracolumbar junction  Similar T12 compression deformity.  No change in alignment  CT lumbar spine without contrast  1.  Compression deformities of the T12 and L4 vertebral bodies appear likely acute.  2.  Diffuse lumbar degenerative changes.  3.  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  Generalized weakness  B12 and vitamin D deficiency  DM2 with neuropathy  GERD  CAD  BPH   Mood disorder   NARESH   VTE prophylaxis:  lovenox    Plan:  Increase Seroquel to 50 mg p.o. q. supper follow behavior  Neurosurgery arranging 4-week follow-up with repeat x-rays, no lifting greater than 5 to 10 pounds and avoid significant bending or twisting.  No brace necessary.  PT OT  Await possible SNF placement    12/13/2023  3:06 PM

## 2023-12-13 NOTE — NURSING END OF SHIFT
Nursing End of Shift Summary:    Patient: Joseph Wong  MRN: [id number]  : 10/20/1934, Age: 89 y.o.    Location: [address] [place] [address]    Nursing Goals  Clinical Goals for the Shift: comfort and safety, blood sugar checks, turn, provide calm environment    Narrative Summary of Progress Toward Clinical Goals:  Pt able to tolerate meals with assistance. Remote sitter in place. Bladder Scan done- 243mL of urine. Started to get agitated and restless in the afternoon after the wife left. Due Quetiapine given. Pt is on the bed, calm and cooperative. Needs assistance when expressing needs. Needs attended.   Barriers to Goals/Nursing Concerns:  Yes - Waiting for placement.     New Patient or Family Concerns/Issues:  No    Shift Summary:   Significant Events & Communications to Providers (last 12 hours)       Last 5 Values    No documentation.                 Oxygen Usage (last 12 hours)       Last 5 Values       Row Name 23 0800                   Room Air or Baseline Oxygen Trial by Nursing    Is Patient on Room Air OR on the Same Amount of O2 as at Home? Yes                      Mobility (last 12 hours)       Last 5 Values       Row Name 23 0800 23 0809 23 1200 23 1553 23 1733       Mobility    Activity Turn -- Turn -- Back to bed    Length of Time in Chair (min) 0 -- 0 -- --    Distance Ambulated (feet) 0 Feet -- 0 Feet -- --    Distance Ambulated (Meters Calculated) 0 Meters -- 0 Meters -- --    Patient Position -- Supine -- Sitting --    Turning Pillow support Back;Turns self Pillow support -- --    Distance Ambulated (Meters Calculated)(Do Not Use) 0 Feet -- 0 Feet -- --                  Urethral Catheter       Active Urethral Catheter       None                  Active Lines       Active Central venous catheter / Peripherally inserted central catheter / Implantable Port / Hemodialysis catheter / Midline Catheter       None                  Infusing Medications   Medication Dose Last Rate      PRN Medications   Medication Dose Last Admin    QUEtiapine  25 mg 25 mg at 12/11/23 1639    sodium chloride  3 mL      sodium chloride  1 spray      sodium chloride-aloe vera  1 Application      acetaminophen  500 mg 500 mg at 12/10/23 1925    oxyCODONE  5 mg      magnesium hydroxide  30 mL      ondansetron  4 mg      Or    ondansetron  4 mg      dextrose 50 % in water (D50W)  15-30 mL      dextrose  15.2 g      glucagon  1 mg      Or    glucagon  1 mg

## 2023-12-14 LAB
GLUCOSE BLDC GLUCOMTR-MCNC: 141 MG/DL (ref 70–105)
GLUCOSE BLDC GLUCOMTR-MCNC: 209 MG/DL (ref 70–105)

## 2023-12-14 PROCEDURE — 51701 INSERT BLADDER CATHETER: CPT

## 2023-12-14 PROCEDURE — 97116 GAIT TRAINING THERAPY: CPT | Mod: GP | Performed by: PHYSICAL THERAPIST

## 2023-12-14 PROCEDURE — 51798 US URINE CAPACITY MEASURE: CPT

## 2023-12-14 PROCEDURE — (BLANK) HC ROOM PRIVATE

## 2023-12-14 PROCEDURE — 6370000100 HC RX 637 (ALT 250 FOR IP): Performed by: HOSPITALIST

## 2023-12-14 PROCEDURE — 2590000100 HC RX 259: Performed by: HOSPITALIST

## 2023-12-14 PROCEDURE — 99232 SBSQ HOSP IP/OBS MODERATE 35: CPT | Performed by: HOSPITALIST

## 2023-12-14 PROCEDURE — 6370000100 HC RX 637 (ALT 250 FOR IP): Performed by: FAMILY MEDICINE

## 2023-12-14 PROCEDURE — 2590000100 HC RX 259: Performed by: FAMILY MEDICINE

## 2023-12-14 PROCEDURE — 82947 ASSAY GLUCOSE BLOOD QUANT: CPT | Mod: QW

## 2023-12-14 RX ADMIN — FINASTERIDE 5 MG: 5 TABLET, FILM COATED ORAL at 20:36

## 2023-12-14 RX ADMIN — ASPIRIN 81 MG: 81 TABLET ORAL at 09:12

## 2023-12-14 RX ADMIN — SPIRONOLACTONE 25 MG: 25 TABLET ORAL at 09:12

## 2023-12-14 RX ADMIN — ATORVASTATIN CALCIUM 40 MG: 40 TABLET, FILM COATED ORAL at 20:36

## 2023-12-14 RX ADMIN — LIDOCAINE 1 PATCH: 50 PATCH CUTANEOUS at 09:11

## 2023-12-14 RX ADMIN — FAMOTIDINE 10 MG: 20 TABLET, FILM COATED ORAL at 09:12

## 2023-12-14 RX ADMIN — TAMSULOSIN HYDROCHLORIDE 0.4 MG: 0.4 CAPSULE ORAL at 20:36

## 2023-12-14 RX ADMIN — Medication 1 TABLET: at 09:12

## 2023-12-14 RX ADMIN — AMLODIPINE BESYLATE 5 MG: 5 TABLET ORAL at 09:12

## 2023-12-14 RX ADMIN — SENNOSIDES AND DOCUSATE SODIUM 1 TABLET: 50; 8.6 TABLET ORAL at 20:36

## 2023-12-14 RX ADMIN — TAMSULOSIN HYDROCHLORIDE 0.4 MG: 0.4 CAPSULE ORAL at 09:12

## 2023-12-14 RX ADMIN — FLUOXETINE HYDROCHLORIDE 40 MG: 20 CAPSULE ORAL at 20:36

## 2023-12-14 RX ADMIN — METOPROLOL SUCCINATE 25 MG: 25 TABLET, EXTENDED RELEASE ORAL at 09:12

## 2023-12-14 RX ADMIN — LOSARTAN POTASSIUM 50 MG: 50 TABLET, FILM COATED ORAL at 09:12

## 2023-12-14 RX ADMIN — QUETIAPINE FUMARATE 50 MG: 25 TABLET ORAL at 17:10

## 2023-12-14 RX ADMIN — SENNOSIDES AND DOCUSATE SODIUM 1 TABLET: 50; 8.6 TABLET ORAL at 09:12

## 2023-12-14 RX ADMIN — FAMOTIDINE 10 MG: 20 TABLET, FILM COATED ORAL at 20:35

## 2023-12-14 RX ADMIN — POLYETHYLENE GLYCOL 3350 17 G: 17 POWDER, FOR SOLUTION ORAL at 09:12

## 2023-12-14 NOTE — PLAN OF CARE
Problem: Safety Adult - Fall  Goal: Free from fall injury  Description: INTERVENTIONS:    Inpatient - Please reference Cares/Safety Flowsheet under Oneil Fall Risk for interventions.  Pediatrics - Please reference Peds Daily Cares/Safety Flowsheet under [facility] Pediatric Fall Assessment Fall Bundle for interventions  LD/OB - Please reference OB Shift Screening Flowsheet under OB Fall Risk for interventions.  Outcome: Progressing     Problem: Pain - Adult  Goal: Verbalizes/displays adequate comfort level or baseline comfort level  Description: INTERVENTIONS:  1. Encourage patient to monitor pain and request interventions  2. Assess pain using the appropriate pain scale  3. Administer analgesics based on type and severity of pain and evaluate response  4. Educate/Implement non-pharmacological measures as appropriate and evaluate response  5. Consider cultural, developmental and social influences on pain and pain management  6. Notify Provider if interventions unsuccessful or patient reports new pain  Outcome: Progressing     Problem: Daily Care  Goal: Daily care needs are met  Description: INTERVENTIONS:   1. Assess and monitor skin integrity  2. Identify patients at risk for skin breakdown on admission and per policy  3. Assess and monitor ability to perform self care and identify potential discharge needs  4. Assess skin integrity/risk for skin breakdown  5. Assist patient with activities of daily living as needed  6. Encourage independent activity per ability   7. Provide mouth care   8. Include patient/family/caregiver in decisions related to daily care   Outcome: Progressing     Problem: Knowledge Deficit  Goal: Patient/family/caregiver demonstrates understanding of disease process, treatment plan, medications, and discharge instructions  Description: INTERVENTIONS:   1. Complete learning assessment and assess knowledge base  2. Provide teaching at level of understanding   3. Provide teaching via preferred  learning methods  Outcome: Progressing     Problem: Discharge Barriers  Goal: Patient's discharge needs are met  Description: INTERVENTIONS:  1. Assess patient for self-management skills  2. Encourage participation in management  3. Identify potential discharge barriers on admission and throughout hospital stay  4. Involve patient/family/caregiver in discharge planning process  5. Collaborate with case management/ for discharge needs  Outcome: Progressing     Problem: Potential for Compromised Skin Integrity  Goal: Skin Integrity is Maintained or Improved  Description: INTERVENTIONS:  1. Assess and monitor skin integrity  2. Collaborate with interdisciplinary team and initiate plans and interventions as needed  3. Alternate a full bath with partial baths for elderly   4. Monitor patient's hygiene practices   5. Collaborate with wound, ostomy, and continence nurse  Outcome: Progressing  Goal: Nutritional status is improving  Description: INTERVENTIONS:  1. Monitor and assess patient for malnutrition (ex- brittle hair, bruises, dry skin, pale skin and conjunctiva, muscle wasting, smooth red tongue, and disorientation)  2. Monitor patient's weight and dietary intake as ordered or per policy  3. Determine patient's food preferences and provide high-protein, high-caloric foods as appropriate  4. Assist patient with eating   5. Allow adequate time for meals   6. Encourage patient to take dietary supplement as ordered   7. Collaborate with dietitian  8. Include patient/family/caregiver in decisions related to nutrition  Outcome: Progressing  Goal: MOBILITY IS MAINTAINED OR IMPROVED  Description: INTERVENTIONS  1. Collaborate with interdisciplinary team and initiate plan and interventions as ordered (PT/OT)  2. Encourage ambulation  3. Up to chair for meals  4. Monitor for signs of deconditioning  Outcome: Progressing     Problem: Urinary Incontinence  Goal: Perineal skin integrity is maintained or  improved  Description: INTERVENTIONS:  1. Assess genitourinary system, perineal skin, labs (urinalysis), and history of incontinence to include past management, aggravating, and alleviating factors  2. Collaborate with interdisciplinary team including wound, ostomy, and continence nurse and initiate plans and interventions as needed  4. Consider urine containment device  5. Apply skin protectant   6. Develop skin care regimen  7. Provide privacy when changing patient's incontinence device to maintain their dignity  Outcome: Progressing     Problem: Infection - Adult  Goal: Absence of infection during hospitalization  Description: INTERVENTIONS:  1. Assess and monitor for signs and symptoms of infection  2. Monitor lab/diagnostic results  3. Monitor all insertion sites/wounds/incisions  4. Monitor secretions for changes in amount and color  5. Administer medications as ordered  6. Educate and encourage patient and family to use good hand hygiene technique  7. Identify and educate in appropriate isolation precautions for identified infection/condition  Outcome: Progressing     Problem: Safety Adult  Goal: Patient will remain safe during hospitalization  Description: INTERVENTIONS    1. Assess patient for fall risk and implement interventions if needed  2. Use safe transport techniques  3. Assess patient using the appropriate Harvey skin assessment scale  4. Assess patient for risk of aspiration  5. Assess patient for risk of elopement  6. Assess patient for risk of suicide  Outcome: Progressing     Problem: TRANSFERS  Goal: STG - Patient will perform bed mobility  Description: Supine<>sit with tactile cues for sequencing  Outcome: Progressing  Goal: LTG - Patient will demonstrate safe transfer techniques  Description: With min A for all transfers with use of walker   Outcome: Progressing     Problem: OT Misc  Goal: LTG - Misc 1  Description: Pt will complete toilet transfers with stand by assistance with assitive  device as needed.   Outcome: Progressing     Problem: Genitourinary - Adult  Goal: Absence of urinary retention  Description: INTERVENTIONS:  1. Assess patient’s ability to void and empty bladder.  2. Monitor intake/output and perform bladder scan if indicated.  3. Place urinary catheter per order and initiate and maintain CAUTI bundle.  4. Administer medications to alleviate retention as needed.  5. Discuss catheterization for long term situations as appropriate.    Outcome: Progressing

## 2023-12-14 NOTE — NURSING END OF SHIFT
Nursing End of Shift Summary:    Patient: Joseph Wong  MRN: [id number]  : 10/20/1934, Age: 89 y.o.    Location: [address] [place] [address]    Nursing Goals  Clinical Goals for the Shift: promote rest, ensure safety    Narrative Summary of Progress Toward Clinical Goals:  Pt slept from 0204-6139. Observed paranoia when he's . He's redirectable and no agitation this shift.     Barriers to Goals/Nursing Concerns:  Yes - placement    New Patient or Family Concerns/Issues:  No    Shift Summary:   Significant Events & Communications to Providers (last 12 hours)       Last 5 Values    No documentation.                 Oxygen Usage (last 12 hours)       Last 5 Values       Row Name 23                   Room Air or Baseline Oxygen Trial by Nursing    Is Patient on Room Air OR on the Same Amount of O2 as at Home? Yes                      Mobility (last 12 hours)       Last 5 Values       Row Name 23                   Mobility    Patient Position Supine                      Urethral Catheter       Active Urethral Catheter       None                  Active Lines       Active Central venous catheter / Peripherally inserted central catheter / Implantable Port / Hemodialysis catheter / Midline Catheter       None                  Infusing Medications   Medication Dose Last Rate     PRN Medications   Medication Dose Last Admin    QUEtiapine  25 mg 25 mg at 23    sodium chloride  3 mL      sodium chloride  1 spray      sodium chloride-aloe vera  1 Application      acetaminophen  500 mg 500 mg at 23    oxyCODONE  5 mg      magnesium hydroxide  30 mL      ondansetron  4 mg      Or    ondansetron  4 mg      dextrose 50 % in water (D50W)  15-30 mL      dextrose  15.2 g      glucagon  1 mg      Or    glucagon  1 mg

## 2023-12-14 NOTE — INTERDISCIPLINARY/THERAPY
12/14/23 1246   Time Calculation   Start Time 1246   Subjective Comments   RN Stated patient is medically cleared for therapy Yes   Subjective Comments OT attempting treatment session. Patient sleeping in recliner upon attempt. Significant other requesting that he be left to rest. OT will continue to follow.

## 2023-12-14 NOTE — INTERDISCIPLINARY/THERAPY
Case Management Progress Note    Phone # [phone]    Discussed Discharge Needs/Topics: CM called Philly Hampton at [facility].  Ph[place] decline the patient, and called Smitha at [facility].  Both decline the patient.  CM called Alisha from [place].  Alisha will take a look at the patient.      CM meet with the patient's wife Alessandra Wong.  CM updated Alessandra.  CM asks Alessandra to expand referrals.  Alessandra declines.  CM brings up Assisted Living Facilities (senior care).  CM answered Alessandra's question to the best of the CM's ability.  The patient wants the [facility].    CM calls the [facility].  CM leaves a message for the person who is in charge of admissions.  CM receives a return phone call back.  Ute at [facility] is requesting a referral at [phone].   will continue to follow.    Disposition: senior care vs. Placement.

## 2023-12-14 NOTE — PROGRESS NOTES
[facility]  [address] [place] [address]  Daily Progress Note  Patient name: Joseph Wong  MRN: [id number]   LOS: 7 days     Subjective   Patient with some sundowning last night but redirected by staff and slept ok afterwards  Objective   Vitals:Temp:  [36.7 °C (98.1 °F)-36.9 °C (98.5 °F)] 36.7 °C (98.1 °F)  Heart Rate:  [67-71] 71  Resp:  [16-18] 16  SpO2:  [92 %] 92 %  BP: (129-166)/(82-99) 166/99  Physical Exam:   HEENT: Pupils equal round and reactive to light and accommodation.  Extraocular movements are intact.  Oropharynx clear with no erythema or exudate.  Neck: Supple nontender without palpable lymphadenopathy JVD bruits or goiter appreciated  Lungs: Clear to auscultation bilaterally  Heart: Regular rate and rhythm with normal S1 and S2  Abdomen: Soft nontender. normoactive bowel sounds. no hepatosplenomegaly  Extremities: No clubbing, cyanosis, or edema.  Neurologic: Alert and oriented ×3.  CN II through XII intact.  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  They will arrange follow-up in clinic approximately 4 weeks with repeat x-rays.  X-ray thoracolumbar junction  Similar T12 compression deformity.  No change in alignment  CT lumbar spine without contrast  1.  Compression deformities of the T12 and L4 vertebral bodies appear likely acute.  2.  Diffuse lumbar degenerative changes.  3.  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  Generalized weakness  B12 and vitamin D deficiency  DM2 with neuropathy  GERD  CAD  BPH   Mood disorder   NARESH   VTE prophylaxis:  lovenox     Plan:  Continue Seroquel to 50 mg p.o. q. supper follow behavior  Neurosurgery arranging 4-week follow-up with repeat x-rays, no lifting greater than 5 to 10 pounds and avoid significant bending or twisting.  No brace necessary.  PT OT  Await possible SNF placement       Electronically signed by: Tarik Aaron Jr., MD  12/14/2023  1:59 PM

## 2023-12-14 NOTE — PLAN OF CARE
Problem: Safety Adult - Fall  Goal: Free from fall injury  Description: INTERVENTIONS:    Inpatient - Please reference Cares/Safety Flowsheet under Oneil Fall Risk for interventions.  Pediatrics - Please reference Peds Daily Cares/Safety Flowsheet under [facility] Pediatric Fall Assessment Fall Bundle for interventions  LD/OB - Please reference OB Shift Screening Flowsheet under OB Fall Risk for interventions.  Outcome: Progressing     Problem: Safety Adult  Goal: Patient will remain safe during hospitalization  Description: INTERVENTIONS    1. Assess patient for fall risk and implement interventions if needed  2. Use safe transport techniques  3. Assess patient using the appropriate Harvey skin assessment scale  4. Assess patient for risk of aspiration  5. Assess patient for risk of elopement  6. Assess patient for risk of suicide  Outcome: Progressing  Flowsheets (Taken 12/14/2023 0048)  Patient will remain safe durning hospitalization:   Assess patient for Fall Risk   Assess Patient for Aspirations   Use safe transport   Assess Patient for Risk of Elopement   Assess Patient using the appropriate Harvey scale   Assess Patient for Risk of Suicide

## 2023-12-15 PROCEDURE — 6370000100 HC RX 637 (ALT 250 FOR IP): Performed by: HOSPITALIST

## 2023-12-15 PROCEDURE — 99232 SBSQ HOSP IP/OBS MODERATE 35: CPT | Performed by: HOSPITALIST

## 2023-12-15 PROCEDURE — 97530 THERAPEUTIC ACTIVITIES: CPT | Mod: GP | Performed by: PHYSICAL THERAPIST

## 2023-12-15 PROCEDURE — 51798 US URINE CAPACITY MEASURE: CPT

## 2023-12-15 PROCEDURE — (BLANK) HC ROOM PRIVATE

## 2023-12-15 PROCEDURE — 6370000100 HC RX 637 (ALT 250 FOR IP): Performed by: FAMILY MEDICINE

## 2023-12-15 PROCEDURE — 2590000100 HC RX 259: Performed by: HOSPITALIST

## 2023-12-15 PROCEDURE — 2590000100 HC RX 259: Performed by: FAMILY MEDICINE

## 2023-12-15 RX ADMIN — FAMOTIDINE 10 MG: 20 TABLET, FILM COATED ORAL at 09:26

## 2023-12-15 RX ADMIN — TAMSULOSIN HYDROCHLORIDE 0.4 MG: 0.4 CAPSULE ORAL at 09:26

## 2023-12-15 RX ADMIN — LOSARTAN POTASSIUM 50 MG: 50 TABLET, FILM COATED ORAL at 09:26

## 2023-12-15 RX ADMIN — LIDOCAINE 1 PATCH: 50 PATCH CUTANEOUS at 09:27

## 2023-12-15 RX ADMIN — FAMOTIDINE 10 MG: 20 TABLET, FILM COATED ORAL at 19:48

## 2023-12-15 RX ADMIN — SENNOSIDES AND DOCUSATE SODIUM 1 TABLET: 50; 8.6 TABLET ORAL at 09:27

## 2023-12-15 RX ADMIN — METOPROLOL SUCCINATE 25 MG: 25 TABLET, EXTENDED RELEASE ORAL at 09:27

## 2023-12-15 RX ADMIN — SPIRONOLACTONE 25 MG: 25 TABLET ORAL at 09:26

## 2023-12-15 RX ADMIN — FINASTERIDE 5 MG: 5 TABLET, FILM COATED ORAL at 19:48

## 2023-12-15 RX ADMIN — SENNOSIDES AND DOCUSATE SODIUM 1 TABLET: 50; 8.6 TABLET ORAL at 19:47

## 2023-12-15 RX ADMIN — Medication 1 TABLET: at 09:26

## 2023-12-15 RX ADMIN — ASPIRIN 81 MG: 81 TABLET ORAL at 09:26

## 2023-12-15 RX ADMIN — QUETIAPINE FUMARATE 25 MG: 25 TABLET ORAL at 07:11

## 2023-12-15 RX ADMIN — ATORVASTATIN CALCIUM 40 MG: 40 TABLET, FILM COATED ORAL at 19:47

## 2023-12-15 RX ADMIN — QUETIAPINE FUMARATE 50 MG: 25 TABLET ORAL at 17:17

## 2023-12-15 RX ADMIN — TAMSULOSIN HYDROCHLORIDE 0.4 MG: 0.4 CAPSULE ORAL at 19:48

## 2023-12-15 RX ADMIN — AMLODIPINE BESYLATE 5 MG: 5 TABLET ORAL at 09:27

## 2023-12-15 RX ADMIN — FLUOXETINE HYDROCHLORIDE 40 MG: 20 CAPSULE ORAL at 19:48

## 2023-12-15 NOTE — PLAN OF CARE
Problem: Safety Adult - Fall  Goal: Free from fall injury  Description: INTERVENTIONS:    Inpatient - Please reference Cares/Safety Flowsheet under Oneil Fall Risk for interventions.  Pediatrics - Please reference Peds Daily Cares/Safety Flowsheet under [facility] Pediatric Fall Assessment Fall Bundle for interventions  LD/OB - Please reference OB Shift Screening Flowsheet under OB Fall Risk for interventions.  Outcome: Progressing     Problem: Pain - Adult  Goal: Verbalizes/displays adequate comfort level or baseline comfort level  Description: INTERVENTIONS:  1. Encourage patient to monitor pain and request interventions  2. Assess pain using the appropriate pain scale  3. Administer analgesics based on type and severity of pain and evaluate response  4. Educate/Implement non-pharmacological measures as appropriate and evaluate response  5. Consider cultural, developmental and social influences on pain and pain management  6. Notify Provider if interventions unsuccessful or patient reports new pain  Outcome: Progressing     Problem: Daily Care  Goal: Daily care needs are met  Description: INTERVENTIONS:   1. Assess and monitor skin integrity  2. Identify patients at risk for skin breakdown on admission and per policy  3. Assess and monitor ability to perform self care and identify potential discharge needs  4. Assess skin integrity/risk for skin breakdown  5. Assist patient with activities of daily living as needed  6. Encourage independent activity per ability   7. Provide mouth care   8. Include patient/family/caregiver in decisions related to daily care   Outcome: Progressing     Problem: Knowledge Deficit  Goal: Patient/family/caregiver demonstrates understanding of disease process, treatment plan, medications, and discharge instructions  Description: INTERVENTIONS:   1. Complete learning assessment and assess knowledge base  2. Provide teaching at level of understanding   3. Provide teaching via preferred  learning methods  Outcome: Progressing     Problem: Discharge Barriers  Goal: Patient's discharge needs are met  Description: INTERVENTIONS:  1. Assess patient for self-management skills  2. Encourage participation in management  3. Identify potential discharge barriers on admission and throughout hospital stay  4. Involve patient/family/caregiver in discharge planning process  5. Collaborate with case management/ for discharge needs  Outcome: Progressing     Problem: Infection - Adult  Goal: Absence of infection during hospitalization  Description: INTERVENTIONS:  1. Assess and monitor for signs and symptoms of infection  2. Monitor lab/diagnostic results  3. Monitor all insertion sites/wounds/incisions  4. Monitor secretions for changes in amount and color  5. Administer medications as ordered  6. Educate and encourage patient and family to use good hand hygiene technique  7. Identify and educate in appropriate isolation precautions for identified infection/condition  Outcome: Progressing     Problem: Safety Adult  Goal: Patient will remain safe during hospitalization  Description: INTERVENTIONS    1. Assess patient for fall risk and implement interventions if needed  2. Use safe transport techniques  3. Assess patient using the appropriate Harvey skin assessment scale  4. Assess patient for risk of aspiration  5. Assess patient for risk of elopement  6. Assess patient for risk of suicide  Outcome: Progressing     Problem: Potential for Compromised Skin Integrity  Goal: Skin Integrity is Maintained or Improved  Description: INTERVENTIONS:  1. Assess and monitor skin integrity  2. Collaborate with interdisciplinary team and initiate plans and interventions as needed  3. Alternate a full bath with partial baths for elderly   4. Monitor patient's hygiene practices   5. Collaborate with wound, ostomy, and continence nurse  Outcome: Progressing  Goal: Nutritional status is improving  Description:  INTERVENTIONS:  1. Monitor and assess patient for malnutrition (ex- brittle hair, bruises, dry skin, pale skin and conjunctiva, muscle wasting, smooth red tongue, and disorientation)  2. Monitor patient's weight and dietary intake as ordered or per policy  3. Determine patient's food preferences and provide high-protein, high-caloric foods as appropriate  4. Assist patient with eating   5. Allow adequate time for meals   6. Encourage patient to take dietary supplement as ordered   7. Collaborate with dietitian  8. Include patient/family/caregiver in decisions related to nutrition  Outcome: Progressing  Goal: MOBILITY IS MAINTAINED OR IMPROVED  Description: INTERVENTIONS  1. Collaborate with interdisciplinary team and initiate plan and interventions as ordered (PT/OT)  2. Encourage ambulation  3. Up to chair for meals  4. Monitor for signs of deconditioning  Outcome: Progressing     Problem: Urinary Incontinence  Goal: Perineal skin integrity is maintained or improved  Description: INTERVENTIONS:  1. Assess genitourinary system, perineal skin, labs (urinalysis), and history of incontinence to include past management, aggravating, and alleviating factors  2. Collaborate with interdisciplinary team including wound, ostomy, and continence nurse and initiate plans and interventions as needed  4. Consider urine containment device  5. Apply skin protectant   6. Develop skin care regimen  7. Provide privacy when changing patient's incontinence device to maintain their dignity  Outcome: Progressing     Problem: TRANSFERS  Goal: STG - Patient will perform bed mobility  Description: Supine<>sit with tactile cues for sequencing  Outcome: Progressing  Goal: LTG - Patient will demonstrate safe transfer techniques  Description: With min A for all transfers with use of walker   Outcome: Progressing

## 2023-12-15 NOTE — NURSING END OF SHIFT
Nursing End of Shift Summary:    Patient: Joseph Wong  MRN: [id number]  : 10/20/1934, Age: 89 y.o.    Location: [address] [place] [address]    Nursing Goals  Clinical Goals for the Shift: comfort and safety, provide calm and restful environment    Narrative Summary of Progress Toward Clinical Goals:  Pt remained calm and comfortable during the shift. Little restless when wife went home. Due Seroquel given. Pt unable to urinate for the shift. Bladder Scan done with amount of 412mL. Straight catheter done, drained 325mL of urine, able to tolerate the procedure. Pt on bed, provided with calm and restful environment. Needs attended.      Barriers to Goals/Nursing Concerns:  Yes - Placement    New Patient or Family Concerns/Issues:  No    Shift Summary:   Significant Events & Communications to Providers (last 12 hours)       Last 5 Values    No documentation.                 Oxygen Usage (last 12 hours)       Last 5 Values       Row Name 23 0830                   Room Air or Baseline Oxygen Trial by Nursing    Is Patient on Room Air OR on the Same Amount of O2 as at Home? Yes                      Mobility (last 12 hours)       Last 5 Values       Row Name 23 0752 23 0800 23 1100 23 1200 23 1400       Mobility    Activity -- Turn Chair Chair;Sleeping --    Level of Assistance -- -- Maximum assist, patient does 25-49% -- --    Length of Time in Chair (min) -- 75 -- -- --    Distance Ambulated (feet) -- 0 Feet -- -- --    Distance Ambulated (Meters Calculated) -- 0 Meters -- -- --    Patient Position Supine -- -- -- --    Turning -- Turns self;Pillow support -- Turns self Pillow support;Other (Comment)  pt on the chair    Distance Ambulated (Meters Calculated)(Do Not Use) -- 0 Feet -- -- --      Row Name 23 1600 23 1706                Mobility    Activity -- Back to bed       Level of Assistance -- Maximum assist, patient does 25-49%       Patient Position Sitting --       Turning Other  (Comment)  up in chair --                     Urethral Catheter       Active Urethral Catheter       None                  Active Lines       Active Central venous catheter / Peripherally inserted central catheter / Implantable Port / Hemodialysis catheter / Midline Catheter       None                  Infusing Medications   Medication Dose Last Rate     PRN Medications   Medication Dose Last Admin    QUEtiapine  25 mg 25 mg at 12/12/23 2142    sodium chloride  3 mL      sodium chloride  1 spray      sodium chloride-aloe vera  1 Application      acetaminophen  500 mg 500 mg at 12/12/23 1933    oxyCODONE  5 mg      magnesium hydroxide  30 mL      ondansetron  4 mg      Or    ondansetron  4 mg

## 2023-12-15 NOTE — INTERDISCIPLINARY/THERAPY
[address] [facility] [address]  [phone]      Inpatient Physical Therapy Daily Treatment Note    Date of Service: 12/15/2023  Patient Name: Joseph Wong  Referring Provider: WYATT SRINIVASAN ROBERT  Completed Visit Number: 4  SOC Date: 12/06/23  Certification Period: 01/05/24    Medical Diagnosis:   Weakness [R53.1]  Unable to bear weight on left lower extremity [R26.89]  Lumbar compression fracture, closed, initial encounter (CMS/[facility]) [S32.000A]  Treatment Diagnoses:  Treatment Diagnosis: Decreased strength, Decreased endurance, Impaired balance, Decreased mobility  Subjective     Subjective Comments  RN Stated patient is medically cleared for therapy: Yes  Subjective Comments: Patient sitting in recliner at beginning and end of session. His lunch tray is in front of him and wife present to assist with feeding patient at end of session. Chair alarm set and needs within reach.     Pain Assessment Scale  Pain Scale: 0-10  0-10 Pain Score: 0 - No pain  Oneil Fall Risk Score: 110         Objective    Precautions  Other Precautions: fall risk, w/c bound at baseline (ambulated short distance)    Treatments:  Gait belt donned for transfers/gait    Cognition: Pleasant and cooperative, requires frequent cues to stay on task for exercises    Bed Mobility: Not assessed    Transfers: Sit<>stand with front wheeled walker mod A   Patient requires mod A to boost to stand and needs chair brought behind him to safely sit    Ambulation: 1 m with front wheeled walker and mod A  Patient has significant forward flexed posture and flexion at hips and knees. He briefly increases upright position but unable to hold with verbal cues. He is not able to stay within base of support of front wheeled walker despite cues. Chair brought behind for safety    Other Activities: BLE: long arc quad, hip flexion in sitting x 10 with verbal and tactile cues. Attempted quad sets/glute sets reclined in chair but patient has  minimal participation with this     Time Calculation  Start Time: 1152  Stop Time: 1201  Time Calculation (min): 9 min  Therapeutic Interventions (Time Spent in Minutes)  Therapeutic Activity: 9       Assessment/Plan   Assessment:    Level of Function and Prognosis  Assessment: Patient with less tolerance for activity this session with impaired balance in standing and attempted gait. Patient continues to be a high fall risk and requires physical assistance for transfers. He would benefit from continued skilled PT to progress mobility as able.  Recommendation  Recommendations for Therapy: Continue skilled therapy  Equipment Recommended:  (has w/c and walker at home)  Plan:    Plan  PT Frequency: 2-3x/wk  Plan for next treatment comment: 1-2 assist (chair follow): bed mobiltiy SBA, transfers min-mod A, gait up to 12 m with chair follow and min-mod A, LE strengthening, standing balance  Treatment duration will be through Certification Period: 01/05/24

## 2023-12-15 NOTE — INTERDISCIPLINARY/THERAPY
Case Management Progress Note    Phone # [phone]    Discussed Discharge Needs/Topics: CM and Lead CM reading nursing notes.  The patient is still requiring a video sitter, or a 1 on 1 sitter. The patient is still having behaviors.    CM and Lead CM, meet with the patient, and the patient's wife Alessandra Wong. Lead CM asked questions regarding the patient's behavior at home, home routine, insurance requirements regarding billing, and discharge planning.  It is this CM's recollection that the CM spoke about the PT recommendations, and what the patient would be more appropriate for regarding a SNF or [facility].  Alessandra does not want the [facility] referral to continue.  Alessandra wants a referral going to [facility] in [place], [place].  CM called [facility] and puts the patient on the waiting list.  CM is awaiting a call from the owner.   will continue to follow.    Alessandra updated.    Disposition: SNF vs. long term.

## 2023-12-15 NOTE — PROGRESS NOTES
[address] [place] [address]  Daily Progress Note  Patient name: Joseph Wong  MRN: [id number]   LOS: 8 days     Subjective   Patient states he slept well last night and doing well  Objective   Vitals:Temp:  [36.8 °C (98.2 °F)-36.8 °C (98.3 °F)] 36.8 °C (98.3 °F)  Heart Rate:  [64-75] 64  Resp:  [18] 18  SpO2:  [90 %-94 %] 90 %  BP: (113-131)/(78-83) 113/79  Physical Exam:   HEENT: Pupils equal round and reactive to light and accommodation.  Extraocular movements are intact.  Oropharynx clear with no erythema or exudate.  Neck: Supple nontender without palpable lymphadenopathy JVD bruits or goiter appreciated  Lungs: Clear to auscultation bilaterally  Heart: Regular rate and rhythm with normal S1 and S2  Abdomen: Soft nontender. normoactive bowel sounds. no hepatosplenomegaly  Extremities: No clubbing, cyanosis, or edema.  Neurologic: Dementia.  CN II through XII intact.  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  They will arrange follow-up in clinic approximately 4 weeks with repeat x-rays.  X-ray thoracolumbar junction  Similar T12 compression deformity.  No change in alignment  CT lumbar spine without contrast  1.  Compression deformities of the T12 and L4 vertebral bodies appear likely acute.  2.  Diffuse lumbar degenerative changes.  3.  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  Generalized weakness  B12 and vitamin D deficiency  DM2 with neuropathy  GERD  CAD  BPH   Mood disorder   NARESH   VTE prophylaxis:  lovenox     Plan:  Continue supportive care  Neurosurgery arranging 4-week follow-up with repeat x-rays, no lifting greater than 5 to 10 pounds and avoid significant bending or twisting.  No brace necessary.  Continue PT OT  Await possible SNF placement     lectronically signed by: Tarik Aaron Jr., MD  12/15/2023  12:09 PM

## 2023-12-15 NOTE — INTERDISCIPLINARY/THERAPY
[address] [facility] [address]  [phone]      Inpatient Physical Therapy Daily Treatment Note    Date of Service: 12/14/2023  Patient Name: Joseph Wong  Referring Provider: WYATT SRINIVASAN ROBERT  Completed Visit Number: 3  SOC Date: 12/06/23  Certification Period: 01/05/24    Medical Diagnosis:   Weakness [R53.1]  Unable to bear weight on left lower extremity [R26.89]  Lumbar compression fracture, closed, initial encounter (CMS/[facility]) [S32.000A]  Treatment Diagnoses:  Treatment Diagnosis: Decreased strength, Decreased endurance, Impaired balance, Decreased mobility  Subjective   History of Current Complaint: impaired mobility due to weakness and knee pain/effusion, dementia       RN approved PT services. Pt found recliner and agreeable to PT. Gait belt donned for mobility. Pt left in recliner with call light in reach, needs met, and alarm on. Wife in room. Nursing is aware of patient position and mobility status.      Family/Caregiver Present  Family/Caregiver Present: Yes  Caregiver: Spouse  Subjective Comments  RN Stated patient is medically cleared for therapy: Yes  Subjective Comments: Patient agrees to PT, he is in recliner chair pre-post PT. Left in recliner , all needs in reach. Wife present, alarm on chair.   Current Assistive or Adaptive Equipment  Equipment: Front wheeled walker  Pain Assessment Scale  Pain Scale: Kraft-Baker FACES  0-10 Pain Score: 0 - No pain  Oneil Fall Risk Score: 100         Objective        Gait belt donned for all mobility. Pt left with call light in reach, needs met.     Cognition: Impaired, pleasant and cooperative.  Requires redirection        Transfers: sit to stand minimal assist, stand pivot front wheeled walker use moderate assist for motor planning and walker positioning    Ambulation: 12m front wheeled walker use, minimal assist, small steps, foot flat, flexed posture, turning requires moderate assist for motor planning and walker assist. Recliner  follows to encouarge increased ambulation.        Balance: Impaired, tends to tip posteriorly with standing. Worked on heel raises with front wheeled walker support for anterior shifrting and increased knee extension activation. This was quite challenging for him.        Activity Tolerance: Fair    Precautions  Reinforced Precautions: Yes  Other Precautions: fall risk, w/c bound at baseline (ambulated short distance  Is this a Co-Treatment?: No  Treatments:  Ambulation 1  Ambulation Distance (meters): 12 meters  Time Calculation  Start Time: 1605  Stop Time: 1623  Time Calculation (min): 18 min  Therapeutic Interventions (Time Spent in Minutes)  Gait/Mobility: 18       Assessment/Plan   Assessment:    Level of Function and Prognosis  Current Level of Function: Sit to stand from recliner min to moda  using fww, stand pivot using fww Stephany to modA ofone for guiding walker, flexed posture, fatigued quickly.  Ambulated using fww with recliner chair following 12m slow pace, flexed posture, foot flat steps.  Assessment: Patient continues to improve gradually with mobility skills. He is requiring assistance of one for safe mobility with fww use. He remains a high risk for fall due to impairments in balance and cognition. He will benefit from continued skilled PT to work towards improvements with mobility as well as to assist with DC planning.  Skilled PT is recommended to allow patient to progress to the highest level of functional mobility using the least restrictive assistive device.  Placement is recommended post acute care stay in order for patient to continue with skilled PT and work towards increased level of independent mobility.  .    Recommendation  Equipment Recommended: Hand held shower  Plan:    Plan  PT Frequency: 2-3x/wk  Plan for next treatment comment: 1-2 assist: (chair follow for ambulation) bed mobility SBA, transfers min A, gait min A 15 m with walker, standing balance, LE exercises  Treatment duration  will be through Certification Period: 01/05/24

## 2023-12-15 NOTE — NURSING END OF SHIFT
Nursing End of Shift Summary:    Patient: Joseph Wong  MRN: [id number]  : 10/20/1934, Age: 89 y.o.    Location: [address] [place] [address]    Nursing Goals  Clinical Goals for the Shift: maintain pt safety and comfort    Narrative Summary of Progress Toward Clinical Goals:  1:1 sitter at bedside throughout shift. Patient oriented to self. Pt restless at beginning of shift and wanting to get out of bed; refused to take oral medications, pt redirected and did take medications. Noted to be asleep overnight. Maintained pt safety and comfort.     Barriers to Goals/Nursing Concerns:  Yes - placement    New Patient or Family Concerns/Issues:  No    Shift Summary:   Significant Events & Communications to Providers (last 12 hours)       Last 5 Values    No documentation.                 Oxygen Usage (last 12 hours)       Last 5 Values       Row Name 23                   Room Air or Baseline Oxygen Trial by Nursing    Is Patient on Room Air OR on the Same Amount of O2 as at Home? Yes                      Mobility (last 12 hours)       Last 5 Values       Row Name 23 1706 23 2200 12/15/23 0000 12/15/23 0200       Mobility    Activity Back to bed -- Turn -- --    Level of Assistance Maximum assist, patient does 25-49% -- -- -- --    Length of Time in Chair (min) -- -- 0 -- --    Distance Ambulated (feet) -- -- 0 Feet -- --    Distance Ambulated (Meters Calculated) -- -- 0 Meters -- --    Patient Position -- Supine -- Supine --    Turning -- Turns self Turns self Turns self Other (Comment)    Distance Ambulated (Meters Calculated)(Do Not Use) -- -- 0 Feet -- --      Row Name 12/15/23 0357                   Mobility    Turning Turns self                      Urethral Catheter       Active Urethral Catheter       None                  Active Lines       Active Central venous catheter / Peripherally inserted central catheter / Implantable Port / Hemodialysis catheter / Midline Catheter       None                   Infusing Medications   Medication Dose Last Rate     PRN Medications   Medication Dose Last Admin    QUEtiapine  25 mg 25 mg at 12/12/23 2142    sodium chloride  3 mL      sodium chloride  1 spray      sodium chloride-aloe vera  1 Application      acetaminophen  500 mg 500 mg at 12/12/23 1933    oxyCODONE  5 mg      magnesium hydroxide  30 mL      ondansetron  4 mg      Or    ondansetron  4 mg

## 2023-12-16 PROCEDURE — 2590000100 HC RX 259: Performed by: HOSPITALIST

## 2023-12-16 PROCEDURE — (BLANK) HC ROOM PRIVATE

## 2023-12-16 PROCEDURE — 51798 US URINE CAPACITY MEASURE: CPT

## 2023-12-16 PROCEDURE — 6370000100 HC RX 637 (ALT 250 FOR IP): Performed by: HOSPITALIST

## 2023-12-16 PROCEDURE — 99232 SBSQ HOSP IP/OBS MODERATE 35: CPT | Performed by: HOSPITALIST

## 2023-12-16 PROCEDURE — 6370000100 HC RX 637 (ALT 250 FOR IP): Performed by: FAMILY MEDICINE

## 2023-12-16 PROCEDURE — 2590000100 HC RX 259: Performed by: FAMILY MEDICINE

## 2023-12-16 PROCEDURE — 51701 INSERT BLADDER CATHETER: CPT

## 2023-12-16 RX ADMIN — Medication 1 TABLET: at 08:31

## 2023-12-16 RX ADMIN — LIDOCAINE 1 PATCH: 50 PATCH CUTANEOUS at 08:31

## 2023-12-16 RX ADMIN — POLYETHYLENE GLYCOL 3350 17 G: 17 POWDER, FOR SOLUTION ORAL at 08:31

## 2023-12-16 RX ADMIN — LOSARTAN POTASSIUM 50 MG: 50 TABLET, FILM COATED ORAL at 08:31

## 2023-12-16 RX ADMIN — SPIRONOLACTONE 25 MG: 25 TABLET ORAL at 08:31

## 2023-12-16 RX ADMIN — TAMSULOSIN HYDROCHLORIDE 0.4 MG: 0.4 CAPSULE ORAL at 08:31

## 2023-12-16 RX ADMIN — FAMOTIDINE 10 MG: 20 TABLET, FILM COATED ORAL at 08:31

## 2023-12-16 RX ADMIN — ASPIRIN 81 MG: 81 TABLET ORAL at 08:31

## 2023-12-16 RX ADMIN — AMLODIPINE BESYLATE 5 MG: 5 TABLET ORAL at 08:31

## 2023-12-16 RX ADMIN — METOPROLOL SUCCINATE 25 MG: 25 TABLET, EXTENDED RELEASE ORAL at 08:31

## 2023-12-16 RX ADMIN — QUETIAPINE FUMARATE 50 MG: 25 TABLET ORAL at 17:44

## 2023-12-16 RX ADMIN — SENNOSIDES AND DOCUSATE SODIUM 1 TABLET: 50; 8.6 TABLET ORAL at 08:31

## 2023-12-16 NOTE — NURSING END OF SHIFT
Nursing End of Shift Summary:    Patient: Joseph Wong  MRN: [id number]  : 10/20/1934, Age: 89 y.o.    Location: [address] [place] [address]    Nursing Goals  Clinical Goals for the Shift: maintain pt safety and comfort    Narrative Summary of Progress Toward Clinical Goals:  Patient took night time PO medications with no issues. Patient oriented to self. 1:1 sitter at bedside throughout shift. Noted to be asleep overnight and no behavioral issues noted. Straight cath'd patient this morning for bladder scan > 300cc per order; bladder scan of 540cc with 500cc of urine returned. Maintained pt safety and comfort.     Barriers to Goals/Nursing Concerns:  No    New Patient or Family Concerns/Issues:  No    Shift Summary:   Significant Events & Communications to Providers (last 12 hours)       Last 5 Values    No documentation.                 Oxygen Usage (last 12 hours)       Last 5 Values       Row Name 12/15/23 2000                   Room Air or Baseline Oxygen Trial by Nursing    Is Patient on Room Air OR on the Same Amount of O2 as at Home? Yes                      Mobility (last 12 hours)       Last 5 Values       Row Name 12/15/23 2000 12/15/23 2200                Mobility    Activity -- Sleeping;Turn       Length of Time in Chair (min) -- 0       Distance Ambulated (feet) -- 0 Feet       Distance Ambulated (Meters Calculated) -- 0 Meters       Patient Position Supine --       Turning Turns self Turns self       Distance Ambulated (Meters Calculated)(Do Not Use) -- 0 Feet                     Urethral Catheter       Active Urethral Catheter       None                  Active Lines       Active Central venous catheter / Peripherally inserted central catheter / Implantable Port / Hemodialysis catheter / Midline Catheter       None                  Infusing Medications   Medication Dose Last Rate     PRN Medications   Medication Dose Last Admin    QUEtiapine  25 mg 25 mg at 12/15/23 0711    sodium chloride  3 mL      sodium chloride   1 spray      sodium chloride-aloe vera  1 Application      acetaminophen  500 mg 500 mg at 12/12/23 1933    oxyCODONE  5 mg      magnesium hydroxide  30 mL      ondansetron  4 mg      Or    ondansetron  4 mg

## 2023-12-16 NOTE — PROGRESS NOTES
[address] [place] [address]  Daily Progress Note  Patient name: Joseph Wong  MRN: [id number]   LOS: 9 days     Subjective   Patient says he slept well last night and doing good  Objective   Vitals:Temp:  [36.4 °C (97.6 °F)-36.6 °C (97.9 °F)] 36.6 °C (97.9 °F)  Heart Rate:  [64-76] 64  Resp:  [20] 20  SpO2:  [92 %-93 %] 93 %  BP: (120-135)/(74-92) 135/92  Physical Exam:   HEENT: Pupils equal round and reactive to light and accommodation.  Extraocular movements are intact.  Oropharynx clear with no erythema or exudate.  Neck: Supple nontender without palpable lymphadenopathy JVD bruits or goiter appreciated  Lungs: Clear to auscultation bilaterally  Heart: Regular rate and rhythm with normal S1 and S2  Abdomen: Soft nontender. normoactive bowel sounds. no hepatosplenomegaly  Extremities: No clubbing, cyanosis, or edema.  Neurologic: Dementia without agitation.  CN II through XII intact.  Nonfocal, generalized debility and weakness.    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  They will arrange follow-up in clinic approximately 4 weeks with repeat x-rays.  X-ray thoracolumbar junction  Similar T12 compression deformity.  No change in alignment  CT lumbar spine without contrast  1.  Compression deformities of the T12 and L4 vertebral bodies appear likely acute.  2.  Diffuse lumbar degenerative changes.  3.  Chronic L5 pars interarticularis  defects without vertebral subluxation.  Generalized weakness  B12 and vitamin D deficiency  DM2 with neuropathy  GERD  CAD  BPH   Mood disorder   NARESH   VTE prophylaxis:  lovenox     Plan:  Continue supportive care  Neurosurgery arranging 4-week follow-up with repeat x-rays, no lifting greater than 5 to 10 pounds and avoid significant bending or twisting.  No brace necessary.  Continue PT OT, SNF placement       Electronically signed by: Tarik Aaron Jr., MD  12/16/2023  10:49 AM

## 2023-12-16 NOTE — PLAN OF CARE
Problem: Safety Adult - Fall  Goal: Free from fall injury  Description: INTERVENTIONS:    Inpatient - Please reference Cares/Safety Flowsheet under Oneil Fall Risk for interventions.  Pediatrics - Please reference Peds Daily Cares/Safety Flowsheet under [place] Pediatric Fall Assessment Fall Bundle for interventions  LD/OB - Please reference OB Shift Screening Flowsheet under OB Fall Risk for interventions.  Outcome: Progressing     Problem: Pain - Adult  Goal: Verbalizes/displays adequate comfort level or baseline comfort level  Description: INTERVENTIONS:  1. Encourage patient to monitor pain and request interventions  2. Assess pain using the appropriate pain scale  3. Administer analgesics based on type and severity of pain and evaluate response  4. Educate/Implement non-pharmacological measures as appropriate and evaluate response  5. Consider cultural, developmental and social influences on pain and pain management  6. Notify Provider if interventions unsuccessful or patient reports new pain  Outcome: Progressing     Problem: Daily Care  Goal: Daily care needs are met  Description: INTERVENTIONS:   1. Assess and monitor skin integrity  2. Identify patients at risk for skin breakdown on admission and per policy  3. Assess and monitor ability to perform self care and identify potential discharge needs  4. Assess skin integrity/risk for skin breakdown  5. Assist patient with activities of daily living as needed  6. Encourage independent activity per ability   7. Provide mouth care   8. Include patient/family/caregiver in decisions related to daily care   Outcome: Progressing     Problem: Knowledge Deficit  Goal: Patient/family/caregiver demonstrates understanding of disease process, treatment plan, medications, and discharge instructions  Description: INTERVENTIONS:   1. Complete learning assessment and assess knowledge base  2. Provide teaching at level of understanding   3. Provide teaching via preferred  learning methods  Outcome: Progressing     Problem: Discharge Barriers  Goal: Patient's discharge needs are met  Description: INTERVENTIONS:  1. Assess patient for self-management skills  2. Encourage participation in management  3. Identify potential discharge barriers on admission and throughout hospital stay  4. Involve patient/family/caregiver in discharge planning process  5. Collaborate with case management/ for discharge needs  Outcome: Progressing     Problem: Infection - Adult  Goal: Absence of infection during hospitalization  Description: INTERVENTIONS:  1. Assess and monitor for signs and symptoms of infection  2. Monitor lab/diagnostic results  3. Monitor all insertion sites/wounds/incisions  4. Monitor secretions for changes in amount and color  5. Administer medications as ordered  6. Educate and encourage patient and family to use good hand hygiene technique  7. Identify and educate in appropriate isolation precautions for identified infection/condition  Outcome: Progressing     Problem: Safety Adult  Goal: Patient will remain safe during hospitalization  Description: INTERVENTIONS    1. Assess patient for fall risk and implement interventions if needed  2. Use safe transport techniques  3. Assess patient using the appropriate Harvey skin assessment scale  4. Assess patient for risk of aspiration  5. Assess patient for risk of elopement  6. Assess patient for risk of suicide  Outcome: Progressing     Problem: Potential for Compromised Skin Integrity  Goal: Skin Integrity is Maintained or Improved  Description: INTERVENTIONS:  1. Assess and monitor skin integrity  2. Collaborate with interdisciplinary team and initiate plans and interventions as needed  3. Alternate a full bath with partial baths for elderly   4. Monitor patient's hygiene practices   5. Collaborate with wound, ostomy, and continence nurse  Outcome: Progressing  Goal: Nutritional status is improving  Description:  INTERVENTIONS:  1. Monitor and assess patient for malnutrition (ex- brittle hair, bruises, dry skin, pale skin and conjunctiva, muscle wasting, smooth red tongue, and disorientation)  2. Monitor patient's weight and dietary intake as ordered or per policy  3. Determine patient's food preferences and provide high-protein, high-caloric foods as appropriate  4. Assist patient with eating   5. Allow adequate time for meals   6. Encourage patient to take dietary supplement as ordered   7. Collaborate with dietitian  8. Include patient/family/caregiver in decisions related to nutrition  Outcome: Progressing  Goal: MOBILITY IS MAINTAINED OR IMPROVED  Description: INTERVENTIONS  1. Collaborate with interdisciplinary team and initiate plan and interventions as ordered (PT/OT)  2. Encourage ambulation  3. Up to chair for meals  4. Monitor for signs of deconditioning  Outcome: Progressing     Problem: Urinary Incontinence  Goal: Perineal skin integrity is maintained or improved  Description: INTERVENTIONS:  1. Assess genitourinary system, perineal skin, labs (urinalysis), and history of incontinence to include past management, aggravating, and alleviating factors  2. Collaborate with interdisciplinary team including wound, ostomy, and continence nurse and initiate plans and interventions as needed  4. Consider urine containment device  5. Apply skin protectant   6. Develop skin care regimen  7. Provide privacy when changing patient's incontinence device to maintain their dignity  Outcome: Progressing     Problem: Genitourinary - Adult  Goal: Absence of urinary retention  Description: INTERVENTIONS:  1. Assess patient’s ability to void and empty bladder.  2. Monitor intake/output and perform bladder scan if indicated.  3. Place urinary catheter per order and initiate and maintain CAUTI bundle.  4. Administer medications to alleviate retention as needed.  5. Discuss catheterization for long term situations as  appropriate.    Outcome: Progressing

## 2023-12-16 NOTE — NURSING END OF SHIFT
Nursing End of Shift Summary:    Patient: Joseph Wong  MRN: [id number]  : 10/20/1934, Age: 89 y.o.    Location: [address] [place] [address]    Nursing Goals  Clinical Goals for the Shift: maintain pt safety and comfort    Narrative Summary of Progress Toward Clinical Goals:  Pt was up in his chair for most of the day. His wife visited with him for about 4-5 hrs. Pt stayed in his bed when he was in it. At the end of the night he started to get anxious and wanted out of his bed. We were able to redirect him and he fell asleep.    Barriers to Goals/Nursing Concerns:  No    New Patient or Family Concerns/Issues:  No    Shift Summary:   Significant Events & Communications to Providers (last 12 hours)       Last 5 Values    No documentation.                 Oxygen Usage (last 12 hours)       Last 5 Values    No documentation.                 Mobility (last 12 hours)       Last 5 Values       Row Name 12/15/23 0722 12/15/23 0800 12/15/23 1137 12/15/23 1638          Mobility    Activity -- Ambulate in room;Commode;Chair Ambulate in room;Chair --     Level of Assistance -- -- Maximum assist, patient does 25-49% --     Length of Time in Chair (min) -- 0 0 --     Distance Ambulated (feet) -- 3 Feet 3 Feet --     Distance Ambulated (Meters Calculated) -- 0.91 Meters 0.91 Meters --     Patient Position Supine -- -- --     Turning -- Every 2 hours Other (Comment)  up in chair Turns self     Distance Ambulated (Meters Calculated)(Do Not Use) -- 0.91 Feet 0.91 Feet --                   Urethral Catheter       Active Urethral Catheter       None                  Active Lines       Active Central venous catheter / Peripherally inserted central catheter / Implantable Port / Hemodialysis catheter / Midline Catheter       None                  Infusing Medications   Medication Dose Last Rate     PRN Medications   Medication Dose Last Admin    QUEtiapine  25 mg 25 mg at 12/15/23 0711    sodium chloride  3 mL      sodium chloride  1 spray      sodium  chloride-aloe vera  1 Application      acetaminophen  500 mg 500 mg at 12/12/23 1933    oxyCODONE  5 mg      magnesium hydroxide  30 mL      ondansetron  4 mg      Or    ondansetron  4 mg

## 2023-12-17 PROCEDURE — 2590000100 HC RX 259: Performed by: HOSPITALIST

## 2023-12-17 PROCEDURE — 51798 US URINE CAPACITY MEASURE: CPT

## 2023-12-17 PROCEDURE — 6370000100 HC RX 637 (ALT 250 FOR IP): Performed by: HOSPITALIST

## 2023-12-17 PROCEDURE — 51701 INSERT BLADDER CATHETER: CPT

## 2023-12-17 PROCEDURE — (BLANK) HC ROOM PRIVATE

## 2023-12-17 PROCEDURE — 99231 SBSQ HOSP IP/OBS SF/LOW 25: CPT | Performed by: HOSPITALIST

## 2023-12-17 PROCEDURE — 2590000100 HC RX 259: Performed by: FAMILY MEDICINE

## 2023-12-17 PROCEDURE — 6370000100 HC RX 637 (ALT 250 FOR IP): Performed by: FAMILY MEDICINE

## 2023-12-17 RX ORDER — QUETIAPINE FUMARATE 100 MG/1
100 TABLET, FILM COATED ORAL
Status: DISCONTINUED | OUTPATIENT
Start: 2023-12-17 | End: 2023-12-21

## 2023-12-17 RX ADMIN — OXYCODONE HYDROCHLORIDE 5 MG: 5 TABLET ORAL at 14:47

## 2023-12-17 RX ADMIN — FAMOTIDINE 10 MG: 20 TABLET, FILM COATED ORAL at 07:48

## 2023-12-17 RX ADMIN — SENNOSIDES AND DOCUSATE SODIUM 1 TABLET: 50; 8.6 TABLET ORAL at 07:48

## 2023-12-17 RX ADMIN — ASPIRIN 81 MG: 81 TABLET ORAL at 07:48

## 2023-12-17 RX ADMIN — LIDOCAINE 1 PATCH: 50 PATCH CUTANEOUS at 07:48

## 2023-12-17 RX ADMIN — TAMSULOSIN HYDROCHLORIDE 0.4 MG: 0.4 CAPSULE ORAL at 19:38

## 2023-12-17 RX ADMIN — AMLODIPINE BESYLATE 5 MG: 5 TABLET ORAL at 07:48

## 2023-12-17 RX ADMIN — FINASTERIDE 5 MG: 5 TABLET, FILM COATED ORAL at 19:37

## 2023-12-17 RX ADMIN — ATORVASTATIN CALCIUM 40 MG: 40 TABLET, FILM COATED ORAL at 19:37

## 2023-12-17 RX ADMIN — TAMSULOSIN HYDROCHLORIDE 0.4 MG: 0.4 CAPSULE ORAL at 07:48

## 2023-12-17 RX ADMIN — QUETIAPINE FUMARATE 100 MG: 100 TABLET ORAL at 17:20

## 2023-12-17 RX ADMIN — LOSARTAN POTASSIUM 50 MG: 50 TABLET, FILM COATED ORAL at 07:48

## 2023-12-17 RX ADMIN — FLUOXETINE HYDROCHLORIDE 40 MG: 20 CAPSULE ORAL at 19:37

## 2023-12-17 RX ADMIN — METOPROLOL SUCCINATE 25 MG: 25 TABLET, EXTENDED RELEASE ORAL at 07:48

## 2023-12-17 RX ADMIN — Medication 1 TABLET: at 07:48

## 2023-12-17 RX ADMIN — SPIRONOLACTONE 25 MG: 25 TABLET ORAL at 07:48

## 2023-12-17 RX ADMIN — POLYETHYLENE GLYCOL 3350 17 G: 17 POWDER, FOR SOLUTION ORAL at 07:48

## 2023-12-17 RX ADMIN — QUETIAPINE FUMARATE 25 MG: 25 TABLET ORAL at 14:47

## 2023-12-17 RX ADMIN — FAMOTIDINE 10 MG: 20 TABLET, FILM COATED ORAL at 19:38

## 2023-12-17 NOTE — NURSING END OF SHIFT
Nursing End of Shift Summary:    Patient: Joseph Wong  MRN: [id number]  : 10/20/1934, Age: 89 y.o.    Location: [address] [place] [address]    Nursing Goals  Clinical Goals for the Shift: patient to remain free of falls this shift    Narrative Summary of Progress Toward Clinical Goals:  Patient noted to have poor appetite this evening. Patient refused assessments and medications; Stating we are on his free time and he is not having any of this. Patient did allow midnight vitals and they were noted to be stable. Patient corporative with sitter at bedside for safety.     Barriers to Goals/Nursing Concerns:  Yes - labs, meds, orders, placement    New Patient or Family Concerns/Issues:  No    Shift Summary:   Significant Events & Communications to Providers (last 12 hours)       Last 5 Values    No documentation.                 Oxygen Usage (last 12 hours)       Last 5 Values    No documentation.                 Mobility (last 12 hours)       Last 5 Values       Row Name 23 1800 23 1950 23 1976          Mobility    Activity Chair Turn -- --     Level of Assistance -- Minimal assist, patient does 75% or more Maximum assist, patient does 25-49% --     Length of Time in Chair (min) 120 -- -- --     Distance Ambulated (feet) 5 Feet -- -- --     Distance Ambulated (Meters Calculated) 1.52 Meters -- -- --     Patient Position -- Supine -- Supine     Turning Every 2 hours Pillow support Pillow support --     Distance Ambulated (Meters Calculated)(Do Not Use) 1.52 Feet -- -- --                   Urethral Catheter       Active Urethral Catheter       None                  Active Lines       Active Central venous catheter / Peripherally inserted central catheter / Implantable Port / Hemodialysis catheter / Midline Catheter       None                  Infusing Medications   Medication Dose Last Rate     PRN Medications   Medication Dose Last Admin    QUEtiapine  25 mg 25 mg at 12/15/23 0711    sodium chloride  3  mL      sodium chloride  1 spray      sodium chloride-aloe vera  1 Application      acetaminophen  500 mg 500 mg at 12/12/23 1933    oxyCODONE  5 mg      magnesium hydroxide  30 mL      ondansetron  4 mg      Or    ondansetron  4 mg

## 2023-12-17 NOTE — NURSING END OF SHIFT
Nursing End of Shift Summary:    Patient: Joseph Wong  MRN: [id number]  : 10/20/1934, Age: 89 y.o.    Location: [address] [place] [address]    Nursing Goals  Clinical Goals for the Shift: maintain safety and comfort    Narrative Summary of Progress Toward Clinical Goals:  VSS. Pt offering no c/o pain at this time. Spouse here today at bedside and updated on plan of care. Pt eating well with assist. BM today. U/o fair. Pending placement.    Barriers to Goals/Nursing Concerns:  No    New Patient or Family Concerns/Issues:  No    Shift Summary:   Significant Events & Communications to Providers (last 12 hours)       Last 5 Values    No documentation.                 Oxygen Usage (last 12 hours)       Last 5 Values    No documentation.                 Mobility (last 12 hours)       Last 5 Values       Row Name 23 0700 23 0820 23 1029 23 1035 23 1044       Mobility    Activity -- Commode;Back to bed Chair Commode Other (Comment)  back to chair    Level of Assistance -- Maximum assist, patient does 25-49% Maximum assist, patient does 25-49% Maximum assist, patient does 25-49% --    Length of Time in Chair (min) -- 0 --  1030 -- --    Distance Ambulated (feet) -- 4 Feet 4 Feet -- --    Distance Ambulated (Meters Calculated) -- 1.22 Meters 1.22 Meters -- --    Patient Position Sitting -- -- -- --    Turning -- Turns self Turns self -- --    Distance Ambulated (Meters Calculated)(Do Not Use) -- 1.22 Feet 1.22 Feet -- --      Row Name 23 1214 23 1246 23 1357 23 1800          Mobility    Activity -- Commode;Back to bed Commode;Back to bed Chair     Level of Assistance -- Maximum assist, patient does 25-49% Maximum assist, patient does 25-49% --     Length of Time in Chair (min) -- 137 -- 120     Distance Ambulated (feet) -- 6 Feet 6 Feet 5 Feet     Distance Ambulated (Meters Calculated) -- 1.83 Meters 1.83 Meters 1.52 Meters     Patient Position Sitting -- -- --     Turning -- Turns self  Turns self Every 2 hours     Distance Ambulated (Meters Calculated)(Do Not Use) -- 1.83 Feet 1.83 Feet 1.52 Feet                   Urethral Catheter       Active Urethral Catheter       None                  Active Lines       Active Central venous catheter / Peripherally inserted central catheter / Implantable Port / Hemodialysis catheter / Midline Catheter       None                  Infusing Medications   Medication Dose Last Rate     PRN Medications   Medication Dose Last Admin    QUEtiapine  25 mg 25 mg at 12/15/23 0711    sodium chloride  3 mL      sodium chloride  1 spray      sodium chloride-aloe vera  1 Application      acetaminophen  500 mg 500 mg at 12/12/23 1933    oxyCODONE  5 mg      magnesium hydroxide  30 mL      ondansetron  4 mg      Or    ondansetron  4 mg

## 2023-12-17 NOTE — PLAN OF CARE
Problem: Safety Adult - Fall  Goal: Free from fall injury  Description: INTERVENTIONS:    Inpatient - Please reference Cares/Safety Flowsheet under [facility] Fall Risk for interventions.  Pediatrics - Please reference Peds Daily Cares/Safety Flowsheet under [facility] Pediatric Fall Assessment Fall Bundle for interventions  LD/OB - Please reference OB Shift Screening Flowsheet under OB Fall Risk for interventions.  Outcome: Progressing     Problem: Knowledge Deficit  Goal: Patient/family/caregiver demonstrates understanding of disease process, treatment plan, medications, and discharge instructions  Description: INTERVENTIONS:   1. Complete learning assessment and assess knowledge base  2. Provide teaching at level of understanding   3. Provide teaching via preferred learning methods  Outcome: Not Progressing  Flowsheets (Taken 12/17/2023 0524)  Patient/family/caregiver demonstrates understanding of disease process, treatment plan, medications, and discharge instructions:   Complete learning assessment and assess knowledge base   Provide teaching at level of understanding   Provide teaching via preferred learning methods     Problem: Safety Adult  Goal: Patient will remain safe during hospitalization  Description: INTERVENTIONS    1. Assess patient for fall risk and implement interventions if needed  2. Use safe transport techniques  3. Assess patient using the appropriate Harvey skin assessment scale  4. Assess patient for risk of aspiration  5. Assess patient for risk of elopement  6. Assess patient for risk of suicide  Outcome: Progressing  Flowsheets (Taken 12/14/2023 0048 by Nimo Rogers, RN)  Patient will remain safe durning hospitalization:   Assess patient for Fall Risk   Assess Patient for Aspirations   Use safe transport   Assess Patient for Risk of Elopement   Assess Patient using the appropriate Harvey scale   Assess Patient for Risk of Suicide

## 2023-12-17 NOTE — PROGRESS NOTES
[address] [place] [address]  Daily Progress Note  Patient name: Joseph Wong  MRN: [id number]   LOS: 10 days     Subjective   Patient slept well and redirected last night with some redirection with sundowning  Objective   Vitals:Temp:  [36.1 °C (96.9 °F)-36.8 °C (98.2 °F)] 36.1 °C (96.9 °F)  Heart Rate:  [53-67] 63  Resp:  [18-20] 18  SpO2:  [91 %-95 %] 91 %  BP: (119-154)/(83-94) 119/83  Physical Exam:   HEENT: Pupils equal round and reactive to light and accommodation.  Extraocular movements are intact.  Oropharynx clear with no erythema or exudate.  Neck: Supple nontender without palpable lymphadenopathy JVD bruits or goiter appreciated  Lungs: Clear to auscultation bilaterally  Heart: Regular rate and rhythm with normal S1 and S2  Abdomen: Soft nontender. normoactive bowel sounds. no hepatosplenomegaly  Extremities: No clubbing, cyanosis, or edema.  Neurologic:Demenita.  CN II through XII intact.  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  They will arrange follow-up in clinic approximately 4 weeks with repeat x-rays.  X-ray thoracolumbar junction  Similar T12 compression deformity.  No change in alignment  CT lumbar spine without contrast  1.  Compression deformities of the T12 and L4 vertebral bodies appear likely acute.  2.  Diffuse lumbar degenerative changes.  3.  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  Generalized weakness  B12 and vitamin D deficiency  DM2 with neuropathy  GERD  CAD  BPH   Mood disorder   NARESH   VTE prophylaxis:  lovenox     Plan:  Continue supportive care  Increase Seroquel to 100 mg po with supper and follow  Neurosurgery arranging 4-week follow-up with repeat x-rays, no lifting greater than 5 to 10 pounds and avoid significant bending or twisting.  No brace necessary.  Continue PT OT, SNF placement    Electronically signed by: Tarik Aaron Jr., MD  12/17/2023  12:36 PM

## 2023-12-18 PROCEDURE — 6370000100 HC RX 637 (ALT 250 FOR IP): Performed by: FAMILY MEDICINE

## 2023-12-18 PROCEDURE — 97530 THERAPEUTIC ACTIVITIES: CPT | Mod: GP | Performed by: PHYSICAL THERAPIST

## 2023-12-18 PROCEDURE — 2590000100 HC RX 259: Performed by: HOSPITALIST

## 2023-12-18 PROCEDURE — 99232 SBSQ HOSP IP/OBS MODERATE 35: CPT | Performed by: HOSPITALIST

## 2023-12-18 PROCEDURE — 2590000100 HC RX 259: Performed by: FAMILY MEDICINE

## 2023-12-18 PROCEDURE — 6370000100 HC RX 637 (ALT 250 FOR IP): Performed by: HOSPITALIST

## 2023-12-18 PROCEDURE — (BLANK) HC ROOM PRIVATE

## 2023-12-18 RX ADMIN — ASPIRIN 81 MG: 81 TABLET ORAL at 09:06

## 2023-12-18 RX ADMIN — FAMOTIDINE 10 MG: 20 TABLET, FILM COATED ORAL at 09:06

## 2023-12-18 RX ADMIN — Medication 1 TABLET: at 09:06

## 2023-12-18 RX ADMIN — AMLODIPINE BESYLATE 5 MG: 5 TABLET ORAL at 09:06

## 2023-12-18 RX ADMIN — TAMSULOSIN HYDROCHLORIDE 0.4 MG: 0.4 CAPSULE ORAL at 09:06

## 2023-12-18 RX ADMIN — SENNOSIDES AND DOCUSATE SODIUM 1 TABLET: 50; 8.6 TABLET ORAL at 20:40

## 2023-12-18 RX ADMIN — SENNOSIDES AND DOCUSATE SODIUM 1 TABLET: 50; 8.6 TABLET ORAL at 09:06

## 2023-12-18 RX ADMIN — LIDOCAINE 1 PATCH: 50 PATCH CUTANEOUS at 09:00

## 2023-12-18 RX ADMIN — FLUOXETINE HYDROCHLORIDE 40 MG: 20 CAPSULE ORAL at 20:40

## 2023-12-18 RX ADMIN — QUETIAPINE FUMARATE 100 MG: 100 TABLET ORAL at 16:55

## 2023-12-18 RX ADMIN — FAMOTIDINE 10 MG: 20 TABLET, FILM COATED ORAL at 20:40

## 2023-12-18 RX ADMIN — SPIRONOLACTONE 25 MG: 25 TABLET ORAL at 09:06

## 2023-12-18 RX ADMIN — TAMSULOSIN HYDROCHLORIDE 0.4 MG: 0.4 CAPSULE ORAL at 20:40

## 2023-12-18 RX ADMIN — LOSARTAN POTASSIUM 50 MG: 50 TABLET, FILM COATED ORAL at 09:06

## 2023-12-18 RX ADMIN — METOPROLOL SUCCINATE 25 MG: 25 TABLET, EXTENDED RELEASE ORAL at 09:06

## 2023-12-18 RX ADMIN — ATORVASTATIN CALCIUM 40 MG: 40 TABLET, FILM COATED ORAL at 20:40

## 2023-12-18 RX ADMIN — FINASTERIDE 5 MG: 5 TABLET, FILM COATED ORAL at 20:40

## 2023-12-18 NOTE — PLAN OF CARE
Problem: Safety Adult - Fall  Goal: Free from fall injury  Description: INTERVENTIONS:    Inpatient - Please reference Cares/Safety Flowsheet under [facility] Fall Risk for interventions.  Pediatrics - Please reference Peds Daily Cares/Safety Flowsheet under [facility] Pediatric Fall Assessment Fall Bundle for interventions  LD/OB - Please reference OB Shift Screening Flowsheet under OB Fall Risk for interventions.  Outcome: Progressing     Problem: Pain - Adult  Goal: Verbalizes/displays adequate comfort level or baseline comfort level  Description: INTERVENTIONS:  1. Encourage patient to monitor pain and request interventions  2. Assess pain using the appropriate pain scale  3. Administer analgesics based on type and severity of pain and evaluate response  4. Educate/Implement non-pharmacological measures as appropriate and evaluate response  5. Consider cultural, developmental and social influences on pain and pain management  6. Notify Provider if interventions unsuccessful or patient reports new pain  Outcome: Progressing     Problem: Daily Care  Goal: Daily care needs are met  Description: INTERVENTIONS:   1. Assess and monitor skin integrity  2. Identify patients at risk for skin breakdown on admission and per policy  3. Assess and monitor ability to perform self care and identify potential discharge needs  4. Assess skin integrity/risk for skin breakdown  5. Assist patient with activities of daily living as needed  6. Encourage independent activity per ability   7. Provide mouth care   8. Include patient/family/caregiver in decisions related to daily care   Outcome: Progressing     Problem: Knowledge Deficit  Goal: Patient/family/caregiver demonstrates understanding of disease process, treatment plan, medications, and discharge instructions  Description: INTERVENTIONS:   1. Complete learning assessment and assess knowledge base  2. Provide teaching at level of understanding   3. Provide teaching via preferred  learning methods  Outcome: Progressing     Problem: Discharge Barriers  Goal: Patient's discharge needs are met  Description: INTERVENTIONS:  1. Assess patient for self-management skills  2. Encourage participation in management  3. Identify potential discharge barriers on admission and throughout hospital stay  4. Involve patient/family/caregiver in discharge planning process  5. Collaborate with case management/ for discharge needs  Outcome: Progressing     Problem: Infection - Adult  Goal: Absence of infection during hospitalization  Description: INTERVENTIONS:  1. Assess and monitor for signs and symptoms of infection  2. Monitor lab/diagnostic results  3. Monitor all insertion sites/wounds/incisions  4. Monitor secretions for changes in amount and color  5. Administer medications as ordered  6. Educate and encourage patient and family to use good hand hygiene technique  7. Identify and educate in appropriate isolation precautions for identified infection/condition  Outcome: Progressing     Problem: Potential for Compromised Skin Integrity  Goal: Skin Integrity is Maintained or Improved  Description: INTERVENTIONS:  1. Assess and monitor skin integrity  2. Collaborate with interdisciplinary team and initiate plans and interventions as needed  3. Alternate a full bath with partial baths for elderly   4. Monitor patient's hygiene practices   5. Collaborate with wound, ostomy, and continence nurse  Outcome: Progressing  Goal: Nutritional status is improving  Description: INTERVENTIONS:  1. Monitor and assess patient for malnutrition (ex- brittle hair, bruises, dry skin, pale skin and conjunctiva, muscle wasting, smooth red tongue, and disorientation)  2. Monitor patient's weight and dietary intake as ordered or per policy  3. Determine patient's food preferences and provide high-protein, high-caloric foods as appropriate  4. Assist patient with eating   5. Allow adequate time for meals   6. Encourage  patient to take dietary supplement as ordered   7. Collaborate with dietitian  8. Include patient/family/caregiver in decisions related to nutrition  Outcome: Progressing  Goal: MOBILITY IS MAINTAINED OR IMPROVED  Description: INTERVENTIONS  1. Collaborate with interdisciplinary team and initiate plan and interventions as ordered (PT/OT)  2. Encourage ambulation  3. Up to chair for meals  4. Monitor for signs of deconditioning  Outcome: Progressing     Problem: Urinary Incontinence  Goal: Perineal skin integrity is maintained or improved  Description: INTERVENTIONS:  1. Assess genitourinary system, perineal skin, labs (urinalysis), and history of incontinence to include past management, aggravating, and alleviating factors  2. Collaborate with interdisciplinary team including wound, ostomy, and continence nurse and initiate plans and interventions as needed  4. Consider urine containment device  5. Apply skin protectant   6. Develop skin care regimen  7. Provide privacy when changing patient's incontinence device to maintain their dignity  Outcome: Progressing     Problem: Genitourinary - Adult  Goal: Absence of urinary retention  Description: INTERVENTIONS:  1. Assess patient’s ability to void and empty bladder.  2. Monitor intake/output and perform bladder scan if indicated.  3. Place urinary catheter per order and initiate and maintain CAUTI bundle.  4. Administer medications to alleviate retention as needed.  5. Discuss catheterization for long term situations as appropriate.    Outcome: Progressing     Problem: Elopement Risk  Goal: Minimize the risk of a patient leaving without authorization when departure presents a threat to the safety of patient or others.  Description: INTERVENTIONS:  1. Frequent re-orientation, using a calm voice and positive re-enforcement when interacting with the patient  2. Familiar objects and possessions placed in the room  3. Purposeful focused activities  4. Family or volunteer  staying with the patient  5. Medication evaluation by Pharmacy for possible adjustments  6. Consider continual supervision while patient transported off unit  7. Ensure pain relief as necessary  8. Set limits and provide clear expectations as needed  9. Reduce noise/stimulation  Outcome: Progressing

## 2023-12-18 NOTE — NURSING END OF SHIFT
Nursing End of Shift Summary:    Patient: Joseph Wong  MRN: [id number]  : 10/20/1934, Age: 89 y.o.    Location: [address] [place] [address]    Nursing Goals  Clinical Goals for the Shift: Promote safety and comfort, remain fall free.    Narrative Summary of Progress Toward Clinical Goals:  Patient calm and cooperative this shift, no concerns. Up to commode a few times throughout the day. Patient up with two with a gait belt and front wheel walker. Patient seemed weak but able to stand and pivot.     Barriers to Goals/Nursing Concerns:  No    New Patient or Family Concerns/Issues:  No    Shift Summary:   Significant Events & Communications to Providers (last 12 hours)       Last 5 Values    No documentation.                 Oxygen Usage (last 12 hours)       Last 5 Values       Row Name 23 0800                   Room Air or Baseline Oxygen Trial by Nursing    Is Patient on Room Air OR on the Same Amount of O2 as at Home? Yes                      Mobility (last 12 hours)       Last 5 Values       Row Name 23 0500 23 0600 23 0615 23 0800 23 0900       Mobility    Activity Sleeping Sleeping Turn Turn --    Level of Assistance -- -- Moderate assist, patient does 50-74% -- --    Length of Time in Chair (min) -- -- -- 0 --    Distance Ambulated (feet) -- -- 0 Feet 0 Feet --    Distance Ambulated (Meters Calculated) -- -- 0 Meters 0 Meters --    Patient Position -- -- -- -- Supine    Turning -- -- Turns self Turns self --    Distance Ambulated (Meters Calculated)(Do Not Use) -- -- 0 Feet 0 Feet --      Row Name 23 1200 23 1542 23 1653             Mobility    Activity Commode -- Commode      Length of Time in Chair (min) 10  Commode -- 10  Commode      Distance Ambulated (feet) 2 Feet -- 2 Feet      Distance Ambulated (Meters Calculated) 0.61 Meters -- 0.61 Meters      Patient Position -- Supine --      Turning Turns self -- Turns self      Distance Ambulated (Meters Calculated)(Do Not  Use) 0.61 Feet -- 0.61 Feet                    Urethral Catheter       Active Urethral Catheter       None                  Active Lines       Active Central venous catheter / Peripherally inserted central catheter / Implantable Port / Hemodialysis catheter / Midline Catheter       None                  Infusing Medications   Medication Dose Last Rate     PRN Medications   Medication Dose Last Admin    QUEtiapine  25 mg 25 mg at 12/17/23 1447    sodium chloride  3 mL      sodium chloride  1 spray      sodium chloride-aloe vera  1 Application      acetaminophen  500 mg 500 mg at 12/12/23 1933    oxyCODONE  5 mg 5 mg at 12/17/23 1447    magnesium hydroxide  30 mL      ondansetron  4 mg      Or    ondansetron  4 mg

## 2023-12-18 NOTE — INTERDISCIPLINARY/THERAPY
NUTRITION ASSESSMENT/REASSESSMENT    PERTINENT MEDICAL DIAGNOSIS/PROBLEMS:     Principle problem/diagnosis/procedures:   Left knee pain with associated effusion, Left total knee arthroplasty, 1/22, T12 and L4 compression deformities/fractures, Generalized weakness, Dementia, B12 and vitamin D deficiency, Type 2 diabetes mellitus, Neuropathy history, Obstructive sleep apnea, Gastroesophageal reflux disease history, Of coronary artery disease history, Benign prostatic hypertrophy history, Mood disorder history     PMH:     Past Medical History:   Diagnosis Date    CAD (coronary artery disease)     Elevated cholesterol     GERD (gastroesophageal reflux disease)     Hiatal hernia     History of shingles     Hypertension     Macular degeneration     Metabolic syndrome     Vertigo       Past Surgical History:   Procedure Laterality Date    OTHER SURGICAL HISTORY Left 08/2016    quad tendon repair    QUADRICEPS REPAIR Left 2015    TONSILLECTOMY      TOTAL ANKLE ARTHROPLASTY Left     TOTAL HIP ARTHROPLASTY Right     TOTAL HIP ARTHROPLASTY Left     TOTAL KNEE ARTHROPLASTY  januauary 2nd 2022    TRANSURETHRAL RESECTION OF PROSTATE             NUTRITION FOCUSED PHYSICAL EXAM (NFPE):  Physical Exam  Physical Exam:  (RD to complete NFPE as able.)    Subcutaneous Fat Loss       Muscle Wasting       Physical Findings       MONITORING/EVALUATION:    Labs:  Reviewed 12/18.         Lab Results   Component Value Date    HGBA1C 6.4 (H) 12/04/2023     Lab Results   Component Value Date    POCGLU 209 (H) 12/14/2023    POCGLU 141 (H) 12/14/2023    POCGLU 139 (H) 12/13/2023    POCGLU 139 (H) 12/13/2023    POCGLU 139 (H) 12/13/2023     Pertinent Meds: Scheduled Meds:QUEtiapine, 100 mg, oral, Daily with dinner  melatonin, 3 mg, oral, Once  multivitamin with minerals, 1 tablet, oral, Daily  lidocaine, 1 patch, Topical, Daily  sennosides-docusate sodium, 1 tablet, oral, 2x daily  polyethylene glycol, 17 g, oral, Daily  famotidine, 10 mg, oral,  "2x daily  amLODIPine, 5 mg, oral, q AM  aspirin, 81 mg, oral, q AM  atorvastatin, 40 mg, oral, q PM  finasteride, 5 mg, oral, q PM  FLUoxetine, 40 mg, oral, q PM  losartan, 50 mg, oral, q AM  metoprolol succinate XL, 25 mg, oral, q AM  spironolactone, 25 mg, oral, q AM  tamsulosin, 0.4 mg, oral, 2x daily      Continuous Infusions:   PRN Meds: Milk of magnesia, Zofran (none given).  Neuro status: Alert & disoriented x4  Respiratory status:   RA  GI findings: Last BM: 12/18, soft brown small  Wounds:    none  Edema (per nursing documentation):  no edema as of 12/18 (no edema on admit)    ANTHROPOMETRICS:  Wt Change in hospital:  12/18: -2.1 kg weight loss since admit, no updated weights since 12/8  Ht Readings from Last 3 Encounters:   12/04/23 1.829 m (6')   08/29/23 1.803 m (5' 11\")   08/07/23 1.803 m (5' 11\")     Weights (Current Encounter Only) (last 180 days)       Date/Time Weight    12/08/23 0400 75.9 kg (167 lb 4.8 oz)    12/08/23 0000 75.8 kg (167 lb 1.6 oz)    12/06/23 0001 76.9 kg (169 lb 9.6 oz)    12/04/23 1505 78 kg (171 lb 14.4 oz)          Wt Readings from Last 10 Encounters:   12/08/23 75.9 kg (167 lb 4.8 oz)   08/29/23 86.6 kg (191 lb)   08/07/23 86.8 kg (191 lb 5.8 oz)   04/21/23 86.8 kg (191 lb 5.8 oz)   02/26/23 86.8 kg (191 lb 5.8 oz)   01/14/23 80.3 kg (177 lb 0.5 oz)   09/02/22 82.6 kg (182 lb)   08/05/22 86.6 kg (191 lb)   03/21/22 86.6 kg (191 lb)   12/20/21 86.6 kg (191 lb)     Admit Weight: 78 kg (171 lb 14.4 oz) 12/4/2023  Admit BMI (kg/m2): 23.31  IBW/kg (Calculated) Male: 80.8 kg  Weight Category: Acceptable  Wt Change Hx: -8.6 kg weight loss (10%) x 3 months prior to admission (8/29/23- 12/04/23), clinically significant  Per wife, UBW: 190 lbs 6 months ago    ORAL/DENTAL STATUS:  Teeth: Missing teeth       FOOD ALLERGIES:    No Known Allergies  Scientologist/CULTURAL REQUESTS:      None  Cultural Requests During Hospitalization: none  Spiritual Requests During Hospitalization: " none    Social Determinants of Health with Concerns     Tobacco Use: Medium Risk (12/4/2023)    Patient History     Smoking Tobacco Use: Former     Smokeless Tobacco Use: Never     Passive Exposure: Not on file   Alcohol Use: Not on file   Financial Resource Strain: Not on file   Physical Activity: Not on file   Stress: Not on file   Social Connections: Not on file   Depression: Not on file   Postpartum Depression: Not on file     Hunger Screen:        Meal/Supplement Intake:  12/18: Pt with significantly improved intakes, averaging 83% meals and 67% supplements (Boost Plus TID with meals and Magic Cup once daily)    Average Percent Meals Eaten (%): 82.78 Avg %  Average Percent Supplement Eaten (%): 66.67 Avg %    NUTRITION PRESCRIPTION:       Dietary Orders   (From admission, onward)                 Start     Ordered    12/05/23 1642  Meal Tray Service  Continuous        Question:  Meal Tray Service:  Answer:  Automatic Tray    12/05/23 1641    12/05/23 0621  Diet Regular  Diet effective now        Comments: Automatic trays   Question Answer Comment   Nutrition Therapy Protocol (Dietitian May Adjust Diet and Nourishments) Yes    Diet type Regular        12/05/23 0621                  Estimated Needs:  Total Energy Estimated Needs: 1560- 1794 kcal/day (20-23 kcal/kg ABW)  Total Protein Estimated Needs: 78- 94 g/day (1-1.2 g/kg ABW)  Total Fluid Estimated Needs: 1950 mL/day (25 mL/kg ABW)      SUMMARY 12/5: Pt seen by Nutrition Services for +Consult to Nutrition Services. Pt presents with extremity weakness, LLE pain. Pt presents with failure to thrive, decreased appetite, poor nutrition and inactivity along with age related physical debility, malnutrition suspected per admission note. Per Care Everywhere data, pt with -8.6 kg weight loss (10%) x 3 months prior to admission (8/29/23- 12/04/23), clinically significant. Per discussion with patient's wife, he has had a decreased appetite over the past few months. She  reports he drinks Ensure supplements at home, various flavors. Pt UBW: 190 lbs but has been gradually losing over the past few months. Disposition: DOP    12/18: Pt with improved intakes over the past few days, averaging 83% meals and 67% supplements.  Disposition: awaiting nursing home placement    ____________________________________________________________________  NUTRITION CARE PLAN     MALNUTRITION:  Malnutrition Present On Admission: Yes  Parameters for Malnutrition: Severe Sdvetthgormw-Bfppiutdfo-thafevb (NC-4.1.1.2)  Etiology: decreased appetite/ intakes related to failure to thrive, advanced age with some confusion  Signs/Symptoms:     Weight Loss: -8.6 kg weight loss (10%) x 3 months prior to admission (8/29/23- 12/04/23)  Energy Intake: <75% compared with estimated needs for >1 month prior to admission    Goals: Pt to consume >75% meals during admission, no weight loss below 78 kg  Progress: Pt meeting goal as of 12/18 with improved intakes.  Care Plan Documented:  yes    Interventions:  12/18:   -Diet: Regular  -Nutrition Supplementation: continue Medical Nutrition Suppl. (ND-3.1.1):  Boost Plus 8 oz TID with meal trays (14g PRO, 45g CHO, 360 Kehinde, 1gm fiber per 8 oz)  -Medical Nutrition Suppl. (ND-3.1.2): continue Magic Cup with lunch tray (9g PRO, 38g CHO, 290 Kehinde, no fiber per svg, honey thick)  -Continue daily multivitamin with minerals             Discharge nutrition recommendations: Regular diet with oral nutrition supplementation as needed for poor appetite.

## 2023-12-18 NOTE — NURSING END OF SHIFT
Nursing End of Shift Summary:    Patient: Joseph Wong  MRN: [id number]  : 10/20/1934, Age: 89 y.o.    Location: [address] [place] [address]    Nursing Goals  Clinical Goals for the Shift: maintain safety and comfort    Narrative Summary of Progress Toward Clinical Goals:  VSS. Pt offering some c/o pain and treated with po medications. Prn Seroquel given today. And Dinner time dose started as well. Pt eating well. BM today x3. U/o good. Bladder scanned this evening for 350 ml with 450 out with straight cath. Wife to bedside today and updated on plan of care.    Barriers to Goals/Nursing Concerns:  No    New Patient or Family Concerns/Issues:  No    Shift Summary:   Significant Events & Communications to Providers (last 12 hours)       Last 5 Values    No documentation.                 Oxygen Usage (last 12 hours)       Last 5 Values    No documentation.                 Mobility (last 12 hours)       Last 5 Values       Row Name 23 0733 23 0800 23 1200 23 1600 23 1740       Mobility    Activity -- Commode Commode -- Commode    Length of Time in Chair (min) -- 120 120 -- 120    Distance Ambulated (feet) -- 5 Feet 5 Feet -- 5 Feet    Distance Ambulated (Meters Calculated) -- 1.52 Meters 1.52 Meters -- 1.52 Meters    Patient Position Sitting -- -- Supine --    Turning -- Every 2 hours Every 2 hours -- Every 2 hours    Distance Ambulated (Meters Calculated)(Do Not Use) -- 1.52 Feet 1.52 Feet -- 1.52 Feet                  Urethral Catheter       Active Urethral Catheter       None                  Active Lines       Active Central venous catheter / Peripherally inserted central catheter / Implantable Port / Hemodialysis catheter / Midline Catheter       None                  Infusing Medications   Medication Dose Last Rate     PRN Medications   Medication Dose Last Admin    QUEtiapine  25 mg 25 mg at 23 1447    sodium chloride  3 mL      sodium chloride  1 spray      sodium chloride-aloe vera  1  Application      acetaminophen  500 mg 500 mg at 12/12/23 1933    oxyCODONE  5 mg 5 mg at 12/17/23 1447    magnesium hydroxide  30 mL      ondansetron  4 mg      Or    ondansetron  4 mg

## 2023-12-18 NOTE — PROGRESS NOTES
[address] [address]  Daily Progress Note  Patient name: Joseph Wong  MRN: [id number]   LOS: 11 days     Subjective   Patient resting comfortably.  Objective   Vitals:Temp:  [36.4 °C (97.5 °F)-36.6 °C (97.8 °F)] 36.4 °C (97.6 °F)  Heart Rate:  [62-72] 62  Resp:  [18] 18  SpO2:  [91 %-94 %] 94 %  BP: (130-141)/(82-96) 141/96  Physical Exam:  HEENT: Negative  Neck: No neck vein distention  Lungs: Clear to auscultation  Heart: Regular  Abdomen: Soft  Genitourinary: Deferred  Extremities: No significant edema  Skin: No rash or jaundice      Review of Systems:  Negative  Admission on 12/04/2023   Component Date Value Ref Range Status    WBC 12/04/2023 7.3  3.7 - 9.6 10*3/uL Final    RBC 12/04/2023 4.13  4.10 - 5.80 10*6/µL Final    Hemoglobin 12/04/2023 14.0  13.2 - 17.2 g/dL Final    Hematocrit 12/04/2023 38.5  38.0 - 50.0 % Final    MCV 12/04/2023 93.3  82.0 - 97.0 fL Final    MCH 12/04/2023 33.9  29.0 - 34.0 pg Final    MCHC 12/04/2023 36.3 (H)  32.0 - 36.0 g/dL Final    RDW 12/04/2023 14.0  11.5 - 15.0 % Final    Platelets 12/04/2023 203  130 - 350 10*3/uL Final    MPV 12/04/2023 7.3  6.9 - 10.8 fL Final    Neutrophils% 12/04/2023 73.3 (H)  46.0 - 70.0 % Final    Lymphocytes% 12/04/2023 15.4  15.0 - 47.0 % Final    Monocytes% 12/04/2023 9.2  5.0 - 13.0 % Final    Eosinophils% 12/04/2023 1.4  0.0 - 3.0 % Final    Basophils% 12/04/2023 0.7  0.0 - 2.0 % Final    ANC (auto diff) 12/04/2023 5.30  10*3/UL Final    Lymphocytes Absolute 12/04/2023 1.10  10*3/uL Final    Monocytes Absolute 12/04/2023 0.70  10*3/uL Final    Eosinophils Absolute 12/04/2023 0.10  10*3/uL Final    Basophils Absolute 12/04/2023 0.10  10*3/uL Final    Sodium 12/04/2023 134 (L)  135 - 145 mmol/L Final    Potassium 12/04/2023 4.4  3.5 - 5.1 MMOL/L Final    Chloride 12/04/2023 103  98 - 107 mmol/L Final    CO2 12/04/2023 22  21 - 32 mmol/L Final    Anion Gap 12/04/2023 9  3 - 11 mmol/L Final    BUN  12/04/2023 26 (H)  7 - 25 mg/dL Final    Creatinine 12/04/2023 0.95  0.70 - 1.30 mg/dL Final    Glucose 12/04/2023 119 (H)  70 - 105 mg/dL Final    Calcium 12/04/2023 8.9  8.6 - 10.3 mg/dL Final    AST 12/04/2023 16  <40 U/L Final    ALT (SGPT) 12/04/2023 12  7 - 52 U/L Final    Alkaline Phosphatase 12/04/2023 103  45 - 115 U/L Final    Total Protein 12/04/2023 6.1  6.0 - 8.3 g/dL Final    Albumin 12/04/2023 3.7  3.5 - 5.3 g/dL Final    Total Bilirubin 12/04/2023 0.95  0.20 - 1.40 mg/dL Final    Corrected Calcium 12/04/2023 9.1  8.6 - 10.3 mg/dL Final    eGFR 12/04/2023 77  >60 mL/min/1.73m*2 Final    Lipase 12/04/2023 77  11 - 82 U/L Final    Magnesium 12/04/2023 1.9  1.8 - 2.4 mg/dL Final    POC Lactate 12/04/2023 1.20  0.50 - 1.99 MMOL/L Final    CRP 12/04/2023 <1.0  <=10.0 MG/L Final    Protime 12/04/2023 12.0  9.4 - 12.5 SECONDS Final    INR 12/04/2023 1.1  <=1.1 Final    PTT 12/04/2023 27.6  25.1 - 36.5 SECONDS Final    RBC, Urine 12/04/2023 0-2  None seen, 0-2, Negative /HPF Final    WBC, Urine 12/04/2023 0-4  0 - 4 /HPF Final    WBC Clumps, Urine 12/04/2023 None seen  None seen /HPF Final    Squamous Epithelial, Urine 12/04/2023 Negative  None Seen-9 /HPF Final    Bacteria, Urine 12/04/2023 None seen  None seen, Few /HPF Final    Color, Urine 12/04/2023 Yellow  Yellow Final    Clarity, Urine 12/04/2023 Clear  Clear Final    Specific Gravity, Urine 12/04/2023 1.025  1.003 - 1.030 Final    Leukocytes, Urine 12/04/2023 Negative  Negative Final    Nitrite, Urine 12/04/2023 Negative  Negative Final    Protein, Urine 12/04/2023 10 (A)  Negative MG/DL Final    Ketones, Urine 12/04/2023 Negative  Negative MG/DL Final    Urobilinogen, Urine 12/04/2023 2.0 (A)  <2.0 mg/dL Final    Bilirubin, Urine 12/04/2023 Negative  Negative Final    Blood, Urine 12/04/2023 Negative  Negative Final    Glucose, Urine 12/04/2023 Negative  Negative MG/DL Final    pH, Urine 12/04/2023 5.5  5.0 - 8.0 PH Final    POC Glucose 12/04/2023  119 (H)  70 - 105 MG/DL Final    Uric Acid 12/04/2023 4.0 (L)  4.4 - 7.6 mg/dL Final    Procalcitonin 12/04/2023 <0.02  <0.50 ng/mL Final    Sed Rate 12/04/2023 5  <=20 mm/hr Final    Vitamin B-12 12/04/2023 343  180 - 914 pg/mL Final    Folate 12/04/2023 13.3  >6.6 ng/mL Final    Vit D, 25-Hydroxy 12/04/2023 30  30 - 100 ng/mL Final    TSH 12/04/2023 3.622  0.340 - 4.820 uIU/ml Final    Phosphorus 12/04/2023 3.0  2.5 - 4.9 mg/dL Final    Hemoglobin A1C 12/04/2023 6.4 (H)  4.0 - 6.0 % Final    Estimated Average Glucose 12/04/2023 137.0  mg/dL Final    POC Glucose 12/04/2023 117 (H)  70 - 105 MG/DL Final    WBC 12/05/2023 6.4  3.7 - 9.6 10*3/uL Final    RBC 12/05/2023 3.92 (L)  4.10 - 5.80 10*6/µL Final    Hemoglobin 12/05/2023 13.0 (L)  13.2 - 17.2 g/dL Final    Hematocrit 12/05/2023 36.2 (L)  38.0 - 50.0 % Final    MCV 12/05/2023 92.4  82.0 - 97.0 fL Final    MCH 12/05/2023 33.1  29.0 - 34.0 pg Final    MCHC 12/05/2023 35.8  32.0 - 36.0 g/dL Final    RDW 12/05/2023 13.6  11.5 - 15.0 % Final    Platelets 12/05/2023 213  130 - 350 10*3/uL Final    MPV 12/05/2023 7.3  6.9 - 10.8 fL Final    Neutrophils% 12/05/2023 66.2  46.0 - 70.0 % Final    Lymphocytes% 12/05/2023 18.9  15.0 - 47.0 % Final    Monocytes% 12/05/2023 12.4  5.0 - 13.0 % Final    Eosinophils% 12/05/2023 1.7  0.0 - 3.0 % Final    Basophils% 12/05/2023 0.8  0.0 - 2.0 % Final    ANC (auto diff) 12/05/2023 4.20  10*3/UL Final    Lymphocytes Absolute 12/05/2023 1.20  10*3/uL Final    Monocytes Absolute 12/05/2023 0.80  10*3/uL Final    Eosinophils Absolute 12/05/2023 0.10  10*3/uL Final    Basophils Absolute 12/05/2023 0.10  10*3/uL Final    Sodium 12/05/2023 133 (L)  135 - 145 mmol/L Final    Potassium 12/05/2023 4.0  3.5 - 5.1 MMOL/L Final    Chloride 12/05/2023 103  98 - 107 mmol/L Final    CO2 12/05/2023 24  21 - 32 mmol/L Final    BUN 12/05/2023 16  7 - 25 mg/dL Final    Creatinine 12/05/2023 0.82  0.70 - 1.30 mg/dL Final    Glucose 12/05/2023 106 (H)   70 - 105 mg/dL Final    Calcium 12/05/2023 8.9  8.6 - 10.3 mg/dL Final    Anion Gap 12/05/2023 6  3 - 11 mmol/L Final    eGFR 12/05/2023 84  >60 mL/min/1.73m*2 Final    POC Glucose 12/05/2023 113 (H)  70 - 105 MG/DL Final    POC Glucose 12/05/2023 173 (H)  70 - 105 MG/DL Final    POC Glucose 12/05/2023 135 (H)  70 - 105 MG/DL Final    POC Glucose 12/05/2023 142 (H)  70 - 105 MG/DL Final    POC Glucose 12/06/2023 125 (H)  70 - 105 MG/DL Final    POC Glucose 12/06/2023 143 (H)  70 - 105 MG/DL Final    POC Glucose 12/06/2023 145 (H)  70 - 105 MG/DL Final    POC Glucose 12/06/2023 124 (H)  70 - 105 MG/DL Final    POC Glucose 12/07/2023 136 (H)  70 - 105 MG/DL Final    POC Glucose 12/07/2023 131 (H)  70 - 105 MG/DL Final    POC Glucose 12/07/2023 140 (H)  70 - 105 MG/DL Final    POC Glucose 12/07/2023 132 (H)  70 - 105 MG/DL Final    POC Glucose 12/08/2023 127 (H)  70 - 105 MG/DL Final    POC Glucose 12/08/2023 222 (H)  70 - 105 MG/DL Final    POC Glucose 12/08/2023 144 (H)  70 - 105 MG/DL Final    POC Glucose 12/08/2023 148 (H)  70 - 105 MG/DL Final    POC Glucose 12/09/2023 140 (H)  70 - 105 MG/DL Final    POC Glucose 12/09/2023 126 (H)  70 - 105 MG/DL Final    POC Glucose 12/09/2023 148 (H)  70 - 105 MG/DL Final    POC Glucose 12/10/2023 130 (H)  70 - 105 MG/DL Final    POC Glucose 12/10/2023 138 (H)  70 - 105 MG/DL Final    POC Glucose 12/11/2023 145 (H)  70 - 105 MG/DL Final    POC Glucose 12/11/2023 155 (H)  70 - 105 MG/DL Final    POC Glucose 12/11/2023 174 (H)  70 - 105 MG/DL Final    POC Glucose 12/11/2023 138 (H)  70 - 105 MG/DL Final    POC Glucose 12/12/2023 145 (H)  70 - 105 MG/DL Final    POC Glucose 12/12/2023 161 (H)  70 - 105 MG/DL Final    POC Glucose 12/12/2023 135 (H)  70 - 105 MG/DL Final    POC Glucose 12/12/2023 146 (H)  70 - 105 MG/DL Final    POC Glucose 12/13/2023 139 (H)  70 - 105 MG/DL Final    POC Glucose 12/13/2023 139 (H)  70 - 105 MG/DL Final    POC Glucose 12/13/2023 139 (H)  70 - 105  MG/DL Final    POC Glucose 12/14/2023 141 (H)  70 - 105 MG/DL Final    POC Glucose 12/14/2023 209 (H)  70 - 105 MG/DL Final        Assessment/Plan   Left knee pain with associated effusion                       Left total knee arthroplasty, 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    Electronically signed by: Sami Olsen MD  12/18/2023  12:24 PM

## 2023-12-18 NOTE — INTERDISCIPLINARY/THERAPY
[address] [facility] [address]  [phone]      Inpatient Physical Therapy Daily Treatment Note    Date of Service: 12/18/2023  Patient Name: Joseph Wong  Referring Provider: WYATT SRINIVASAN ROBERT  Completed Visit Number: [id number]  SOC Date: 12/06/23  Certification Period: 01/05/24    Medical Diagnosis:   Weakness [R53.1]  Unable to bear weight on left lower extremity [R26.89]  Lumbar compression fracture, closed, initial encounter (CMS/[facility]) [S32.000A]  Treatment Diagnoses:  Treatment Diagnosis: Decreased strength, Decreased endurance, Impaired balance, Decreased mobility  Subjective     Subjective Comments  RN Stated patient is medically cleared for therapy: Yes  Subjective Comments: Patient laying in bed and agreeable to PT upon PT arrival. He ends session in the recliner with call light on hand, chair alarm set and spouse present throughout session     Pain Assessment Scale  Pain Scale: 0-10  0-10 Pain Score: 0 - No pain  Oneil Fall Risk Score: 100         Objective    Precautions  Other Precautions: fall risk, w/c bound at baseline (ambulated short distance)    Treatments:  Gait belt donned for transfers/gait.     Cognition: Pleasant and cooperative    Bed Mobility: Min A supine>sit  Patient requires min A for trunk support to sit upright    Transfers: Min-Mod A of 1 front wheeled walker  Patient requires mod assistance to boost from bed with initial stand and min A with second stand from edge of bed and stand pivot transfer bed>recliner    Ambulation: 3 m with min-mod A and front wheeled walker  Patient requires min A for negotiation of front wheeled walker and stability. As he fatigues, he has increased knee and hip flexion and requires mod A to turn to sit safely on the bed as his feet stick in place.     Other Activities: long arc quad and hip flexion bilat x 10 with verbal and visual cues      Time Calculation  Start Time: 1352  Stop Time: 1403  Time Calculation (min): 11  min  Therapeutic Interventions (Time Spent in Minutes)  Therapeutic Activity: 11       Assessment/Plan   Assessment:    Level of Function and Prognosis  Assessment: Patient continues to be limited in ambulation distance and requires mod A for safety with transfers and gait. He fatigues quickly and requires mod A for safety to turn and sit onto the bed before ending on the floor. Patient would benefit from continued skilled PT to progress independence and safety with bed mobility and transfers  Recommendation  Recommendations for Therapy: Continue skilled therapy  Equipment Recommended:  (has w/c and walker at home)  Plan:    Plan  PT Frequency: 2-3x/wk  Plan for next treatment comment: 1-2 assist (chair follow): bed mobility min A, transfers mod A, gait 3 m min-mod A, LE strengthening, standing balance  Treatment duration will be through Certification Period: 01/05/24

## 2023-12-18 NOTE — PLAN OF CARE
Problem: Safety Adult - Fall  Goal: Free from fall injury  Description: INTERVENTIONS:    Inpatient - Please reference Cares/Safety Flowsheet under [facility] Fall Risk for interventions.  Pediatrics - Please reference Peds Daily Cares/Safety Flowsheet under [facility] Pediatric Fall Assessment Fall Bundle for interventions  LD/OB - Please reference OB Shift Screening Flowsheet under OB Fall Risk for interventions.  Outcome: Progressing     Problem: Pain - Adult  Goal: Verbalizes/displays adequate comfort level or baseline comfort level  Description: INTERVENTIONS:  1. Encourage patient to monitor pain and request interventions  2. Assess pain using the appropriate pain scale  3. Administer analgesics based on type and severity of pain and evaluate response  4. Educate/Implement non-pharmacological measures as appropriate and evaluate response  5. Consider cultural, developmental and social influences on pain and pain management  6. Notify Provider if interventions unsuccessful or patient reports new pain  Outcome: Progressing     Problem: Daily Care  Goal: Daily care needs are met  Description: INTERVENTIONS:   1. Assess and monitor skin integrity  2. Identify patients at risk for skin breakdown on admission and per policy  3. Assess and monitor ability to perform self care and identify potential discharge needs  4. Assess skin integrity/risk for skin breakdown  5. Assist patient with activities of daily living as needed  6. Encourage independent activity per ability   7. Provide mouth care   8. Include patient/family/caregiver in decisions related to daily care   Outcome: Progressing     Problem: Knowledge Deficit  Goal: Patient/family/caregiver demonstrates understanding of disease process, treatment plan, medications, and discharge instructions  Description: INTERVENTIONS:   1. Complete learning assessment and assess knowledge base  2. Provide teaching at level of understanding   3. Provide teaching via preferred  learning methods  Outcome: Progressing     Problem: Discharge Barriers  Goal: Patient's discharge needs are met  Description: INTERVENTIONS:  1. Assess patient for self-management skills  2. Encourage participation in management  3. Identify potential discharge barriers on admission and throughout hospital stay  4. Involve patient/family/caregiver in discharge planning process  5. Collaborate with case management/ for discharge needs  Outcome: Progressing     Problem: Potential for Compromised Skin Integrity  Goal: Skin Integrity is Maintained or Improved  Description: INTERVENTIONS:  1. Assess and monitor skin integrity  2. Collaborate with interdisciplinary team and initiate plans and interventions as needed  3. Alternate a full bath with partial baths for elderly   4. Monitor patient's hygiene practices   5. Collaborate with wound, ostomy, and continence nurse  Outcome: Progressing  Goal: Nutritional status is improving  Description: INTERVENTIONS:  1. Monitor and assess patient for malnutrition (ex- brittle hair, bruises, dry skin, pale skin and conjunctiva, muscle wasting, smooth red tongue, and disorientation)  2. Monitor patient's weight and dietary intake as ordered or per policy  3. Determine patient's food preferences and provide high-protein, high-caloric foods as appropriate  4. Assist patient with eating   5. Allow adequate time for meals   6. Encourage patient to take dietary supplement as ordered   7. Collaborate with dietitian  8. Include patient/family/caregiver in decisions related to nutrition  Outcome: Progressing  Goal: MOBILITY IS MAINTAINED OR IMPROVED  Description: INTERVENTIONS  1. Collaborate with interdisciplinary team and initiate plan and interventions as ordered (PT/OT)  2. Encourage ambulation  3. Up to chair for meals  4. Monitor for signs of deconditioning  Outcome: Progressing     Problem: Urinary Incontinence  Goal: Perineal skin integrity is maintained or  improved  Description: INTERVENTIONS:  1. Assess genitourinary system, perineal skin, labs (urinalysis), and history of incontinence to include past management, aggravating, and alleviating factors  2. Collaborate with interdisciplinary team including wound, ostomy, and continence nurse and initiate plans and interventions as needed  4. Consider urine containment device  5. Apply skin protectant   6. Develop skin care regimen  7. Provide privacy when changing patient's incontinence device to maintain their dignity  Outcome: Progressing     Problem: Infection - Adult  Goal: Absence of infection during hospitalization  Description: INTERVENTIONS:  1. Assess and monitor for signs and symptoms of infection  2. Monitor lab/diagnostic results  3. Monitor all insertion sites/wounds/incisions  4. Monitor secretions for changes in amount and color  5. Administer medications as ordered  6. Educate and encourage patient and family to use good hand hygiene technique  7. Identify and educate in appropriate isolation precautions for identified infection/condition  Outcome: Progressing     Problem: Safety Adult  Goal: Patient will remain safe during hospitalization  Description: INTERVENTIONS    1. Assess patient for fall risk and implement interventions if needed  2. Use safe transport techniques  3. Assess patient using the appropriate Harvey skin assessment scale  4. Assess patient for risk of aspiration  5. Assess patient for risk of elopement  6. Assess patient for risk of suicide  Outcome: Progressing     Problem: TRANSFERS  Goal: STG - Patient will perform bed mobility  Description: Supine<>sit with tactile cues for sequencing  Outcome: Progressing  Goal: LTG - Patient will demonstrate safe transfer techniques  Description: With min A for all transfers with use of walker   Outcome: Progressing     Problem: OT Misc  Goal: LTG - Misc 1  Description: Pt will complete toilet transfers with stand by assistance with assitive  device as needed.   Outcome: Progressing     Problem: Genitourinary - Adult  Goal: Absence of urinary retention  Description: INTERVENTIONS:  1. Assess patient’s ability to void and empty bladder.  2. Monitor intake/output and perform bladder scan if indicated.  3. Place urinary catheter per order and initiate and maintain CAUTI bundle.  4. Administer medications to alleviate retention as needed.  5. Discuss catheterization for long term situations as appropriate.    Outcome: Progressing     Problem: Elopement Risk  Goal: Minimize the risk of a patient leaving without authorization when departure presents a threat to the safety of patient or others.  Description: INTERVENTIONS:  1. Frequent re-orientation, using a calm voice and positive re-enforcement when interacting with the patient  2. Familiar objects and possessions placed in the room  3. Purposeful focused activities  4. Family or volunteer staying with the patient  5. Medication evaluation by Pharmacy for possible adjustments  6. Consider continual supervision while patient transported off unit  7. Ensure pain relief as necessary  8. Set limits and provide clear expectations as needed  9. Reduce noise/stimulation  Outcome: Progressing

## 2023-12-18 NOTE — NURSING END OF SHIFT
Nursing End of Shift Summary:    Patient: Joseph Wong  MRN: [id number]  : 10/20/1934, Age: 89 y.o.    Location: [address] [place] [address]    Nursing Goals  Clinical Goals for the Shift: safety and comfort, monitor for agitation/restlessness    Narrative Summary of Progress Toward Clinical Goals:  Pt was confused and impulsive. Redirected many times. Remains on tele sitter. Able to dangle, ambulate in the room using FWW+GB with 1 assist, tolerated fairly well. All needs attended. VS stable. Pt noted asleep after pt took meds. Maintained safety and comfort.      Barriers to Goals/Nursing Concerns:  Yes - impulsive, getting out of bed in the evening and needs sitter. Possible to adjust night meds schedule early at 1800.    New Patient or Family Concerns/Issues:  No    Shift Summary:   Significant Events & Communications to Providers (last 12 hours)       Last 5 Values    No documentation.                 Oxygen Usage (last 12 hours)       Last 5 Values       Row Name 23                   Room Air or Baseline Oxygen Trial by Nursing    Is Patient on Room Air OR on the Same Amount of O2 as at Home? Yes        Are You Performing the QShift O2 Trial? No                      Mobility (last 12 hours)       Last 5 Values       Row Name 23 1200 23 1600 23 1740 23          Mobility    Activity Commode -- Commode Other (Comment)     Level of Assistance -- -- -- Maximum assist, patient does 25-49%     Length of Time in Chair (min) 120 -- 120 0     Distance Ambulated (feet) 5 Feet -- 5 Feet 0 Feet     Distance Ambulated (Meters Calculated) 1.52 Meters -- 1.52 Meters 0 Meters     Patient Position -- Supine -- Supine     Turning Every 2 hours -- Every 2 hours Turns self     Distance Ambulated (Meters Calculated)(Do Not Use) 1.52 Feet -- 1.52 Feet 0 Feet                   Urethral Catheter       Active Urethral Catheter       None                  Active Lines       Active Central venous catheter /  Peripherally inserted central catheter / Implantable Port / Hemodialysis catheter / Midline Catheter       None                  Infusing Medications   Medication Dose Last Rate     PRN Medications   Medication Dose Last Admin    QUEtiapine  25 mg 25 mg at 12/17/23 1447    sodium chloride  3 mL      sodium chloride  1 spray      sodium chloride-aloe vera  1 Application      acetaminophen  500 mg 500 mg at 12/12/23 1933    oxyCODONE  5 mg 5 mg at 12/17/23 1447    magnesium hydroxide  30 mL      ondansetron  4 mg      Or    ondansetron  4 mg

## 2023-12-19 PROCEDURE — 6370000100 HC RX 637 (ALT 250 FOR IP): Performed by: FAMILY MEDICINE

## 2023-12-19 PROCEDURE — 2590000100 HC RX 259: Performed by: FAMILY MEDICINE

## 2023-12-19 PROCEDURE — 51701 INSERT BLADDER CATHETER: CPT

## 2023-12-19 PROCEDURE — 99232 SBSQ HOSP IP/OBS MODERATE 35: CPT | Performed by: HOSPITALIST

## 2023-12-19 PROCEDURE — 6370000100 HC RX 637 (ALT 250 FOR IP): Performed by: HOSPITALIST

## 2023-12-19 PROCEDURE — 97530 THERAPEUTIC ACTIVITIES: CPT | Mod: GP | Performed by: PHYSICAL THERAPIST

## 2023-12-19 PROCEDURE — 51798 US URINE CAPACITY MEASURE: CPT

## 2023-12-19 PROCEDURE — 2590000100 HC RX 259: Performed by: HOSPITALIST

## 2023-12-19 PROCEDURE — (BLANK) HC ROOM PRIVATE

## 2023-12-19 RX ADMIN — FAMOTIDINE 10 MG: 20 TABLET, FILM COATED ORAL at 19:45

## 2023-12-19 RX ADMIN — FAMOTIDINE 10 MG: 20 TABLET, FILM COATED ORAL at 08:10

## 2023-12-19 RX ADMIN — SENNOSIDES AND DOCUSATE SODIUM 1 TABLET: 50; 8.6 TABLET ORAL at 19:45

## 2023-12-19 RX ADMIN — LOSARTAN POTASSIUM 50 MG: 50 TABLET, FILM COATED ORAL at 08:11

## 2023-12-19 RX ADMIN — AMLODIPINE BESYLATE 5 MG: 5 TABLET ORAL at 08:11

## 2023-12-19 RX ADMIN — ATORVASTATIN CALCIUM 40 MG: 40 TABLET, FILM COATED ORAL at 19:45

## 2023-12-19 RX ADMIN — ASPIRIN 81 MG: 81 TABLET ORAL at 08:10

## 2023-12-19 RX ADMIN — TAMSULOSIN HYDROCHLORIDE 0.4 MG: 0.4 CAPSULE ORAL at 08:10

## 2023-12-19 RX ADMIN — METOPROLOL SUCCINATE 25 MG: 25 TABLET, EXTENDED RELEASE ORAL at 08:11

## 2023-12-19 RX ADMIN — TAMSULOSIN HYDROCHLORIDE 0.4 MG: 0.4 CAPSULE ORAL at 19:45

## 2023-12-19 RX ADMIN — QUETIAPINE FUMARATE 100 MG: 100 TABLET ORAL at 16:42

## 2023-12-19 RX ADMIN — POLYETHYLENE GLYCOL 3350 17 G: 17 POWDER, FOR SOLUTION ORAL at 08:11

## 2023-12-19 RX ADMIN — FINASTERIDE 5 MG: 5 TABLET, FILM COATED ORAL at 19:45

## 2023-12-19 RX ADMIN — Medication 1 TABLET: at 08:11

## 2023-12-19 RX ADMIN — SPIRONOLACTONE 25 MG: 25 TABLET ORAL at 08:11

## 2023-12-19 RX ADMIN — FLUOXETINE HYDROCHLORIDE 40 MG: 20 CAPSULE ORAL at 19:45

## 2023-12-19 RX ADMIN — SENNOSIDES AND DOCUSATE SODIUM 1 TABLET: 50; 8.6 TABLET ORAL at 08:10

## 2023-12-19 NOTE — PROGRESS NOTES
[address] [address]  Daily Progress Note  Patient name: Joseph Wong  MRN: [id number]   LOS: 12 days     Subjective   Patient resting comfortably.  Objective   Vitals:Temp:  [36.4 °C (97.6 °F)-36.9 °C (98.4 °F)] 36.7 °C (98 °F)  Heart Rate:  [59-63] 59  Resp:  [16-18] 17  SpO2:  [90 %-92 %] 92 %  BP: (122-147)/(79-94) 131/94  Physical Exam:  HEENT: Negative  Neck: No neck vein distention  Lungs: Clear to auscultation  Heart: Regular  Abdomen: Soft  Genitourinary: Deferred  Extremities: No significant edema  Skin: No rash or jaundice  Neurologic: Alert    Review of Systems:  Negative  Admission on 12/04/2023   Component Date Value Ref Range Status    WBC 12/04/2023 7.3  3.7 - 9.6 10*3/uL Final    RBC 12/04/2023 4.13  4.10 - 5.80 10*6/µL Final    Hemoglobin 12/04/2023 14.0  13.2 - 17.2 g/dL Final    Hematocrit 12/04/2023 38.5  38.0 - 50.0 % Final    MCV 12/04/2023 93.3  82.0 - 97.0 fL Final    MCH 12/04/2023 33.9  29.0 - 34.0 pg Final    MCHC 12/04/2023 36.3 (H)  32.0 - 36.0 g/dL Final    RDW 12/04/2023 14.0  11.5 - 15.0 % Final    Platelets 12/04/2023 203  130 - 350 10*3/uL Final    MPV 12/04/2023 7.3  6.9 - 10.8 fL Final    Neutrophils% 12/04/2023 73.3 (H)  46.0 - 70.0 % Final    Lymphocytes% 12/04/2023 15.4  15.0 - 47.0 % Final    Monocytes% 12/04/2023 9.2  5.0 - 13.0 % Final    Eosinophils% 12/04/2023 1.4  0.0 - 3.0 % Final    Basophils% 12/04/2023 0.7  0.0 - 2.0 % Final    ANC (auto diff) 12/04/2023 5.30  10*3/UL Final    Lymphocytes Absolute 12/04/2023 1.10  10*3/uL Final    Monocytes Absolute 12/04/2023 0.70  10*3/uL Final    Eosinophils Absolute 12/04/2023 0.10  10*3/uL Final    Basophils Absolute 12/04/2023 0.10  10*3/uL Final    Sodium 12/04/2023 134 (L)  135 - 145 mmol/L Final    Potassium 12/04/2023 4.4  3.5 - 5.1 MMOL/L Final    Chloride 12/04/2023 103  98 - 107 mmol/L Final    CO2 12/04/2023 22  21 - 32 mmol/L Final    Anion Gap 12/04/2023 9  3 - 11 mmol/L  Final    BUN 12/04/2023 26 (H)  7 - 25 mg/dL Final    Creatinine 12/04/2023 0.95  0.70 - 1.30 mg/dL Final    Glucose 12/04/2023 119 (H)  70 - 105 mg/dL Final    Calcium 12/04/2023 8.9  8.6 - 10.3 mg/dL Final    AST 12/04/2023 16  <40 U/L Final    ALT (SGPT) 12/04/2023 12  7 - 52 U/L Final    Alkaline Phosphatase 12/04/2023 103  45 - 115 U/L Final    Total Protein 12/04/2023 6.1  6.0 - 8.3 g/dL Final    Albumin 12/04/2023 3.7  3.5 - 5.3 g/dL Final    Total Bilirubin 12/04/2023 0.95  0.20 - 1.40 mg/dL Final    Corrected Calcium 12/04/2023 9.1  8.6 - 10.3 mg/dL Final    eGFR 12/04/2023 77  >60 mL/min/1.73m*2 Final    Lipase 12/04/2023 77  11 - 82 U/L Final    Magnesium 12/04/2023 1.9  1.8 - 2.4 mg/dL Final    POC Lactate 12/04/2023 1.20  0.50 - 1.99 MMOL/L Final    CRP 12/04/2023 <1.0  <=10.0 MG/L Final    Protime 12/04/2023 12.0  9.4 - 12.5 SECONDS Final    INR 12/04/2023 1.1  <=1.1 Final    PTT 12/04/2023 27.6  25.1 - 36.5 SECONDS Final    RBC, Urine 12/04/2023 0-2  None seen, 0-2, Negative /HPF Final    WBC, Urine 12/04/2023 0-4  0 - 4 /HPF Final    WBC Clumps, Urine 12/04/2023 None seen  None seen /HPF Final    Squamous Epithelial, Urine 12/04/2023 Negative  None Seen-9 /HPF Final    Bacteria, Urine 12/04/2023 None seen  None seen, Few /HPF Final    Color, Urine 12/04/2023 Yellow  Yellow Final    Clarity, Urine 12/04/2023 Clear  Clear Final    Specific Gravity, Urine 12/04/2023 1.025  1.003 - 1.030 Final    Leukocytes, Urine 12/04/2023 Negative  Negative Final    Nitrite, Urine 12/04/2023 Negative  Negative Final    Protein, Urine 12/04/2023 10 (A)  Negative MG/DL Final    Ketones, Urine 12/04/2023 Negative  Negative MG/DL Final    Urobilinogen, Urine 12/04/2023 2.0 (A)  <2.0 mg/dL Final    Bilirubin, Urine 12/04/2023 Negative  Negative Final    Blood, Urine 12/04/2023 Negative  Negative Final    Glucose, Urine 12/04/2023 Negative  Negative MG/DL Final    pH, Urine 12/04/2023 5.5  5.0 - 8.0 PH Final    POC Glucose  12/04/2023 119 (H)  70 - 105 MG/DL Final    Uric Acid 12/04/2023 4.0 (L)  4.4 - 7.6 mg/dL Final    Procalcitonin 12/04/2023 <0.02  <0.50 ng/mL Final    Sed Rate 12/04/2023 5  <=20 mm/hr Final    Vitamin B-12 12/04/2023 343  180 - 914 pg/mL Final    Folate 12/04/2023 13.3  >6.6 ng/mL Final    Vit D, 25-Hydroxy 12/04/2023 30  30 - 100 ng/mL Final    TSH 12/04/2023 3.622  0.340 - 4.820 uIU/ml Final    Phosphorus 12/04/2023 3.0  2.5 - 4.9 mg/dL Final    Hemoglobin A1C 12/04/2023 6.4 (H)  4.0 - 6.0 % Final    Estimated Average Glucose 12/04/2023 137.0  mg/dL Final    POC Glucose 12/04/2023 117 (H)  70 - 105 MG/DL Final    WBC 12/05/2023 6.4  3.7 - 9.6 10*3/uL Final    RBC 12/05/2023 3.92 (L)  4.10 - 5.80 10*6/µL Final    Hemoglobin 12/05/2023 13.0 (L)  13.2 - 17.2 g/dL Final    Hematocrit 12/05/2023 36.2 (L)  38.0 - 50.0 % Final    MCV 12/05/2023 92.4  82.0 - 97.0 fL Final    MCH 12/05/2023 33.1  29.0 - 34.0 pg Final    MCHC 12/05/2023 35.8  32.0 - 36.0 g/dL Final    RDW 12/05/2023 13.6  11.5 - 15.0 % Final    Platelets 12/05/2023 213  130 - 350 10*3/uL Final    MPV 12/05/2023 7.3  6.9 - 10.8 fL Final    Neutrophils% 12/05/2023 66.2  46.0 - 70.0 % Final    Lymphocytes% 12/05/2023 18.9  15.0 - 47.0 % Final    Monocytes% 12/05/2023 12.4  5.0 - 13.0 % Final    Eosinophils% 12/05/2023 1.7  0.0 - 3.0 % Final    Basophils% 12/05/2023 0.8  0.0 - 2.0 % Final    ANC (auto diff) 12/05/2023 4.20  10*3/UL Final    Lymphocytes Absolute 12/05/2023 1.20  10*3/uL Final    Monocytes Absolute 12/05/2023 0.80  10*3/uL Final    Eosinophils Absolute 12/05/2023 0.10  10*3/uL Final    Basophils Absolute 12/05/2023 0.10  10*3/uL Final    Sodium 12/05/2023 133 (L)  135 - 145 mmol/L Final    Potassium 12/05/2023 4.0  3.5 - 5.1 MMOL/L Final    Chloride 12/05/2023 103  98 - 107 mmol/L Final    CO2 12/05/2023 24  21 - 32 mmol/L Final    BUN 12/05/2023 16  7 - 25 mg/dL Final    Creatinine 12/05/2023 0.82  0.70 - 1.30 mg/dL Final    Glucose 12/05/2023  106 (H)  70 - 105 mg/dL Final    Calcium 12/05/2023 8.9  8.6 - 10.3 mg/dL Final    Anion Gap 12/05/2023 6  3 - 11 mmol/L Final    eGFR 12/05/2023 84  >60 mL/min/1.73m*2 Final    POC Glucose 12/05/2023 113 (H)  70 - 105 MG/DL Final    POC Glucose 12/05/2023 173 (H)  70 - 105 MG/DL Final    POC Glucose 12/05/2023 135 (H)  70 - 105 MG/DL Final    POC Glucose 12/05/2023 142 (H)  70 - 105 MG/DL Final    POC Glucose 12/06/2023 125 (H)  70 - 105 MG/DL Final    POC Glucose 12/06/2023 143 (H)  70 - 105 MG/DL Final    POC Glucose 12/06/2023 145 (H)  70 - 105 MG/DL Final    POC Glucose 12/06/2023 124 (H)  70 - 105 MG/DL Final    POC Glucose 12/07/2023 136 (H)  70 - 105 MG/DL Final    POC Glucose 12/07/2023 131 (H)  70 - 105 MG/DL Final    POC Glucose 12/07/2023 140 (H)  70 - 105 MG/DL Final    POC Glucose 12/07/2023 132 (H)  70 - 105 MG/DL Final    POC Glucose 12/08/2023 127 (H)  70 - 105 MG/DL Final    POC Glucose 12/08/2023 222 (H)  70 - 105 MG/DL Final    POC Glucose 12/08/2023 144 (H)  70 - 105 MG/DL Final    POC Glucose 12/08/2023 148 (H)  70 - 105 MG/DL Final    POC Glucose 12/09/2023 140 (H)  70 - 105 MG/DL Final    POC Glucose 12/09/2023 126 (H)  70 - 105 MG/DL Final    POC Glucose 12/09/2023 148 (H)  70 - 105 MG/DL Final    POC Glucose 12/10/2023 130 (H)  70 - 105 MG/DL Final    POC Glucose 12/10/2023 138 (H)  70 - 105 MG/DL Final    POC Glucose 12/11/2023 145 (H)  70 - 105 MG/DL Final    POC Glucose 12/11/2023 155 (H)  70 - 105 MG/DL Final    POC Glucose 12/11/2023 174 (H)  70 - 105 MG/DL Final    POC Glucose 12/11/2023 138 (H)  70 - 105 MG/DL Final    POC Glucose 12/12/2023 145 (H)  70 - 105 MG/DL Final    POC Glucose 12/12/2023 161 (H)  70 - 105 MG/DL Final    POC Glucose 12/12/2023 135 (H)  70 - 105 MG/DL Final    POC Glucose 12/12/2023 146 (H)  70 - 105 MG/DL Final    POC Glucose 12/13/2023 139 (H)  70 - 105 MG/DL Final    POC Glucose 12/13/2023 139 (H)  70 - 105 MG/DL Final    POC Glucose 12/13/2023 139 (H)  70  - 105 MG/DL Final    POC Glucose 12/14/2023 141 (H)  70 - 105 MG/DL Final    POC Glucose 12/14/2023 209 (H)  70 - 105 MG/DL Final        Assessment/Plan   Left knee pain with associated effusion                       Left total knee arthroplasty, 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  Patient appropriate for discharge.    Electronically signed by: Sami Olsen MD  12/19/2023  11:23 AM

## 2023-12-19 NOTE — INTERDISCIPLINARY/THERAPY
Case Management Progress Note    Phone # [phone]     Discussed Discharge Needs/Topics:   Call to Corrine Sandoval- [phone]- phone is not connecting  Email to Terence Connolly [facility] for [facility], reply states no bed availability.   E-mail to Terence Brito C[facility] for Yadira Copeland, [facility]--[phone] LVM  [facility] [phone], LVM  Yahaira, [facility], [phone], two beds at the [place] and that there is a locked unit bed.   Sherry, [facility] [phone], states no beds available   [facility], [phone], message left for Shirley to return a call to discuss memory care options    Met w/ wife, Alessandra and patient, she reiterated her inability to care for patient at home, she also states that he does not have Medicaid, that he has a long term care policy with is anticipated to cover the majority of his care expenses, will private pay the balance  JOSÉ MIGUEL informed her of status of outreach efforts to secure bed at facility that can offer memory care services. She is interested in touring [facility] this afternoon, JOÉS MIGUEL arranged tour w/ Sami at 3:00 PM and faxed DC referral packet to [phone], she gave consent for information to be shared w/ this facility as well as [facility] in [place]. JOSÉ MIGUEL faxed to [place] referral to [facility] at [phone].    Wife expressed considerable hesitation w/ consideration of [facility] as it is located in [place] and her concerns about visiting patient, especially in winter, JOSÉ MIGUEL provided education on need to identify facility that can provide care for patient, Medicare coverage of acute hospitalization and potential for issuance of Notification of Non-coverage if no beds available locally and she is not willing to consider alternatives, she verbalized understanding.    Addendum: Met w/ wife, she states [facility] is not able to provide the services he requires. She asked  about [facility]-- informed that they have declined patient. She is considering taking him home w/ private pay, in-home caregivers if no facilities are able to accept.     JOSÉ MIGUEL spoke w/ Shirley at [facility], she states that the clinical has been received and is being reviewed, she will return call w/ decision.      Disposition: [facility] w/ Memory Care

## 2023-12-19 NOTE — INTERDISCIPLINARY/THERAPY
"  [address] [facility] [address]  [phone]      Inpatient Physical Therapy Daily Treatment Note    Date of Service: 12/19/2023  Patient Name: Joseph Wong  Referring Provider: WYATT SRINIVASAN ROBERT  Completed Visit Number: [id number]  SOC Date: 12/06/23  Certification Period: 01/05/24    Medical Diagnosis:   Weakness [R53.1]  Unable to bear weight on left lower extremity [R26.89]  Lumbar compression fracture, closed, initial encounter (CMS/[facility]) [S32.000A]  Treatment Diagnoses:  Treatment Diagnosis: Decreased strength, Decreased endurance, Impaired balance, Decreased mobility  Subjective     Subjective Comments  RN Stated patient is medically cleared for therapy: Yes  Subjective Comments: Patient laying in bed upon PT arrival and ends session in the recliner with call light on hand, needs within reach, chair alarm set and spouse present.     Pain Assessment Scale  Pain Scale: 0-10  0-10 Pain Score: 0 - No pain  Oneil Fall Risk Score: 100         Objective    Precautions  Other Precautions: fall risk, w/c bound at baseline (ambulates short distance)  Is this a Co-Treatment?: No  Treatments:  Gait belt donned for transfers    Cognition: Pleasant and cooperative    Bed Mobility: Min A supine>sit with use of bed rail  Patient requires min A for trunk support and verbal cues for sequencing and hand placement     Transfers: Mod A of 1 with/without front wheeled walker  Patient performed stand pivot transfer from bed to recliner with use of arm rests and mod a to boost and perform. He requires mod A to boost from recliner and stand with support on front wheeled walker after pivot transfer    Ambulation: Not performed due to increased fatigue this session    Balance: Patient stood 60\" with front wheeled walker support and min A to prevent posterior fall. He exhibits shaking in his legs and has flexed posture in standing      Time Calculation  Start Time: 1104  Stop Time: 1114  Time Calculation (min): 10 " min  Therapeutic Interventions (Time Spent in Minutes)  Therapeutic Activity: 10       Assessment/Plan   Assessment:    Level of Function and Prognosis  Assessment: Patient with increased weakness this session and requires more assistance for stand pivot transfer to the chair and to boost out of the chair. Patient would benefit from increased transfers OOB with nursing staff. PT will continue to work with patient to progress mobility as able.  Recommendation  Recommendations for Therapy: Continue skilled therapy  Equipment Recommended:  (has w/c and walker at home)  Plan:    Plan  PT Frequency: 2-3x/wk  Plan for next treatment comment: 1-2 assist (chair follow): bed mobility min A, transfers mod A, gait 3-15 m with min-mod A, LE strengthening, standing balance/tolerance  Treatment duration will be through Certification Period: 01/05/24

## 2023-12-19 NOTE — PLAN OF CARE
Problem: Safety Adult - Fall  Goal: Free from fall injury  Description: INTERVENTIONS:    Inpatient - Please reference Cares/Safety Flowsheet under [facility] Fall Risk for interventions.  Pediatrics - Please reference Peds Daily Cares/Safety Flowsheet under [facility] Pediatric Fall Assessment Fall Bundle for interventions  LD/OB - Please reference OB Shift Screening Flowsheet under OB Fall Risk for interventions.  Outcome: Progressing     Problem: Pain - Adult  Goal: Verbalizes/displays adequate comfort level or baseline comfort level  Description: INTERVENTIONS:  1. Encourage patient to monitor pain and request interventions  2. Assess pain using the appropriate pain scale  3. Administer analgesics based on type and severity of pain and evaluate response  4. Educate/Implement non-pharmacological measures as appropriate and evaluate response  5. Consider cultural, developmental and social influences on pain and pain management  6. Notify Provider if interventions unsuccessful or patient reports new pain  Outcome: Progressing     Problem: Daily Care  Goal: Daily care needs are met  Description: INTERVENTIONS:   1. Assess and monitor skin integrity  2. Identify patients at risk for skin breakdown on admission and per policy  3. Assess and monitor ability to perform self care and identify potential discharge needs  4. Assess skin integrity/risk for skin breakdown  5. Assist patient with activities of daily living as needed  6. Encourage independent activity per ability   7. Provide mouth care   8. Include patient/family/caregiver in decisions related to daily care   Outcome: Progressing     Problem: Knowledge Deficit  Goal: Patient/family/caregiver demonstrates understanding of disease process, treatment plan, medications, and discharge instructions  Description: INTERVENTIONS:   1. Complete learning assessment and assess knowledge base  2. Provide teaching at level of understanding   3. Provide teaching via preferred  learning methods  Outcome: Progressing     Problem: Discharge Barriers  Goal: Patient's discharge needs are met  Description: INTERVENTIONS:  1. Assess patient for self-management skills  2. Encourage participation in management  3. Identify potential discharge barriers on admission and throughout hospital stay  4. Involve patient/family/caregiver in discharge planning process  5. Collaborate with case management/ for discharge needs  Outcome: Progressing     Problem: Infection - Adult  Goal: Absence of infection during hospitalization  Description: INTERVENTIONS:  1. Assess and monitor for signs and symptoms of infection  2. Monitor lab/diagnostic results  3. Monitor all insertion sites/wounds/incisions  4. Monitor secretions for changes in amount and color  5. Administer medications as ordered  6. Educate and encourage patient and family to use good hand hygiene technique  7. Identify and educate in appropriate isolation precautions for identified infection/condition  Outcome: Progressing     Problem: Potential for Compromised Skin Integrity  Goal: Skin Integrity is Maintained or Improved  Description: INTERVENTIONS:  1. Assess and monitor skin integrity  2. Collaborate with interdisciplinary team and initiate plans and interventions as needed  3. Alternate a full bath with partial baths for elderly   4. Monitor patient's hygiene practices   5. Collaborate with wound, ostomy, and continence nurse  Outcome: Progressing  Goal: Nutritional status is improving  Description: INTERVENTIONS:  1. Monitor and assess patient for malnutrition (ex- brittle hair, bruises, dry skin, pale skin and conjunctiva, muscle wasting, smooth red tongue, and disorientation)  2. Monitor patient's weight and dietary intake as ordered or per policy  3. Determine patient's food preferences and provide high-protein, high-caloric foods as appropriate  4. Assist patient with eating   5. Allow adequate time for meals   6. Encourage  patient to take dietary supplement as ordered   7. Collaborate with dietitian  8. Include patient/family/caregiver in decisions related to nutrition  Outcome: Progressing  Goal: MOBILITY IS MAINTAINED OR IMPROVED  Description: INTERVENTIONS  1. Collaborate with interdisciplinary team and initiate plan and interventions as ordered (PT/OT)  2. Encourage ambulation  3. Up to chair for meals  4. Monitor for signs of deconditioning  Outcome: Progressing     Problem: Urinary Incontinence  Goal: Perineal skin integrity is maintained or improved  Description: INTERVENTIONS:  1. Assess genitourinary system, perineal skin, labs (urinalysis), and history of incontinence to include past management, aggravating, and alleviating factors  2. Collaborate with interdisciplinary team including wound, ostomy, and continence nurse and initiate plans and interventions as needed  4. Consider urine containment device  5. Apply skin protectant   6. Develop skin care regimen  7. Provide privacy when changing patient's incontinence device to maintain their dignity  Outcome: Progressing     Problem: Elopement Risk  Goal: Minimize the risk of a patient leaving without authorization when departure presents a threat to the safety of patient or others.  Description: INTERVENTIONS:  1. Frequent re-orientation, using a calm voice and positive re-enforcement when interacting with the patient  2. Familiar objects and possessions placed in the room  3. Purposeful focused activities  4. Family or volunteer staying with the patient  5. Medication evaluation by Pharmacy for possible adjustments  6. Consider continual supervision while patient transported off unit  7. Ensure pain relief as necessary  8. Set limits and provide clear expectations as needed  9. Reduce noise/stimulation  Outcome: Progressing

## 2023-12-19 NOTE — PLAN OF CARE
Problem: Pain - Adult  Goal: Verbalizes/displays adequate comfort level or baseline comfort level  Description: INTERVENTIONS:  1. Encourage patient to monitor pain and request interventions  2. Assess pain using the appropriate pain scale  3. Administer analgesics based on type and severity of pain and evaluate response  4. Educate/Implement non-pharmacological measures as appropriate and evaluate response  5. Consider cultural, developmental and social influences on pain and pain management  6. Notify Provider if interventions unsuccessful or patient reports new pain  Outcome: Progressing  Flowsheets (Taken 12/19/2023 1103)  Verbalizes/displays adequate comfort level or baseline comfort level:   Encourage patient to monitor pain and request interventions   Assess pain using the appropriate pain scale   Administer analgesics based on type and severity of pain and evaluate response   Educate/Implement non-pharmacological measures as appropriate and evaluate response   Consider cultural, developmental and social influences on pain and pain management   Notify Provider if interventions unsuccessful or patient reports new pain     Problem: Daily Care  Goal: Daily care needs are met  Description: INTERVENTIONS:   1. Assess and monitor skin integrity  2. Identify patients at risk for skin breakdown on admission and per policy  3. Assess and monitor ability to perform self care and identify potential discharge needs  4. Assess skin integrity/risk for skin breakdown  5. Assist patient with activities of daily living as needed  6. Encourage independent activity per ability   7. Provide mouth care   8. Include patient/family/caregiver in decisions related to daily care   Outcome: Progressing  Flowsheets (Taken 12/19/2023 1103)  Daily care needs are met:   Assess and monitor skin integrity   Identify patients at risk for skin breakdown on admission and per policy   Assess and monitor ability to perform self care and  identify potential discharge needs   Assess skin integrity/risk for skin breakdown   Assist patient with activities of daily living as needed   Encourage independent activity per ability   Include patient/family/caregiver in decisions related to daily care   Provide mouth care     Problem: Safety Adult  Goal: Patient will remain safe during hospitalization  Description: INTERVENTIONS    1. Assess patient for fall risk and implement interventions if needed  2. Use safe transport techniques  3. Assess patient using the appropriate Harvey skin assessment scale  4. Assess patient for risk of aspiration  5. Assess patient for risk of elopement  6. Assess patient for risk of suicide  Outcome: Progressing  Flowsheets (Taken 12/19/2023 1103)  Patient will remain safe durning hospitalization:   Assess patient for Fall Risk   Use safe transport   Assess Patient using the appropriate Harvey scale   Assess Patient for Aspirations   Assess Patient for Risk of Suicide   Assess Patient for Risk of Elopement

## 2023-12-19 NOTE — NURSING END OF SHIFT
Nursing End of Shift Summary:    Patient: Joseph Wong  MRN: [id number]  : 10/20/1934, Age: 89 y.o.    Location: [address] [place] [address]    Nursing Goals  Clinical Goals for the Shift: monitor for agitation, safety and comfort    Narrative Summary of Progress Toward Clinical Goals:  Patient still confused but redirectable. No agitation noted. Patient more comfortable and well rested with sitter in place. Kept safe.    Barriers to Goals/Nursing Concerns:  No    New Patient or Family Concerns/Issues:  No    Shift Summary:   Significant Events & Communications to Providers (last 12 hours)       Last 5 Values    No documentation.                 Oxygen Usage (last 12 hours)       Last 5 Values       Row Name 23 2040 23 0344                Room Air or Baseline Oxygen Trial by Nursing    Is Patient on Room Air OR on the Same Amount of O2 as at Home? Yes Yes                     Mobility (last 12 hours)       Last 5 Values       Row Name 23 1653 23 203                Mobility    Activity Commode Turn       Level of Assistance -- Moderate assist, patient does 50-74%       Length of Time in Chair (min) 10  Commode 0       Distance Ambulated (feet) 2 Feet 0 Feet       Distance Ambulated (Meters Calculated) 0.61 Meters 0 Meters       Patient Position -- Supine       Turning Turns self Turns self       Distance Ambulated (Meters Calculated)(Do Not Use) 0.61 Feet 0 Feet                     Urethral Catheter       Active Urethral Catheter       None                  Active Lines       Active Central venous catheter / Peripherally inserted central catheter / Implantable Port / Hemodialysis catheter / Midline Catheter       None                  Infusing Medications   Medication Dose Last Rate     PRN Medications   Medication Dose Last Admin    QUEtiapine  25 mg 25 mg at 23 1447    sodium chloride  3 mL      sodium chloride  1 spray      sodium chloride-aloe vera  1 Application      acetaminophen  500 mg 500 mg at  12/12/23 1933    oxyCODONE  5 mg 5 mg at 12/17/23 1447    magnesium hydroxide  30 mL      ondansetron  4 mg      Or    ondansetron  4 mg

## 2023-12-20 PROCEDURE — 2590000100 HC RX 259: Performed by: HOSPITALIST

## 2023-12-20 PROCEDURE — 2590000100 HC RX 259: Performed by: FAMILY MEDICINE

## 2023-12-20 PROCEDURE — (BLANK) HC ROOM PRIVATE

## 2023-12-20 PROCEDURE — 99232 SBSQ HOSP IP/OBS MODERATE 35: CPT | Performed by: HOSPITALIST

## 2023-12-20 PROCEDURE — 6370000100 HC RX 637 (ALT 250 FOR IP): Performed by: FAMILY MEDICINE

## 2023-12-20 PROCEDURE — 97530 THERAPEUTIC ACTIVITIES: CPT | Mod: GO

## 2023-12-20 PROCEDURE — 6370000100 HC RX 637 (ALT 250 FOR IP): Performed by: HOSPITALIST

## 2023-12-20 RX ADMIN — SENNOSIDES AND DOCUSATE SODIUM 1 TABLET: 50; 8.6 TABLET ORAL at 09:41

## 2023-12-20 RX ADMIN — FINASTERIDE 5 MG: 5 TABLET, FILM COATED ORAL at 19:58

## 2023-12-20 RX ADMIN — Medication 1 TABLET: at 09:41

## 2023-12-20 RX ADMIN — TAMSULOSIN HYDROCHLORIDE 0.4 MG: 0.4 CAPSULE ORAL at 19:58

## 2023-12-20 RX ADMIN — LOSARTAN POTASSIUM 50 MG: 50 TABLET, FILM COATED ORAL at 09:41

## 2023-12-20 RX ADMIN — TAMSULOSIN HYDROCHLORIDE 0.4 MG: 0.4 CAPSULE ORAL at 09:41

## 2023-12-20 RX ADMIN — FAMOTIDINE 10 MG: 20 TABLET, FILM COATED ORAL at 19:57

## 2023-12-20 RX ADMIN — FAMOTIDINE 10 MG: 20 TABLET, FILM COATED ORAL at 09:41

## 2023-12-20 RX ADMIN — AMLODIPINE BESYLATE 5 MG: 5 TABLET ORAL at 09:40

## 2023-12-20 RX ADMIN — POLYETHYLENE GLYCOL 3350 17 G: 17 POWDER, FOR SOLUTION ORAL at 09:42

## 2023-12-20 RX ADMIN — ATORVASTATIN CALCIUM 40 MG: 40 TABLET, FILM COATED ORAL at 19:58

## 2023-12-20 RX ADMIN — SENNOSIDES AND DOCUSATE SODIUM 1 TABLET: 50; 8.6 TABLET ORAL at 19:58

## 2023-12-20 RX ADMIN — LIDOCAINE 1 PATCH: 50 PATCH CUTANEOUS at 09:40

## 2023-12-20 RX ADMIN — FLUOXETINE HYDROCHLORIDE 40 MG: 20 CAPSULE ORAL at 19:58

## 2023-12-20 RX ADMIN — QUETIAPINE FUMARATE 100 MG: 100 TABLET ORAL at 16:58

## 2023-12-20 RX ADMIN — ASPIRIN 81 MG: 81 TABLET ORAL at 09:41

## 2023-12-20 RX ADMIN — METOPROLOL SUCCINATE 25 MG: 25 TABLET, EXTENDED RELEASE ORAL at 09:42

## 2023-12-20 RX ADMIN — SPIRONOLACTONE 25 MG: 25 TABLET ORAL at 09:40

## 2023-12-20 NOTE — INTERDISCIPLINARY/THERAPY
Case Management Progress Note    Phone # [phone]    Diagnosis: Weakness.    Plan of Care:  Medication management, and PT/OT.    Discussed Discharge Topics/Narrative: CM spoke with the patient's wife Alessandra Wong.  CM spoke about the discharge process regarding the tele sitter.   will continue to follow.    Family updated: Yes.    RN updated: No.    Disposition: Placement.    JOSÉ MIGUEL Mar explained to the this CM that the [facility] [place] could not except the patient due to medication regiment, and behaviors.

## 2023-12-20 NOTE — NURSING END OF SHIFT
Nursing End of Shift Summary:    Patient: Joseph Wong  MRN: [id number]  : 10/20/1934, Age: 89 y.o.    Location: [address] [place] [address]    Nursing Goals  Clinical Goals for the Shift: Ensure safety and comfort, monitor neuro status, monitor agitation    Narrative Summary of Progress Toward Clinical Goals:  Safety and comfort maintained throughout shift. Patient disoriented x 3, intermittently disoriented x 4. Patient stand and pivots to and from commode with two assist. Tolerates activity poorly. Almost fell due to leg weakness and not standing up straight.    Barriers to Goals/Nursing Concerns:  Yes - placement    New Patient or Family Concerns/Issues:  No    Shift Summary:   Significant Events & Communications to Providers (last 12 hours)       Last 5 Values    No documentation.                 Oxygen Usage (last 12 hours)       Last 5 Values       Row Name 23 0800                   Room Air or Baseline Oxygen Trial by Nursing    Is Patient on Room Air OR on the Same Amount of O2 as at Home? Yes                      Mobility (last 12 hours)       Last 5 Values       Row Name 23 0715 23 1315 23 1500 23 1550 23 1628       Mobility    Activity -- Back to bed;Chair Commode -- Commode;Back to bed    Level of Assistance -- -- Moderate assist, patient does 50-74% -- Maximum assist, patient does 25-49%    Length of Time in Chair (min) -- 60 -- -- --    Distance Ambulated (feet) -- 5 Feet 4 Feet -- 5 Feet    Distance Ambulated (Meters Calculated) -- 1.52 Meters 1.22 Meters -- 1.52 Meters    Patient Position Supine -- -- Supine --    Turning -- Turns self Turns self -- --    Distance Ambulated (Meters Calculated)(Do Not Use) -- 1.52 Feet 1.22 Feet -- 1.52 Feet                  Urethral Catheter       Active Urethral Catheter       None                  Active Lines       Active Central venous catheter / Peripherally inserted central catheter / Implantable Port / Hemodialysis catheter / Midline  Catheter       None                  Infusing Medications   Medication Dose Last Rate     PRN Medications   Medication Dose Last Admin    QUEtiapine  25 mg 25 mg at 12/17/23 1447    sodium chloride  3 mL      sodium chloride  1 spray      sodium chloride-aloe vera  1 Application      acetaminophen  500 mg 500 mg at 12/12/23 1933    oxyCODONE  5 mg 5 mg at 12/17/23 1447    magnesium hydroxide  30 mL      ondansetron  4 mg      Or    ondansetron  4 mg

## 2023-12-20 NOTE — PLAN OF CARE
Problem: Pain - Adult  Goal: Verbalizes/displays adequate comfort level or baseline comfort level  Description: INTERVENTIONS:  1. Encourage patient to monitor pain and request interventions  2. Assess pain using the appropriate pain scale  3. Administer analgesics based on type and severity of pain and evaluate response  4. Educate/Implement non-pharmacological measures as appropriate and evaluate response  5. Consider cultural, developmental and social influences on pain and pain management  6. Notify Provider if interventions unsuccessful or patient reports new pain  Outcome: Progressing     Problem: Daily Care  Goal: Daily care needs are met  Description: INTERVENTIONS:   1. Assess and monitor skin integrity  2. Identify patients at risk for skin breakdown on admission and per policy  3. Assess and monitor ability to perform self care and identify potential discharge needs  4. Assess skin integrity/risk for skin breakdown  5. Assist patient with activities of daily living as needed  6. Encourage independent activity per ability   7. Provide mouth care   8. Include patient/family/caregiver in decisions related to daily care   Outcome: Progressing     Problem: Knowledge Deficit  Goal: Patient/family/caregiver demonstrates understanding of disease process, treatment plan, medications, and discharge instructions  Description: INTERVENTIONS:   1. Complete learning assessment and assess knowledge base  2. Provide teaching at level of understanding   3. Provide teaching via preferred learning methods  Outcome: Progressing     Problem: Discharge Barriers  Goal: Patient's discharge needs are met  Description: INTERVENTIONS:  1. Assess patient for self-management skills  2. Encourage participation in management  3. Identify potential discharge barriers on admission and throughout hospital stay  4. Involve patient/family/caregiver in discharge planning process  5. Collaborate with case management/ for  discharge needs  Outcome: Progressing     Problem: Infection - Adult  Goal: Absence of infection during hospitalization  Description: INTERVENTIONS:  1. Assess and monitor for signs and symptoms of infection  2. Monitor lab/diagnostic results  3. Monitor all insertion sites/wounds/incisions  4. Monitor secretions for changes in amount and color  5. Administer medications as ordered  6. Educate and encourage patient and family to use good hand hygiene technique  7. Identify and educate in appropriate isolation precautions for identified infection/condition  Outcome: Progressing     Problem: Safety Adult  Goal: Patient will remain safe during hospitalization  Description: INTERVENTIONS    1. Assess patient for fall risk and implement interventions if needed  2. Use safe transport techniques  3. Assess patient using the appropriate Harvey skin assessment scale  4. Assess patient for risk of aspiration  5. Assess patient for risk of elopement  6. Assess patient for risk of suicide  Outcome: Progressing     Problem: Potential for Compromised Skin Integrity  Goal: Skin Integrity is Maintained or Improved  Description: INTERVENTIONS:  1. Assess and monitor skin integrity  2. Collaborate with interdisciplinary team and initiate plans and interventions as needed  3. Alternate a full bath with partial baths for elderly   4. Monitor patient's hygiene practices   5. Collaborate with wound, ostomy, and continence nurse  Outcome: Progressing  Goal: Nutritional status is improving  Description: INTERVENTIONS:  1. Monitor and assess patient for malnutrition (ex- brittle hair, bruises, dry skin, pale skin and conjunctiva, muscle wasting, smooth red tongue, and disorientation)  2. Monitor patient's weight and dietary intake as ordered or per policy  3. Determine patient's food preferences and provide high-protein, high-caloric foods as appropriate  4. Assist patient with eating   5. Allow adequate time for meals   6. Encourage  patient to take dietary supplement as ordered   7. Collaborate with dietitian  8. Include patient/family/caregiver in decisions related to nutrition  Outcome: Progressing  Goal: MOBILITY IS MAINTAINED OR IMPROVED  Description: INTERVENTIONS  1. Collaborate with interdisciplinary team and initiate plan and interventions as ordered (PT/OT)  2. Encourage ambulation  3. Up to chair for meals  4. Monitor for signs of deconditioning  Outcome: Progressing     Problem: Urinary Incontinence  Goal: Perineal skin integrity is maintained or improved  Description: INTERVENTIONS:  1. Assess genitourinary system, perineal skin, labs (urinalysis), and history of incontinence to include past management, aggravating, and alleviating factors  2. Collaborate with interdisciplinary team including wound, ostomy, and continence nurse and initiate plans and interventions as needed  4. Consider urine containment device  5. Apply skin protectant   6. Develop skin care regimen  7. Provide privacy when changing patient's incontinence device to maintain their dignity  Outcome: Progressing     Problem: Genitourinary - Adult  Goal: Absence of urinary retention  Description: INTERVENTIONS:  1. Assess patient’s ability to void and empty bladder.  2. Monitor intake/output and perform bladder scan if indicated.  3. Place urinary catheter per order and initiate and maintain CAUTI bundle.  4. Administer medications to alleviate retention as needed.  5. Discuss catheterization for long term situations as appropriate.    Outcome: Progressing

## 2023-12-20 NOTE — NURSING END OF SHIFT
I placed the order- lab collect Nursing End of Shift Summary:    Patient: Joseph Wong  MRN: [id number]  : 10/20/1934, Age: 89 y.o.    Location: [address] [place] [address]    Nursing Goals  Clinical Goals for the Shift: monitor for agitation, safety and comfort    Narrative Summary of Progress Toward Clinical Goals:  Patient calm and cooperative, no agitation noted. Able to void freely. Kept safe.    Barriers to Goals/Nursing Concerns:  Yes - pending placement    New Patient or Family Concerns/Issues:  No    Shift Summary:   Significant Events & Communications to Providers (last 12 hours)       Last 5 Values    No documentation.                 Oxygen Usage (last 12 hours)       Last 5 Values       Row Name 23 [id number] 0342                Room Air or Baseline Oxygen Trial by Nursing    Is Patient on Room Air OR on the Same Amount of O2 as at Home? Yes Yes                     Mobility (last 12 hours)       Last 5 Values       Row Name 23 19023                Mobility    Activity -- Sleeping       Length of Time in Chair (min) -- 0       Distance Ambulated (feet) -- 0 Feet       Distance Ambulated (Meters Calculated) -- 0 Meters       Patient Position Supine --       Turning -- Turns self       Distance Ambulated (Meters Calculated)(Do Not Use) -- 0 Feet                     Urethral Catheter       Active Urethral Catheter       None                  Active Lines       Active Central venous catheter / Peripherally inserted central catheter / Implantable Port / Hemodialysis catheter / Midline Catheter       None                  Infusing Medications   Medication Dose Last Rate     PRN Medications   Medication Dose Last Admin    QUEtiapine  25 mg 25 mg at 23 1447    sodium chloride  3 mL      sodium chloride  1 spray      sodium chloride-aloe vera  1 Application      acetaminophen  500 mg 500 mg at 23    oxyCODONE  5 mg 5 mg at 23 1447    magnesium hydroxide  30 mL      ondansetron  4 mg      Or    ondansetron  4 mg

## 2023-12-20 NOTE — PROGRESS NOTES
[address] [address]  Daily Progress Note  Patient name: Joseph Wong  MRN: [id number]   LOS: 13 days     Subjective   Patient resting comfortably.  Objective   Vitals:Temp:  [36 °C (96.8 °F)-36.7 °C (98 °F)] 36.5 °C (97.7 °F)  Heart Rate:  [58-67] 58  Resp:  [16-17] 17  SpO2:  [91 %-93 %] 92 %  BP: (117-127)/(82-87) 127/82  Physical Exam:  HEENT: Negative  Neck: No neck vein distention  Lungs: Clear to auscultation  Heart: Regular  Abdomen: Soft  Genitourinary: Deferred  Extremities: No significant edema  Skin: No rash or jaundice  Neurologic: Alert    Review of Systems:  Negative  Admission on 12/04/2023   Component Date Value Ref Range Status    WBC 12/04/2023 7.3  3.7 - 9.6 10*3/uL Final    RBC 12/04/2023 4.13  4.10 - 5.80 10*6/µL Final    Hemoglobin 12/04/2023 14.0  13.2 - 17.2 g/dL Final    Hematocrit 12/04/2023 38.5  38.0 - 50.0 % Final    MCV 12/04/2023 93.3  82.0 - 97.0 fL Final    MCH 12/04/2023 33.9  29.0 - 34.0 pg Final    MCHC 12/04/2023 36.3 (H)  32.0 - 36.0 g/dL Final    RDW 12/04/2023 14.0  11.5 - 15.0 % Final    Platelets 12/04/2023 203  130 - 350 10*3/uL Final    MPV 12/04/2023 7.3  6.9 - 10.8 fL Final    Neutrophils% 12/04/2023 73.3 (H)  46.0 - 70.0 % Final    Lymphocytes% 12/04/2023 15.4  15.0 - 47.0 % Final    Monocytes% 12/04/2023 9.2  5.0 - 13.0 % Final    Eosinophils% 12/04/2023 1.4  0.0 - 3.0 % Final    Basophils% 12/04/2023 0.7  0.0 - 2.0 % Final    ANC (auto diff) 12/04/2023 5.30  10*3/UL Final    Lymphocytes Absolute 12/04/2023 1.10  10*3/uL Final    Monocytes Absolute 12/04/2023 0.70  10*3/uL Final    Eosinophils Absolute 12/04/2023 0.10  10*3/uL Final    Basophils Absolute 12/04/2023 0.10  10*3/uL Final    Sodium 12/04/2023 134 (L)  135 - 145 mmol/L Final    Potassium 12/04/2023 4.4  3.5 - 5.1 MMOL/L Final    Chloride 12/04/2023 103  98 - 107 mmol/L Final    CO2 12/04/2023 22  21 - 32 mmol/L Final    Anion Gap 12/04/2023 9  3 - 11 mmol/L  Final    BUN 12/04/2023 26 (H)  7 - 25 mg/dL Final    Creatinine 12/04/2023 0.95  0.70 - 1.30 mg/dL Final    Glucose 12/04/2023 119 (H)  70 - 105 mg/dL Final    Calcium 12/04/2023 8.9  8.6 - 10.3 mg/dL Final    AST 12/04/2023 16  <40 U/L Final    ALT (SGPT) 12/04/2023 12  7 - 52 U/L Final    Alkaline Phosphatase 12/04/2023 103  45 - 115 U/L Final    Total Protein 12/04/2023 6.1  6.0 - 8.3 g/dL Final    Albumin 12/04/2023 3.7  3.5 - 5.3 g/dL Final    Total Bilirubin 12/04/2023 0.95  0.20 - 1.40 mg/dL Final    Corrected Calcium 12/04/2023 9.1  8.6 - 10.3 mg/dL Final    eGFR 12/04/2023 77  >60 mL/min/1.73m*2 Final    Lipase 12/04/2023 77  11 - 82 U/L Final    Magnesium 12/04/2023 1.9  1.8 - 2.4 mg/dL Final    POC Lactate 12/04/2023 1.20  0.50 - 1.99 MMOL/L Final    CRP 12/04/2023 <1.0  <=10.0 MG/L Final    Protime 12/04/2023 12.0  9.4 - 12.5 SECONDS Final    INR 12/04/2023 1.1  <=1.1 Final    PTT 12/04/2023 27.6  25.1 - 36.5 SECONDS Final    RBC, Urine 12/04/2023 0-2  None seen, 0-2, Negative /HPF Final    WBC, Urine 12/04/2023 0-4  0 - 4 /HPF Final    WBC Clumps, Urine 12/04/2023 None seen  None seen /HPF Final    Squamous Epithelial, Urine 12/04/2023 Negative  None Seen-9 /HPF Final    Bacteria, Urine 12/04/2023 None seen  None seen, Few /HPF Final    Color, Urine 12/04/2023 Yellow  Yellow Final    Clarity, Urine 12/04/2023 Clear  Clear Final    Specific Gravity, Urine 12/04/2023 1.025  1.003 - 1.030 Final    Leukocytes, Urine 12/04/2023 Negative  Negative Final    Nitrite, Urine 12/04/2023 Negative  Negative Final    Protein, Urine 12/04/2023 10 (A)  Negative MG/DL Final    Ketones, Urine 12/04/2023 Negative  Negative MG/DL Final    Urobilinogen, Urine 12/04/2023 2.0 (A)  <2.0 mg/dL Final    Bilirubin, Urine 12/04/2023 Negative  Negative Final    Blood, Urine 12/04/2023 Negative  Negative Final    Glucose, Urine 12/04/2023 Negative  Negative MG/DL Final    pH, Urine 12/04/2023 5.5  5.0 - 8.0 PH Final    POC Glucose  12/04/2023 119 (H)  70 - 105 MG/DL Final    Uric Acid 12/04/2023 4.0 (L)  4.4 - 7.6 mg/dL Final    Procalcitonin 12/04/2023 <0.02  <0.50 ng/mL Final    Sed Rate 12/04/2023 5  <=20 mm/hr Final    Vitamin B-12 12/04/2023 343  180 - 914 pg/mL Final    Folate 12/04/2023 13.3  >6.6 ng/mL Final    Vit D, 25-Hydroxy 12/04/2023 30  30 - 100 ng/mL Final    TSH 12/04/2023 3.622  0.340 - 4.820 uIU/ml Final    Phosphorus 12/04/2023 3.0  2.5 - 4.9 mg/dL Final    Hemoglobin A1C 12/04/2023 6.4 (H)  4.0 - 6.0 % Final    Estimated Average Glucose 12/04/2023 137.0  mg/dL Final    POC Glucose 12/04/2023 117 (H)  70 - 105 MG/DL Final    WBC 12/05/2023 6.4  3.7 - 9.6 10*3/uL Final    RBC 12/05/2023 3.92 (L)  4.10 - 5.80 10*6/µL Final    Hemoglobin 12/05/2023 13.0 (L)  13.2 - 17.2 g/dL Final    Hematocrit 12/05/2023 36.2 (L)  38.0 - 50.0 % Final    MCV 12/05/2023 92.4  82.0 - 97.0 fL Final    MCH 12/05/2023 33.1  29.0 - 34.0 pg Final    MCHC 12/05/2023 35.8  32.0 - 36.0 g/dL Final    RDW 12/05/2023 13.6  11.5 - 15.0 % Final    Platelets 12/05/2023 213  130 - 350 10*3/uL Final    MPV 12/05/2023 7.3  6.9 - 10.8 fL Final    Neutrophils% 12/05/2023 66.2  46.0 - 70.0 % Final    Lymphocytes% 12/05/2023 18.9  15.0 - 47.0 % Final    Monocytes% 12/05/2023 12.4  5.0 - 13.0 % Final    Eosinophils% 12/05/2023 1.7  0.0 - 3.0 % Final    Basophils% 12/05/2023 0.8  0.0 - 2.0 % Final    ANC (auto diff) 12/05/2023 4.20  10*3/UL Final    Lymphocytes Absolute 12/05/2023 1.20  10*3/uL Final    Monocytes Absolute 12/05/2023 0.80  10*3/uL Final    Eosinophils Absolute 12/05/2023 0.10  10*3/uL Final    Basophils Absolute 12/05/2023 0.10  10*3/uL Final    Sodium 12/05/2023 133 (L)  135 - 145 mmol/L Final    Potassium 12/05/2023 4.0  3.5 - 5.1 MMOL/L Final    Chloride 12/05/2023 103  98 - 107 mmol/L Final    CO2 12/05/2023 24  21 - 32 mmol/L Final    BUN 12/05/2023 16  7 - 25 mg/dL Final    Creatinine 12/05/2023 0.82  0.70 - 1.30 mg/dL Final    Glucose 12/05/2023  106 (H)  70 - 105 mg/dL Final    Calcium 12/05/2023 8.9  8.6 - 10.3 mg/dL Final    Anion Gap 12/05/2023 6  3 - 11 mmol/L Final    eGFR 12/05/2023 84  >60 mL/min/1.73m*2 Final    POC Glucose 12/05/2023 113 (H)  70 - 105 MG/DL Final    POC Glucose 12/05/2023 173 (H)  70 - 105 MG/DL Final    POC Glucose 12/05/2023 135 (H)  70 - 105 MG/DL Final    POC Glucose 12/05/2023 142 (H)  70 - 105 MG/DL Final    POC Glucose 12/06/2023 125 (H)  70 - 105 MG/DL Final    POC Glucose 12/06/2023 143 (H)  70 - 105 MG/DL Final    POC Glucose 12/06/2023 145 (H)  70 - 105 MG/DL Final    POC Glucose 12/06/2023 124 (H)  70 - 105 MG/DL Final    POC Glucose 12/07/2023 136 (H)  70 - 105 MG/DL Final    POC Glucose 12/07/2023 131 (H)  70 - 105 MG/DL Final    POC Glucose 12/07/2023 140 (H)  70 - 105 MG/DL Final    POC Glucose 12/07/2023 132 (H)  70 - 105 MG/DL Final    POC Glucose 12/08/2023 127 (H)  70 - 105 MG/DL Final    POC Glucose 12/08/2023 222 (H)  70 - 105 MG/DL Final    POC Glucose 12/08/2023 144 (H)  70 - 105 MG/DL Final    POC Glucose 12/08/2023 148 (H)  70 - 105 MG/DL Final    POC Glucose 12/09/2023 140 (H)  70 - 105 MG/DL Final    POC Glucose 12/09/2023 126 (H)  70 - 105 MG/DL Final    POC Glucose 12/09/2023 148 (H)  70 - 105 MG/DL Final    POC Glucose 12/10/2023 130 (H)  70 - 105 MG/DL Final    POC Glucose 12/10/2023 138 (H)  70 - 105 MG/DL Final    POC Glucose 12/11/2023 145 (H)  70 - 105 MG/DL Final    POC Glucose 12/11/2023 155 (H)  70 - 105 MG/DL Final    POC Glucose 12/11/2023 174 (H)  70 - 105 MG/DL Final    POC Glucose 12/11/2023 138 (H)  70 - 105 MG/DL Final    POC Glucose 12/12/2023 145 (H)  70 - 105 MG/DL Final    POC Glucose 12/12/2023 161 (H)  70 - 105 MG/DL Final    POC Glucose 12/12/2023 135 (H)  70 - 105 MG/DL Final    POC Glucose 12/12/2023 146 (H)  70 - 105 MG/DL Final    POC Glucose 12/13/2023 139 (H)  70 - 105 MG/DL Final    POC Glucose 12/13/2023 139 (H)  70 - 105 MG/DL Final    POC Glucose 12/13/2023 139 (H)  70  - 105 MG/DL Final    POC Glucose 12/14/2023 141 (H)  70 - 105 MG/DL Final    POC Glucose 12/14/2023 209 (H)  70 - 105 MG/DL Final        Assessment/Plan   Left knee pain with associated effusion                       Left total knee arthroplasty, 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  Patient appropriate for discharge.) 12/19/23    Disposition planning.    Electronically signed by: Sami Olsen MD  12/20/2023  11:43 AM

## 2023-12-20 NOTE — INTERDISCIPLINARY/THERAPY
[facility] [id number]  [phone]      Inpatient Occupational Therapy Daily Treatment Note    Date of Service: 12/20/2023  Patient Name: Joseph Wong  Referring Provider: WYATT SRINIVASAN ROBERT  Completed Visit Number: 2  SOC Date: 12/12/23  Certification Date: 01/11/24    Medical Diagnosis:    Weakness [R53.1]  Unable to bear weight on left lower extremity [R26.89]  Lumbar compression fracture, closed, initial encounter (CMS/formerly [facility]) [S32.000A]  Treatment Diagnoses:  Treatment Diagnosis: Decreased ADL status, Decreased upper extremity strength, Decreased cognition, Decreased endurance, Decreased functional mobility, Decreased safe judgment during ADL  Subjective   Family/Caregiver Present  Family/Caregiver Present: No  Subjective Comments  RN Stated patient is medically cleared for therapy: Yes  Subjective Comments: RN ok'd OT tx session. Upon arrival, pt in a supine position in bed and agreeable to therapy. At the end of the tx session pt seated in recliner with all needs within reach and chair alarm donned.     Pain Assessment Scale  0-10 Pain Score: 0 - No pain           Objective    Precautions  Reinforced Precautions: Yes  Other Precautions: fall risk, confusion  Is this a Co-Treatment?: No  Treatment:  Other Precautions: fall risk, confusion    Cognition: Pt alert oriented to person. Pt unable to state month or year. Pt was able to follow simple commands     Bed mobility: SBA  Supine to short with SBA     Transfers: min assist, mod assist   Pt completed SIT TO STAND to FRONT WHEELED WALKER with min assist and pivoted with mod assist to chair.     Ambulation: na     ADLs:   Dressing: na   Grooming: wash cloth   Setup with wash cloth in bed     Balance: impaired       Activity tolerance: vital signs stable on RA      Time Calculation  Start Time: 0707  Stop Time: 0719  Time Calculation (min): 12 min  Therapeutic Interventions (Time Spent in Minutes)  Therapeutic Activity Dynamic:  12        Assessment/Plan   Assessment:    Level of Function and Prognosis  Assessment: Pt demo's decreased activity, impaired cognition, and decreased independence with ADLs and functional mobility. Pt to continue to benefit from skilled OT services.  Plan:    Plan  Plan for next treatment comment: transfers, ADLs, edge of bed  Recommendation  Recommendations for Therapy: Continue skilled therapy  Treatment duration will be through Certification Date: 01/11/24

## 2023-12-21 PROCEDURE — (BLANK) HC ROOM PRIVATE

## 2023-12-21 PROCEDURE — 99232 SBSQ HOSP IP/OBS MODERATE 35: CPT | Performed by: HOSPITALIST

## 2023-12-21 PROCEDURE — 6370000100 HC RX 637 (ALT 250 FOR IP): Performed by: HOSPITALIST

## 2023-12-21 PROCEDURE — 51798 US URINE CAPACITY MEASURE: CPT

## 2023-12-21 PROCEDURE — 2590000100 HC RX 259: Performed by: FAMILY MEDICINE

## 2023-12-21 PROCEDURE — 2590000100 HC RX 259: Performed by: HOSPITALIST

## 2023-12-21 PROCEDURE — 6370000100 HC RX 637 (ALT 250 FOR IP): Performed by: FAMILY MEDICINE

## 2023-12-21 RX ORDER — QUETIAPINE FUMARATE 25 MG/1
50 TABLET, FILM COATED ORAL DAILY
Status: DISCONTINUED | OUTPATIENT
Start: 2023-12-21 | End: 2023-12-26

## 2023-12-21 RX ADMIN — METOPROLOL SUCCINATE 25 MG: 25 TABLET, EXTENDED RELEASE ORAL at 09:37

## 2023-12-21 RX ADMIN — TAMSULOSIN HYDROCHLORIDE 0.4 MG: 0.4 CAPSULE ORAL at 09:36

## 2023-12-21 RX ADMIN — QUETIAPINE FUMARATE 25 MG: 25 TABLET ORAL at 20:20

## 2023-12-21 RX ADMIN — QUETIAPINE FUMARATE 50 MG: 25 TABLET ORAL at 11:58

## 2023-12-21 RX ADMIN — Medication 1 TABLET: at 09:37

## 2023-12-21 RX ADMIN — TAMSULOSIN HYDROCHLORIDE 0.4 MG: 0.4 CAPSULE ORAL at 20:21

## 2023-12-21 RX ADMIN — FAMOTIDINE 10 MG: 20 TABLET, FILM COATED ORAL at 20:21

## 2023-12-21 RX ADMIN — SENNOSIDES AND DOCUSATE SODIUM 1 TABLET: 50; 8.6 TABLET ORAL at 09:36

## 2023-12-21 RX ADMIN — ASPIRIN 81 MG: 81 TABLET ORAL at 09:36

## 2023-12-21 RX ADMIN — FAMOTIDINE 10 MG: 20 TABLET, FILM COATED ORAL at 09:36

## 2023-12-21 RX ADMIN — FINASTERIDE 5 MG: 5 TABLET, FILM COATED ORAL at 20:21

## 2023-12-21 RX ADMIN — QUETIAPINE FUMARATE 150 MG: 100 TABLET ORAL at 17:07

## 2023-12-21 RX ADMIN — AMLODIPINE BESYLATE 5 MG: 5 TABLET ORAL at 09:36

## 2023-12-21 RX ADMIN — FLUOXETINE HYDROCHLORIDE 40 MG: 20 CAPSULE ORAL at 20:20

## 2023-12-21 RX ADMIN — SPIRONOLACTONE 25 MG: 25 TABLET ORAL at 09:37

## 2023-12-21 RX ADMIN — LOSARTAN POTASSIUM 50 MG: 50 TABLET, FILM COATED ORAL at 09:37

## 2023-12-21 RX ADMIN — ATORVASTATIN CALCIUM 40 MG: 40 TABLET, FILM COATED ORAL at 20:21

## 2023-12-21 NOTE — INTERDISCIPLINARY/THERAPY
Spiritual care services   12/21/23 1300   Clinical Encounter Type   Referral By: Rounds   Visited With Other Individuals Significant other/spouse   Visit Type Initial   Taoism Affilation   Affiliated with Anabaptist/Emeli Group Yes   Current Anabaptist/Emeli Group Affiliation Taoism   Spiritual/Emotional Distress   Level Unable to assess   Plan of Care   Spiritual Care Plan Initiated Provide supportive presence   Spiritual Assessment   Completed by    Spiritual Interventions   Spiritual/Taoism Interventions Prayer provided   Emotional/Spiritual Interventions Family/loved one(s) supported     Offered supportive presence, reflective listening and prayer with spouse of patient  Services remain available

## 2023-12-21 NOTE — PROGRESS NOTES
[address] [place] [address]  Daily Progress Note  Patient name: Joseph Wong  MRN: [id number]   LOS: 14 days     Subjective   Patient, still trying to get out of bed at times per nursing and fall risk.  Objective   Vitals:Temp:  [36.7 °C (98 °F)-36.9 °C (98.5 °F)] 36.9 °C (98.5 °F)  Heart Rate:  [65-71] 71  Resp:  [18] 18  SpO2:  [90 %-94 %] 94 %  BP: (121-128)/(77-98) 128/82  Physical Exam:   HEENT: Pupils equal round and reactive to light and accommodation.  Extraocular movements are intact.  Oropharynx clear with no erythema or exudate.  Neck: Supple nontender without palpable lymphadenopathy JVD bruits or goiter appreciated  Lungs: Clear to auscultation bilaterally  Heart: Regular rate and rhythm with normal S1 and S2  Abdomen: Soft nontender. normoactive bowel sounds. no hepatosplenomegaly  Extremities: No clubbing, cyanosis, or edema.  Neurologic: Dementia without agitation    Assessment/Plan       Dementia with agitation (resolved)  Seroquel adjusted  CT head without IV contrast  Stable exam without evidence of acute intracranial injury   acute left knee pain and associated mild effusion  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  They will arrange follow-up in clinic approximately 4 weeks with repeat x-rays.  X-ray thoracolumbar junction  Similar T12 compression deformity.  No change in alignment  CT lumbar spine without contrast  1.  Compression deformities of the T12 and L4 vertebral bodies appear likely acute.  2.  Diffuse lumbar degenerative changes.  3.  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  Generalized weakness  B12 and vitamin D deficiency  DM2 with neuropathy  GERD  CAD  BPH   Mood disorder   NARESH   VTE prophylaxis:  lovenox     Plan:  Continue supportive care  Increase Seroquel to 50 mg po a.m. and 150 mg p.o. with supper  Prior to wean off dialysis  Neurosurgery arranging 4-week follow-up with repeat x-rays, no lifting greater than 5 to 10 pounds and avoid significant bending or twisting.  No brace necessary.  Continue PT OT, await SNF placement     Electronically signed by: Tarik Aaron Jr., MD  12/21/2023  2:37 PM

## 2023-12-21 NOTE — INTERDISCIPLINARY/THERAPY
Case Management Progress Note    Phone # [phone]    Diagnosis: Weakness.      Plan of Care:      Discussed Discharge Topics/Narrative: CM spoke with the patient's nurse about the tele sitter being on at night.  The patient's nurse explained that they trailed the tele sitter, of the patient tried to get out of bed.  CM updated the patient's wife Alessandra Wong.  Alessandra asked if Neurology was consulted.  JOSÉ MIGUEL told Alessandra that Neurosurgery was consulted.  CM notified Lead CM that Alessandra was wondering if a Neurology consult would be appropriate.   will continue to follow.    Family updated: Yes.    RN updated:     Disposition: Placement.

## 2023-12-21 NOTE — NURSING END OF SHIFT
Nursing End of Shift Summary:    Patient: Joseph Wong  MRN: [id number]  : 10/20/1934, Age: 89 y.o.    Location: [address] [place] [address]    Nursing Goals  Clinical Goals for the Shift: telesitter, comfort and safety, monitor for agitation    Narrative Summary of Progress Toward Clinical Goals:  Pt remained calm and cooperative during the shift. Able to move to commode with gaitbelt and front wheel walker. Able to move bowel 4x. Started to get agitated after wife left in the afternoon. Telesitter in place. Needs attended.     Barriers to Goals/Nursing Concerns:  Yes - Placement    New Patient or Family Concerns/Issues:  No    Shift Summary:   Significant Events & Communications to Providers (last 12 hours)       Last 5 Values    No documentation.                 Oxygen Usage (last 12 hours)       Last 5 Values       Row Name 23 0800                   Room Air or Baseline Oxygen Trial by Nursing    Is Patient on Room Air OR on the Same Amount of O2 as at Home? Yes                      Mobility (last 12 hours)       Last 5 Values       Row Name 23 0727 23 0800 23 1200 23 1507 23 1658       Mobility    Activity -- Chair Commode;Dangle -- Dangle;Commode    Level of Assistance -- -- -- -- Maximum assist, patient does 25-49%    Length of Time in Chair (min) -- 120 0 -- --    Distance Ambulated (feet) -- 5 Feet 0 Feet -- --    Distance Ambulated (Meters Calculated) -- 1.52 Meters 0 Meters -- --    Patient Position Up in chair -- -- Supine --    Turning -- Turns self;Pillow support Turns self;Pillow support -- --    Distance Ambulated (Meters Calculated)(Do Not Use) -- 1.52 Feet 0 Feet -- --                  Urethral Catheter       Active Urethral Catheter       None                  Active Lines       Active Central venous catheter / Peripherally inserted central catheter / Implantable Port / Hemodialysis catheter / Midline Catheter       None                  Infusing Medications   Medication Dose  Last Rate     PRN Medications   Medication Dose Last Admin    QUEtiapine  25 mg 25 mg at 12/17/23 1447    sodium chloride  3 mL      sodium chloride  1 spray      sodium chloride-aloe vera  1 Application      acetaminophen  500 mg 500 mg at 12/12/23 1933    oxyCODONE  5 mg 5 mg at 12/17/23 1447    magnesium hydroxide  30 mL      ondansetron  4 mg      Or    ondansetron  4 mg

## 2023-12-21 NOTE — PLAN OF CARE
Problem: Safety Adult - Fall  Goal: Free from fall injury  Description: INTERVENTIONS:    Inpatient - Please reference Cares/Safety Flowsheet under [place] Fall Risk for interventions.  Pediatrics - Please reference Peds Daily Cares/Safety Flowsheet under [place] Pediatric Fall Assessment Fall Bundle for interventions  LD/OB - Please reference OB Shift Screening Flowsheet under OB Fall Risk for interventions.  Outcome: Progressing     Problem: Pain - Adult  Goal: Verbalizes/displays adequate comfort level or baseline comfort level  Description: INTERVENTIONS:  1. Encourage patient to monitor pain and request interventions  2. Assess pain using the appropriate pain scale  3. Administer analgesics based on type and severity of pain and evaluate response  4. Educate/Implement non-pharmacological measures as appropriate and evaluate response  5. Consider cultural, developmental and social influences on pain and pain management  6. Notify Provider if interventions unsuccessful or patient reports new pain  Outcome: Progressing     Problem: Daily Care  Goal: Daily care needs are met  Description: INTERVENTIONS:   1. Assess and monitor skin integrity  2. Identify patients at risk for skin breakdown on admission and per policy  3. Assess and monitor ability to perform self care and identify potential discharge needs  4. Assess skin integrity/risk for skin breakdown  5. Assist patient with activities of daily living as needed  6. Encourage independent activity per ability   7. Provide mouth care   8. Include patient/family/caregiver in decisions related to daily care   Outcome: Progressing     Problem: Infection - Adult  Goal: Absence of infection during hospitalization  Description: INTERVENTIONS:  1. Assess and monitor for signs and symptoms of infection  2. Monitor lab/diagnostic results  3. Monitor all insertion sites/wounds/incisions  4. Monitor secretions for changes in amount and color  5. Administer medications as  ordered  6. Educate and encourage patient and family to use good hand hygiene technique  7. Identify and educate in appropriate isolation precautions for identified infection/condition  Outcome: Progressing     Problem: Safety Adult  Goal: Patient will remain safe during hospitalization  Description: INTERVENTIONS    1. Assess patient for fall risk and implement interventions if needed  2. Use safe transport techniques  3. Assess patient using the appropriate Harvey skin assessment scale  4. Assess patient for risk of aspiration  5. Assess patient for risk of elopement  6. Assess patient for risk of suicide  Outcome: Progressing     Problem: Potential for Compromised Skin Integrity  Goal: Skin Integrity is Maintained or Improved  Description: INTERVENTIONS:  1. Assess and monitor skin integrity  2. Collaborate with interdisciplinary team and initiate plans and interventions as needed  3. Alternate a full bath with partial baths for elderly   4. Monitor patient's hygiene practices   5. Collaborate with wound, ostomy, and continence nurse  Outcome: Progressing  Goal: Nutritional status is improving  Description: INTERVENTIONS:  1. Monitor and assess patient for malnutrition (ex- brittle hair, bruises, dry skin, pale skin and conjunctiva, muscle wasting, smooth red tongue, and disorientation)  2. Monitor patient's weight and dietary intake as ordered or per policy  3. Determine patient's food preferences and provide high-protein, high-caloric foods as appropriate  4. Assist patient with eating   5. Allow adequate time for meals   6. Encourage patient to take dietary supplement as ordered   7. Collaborate with dietitian  8. Include patient/family/caregiver in decisions related to nutrition  Outcome: Progressing     Problem: Urinary Incontinence  Goal: Perineal skin integrity is maintained or improved  Description: INTERVENTIONS:  1. Assess genitourinary system, perineal skin, labs (urinalysis), and history of  incontinence to include past management, aggravating, and alleviating factors  2. Collaborate with interdisciplinary team including wound, ostomy, and continence nurse and initiate plans and interventions as needed  4. Consider urine containment device  5. Apply skin protectant   6. Develop skin care regimen  7. Provide privacy when changing patient's incontinence device to maintain their dignity  Outcome: Progressing     Problem: Elopement Risk  Goal: Minimize the risk of a patient leaving without authorization when departure presents a threat to the safety of patient or others.  Description: INTERVENTIONS:  1. Frequent re-orientation, using a calm voice and positive re-enforcement when interacting with the patient  2. Familiar objects and possessions placed in the room  3. Purposeful focused activities  4. Family or volunteer staying with the patient  5. Medication evaluation by Pharmacy for possible adjustments  6. Consider continual supervision while patient transported off unit  7. Ensure pain relief as necessary  8. Set limits and provide clear expectations as needed  9. Reduce noise/stimulation  Outcome: Progressing

## 2023-12-21 NOTE — NURSING END OF SHIFT
Nursing End of Shift Summary:    Patient: Joseph Wong  MRN: [id number]  : 10/20/1934, Age: 89 y.o.    Location: [address] [place] [address]    Nursing Goals  Clinical Goals for the Shift: maintain safety and comfort, one on one sitter.    Narrative Summary of Progress Toward Clinical Goals:  Pt was agitated at the beginning of the shift but later calmed down, and  went to sleep. One on one sitter maintained, vital signs remain stable. Safety and comfort maintained throughout the night.    Barriers to Goals/Nursing Concerns:  Yes - Placement    New Patient or Family Concerns/Issues:  No    Shift Summary:   Significant Events & Communications to Providers (last 12 hours)       Last 5 Values    No documentation.                 Oxygen Usage (last 12 hours)       Last 5 Values       Row Name 23                   Room Air or Baseline Oxygen Trial by Nursing    Is Patient on Room Air OR on the Same Amount of O2 as at Home? Yes                      Mobility (last 12 hours)       Last 5 Values       Row Name 23 1800 23 2307 23 2313       Mobility    Activity Back to bed;Turn Turn Sleeping Turn --    Level of Assistance -- -- -- Minimal assist, patient does 75% or more --    Length of Time in Chair (min) 0 -- 0 -- --    Distance Ambulated (feet) 0 Feet -- 0 Feet -- --    Distance Ambulated (Meters Calculated) 0 Meters -- 0 Meters -- --    Patient Position -- Supine -- -- Supine    Turning Turns self;Pillow support Turns self;Pillow support Turns self;Pillow support Turns self --    Distance Ambulated (Meters Calculated)(Do Not Use) 0 Feet -- 0 Feet -- --      Row Name 23 2319 23 2338 23 0405 23 0522          Mobility    Activity Chair position in bed;Turn -- Turn Turn     Level of Assistance Minimal assist, patient does 75% or more -- Independent Independent     Turning Turns self Pillow support;Other (Comment)  both arms Turns self Turns self                    Urethral Catheter       Active Urethral Catheter       None                  Active Lines       Active Central venous catheter / Peripherally inserted central catheter / Implantable Port / Hemodialysis catheter / Midline Catheter       None                  Infusing Medications   Medication Dose Last Rate     PRN Medications   Medication Dose Last Admin    QUEtiapine  25 mg 25 mg at 12/17/23 1447    sodium chloride  3 mL      sodium chloride  1 spray      sodium chloride-aloe vera  1 Application      acetaminophen  500 mg 500 mg at 12/12/23 1933    oxyCODONE  5 mg 5 mg at 12/17/23 1447    magnesium hydroxide  30 mL      ondansetron  4 mg      Or    ondansetron  4 mg

## 2023-12-22 PROCEDURE — 99232 SBSQ HOSP IP/OBS MODERATE 35: CPT | Performed by: HOSPITALIST

## 2023-12-22 PROCEDURE — 6370000100 HC RX 637 (ALT 250 FOR IP): Performed by: FAMILY MEDICINE

## 2023-12-22 PROCEDURE — 6370000100 HC RX 637 (ALT 250 FOR IP): Performed by: HOSPITALIST

## 2023-12-22 PROCEDURE — 2590000100 HC RX 259: Performed by: FAMILY MEDICINE

## 2023-12-22 PROCEDURE — (BLANK) HC ROOM PRIVATE

## 2023-12-22 PROCEDURE — 2590000100 HC RX 259: Performed by: HOSPITALIST

## 2023-12-22 RX ADMIN — FINASTERIDE 5 MG: 5 TABLET, FILM COATED ORAL at 19:45

## 2023-12-22 RX ADMIN — METOPROLOL SUCCINATE 25 MG: 25 TABLET, EXTENDED RELEASE ORAL at 09:01

## 2023-12-22 RX ADMIN — LOSARTAN POTASSIUM 50 MG: 50 TABLET, FILM COATED ORAL at 09:02

## 2023-12-22 RX ADMIN — TAMSULOSIN HYDROCHLORIDE 0.4 MG: 0.4 CAPSULE ORAL at 19:45

## 2023-12-22 RX ADMIN — QUETIAPINE FUMARATE 150 MG: 100 TABLET ORAL at 17:52

## 2023-12-22 RX ADMIN — AMLODIPINE BESYLATE 5 MG: 5 TABLET ORAL at 09:02

## 2023-12-22 RX ADMIN — QUETIAPINE FUMARATE 25 MG: 25 TABLET ORAL at 21:29

## 2023-12-22 RX ADMIN — ASPIRIN 81 MG: 81 TABLET ORAL at 09:02

## 2023-12-22 RX ADMIN — ATORVASTATIN CALCIUM 40 MG: 40 TABLET, FILM COATED ORAL at 19:44

## 2023-12-22 RX ADMIN — TAMSULOSIN HYDROCHLORIDE 0.4 MG: 0.4 CAPSULE ORAL at 09:02

## 2023-12-22 RX ADMIN — FAMOTIDINE 10 MG: 20 TABLET, FILM COATED ORAL at 09:02

## 2023-12-22 RX ADMIN — QUETIAPINE FUMARATE 50 MG: 25 TABLET ORAL at 09:02

## 2023-12-22 RX ADMIN — Medication 1 TABLET: at 09:01

## 2023-12-22 RX ADMIN — FAMOTIDINE 10 MG: 20 TABLET, FILM COATED ORAL at 19:45

## 2023-12-22 RX ADMIN — SPIRONOLACTONE 25 MG: 25 TABLET ORAL at 09:01

## 2023-12-22 RX ADMIN — FLUOXETINE HYDROCHLORIDE 40 MG: 20 CAPSULE ORAL at 19:44

## 2023-12-22 RX ADMIN — SENNOSIDES AND DOCUSATE SODIUM 1 TABLET: 50; 8.6 TABLET ORAL at 19:45

## 2023-12-22 NOTE — NURSING END OF SHIFT
Nursing End of Shift Summary:    Patient: Joseph Wong  MRN: [id number]  : 10/20/1934, Age: 89 y.o.    Location: [address] [place] [address]    Nursing Goals  Clinical Goals for the Shift: monitor VS, administer medication as scheduled, remain free from falls during shift, promote comfort and rest    Narrative Summary of Progress Toward Clinical Goals:  Pt vs stable throughout shift; pt remained free from falls during shift; pt received all  medications as scheduled; pt slept throughout the night; no agitation noted during shift;    Barriers to Goals/Nursing Concerns:  Yes - Orders, Labs, Meds    New Patient or Family Concerns/Issues:  No    Shift Summary:   Significant Events & Communications to Providers (last 12 hours)       Last 5 Values    No documentation.                 Oxygen Usage (last 12 hours)       Last 5 Values    No documentation.                 Mobility (last 12 hours)       Last 5 Values       Row Name 23 1935 23 2154 23 0000 23 0527          Mobility    Activity -- Turn -- Commode     Level of Assistance -- -- -- Maximum assist, patient does 25-49%     Length of Time in Chair (min) -- 0 -- --     Distance Ambulated (feet) -- 0 Feet -- --     Distance Ambulated (Meters Calculated) -- 0 Meters -- --     Patient Position -- -- Supine --     Turning Turns self Turns self Turns self --     Distance Ambulated (Meters Calculated)(Do Not Use) -- 0 Feet -- --                   Urethral Catheter       Active Urethral Catheter       None                  Active Lines       Active Central venous catheter / Peripherally inserted central catheter / Implantable Port / Hemodialysis catheter / Midline Catheter       None                  Infusing Medications   Medication Dose Last Rate     PRN Medications   Medication Dose Last Admin    QUEtiapine  25 mg 25 mg at 23    sodium chloride  3 mL      sodium chloride  1 spray      sodium chloride-aloe vera  1 Application      acetaminophen  500 mg 500  mg at 12/12/23 1933    oxyCODONE  5 mg 5 mg at 12/17/23 1447    magnesium hydroxide  30 mL      ondansetron  4 mg      Or    ondansetron  4 mg

## 2023-12-22 NOTE — INTERDISCIPLINARY/THERAPY
Spiritual care services   followed up with spouse of patient  Offered supportive presence  Services remain available as needed

## 2023-12-22 NOTE — PROGRESS NOTES
[address] [place] [address]  Daily Progress Note  Patient name: Joseph Wong  MRN: [id number]   LOS: 15 days     Subjective   Patient doing well today and slept well last night  Objective   Vitals:Temp:  [36.1 °C (97 °F)-37.2 °C (99 °F)] 36.1 °C (97 °F)  Heart Rate:  [60-74] 60  Resp:  [18-20] 20  SpO2:  [90 %] 90 %  BP: (125-143)/(76-85) 143/85  Physical Exam:   HEENT: Pupils equal round and reactive to light and accommodation.  Extraocular movements are intact.  Oropharynx clear with no erythema or exudate.  Neck: Supple nontender without palpable lymphadenopathy JVD bruits or goiter appreciated  Lungs: Clear to auscultation bilaterally  Heart: Regular rate and rhythm with normal S1 and S2  Abdomen: Soft nontender. normoactive bowel sounds. no hepatosplenomegaly  Extremities: No clubbing, cyanosis, or edema.  Neurologic: dementia.  CN II through XII intact.  Nonfocal, generalized debility and weakness.      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  They will arrange follow-up in clinic approximately 4 weeks with repeat x-rays.  X-ray thoracolumbar junction  Similar T12 compression deformity.  No change in alignment  CT lumbar spine without contrast  1.  Compression deformities of the T12 and L4 vertebral bodies appear likely acute.  2.  Diffuse lumbar degenerative changes.  3.  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  Generalized weakness  B12 and vitamin D deficiency  DM2 with neuropathy  GERD  CAD  BPH   Mood disorder   NARESH   VTE prophylaxis:  lovenox     Plan:  Continue supportive care with current dose Seroquel to 50 mg po a.m. and 150 mg p.o. with supper and try to see if we can dc avasys in next few days  Neurosurgery arranging 4-week follow-up with repeat x-rays, no lifting greater than 5 to 10 pounds and avoid significant bending or twisting.  No brace necessary.  Continue PT OT, await SNF placement       Electronically signed by: Tarik Aaron Jr., MD  12/22/2023  12:04 PM

## 2023-12-22 NOTE — PLAN OF CARE
Problem: Safety Adult - Fall  Goal: Free from fall injury  Description: INTERVENTIONS:    Inpatient - Please reference Cares/Safety Flowsheet under Oneil Fall Risk for interventions.  Pediatrics - Please reference Peds Daily Cares/Safety Flowsheet under [facility] Pediatric Fall Assessment Fall Bundle for interventions  LD/OB - Please reference OB Shift Screening Flowsheet under OB Fall Risk for interventions.  Outcome: Progressing     Problem: Pain - Adult  Goal: Verbalizes/displays adequate comfort level or baseline comfort level  Description: INTERVENTIONS:  1. Encourage patient to monitor pain and request interventions  2. Assess pain using the appropriate pain scale  3. Administer analgesics based on type and severity of pain and evaluate response  4. Educate/Implement non-pharmacological measures as appropriate and evaluate response  5. Consider cultural, developmental and social influences on pain and pain management  6. Notify Provider if interventions unsuccessful or patient reports new pain  Outcome: Progressing  Flowsheets (Taken 12/22/2023 0521)  Verbalizes/displays adequate comfort level or baseline comfort level:   Encourage patient to monitor pain and request interventions   Assess pain using the appropriate pain scale   Administer analgesics based on type and severity of pain and evaluate response   Educate/Implement non-pharmacological measures as appropriate and evaluate response   Consider cultural, developmental and social influences on pain and pain management     Problem: Daily Care  Goal: Daily care needs are met  Description: INTERVENTIONS:   1. Assess and monitor skin integrity  2. Identify patients at risk for skin breakdown on admission and per policy  3. Assess and monitor ability to perform self care and identify potential discharge needs  4. Assess skin integrity/risk for skin breakdown  5. Assist patient with activities of daily living as needed  6. Encourage independent activity per  ability   7. Provide mouth care   8. Include patient/family/caregiver in decisions related to daily care   Outcome: Progressing  Flowsheets (Taken 12/22/2023 0521)  Daily care needs are met:   Assess and monitor skin integrity   Identify patients at risk for skin breakdown on admission and per policy   Assess and monitor ability to perform self care and identify potential discharge needs   Assess skin integrity/risk for skin breakdown   Assist patient with activities of daily living as needed   Encourage independent activity per ability   Provide mouth care   Include patient/family/caregiver in decisions related to daily care     Problem: Knowledge Deficit  Goal: Patient/family/caregiver demonstrates understanding of disease process, treatment plan, medications, and discharge instructions  Description: INTERVENTIONS:   1. Complete learning assessment and assess knowledge base  2. Provide teaching at level of understanding   3. Provide teaching via preferred learning methods  Outcome: Progressing  Flowsheets (Taken 12/22/2023 0521)  Patient/family/caregiver demonstrates understanding of disease process, treatment plan, medications, and discharge instructions:   Complete learning assessment and assess knowledge base   Provide teaching at level of understanding   Provide teaching via preferred learning methods     Problem: Discharge Barriers  Goal: Patient's discharge needs are met  Description: INTERVENTIONS:  1. Assess patient for self-management skills  2. Encourage participation in management  3. Identify potential discharge barriers on admission and throughout hospital stay  4. Involve patient/family/caregiver in discharge planning process  5. Collaborate with case management/ for discharge needs  Outcome: Progressing  Flowsheets (Taken 12/22/2023 0521)  Patient discharge needs are met:   Assess patient for self-management skills   Encourage participation in management   Identify potential discharge  barriers on admission and throughout hospital stay   Involve patient/family/caregiver in discharge planning process     Problem: Potential for Compromised Skin Integrity  Goal: Skin Integrity is Maintained or Improved  Description: INTERVENTIONS:  1. Assess and monitor skin integrity  2. Collaborate with interdisciplinary team and initiate plans and interventions as needed  3. Alternate a full bath with partial baths for elderly   4. Monitor patient's hygiene practices   5. Collaborate with wound, ostomy, and continence nurse  Outcome: Progressing  Flowsheets (Taken 12/22/2023 0521)  Skin integrity is maintained or improved:   Assess and monitor skin integrity   Alternate a full bath with partial baths for elderly   Collaborate with interdisciplinary team and initiate plans and interventions as needed   Monitor patient's hygiene practices  Goal: Nutritional status is improving  Description: INTERVENTIONS:  1. Monitor and assess patient for malnutrition (ex- brittle hair, bruises, dry skin, pale skin and conjunctiva, muscle wasting, smooth red tongue, and disorientation)  2. Monitor patient's weight and dietary intake as ordered or per policy  3. Determine patient's food preferences and provide high-protein, high-caloric foods as appropriate  4. Assist patient with eating   5. Allow adequate time for meals   6. Encourage patient to take dietary supplement as ordered   7. Collaborate with dietitian  8. Include patient/family/caregiver in decisions related to nutrition  Outcome: Progressing  Flowsheets (Taken 12/22/2023 0521)  Nutritional status is improving:   Monitor patient's weight and dietary intake as ordered or per policy   Monitor and assess patient for malnutrition (ex- brittle hair, bruises, dry skin, pale skin and conjunctiva, muscle wasting, smooth red tongue, and disorientation)   Determine patient's food preferences and provide high-protein, high-caloric foods as appropriate   Assist patient with eating    Allow adequate time for meals   Encourage patient to take dietary supplement as ordered  Goal: MOBILITY IS MAINTAINED OR IMPROVED  Description: INTERVENTIONS  1. Collaborate with interdisciplinary team and initiate plan and interventions as ordered (PT/OT)  2. Encourage ambulation  3. Up to chair for meals  4. Monitor for signs of deconditioning  Outcome: Progressing  Flowsheets (Taken 12/22/2023 0521)  Mobility is Maintained or Improved:   Collaborate with interdisciplinary team and initiate plan and interventions as ordered (PT/OT)   Encourage ambulation   Up to chair for meals   Monitor for signs of deconditioning     Problem: Urinary Incontinence  Goal: Perineal skin integrity is maintained or improved  Description: INTERVENTIONS:  1. Assess genitourinary system, perineal skin, labs (urinalysis), and history of incontinence to include past management, aggravating, and alleviating factors  2. Collaborate with interdisciplinary team including wound, ostomy, and continence nurse and initiate plans and interventions as needed  4. Consider urine containment device  5. Apply skin protectant   6. Develop skin care regimen  7. Provide privacy when changing patient's incontinence device to maintain their dignity  Outcome: Progressing  Flowsheets (Taken 12/22/2023 0521)  Perineal skin integrity is maintained or improved:   Assess genitourinary system, perineal skin, labs (urinalysis), and history of incontinence to include past management, aggravating, and alleviating factors   Collaborate with interdisciplinary team including wound, ostomy, and continence nurse and initiate plans and interventions as needed   Consider urine containment device   Apply skin protectant   Develop skin care regimen   Provide privacy when changing patient's incontinence device to maintain their dignity     Problem: Infection - Adult  Goal: Absence of infection during hospitalization  Description: INTERVENTIONS:  1. Assess and monitor for signs  and symptoms of infection  2. Monitor lab/diagnostic results  3. Monitor all insertion sites/wounds/incisions  4. Monitor secretions for changes in amount and color  5. Administer medications as ordered  6. Educate and encourage patient and family to use good hand hygiene technique  7. Identify and educate in appropriate isolation precautions for identified infection/condition  Outcome: Progressing  Flowsheets (Taken 12/22/2023 0521)  Absence of infection during hospitalization:   Assess and monitor for signs and symptoms of infection   Monitor lab/diagnostic results   Monitor all insertion sites/wounds/incisions   Monitor secretions for changes in amount and colo   Administer medications as ordered   Educate and encourage patient and family to use good hand hygiene technique     Problem: Safety Adult  Goal: Patient will remain safe during hospitalization  Description: INTERVENTIONS    1. Assess patient for fall risk and implement interventions if needed  2. Use safe transport techniques  3. Assess patient using the appropriate Harvey skin assessment scale  4. Assess patient for risk of aspiration  5. Assess patient for risk of elopement  6. Assess patient for risk of suicide  Outcome: Progressing  Flowsheets (Taken 12/22/2023 0521)  Patient will remain safe durning hospitalization:   Assess Patient for Aspirations   Use safe transport   Assess patient for Fall Risk   Assess Patient for Risk of Elopement   Assess Patient using the appropriate Harvey scale   Assess Patient for Risk of Suicide     Problem: Genitourinary - Adult  Goal: Absence of urinary retention  Description: INTERVENTIONS:  1. Assess patient’s ability to void and empty bladder.  2. Monitor intake/output and perform bladder scan if indicated.  3. Place urinary catheter per order and initiate and maintain CAUTI bundle.  4. Administer medications to alleviate retention as needed.  5. Discuss catheterization for long term situations as  appropriate.    Outcome: Progressing  Flowsheets (Taken 12/22/2023 0521)  Absence of urinary retention:   Assess patient’s ability to void and empty bladder   Monitor intake/output and perform bladder scan if indicated   Administer medications to alleviate retention as needed   Discuss catheterization for long term situations as appropriate   Place urinary catheter per order and initiate and maintain CAUTI bundle.     Problem: Elopement Risk  Goal: Minimize the risk of a patient leaving without authorization when departure presents a threat to the safety of patient or others.  Description: INTERVENTIONS:  1. Frequent re-orientation, using a calm voice and positive re-enforcement when interacting with the patient  2. Familiar objects and possessions placed in the room  3. Purposeful focused activities  4. Family or volunteer staying with the patient  5. Medication evaluation by Pharmacy for possible adjustments  6. Consider continual supervision while patient transported off unit  7. Ensure pain relief as necessary  8. Set limits and provide clear expectations as needed  9. Reduce noise/stimulation  Outcome: Progressing  Flowsheets (Taken 12/22/2023 0521)  Minimize the risk of patient leaving:   Frequent re-orientation, using a calm voice and positive re-enforcement when interacting with the patient   Familiar objects and possessions placed in the room   Consider continual supervision while patient transported off unit   Reduce noise/stimulation   Purposeful focused activities   Ensure pain relief as necessary   Family or volunteer staying with the patient   Set limits and provide clear expectations as needed

## 2023-12-22 NOTE — INTERDISCIPLINARY/THERAPY
Case Management Progress Note    Phone # [phone]    Diagnosis: Weakness.    Discussed Discharge Topics/Narrative: CM spoke with the patient's wife Alessandra Wong, and the patient's nurse.  The patient's nurse confirmed that the tele sitter was on last night.  CM spoke to Alessandra about the barrier to discharge with the tele sitter.   will continue to follow.    Family updated: Yes.  Please see above.    RN updated: Please see above.    Disposition: Placement.

## 2023-12-22 NOTE — NURSING END OF SHIFT
Nursing End of Shift Summary:    Patient: Joseph Wong  MRN: [id number]  : 10/20/1934, Age: 89 y.o.    Location: [address] [place] [address]    Nursing Goals  Clinical Goals for the Shift: comfort and safety, pain medications, telesitter/one-on-one sitter, monitor for agitation    Narrative Summary of Progress Toward Clinical Goals:  Pt remained calm in the mornig. Agitation started when wife left the premises. On remote telesitter. Due Seroquel given. Pt has weakness on both lower extremities, requiring 2 assist.     Barriers to Goals/Nursing Concerns:  Yes - placement    New Patient or Family Concerns/Issues:  No    Shift Summary:   Significant Events & Communications to Providers (last 12 hours)       Last 5 Values       Row Name 23 0830                   Provider Notification    Reason for Communication Family request  Pt wife wanted to give systane for dry eyes  -AN        Provider Name sabrina Das  -NONI                  User Key  (r) = Recorded By, (t) = Taken By, (c) = Cosigned By      Initials Name    AN Aurora Veras, SAUL                  Oxygen Usage (last 12 hours)       Last 5 Values       Row Name 23 0800                   Room Air or Baseline Oxygen Trial by Nursing    Is Patient on Room Air OR on the Same Amount of O2 as at Home? Yes        Are You Performing the QShift O2 Trial? No                      Mobility (last 12 hours)       Last 5 Values       Row Name 23 0822 23 0830 23 1611 23 193          Mobility    Activity -- Commode -- --     Level of Assistance -- Maximum assist, patient does 25-49% -- --     Length of Time in Chair (min) -- 0 -- --     Distance Ambulated (feet) -- 0 Feet -- --     Distance Ambulated (Meters Calculated) -- 0 Meters -- --     Patient Position Supine Sitting Supine --     Turning -- Turns self;Pillow support -- Turns self     Distance Ambulated (Meters Calculated)(Do Not Use) -- 0 Feet -- --                   Urethral Catheter       Active Urethral  Catheter       None                  Active Lines       Active Central venous catheter / Peripherally inserted central catheter / Implantable Port / Hemodialysis catheter / Midline Catheter       None                  Infusing Medications   Medication Dose Last Rate     PRN Medications   Medication Dose Last Admin    QUEtiapine  25 mg 25 mg at 12/17/23 1447    sodium chloride  3 mL      sodium chloride  1 spray      sodium chloride-aloe vera  1 Application      acetaminophen  500 mg 500 mg at 12/12/23 1933    oxyCODONE  5 mg 5 mg at 12/17/23 1447    magnesium hydroxide  30 mL      ondansetron  4 mg      Or    ondansetron  4 mg

## 2023-12-23 LAB — GLUCOSE BLDC GLUCOMTR-MCNC: 147 MG/DL (ref 70–105)

## 2023-12-23 PROCEDURE — 6370000100 HC RX 637 (ALT 250 FOR IP): Performed by: HOSPITALIST

## 2023-12-23 PROCEDURE — 2590000100 HC RX 259: Performed by: FAMILY MEDICINE

## 2023-12-23 PROCEDURE — 99232 SBSQ HOSP IP/OBS MODERATE 35: CPT | Performed by: HOSPITALIST

## 2023-12-23 PROCEDURE — 6370000100 HC RX 637 (ALT 250 FOR IP): Performed by: FAMILY MEDICINE

## 2023-12-23 PROCEDURE — 2590000100 HC RX 259: Performed by: HOSPITALIST

## 2023-12-23 PROCEDURE — 82947 ASSAY GLUCOSE BLOOD QUANT: CPT | Mod: QW

## 2023-12-23 PROCEDURE — (BLANK) HC ROOM PRIVATE

## 2023-12-23 RX ADMIN — TAMSULOSIN HYDROCHLORIDE 0.4 MG: 0.4 CAPSULE ORAL at 20:21

## 2023-12-23 RX ADMIN — AMLODIPINE BESYLATE 5 MG: 5 TABLET ORAL at 08:21

## 2023-12-23 RX ADMIN — TAMSULOSIN HYDROCHLORIDE 0.4 MG: 0.4 CAPSULE ORAL at 08:20

## 2023-12-23 RX ADMIN — SENNOSIDES AND DOCUSATE SODIUM 1 TABLET: 50; 8.6 TABLET ORAL at 08:20

## 2023-12-23 RX ADMIN — METOPROLOL SUCCINATE 25 MG: 25 TABLET, EXTENDED RELEASE ORAL at 08:20

## 2023-12-23 RX ADMIN — QUETIAPINE FUMARATE 50 MG: 25 TABLET ORAL at 08:21

## 2023-12-23 RX ADMIN — FAMOTIDINE 10 MG: 20 TABLET, FILM COATED ORAL at 20:21

## 2023-12-23 RX ADMIN — ATORVASTATIN CALCIUM 40 MG: 40 TABLET, FILM COATED ORAL at 20:21

## 2023-12-23 RX ADMIN — QUETIAPINE FUMARATE 150 MG: 100 TABLET ORAL at 17:23

## 2023-12-23 RX ADMIN — FLUOXETINE HYDROCHLORIDE 40 MG: 20 CAPSULE ORAL at 20:20

## 2023-12-23 RX ADMIN — Medication 1 TABLET: at 08:20

## 2023-12-23 RX ADMIN — FINASTERIDE 5 MG: 5 TABLET, FILM COATED ORAL at 20:22

## 2023-12-23 RX ADMIN — SPIRONOLACTONE 25 MG: 25 TABLET ORAL at 08:21

## 2023-12-23 RX ADMIN — ASPIRIN 81 MG: 81 TABLET ORAL at 08:21

## 2023-12-23 RX ADMIN — SENNOSIDES AND DOCUSATE SODIUM 1 TABLET: 50; 8.6 TABLET ORAL at 20:21

## 2023-12-23 RX ADMIN — LOSARTAN POTASSIUM 50 MG: 50 TABLET, FILM COATED ORAL at 08:21

## 2023-12-23 RX ADMIN — QUETIAPINE FUMARATE 25 MG: 25 TABLET ORAL at 20:22

## 2023-12-23 RX ADMIN — POLYETHYLENE GLYCOL 3350 17 G: 17 POWDER, FOR SOLUTION ORAL at 08:20

## 2023-12-23 RX ADMIN — LIDOCAINE 1 PATCH: 50 PATCH CUTANEOUS at 08:20

## 2023-12-23 RX ADMIN — FAMOTIDINE 10 MG: 20 TABLET, FILM COATED ORAL at 08:21

## 2023-12-23 NOTE — PLAN OF CARE
Problem: Safety Adult - Fall  Goal: Free from fall injury  Description: INTERVENTIONS:    Inpatient - Please reference Cares/Safety Flowsheet under Oneil Fall Risk for interventions.  Pediatrics - Please reference Peds Daily Cares/Safety Flowsheet under [facility] Pediatric Fall Assessment Fall Bundle for interventions  LD/OB - Please reference OB Shift Screening Flowsheet under OB Fall Risk for interventions.  Outcome: Progressing     Problem: Daily Care  Goal: Daily care needs are met  Description: INTERVENTIONS:   1. Assess and monitor skin integrity  2. Identify patients at risk for skin breakdown on admission and per policy  3. Assess and monitor ability to perform self care and identify potential discharge needs  4. Assess skin integrity/risk for skin breakdown  5. Assist patient with activities of daily living as needed  6. Encourage independent activity per ability   7. Provide mouth care   8. Include patient/family/caregiver in decisions related to daily care   Outcome: Progressing     Problem: Discharge Barriers  Goal: Patient's discharge needs are met  Description: INTERVENTIONS:  1. Assess patient for self-management skills  2. Encourage participation in management  3. Identify potential discharge barriers on admission and throughout hospital stay  4. Involve patient/family/caregiver in discharge planning process  5. Collaborate with case management/ for discharge needs  Outcome: Progressing

## 2023-12-23 NOTE — PROGRESS NOTES
[address] [place] [address]  Daily Progress Note  Patient name: Joseph Wong  MRN: [id number]   LOS: 16 days     Subjective   Patient doing well yesterday and last night without sundowning  Objective   Vitals:Temp:  [36.4 °C (97.6 °F)-36.5 °C (97.7 °F)] 36.4 °C (97.6 °F)  Heart Rate:  [62-70] 62  Resp:  [18] 18  SpO2:  [90 %] 90 %  BP: (137-150)/(92-96) 137/92  Physical Exam:   HEENT: Pupils equal round and reactive to light and accommodation.  Extraocular movements are intact.  Oropharynx clear with no erythema or exudate.  Neck: Supple nontender without palpable lymphadenopathy JVD bruits or goiter appreciated  Lungs: Clear to auscultation bilaterally  Heart: Regular rate and rhythm with normal S1 and S2  Abdomen: Soft nontender. normoactive bowel sounds. no hepatosplenomegaly  Extremities: No clubbing, cyanosis, or edema.  Neurologic: Alert, dementia.  CN II through XII intact.  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  They will arrange follow-up in clinic approximately 4 weeks with repeat x-rays.  X-ray thoracolumbar junction  Similar T12 compression deformity.  No change in alignment  CT lumbar spine without contrast  1.  Compression deformities of the T12 and L4 vertebral bodies appear likely acute.  2.  Diffuse lumbar degenerative changes.  3.  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  Generalized weakness  B12 and vitamin D deficiency  DM2 with neuropathy  GERD  CAD  BPH   Mood disorder   NARESH   VTE prophylaxis:  lovenox     Plan:  Continue supportive care   DC tele sitter  Neurosurgery arranging 4-week follow-up with repeat x-rays, no lifting greater than 5 to 10 pounds and avoid significant bending or twisting.  No brace necessary.  Continue PT OT, await SNF placement       Electronically signed by: Tarik Aaron Jr., MD  12/23/2023  12:56 PM

## 2023-12-23 NOTE — NURSING END OF SHIFT
Nursing End of Shift Summary:    Patient: Joseph Wong  MRN: [id number]  : 10/20/1934, Age: 89 y.o.    Location: [address] [place] [address]    Nursing Goals  Clinical Goals for the Shift: comfort and safety, remote sitter, ambulation, monitor for agitation    Narrative Summary of Progress Toward Clinical Goals:  Pt remained calm and cooperative during the day. Lori bath done. Monitored for agitation, started when wife left. Telesitter in place.     Barriers to Goals/Nursing Concerns:  Yes - Placement    New Patient or Family Concerns/Issues:  No    Shift Summary:   Significant Events & Communications to Providers (last 12 hours)       Last 5 Values       Row Name 23 1407                   Provider Notification    Reason for Communication Family request  -AN        Provider Name Dr. Aaron  -NONI                  User Key  (r) = Recorded By, (t) = Taken By, (c) = Cosigned By      Initials Name    AN Aurora Veras, RN                  Oxygen Usage (last 12 hours)       Last 5 Values       Row Name 23 0830                   Room Air or Baseline Oxygen Trial by Nursing    Is Patient on Room Air OR on the Same Amount of O2 as at Home? Yes                      Mobility (last 12 hours)       Last 5 Values       Row Name 23 0800 23 0818 23 0830 23 1200 23 1614       Mobility    Activity Dangle;Turn -- Commode Turn --    Level of Assistance -- -- Moderate assist, patient does 50-74% -- --    Length of Time in Chair (min) 0 -- 0 0 --    Distance Ambulated (feet) 0 Feet -- 5 Feet 0 Feet --    Distance Ambulated (Meters Calculated) 0 Meters -- 1.52 Meters 0 Meters --    Patient Position -- Supine Supine -- Supine    Turning Pillow support;Turns self -- Pillow support;Turns self Every 2 hours;Pillow support;Back --    Distance Ambulated (Meters Calculated)(Do Not Use) 0 Feet -- 1.52 Feet 0 Feet --      Row Name 23 1828                   Mobility    Activity Bathroom privileges;Ambulate in room;Back to  bed        Level of Assistance Moderate assist, patient does 50-74%        Distance Ambulated (feet) 20 Feet        Distance Ambulated (Meters Calculated) 6.1 Meters        Turning Turns self        Distance Ambulated (Meters Calculated)(Do Not Use) 6.1 Feet                      Urethral Catheter       Active Urethral Catheter       None                  Active Lines       Active Central venous catheter / Peripherally inserted central catheter / Implantable Port / Hemodialysis catheter / Midline Catheter       None                  Infusing Medications   Medication Dose Last Rate     PRN Medications   Medication Dose Last Admin    QUEtiapine  25 mg 25 mg at 12/21/23 2020    sodium chloride  3 mL      sodium chloride  1 spray      sodium chloride-aloe vera  1 Application      acetaminophen  500 mg 500 mg at 12/12/23 1933    oxyCODONE  5 mg 5 mg at 12/17/23 1447    magnesium hydroxide  30 mL      ondansetron  4 mg      Or    ondansetron  4 mg

## 2023-12-23 NOTE — PLAN OF CARE
Problem: Safety Adult - Fall  Goal: Free from fall injury  Description: INTERVENTIONS:    Inpatient - Please reference Cares/Safety Flowsheet under [facility] Fall Risk for interventions.  Pediatrics - Please reference Peds Daily Cares/Safety Flowsheet under [facility] Pediatric Fall Assessment Fall Bundle for interventions  LD/OB - Please reference OB Shift Screening Flowsheet under OB Fall Risk for interventions.  Outcome: Progressing     Problem: Pain - Adult  Goal: Verbalizes/displays adequate comfort level or baseline comfort level  Description: INTERVENTIONS:  1. Encourage patient to monitor pain and request interventions  2. Assess pain using the appropriate pain scale  3. Administer analgesics based on type and severity of pain and evaluate response  4. Educate/Implement non-pharmacological measures as appropriate and evaluate response  5. Consider cultural, developmental and social influences on pain and pain management  6. Notify Provider if interventions unsuccessful or patient reports new pain  Outcome: Progressing     Problem: Daily Care  Goal: Daily care needs are met  Description: INTERVENTIONS:   1. Assess and monitor skin integrity  2. Identify patients at risk for skin breakdown on admission and per policy  3. Assess and monitor ability to perform self care and identify potential discharge needs  4. Assess skin integrity/risk for skin breakdown  5. Assist patient with activities of daily living as needed  6. Encourage independent activity per ability   7. Provide mouth care   8. Include patient/family/caregiver in decisions related to daily care   Outcome: Progressing     Problem: Knowledge Deficit  Goal: Patient/family/caregiver demonstrates understanding of disease process, treatment plan, medications, and discharge instructions  Description: INTERVENTIONS:   1. Complete learning assessment and assess knowledge base  2. Provide teaching at level of understanding   3. Provide teaching via preferred  learning methods  Outcome: Progressing     Problem: Discharge Barriers  Goal: Patient's discharge needs are met  Description: INTERVENTIONS:  1. Assess patient for self-management skills  2. Encourage participation in management  3. Identify potential discharge barriers on admission and throughout hospital stay  4. Involve patient/family/caregiver in discharge planning process  5. Collaborate with case management/ for discharge needs  Outcome: Progressing     Problem: Infection - Adult  Goal: Absence of infection during hospitalization  Description: INTERVENTIONS:  1. Assess and monitor for signs and symptoms of infection  2. Monitor lab/diagnostic results  3. Monitor all insertion sites/wounds/incisions  4. Monitor secretions for changes in amount and color  5. Administer medications as ordered  6. Educate and encourage patient and family to use good hand hygiene technique  7. Identify and educate in appropriate isolation precautions for identified infection/condition  Outcome: Progressing

## 2023-12-24 PROCEDURE — 99232 SBSQ HOSP IP/OBS MODERATE 35: CPT | Performed by: HOSPITALIST

## 2023-12-24 PROCEDURE — 2590000100 HC RX 259: Performed by: HOSPITALIST

## 2023-12-24 PROCEDURE — 2590000100 HC RX 259: Performed by: FAMILY MEDICINE

## 2023-12-24 PROCEDURE — 6370000100 HC RX 637 (ALT 250 FOR IP): Performed by: FAMILY MEDICINE

## 2023-12-24 PROCEDURE — 6370000100 HC RX 637 (ALT 250 FOR IP): Performed by: HOSPITALIST

## 2023-12-24 PROCEDURE — 6370000100 HC RX 637 (ALT 250 FOR IP): Performed by: PHYSICIAN ASSISTANT

## 2023-12-24 PROCEDURE — (BLANK) HC ROOM PRIVATE

## 2023-12-24 RX ORDER — CALCIUM CARBONATE 200(500)MG
1000 TABLET,CHEWABLE ORAL ONCE
Status: COMPLETED | OUTPATIENT
Start: 2023-12-24 | End: 2023-12-24

## 2023-12-24 RX ADMIN — LIDOCAINE 1 PATCH: 50 PATCH CUTANEOUS at 09:14

## 2023-12-24 RX ADMIN — AMLODIPINE BESYLATE 5 MG: 5 TABLET ORAL at 09:14

## 2023-12-24 RX ADMIN — FAMOTIDINE 10 MG: 20 TABLET, FILM COATED ORAL at 09:14

## 2023-12-24 RX ADMIN — TAMSULOSIN HYDROCHLORIDE 0.4 MG: 0.4 CAPSULE ORAL at 09:13

## 2023-12-24 RX ADMIN — SENNOSIDES AND DOCUSATE SODIUM 1 TABLET: 50; 8.6 TABLET ORAL at 19:57

## 2023-12-24 RX ADMIN — TAMSULOSIN HYDROCHLORIDE 0.4 MG: 0.4 CAPSULE ORAL at 19:56

## 2023-12-24 RX ADMIN — QUETIAPINE FUMARATE 150 MG: 100 TABLET ORAL at 17:17

## 2023-12-24 RX ADMIN — POLYETHYLENE GLYCOL 3350 17 G: 17 POWDER, FOR SOLUTION ORAL at 09:14

## 2023-12-24 RX ADMIN — FINASTERIDE 5 MG: 5 TABLET, FILM COATED ORAL at 19:56

## 2023-12-24 RX ADMIN — SENNOSIDES AND DOCUSATE SODIUM 1 TABLET: 50; 8.6 TABLET ORAL at 09:14

## 2023-12-24 RX ADMIN — Medication 1 TABLET: at 09:00

## 2023-12-24 RX ADMIN — QUETIAPINE FUMARATE 25 MG: 25 TABLET ORAL at 16:15

## 2023-12-24 RX ADMIN — ATORVASTATIN CALCIUM 40 MG: 40 TABLET, FILM COATED ORAL at 19:56

## 2023-12-24 RX ADMIN — CALCIUM CARBONATE (ANTACID) CHEW TAB 500 MG 1000 MG: 500 CHEW TAB at 17:50

## 2023-12-24 RX ADMIN — LOSARTAN POTASSIUM 50 MG: 50 TABLET, FILM COATED ORAL at 09:13

## 2023-12-24 RX ADMIN — FAMOTIDINE 10 MG: 20 TABLET, FILM COATED ORAL at 19:57

## 2023-12-24 RX ADMIN — SPIRONOLACTONE 25 MG: 25 TABLET ORAL at 09:13

## 2023-12-24 RX ADMIN — FLUOXETINE HYDROCHLORIDE 40 MG: 20 CAPSULE ORAL at 19:57

## 2023-12-24 RX ADMIN — QUETIAPINE FUMARATE 50 MG: 25 TABLET ORAL at 09:13

## 2023-12-24 RX ADMIN — METOPROLOL SUCCINATE 25 MG: 25 TABLET, EXTENDED RELEASE ORAL at 09:14

## 2023-12-24 RX ADMIN — ASPIRIN 81 MG: 81 TABLET ORAL at 09:14

## 2023-12-24 NOTE — NURSING END OF SHIFT
Nursing End of Shift Summary:    Patient: Joseph Wong  MRN: [id number]  : 10/20/1934, Age: 89 y.o.    Location: [address] [place] [address]    Nursing Goals  Clinical Goals for the Shift: monitor vs, labs, falls    Narrative Summary of Progress Toward Clinical Goals:  Patient is very forgetful. Every 3-5 min must be reminded. Otherwise nothing new    Barriers to Goals/Nursing Concerns:  No    New Patient or Family Concerns/Issues:  No    Shift Summary:   Significant Events & Communications to Providers (last 12 hours)       Last 5 Values    No documentation.                 Oxygen Usage (last 12 hours)       Last 5 Values    No documentation.                 Mobility (last 12 hours)       Last 5 Values       Row Name 23 1549 23 2151 23 2152 23 2326 23 0000       Mobility    Activity -- Turn Turn -- --    Length of Time in Chair (min) -- 0 0 -- --    Distance Ambulated (feet) -- 0 Feet 0 Feet -- --    Distance Ambulated (Meters Calculated) -- 0 Meters 0 Meters -- --    Patient Position Supine -- -- Supine --    Turning -- -- Turns self -- Every 2 hours;Pillow support    Distance Ambulated (Meters Calculated)(Do Not Use) -- 0 Feet 0 Feet -- --      Row Name 23 0200                   Mobility    Turning Turns self                      Urethral Catheter       Active Urethral Catheter       None                  Active Lines       Active Central venous catheter / Peripherally inserted central catheter / Implantable Port / Hemodialysis catheter / Midline Catheter       None                  Infusing Medications   Medication Dose Last Rate     PRN Medications   Medication Dose Last Admin    QUEtiapine  25 mg 25 mg at 23    sodium chloride  3 mL      sodium chloride  1 spray      sodium chloride-aloe vera  1 Application      acetaminophen  500 mg 500 mg at 23    oxyCODONE  5 mg 5 mg at 23 1447    magnesium hydroxide  30 mL      ondansetron  4 mg      Or    ondansetron  4 mg

## 2023-12-24 NOTE — PROGRESS NOTES
[address] [place] [address]  Daily Progress Note  Patient name: Joseph Wong  MRN: [id number]   LOS: 17 days     Subjective   Patient with some blood at tip of penis urethra today  Objective   Vitals:Temp:  [36.2 °C (97.1 °F)-36.8 °C (98.2 °F)] 36.3 °C (97.3 °F)  Heart Rate:  [62-69] 62  Resp:  [18-20] 18  SpO2:  [90 %-93 %] 90 %  BP: (117-162)/(78-87) 162/87  Physical Exam:   HEENT: Pupils equal round and reactive to light and accommodation.  Extraocular movements are intact.  Oropharynx clear with no erythema or exudate.  Neck: Supple nontender without palpable lymphadenopathy JVD bruits or goiter appreciated  Lungs: Clear to auscultation bilaterally  Heart: Regular rate and rhythm with normal S1 and S2  Abdomen: Soft nontender. normoactive bowel sounds. no hepatosplenomegaly  : blood on tip of penis urethra opening  Extremities: No clubbing, cyanosis, or edema.  Neurologic: Dementia.  CN II through XII intact.  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  They will arrange follow-up in clinic approximately 4 weeks with repeat x-rays.  X-ray thoracolumbar junction  Similar T12 compression deformity.  No change in alignment  CT lumbar spine without contrast  1.  Compression deformities of the T12 and L4 vertebral bodies appear likely acute.  2.  Diffuse lumbar degenerative changes.  3.   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  Generalized weakness  Blood on tip of penis urethra opening  Likely due urethral trauma or prostrate and following  B12 and vitamin D deficiency  DM2 with neuropathy  GERD  CAD  BPH   Mood disorder   NARESH   VTE prophylaxis:  lovenox     Plan:  Blood on tip of penis urethra opening likely due mild urethral trauma or prostrate and continue to following, check CBC  Continue supportive care   Neurosurgery arranging 4-week follow-up with repeat x-rays, no lifting greater than 5 to 10 pounds and avoid significant bending or twisting.  No brace necessary.  Continue PT OT, await SNF placement    Electronically signed by: Tarik Aaron Jr., MD  12/24/2023  10:42 AM

## 2023-12-24 NOTE — PLAN OF CARE
Problem: Safety Adult - Fall  Goal: Free from fall injury  Description: INTERVENTIONS:    Inpatient - Please reference Cares/Safety Flowsheet under [facility] Fall Risk for interventions.  Pediatrics - Please reference Peds Daily Cares/Safety Flowsheet under [facility] Pediatric Fall Assessment Fall Bundle for interventions  LD/OB - Please reference OB Shift Screening Flowsheet under OB Fall Risk for interventions.  Outcome: Progressing     Problem: Pain - Adult  Goal: Verbalizes/displays adequate comfort level or baseline comfort level  Description: INTERVENTIONS:  1. Encourage patient to monitor pain and request interventions  2. Assess pain using the appropriate pain scale  3. Administer analgesics based on type and severity of pain and evaluate response  4. Educate/Implement non-pharmacological measures as appropriate and evaluate response  5. Consider cultural, developmental and social influences on pain and pain management  6. Notify Provider if interventions unsuccessful or patient reports new pain  Outcome: Progressing     Problem: Daily Care  Goal: Daily care needs are met  Description: INTERVENTIONS:   1. Assess and monitor skin integrity  2. Identify patients at risk for skin breakdown on admission and per policy  3. Assess and monitor ability to perform self care and identify potential discharge needs  4. Assess skin integrity/risk for skin breakdown  5. Assist patient with activities of daily living as needed  6. Encourage independent activity per ability   7. Provide mouth care   8. Include patient/family/caregiver in decisions related to daily care   Outcome: Progressing     Problem: Knowledge Deficit  Goal: Patient/family/caregiver demonstrates understanding of disease process, treatment plan, medications, and discharge instructions  Description: INTERVENTIONS:   1. Complete learning assessment and assess knowledge base  2. Provide teaching at level of understanding   3. Provide teaching via preferred  learning methods  Outcome: Not Progressing     Problem: Discharge Barriers  Goal: Patient's discharge needs are met  Description: INTERVENTIONS:  1. Assess patient for self-management skills  2. Encourage participation in management  3. Identify potential discharge barriers on admission and throughout hospital stay  4. Involve patient/family/caregiver in discharge planning process  5. Collaborate with case management/ for discharge needs  Outcome: Progressing     Problem: Infection - Adult  Goal: Absence of infection during hospitalization  Description: INTERVENTIONS:  1. Assess and monitor for signs and symptoms of infection  2. Monitor lab/diagnostic results  3. Monitor all insertion sites/wounds/incisions  4. Monitor secretions for changes in amount and color  5. Administer medications as ordered  6. Educate and encourage patient and family to use good hand hygiene technique  7. Identify and educate in appropriate isolation precautions for identified infection/condition  Outcome: Progressing     Problem: Safety Adult  Goal: Patient will remain safe during hospitalization  Description: INTERVENTIONS    1. Assess patient for fall risk and implement interventions if needed  2. Use safe transport techniques  3. Assess patient using the appropriate Harvey skin assessment scale  4. Assess patient for risk of aspiration  5. Assess patient for risk of elopement  6. Assess patient for risk of suicide  Outcome: Progressing     Problem: Potential for Compromised Skin Integrity  Goal: Skin Integrity is Maintained or Improved  Description: INTERVENTIONS:  1. Assess and monitor skin integrity  2. Collaborate with interdisciplinary team and initiate plans and interventions as needed  3. Alternate a full bath with partial baths for elderly   4. Monitor patient's hygiene practices   5. Collaborate with wound, ostomy, and continence nurse  Outcome: Progressing  Goal: Nutritional status is improving  Description:  INTERVENTIONS:  1. Monitor and assess patient for malnutrition (ex- brittle hair, bruises, dry skin, pale skin and conjunctiva, muscle wasting, smooth red tongue, and disorientation)  2. Monitor patient's weight and dietary intake as ordered or per policy  3. Determine patient's food preferences and provide high-protein, high-caloric foods as appropriate  4. Assist patient with eating   5. Allow adequate time for meals   6. Encourage patient to take dietary supplement as ordered   7. Collaborate with dietitian  8. Include patient/family/caregiver in decisions related to nutrition  Outcome: Progressing  Goal: MOBILITY IS MAINTAINED OR IMPROVED  Description: INTERVENTIONS  1. Collaborate with interdisciplinary team and initiate plan and interventions as ordered (PT/OT)  2. Encourage ambulation  3. Up to chair for meals  4. Monitor for signs of deconditioning  Outcome: Progressing     Problem: Urinary Incontinence  Goal: Perineal skin integrity is maintained or improved  Description: INTERVENTIONS:  1. Assess genitourinary system, perineal skin, labs (urinalysis), and history of incontinence to include past management, aggravating, and alleviating factors  2. Collaborate with interdisciplinary team including wound, ostomy, and continence nurse and initiate plans and interventions as needed  4. Consider urine containment device  5. Apply skin protectant   6. Develop skin care regimen  7. Provide privacy when changing patient's incontinence device to maintain their dignity  Outcome: Progressing     Problem: TRANSFERS  Goal: STG - Patient will perform bed mobility  Description: Supine<>sit with tactile cues for sequencing  Outcome: Progressing  Goal: LTG - Patient will demonstrate safe transfer techniques  Description: With min A for all transfers with use of walker   Outcome: Progressing     Problem: OT Misc  Goal: LTG - Misc 1  Description: Pt will complete toilet transfers with stand by assistance with assitive device  as needed.   Outcome: Progressing     Problem: Genitourinary - Adult  Goal: Absence of urinary retention  Description: INTERVENTIONS:  1. Assess patient’s ability to void and empty bladder.  2. Monitor intake/output and perform bladder scan if indicated.  3. Place urinary catheter per order and initiate and maintain CAUTI bundle.  4. Administer medications to alleviate retention as needed.  5. Discuss catheterization for long term situations as appropriate.    Outcome: Progressing     Problem: Elopement Risk  Goal: Minimize the risk of a patient leaving without authorization when departure presents a threat to the safety of patient or others.  Description: INTERVENTIONS:  1. Frequent re-orientation, using a calm voice and positive re-enforcement when interacting with the patient  2. Familiar objects and possessions placed in the room  3. Purposeful focused activities  4. Family or volunteer staying with the patient  5. Medication evaluation by Pharmacy for possible adjustments  6. Consider continual supervision while patient transported off unit  7. Ensure pain relief as necessary  8. Set limits and provide clear expectations as needed  9. Reduce noise/stimulation  Outcome: Progressing

## 2023-12-25 LAB
BASOPHILS # BLD AUTO: 0 10*3/UL
BASOPHILS NFR BLD AUTO: 0.6 % (ref 0–2)
EOSINOPHIL # BLD AUTO: 0.2 10*3/UL
EOSINOPHIL NFR BLD AUTO: 3 % (ref 0–3)
ERYTHROCYTE [DISTWIDTH] IN BLOOD BY AUTOMATED COUNT: 14.2 % (ref 11.5–15)
HCT VFR BLD AUTO: 38.8 % (ref 38–50)
HGB BLD-MCNC: 13.6 G/DL (ref 13.2–17.2)
LYMPHOCYTES # BLD AUTO: 0.9 10*3/UL
LYMPHOCYTES NFR BLD AUTO: 13.6 % (ref 15–47)
MCH RBC QN AUTO: 32.7 PG (ref 29–34)
MCHC RBC AUTO-ENTMCNC: 35.1 G/DL (ref 32–36)
MCV RBC AUTO: 93 FL (ref 82–97)
MONOCYTES # BLD AUTO: 0.6 10*3/UL
MONOCYTES NFR BLD AUTO: 9.4 % (ref 5–13)
NEUTROPHILS # BLD AUTO: 4.7 10*3/UL
NEUTROPHILS NFR BLD AUTO: 73.4 % (ref 46–70)
PLATELET # BLD AUTO: 259 10*3/UL (ref 130–350)
PMV BLD AUTO: 7.5 FL (ref 6.9–10.8)
RBC # BLD AUTO: 4.17 10*6/ΜL (ref 4.1–5.8)
WBC # BLD AUTO: 6.4 10*3/UL (ref 3.7–9.6)

## 2023-12-25 PROCEDURE — 2590000100 HC RX 259: Performed by: FAMILY MEDICINE

## 2023-12-25 PROCEDURE — 6370000100 HC RX 637 (ALT 250 FOR IP): Performed by: HOSPITALIST

## 2023-12-25 PROCEDURE — 36415 COLL VENOUS BLD VENIPUNCTURE: CPT | Performed by: HOSPITALIST

## 2023-12-25 PROCEDURE — 85025 COMPLETE CBC W/AUTO DIFF WBC: CPT | Performed by: HOSPITALIST

## 2023-12-25 PROCEDURE — 6370000100 HC RX 637 (ALT 250 FOR IP): Performed by: FAMILY MEDICINE

## 2023-12-25 PROCEDURE — 99232 SBSQ HOSP IP/OBS MODERATE 35: CPT | Performed by: HOSPITALIST

## 2023-12-25 PROCEDURE — (BLANK) HC ROOM PRIVATE

## 2023-12-25 PROCEDURE — 2590000100 HC RX 259: Performed by: HOSPITALIST

## 2023-12-25 RX ADMIN — FAMOTIDINE 10 MG: 20 TABLET, FILM COATED ORAL at 21:06

## 2023-12-25 RX ADMIN — FLUOXETINE HYDROCHLORIDE 40 MG: 20 CAPSULE ORAL at 21:06

## 2023-12-25 RX ADMIN — METOPROLOL SUCCINATE 25 MG: 25 TABLET, EXTENDED RELEASE ORAL at 10:07

## 2023-12-25 RX ADMIN — FINASTERIDE 5 MG: 5 TABLET, FILM COATED ORAL at 21:06

## 2023-12-25 RX ADMIN — POLYETHYLENE GLYCOL 3350 17 G: 17 POWDER, FOR SOLUTION ORAL at 10:08

## 2023-12-25 RX ADMIN — FAMOTIDINE 10 MG: 20 TABLET, FILM COATED ORAL at 10:07

## 2023-12-25 RX ADMIN — TAMSULOSIN HYDROCHLORIDE 0.4 MG: 0.4 CAPSULE ORAL at 10:08

## 2023-12-25 RX ADMIN — SPIRONOLACTONE 25 MG: 25 TABLET ORAL at 10:08

## 2023-12-25 RX ADMIN — ASPIRIN 81 MG: 81 TABLET ORAL at 10:08

## 2023-12-25 RX ADMIN — QUETIAPINE FUMARATE 50 MG: 25 TABLET ORAL at 10:07

## 2023-12-25 RX ADMIN — AMLODIPINE BESYLATE 5 MG: 5 TABLET ORAL at 10:08

## 2023-12-25 RX ADMIN — SENNOSIDES AND DOCUSATE SODIUM 1 TABLET: 50; 8.6 TABLET ORAL at 10:08

## 2023-12-25 RX ADMIN — Medication 1 TABLET: at 10:08

## 2023-12-25 RX ADMIN — ATORVASTATIN CALCIUM 40 MG: 40 TABLET, FILM COATED ORAL at 21:06

## 2023-12-25 RX ADMIN — LIDOCAINE 1 PATCH: 50 PATCH CUTANEOUS at 10:08

## 2023-12-25 RX ADMIN — TAMSULOSIN HYDROCHLORIDE 0.4 MG: 0.4 CAPSULE ORAL at 21:06

## 2023-12-25 RX ADMIN — LOSARTAN POTASSIUM 50 MG: 50 TABLET, FILM COATED ORAL at 10:08

## 2023-12-25 RX ADMIN — SENNOSIDES AND DOCUSATE SODIUM 1 TABLET: 50; 8.6 TABLET ORAL at 21:06

## 2023-12-25 NOTE — PROGRESS NOTES
[address] [place] [address]  Daily Progress Note  Patient name: Joseph Wong  MRN: [id number]   LOS: 18 days     Subjective   Patient up to bathroom this am but otherwise had a good night  Objective   Vitals:Temp:  [36.8 °C (98.2 °F)-37.4 °C (99.3 °F)] 36.8 °C (98.2 °F)  Heart Rate:  [71-77] 77  Resp:  [20] 20  SpO2:  [92 %] 92 %  BP: (138-152)/(83-94) 152/94  Physical Exam:   HEENT: Pupils equal round and reactive to light and accommodation.  Extraocular movements are intact.  Oropharynx clear with no erythema or exudate.  Neck: Supple nontender without palpable lymphadenopathy JVD bruits or goiter appreciated  Lungs: Clear to auscultation bilaterally  Heart: Regular rate and rhythm with normal S1 and S2  Abdomen: Soft nontender. normoactive bowel sounds. no hepatosplenomegaly  Extremities: No clubbing, cyanosis, or edema.  Neurologic: Alert , dementia.  CN II through XII intact.  Nonfocal, generalized debility and weakness.     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  They will arrange follow-up in clinic approximately 4 weeks with repeat x-rays.  X-ray thoracolumbar junction  Similar T12 compression deformity.  No change in alignment  CT lumbar spine without contrast  1.  Compression  deformities of the T12 and L4 vertebral bodies appear likely acute.  2.  Diffuse lumbar degenerative changes.  3.  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  Generalized weakness  Blood on tip of penis urethra opening  Likely due urethral trauma or prostrate and following  B12 and vitamin D deficiency  DM2 with neuropathy  GERD  CAD  BPH   Mood disorder   NARESH   VTE prophylaxis:  lovenox     Plan:    Continue supportive care   Neurosurgery arranging 4-week follow-up with repeat x-rays, no lifting greater than 5 to 10 pounds and avoid significant bending or twisting.  No brace necessary.  Continue PT OT, await SNF placement    Electronically signed by: Tarik Aaron Jr., MD  12/25/2023  9:58 AM

## 2023-12-25 NOTE — NURSING END OF SHIFT
Nursing End of Shift Summary:    Patient: Joseph Wong  MRN: [id number]  : 10/20/1934, Age: 89 y.o.    Location: [address] [place] [address]    Nursing Goals  Clinical Goals for the Shift: monitor vs, labs, falls    Narrative Summary of Progress Toward Clinical Goals:  Pt VS are stable. Pt has not have a tele sitter in his room today. Pt has not had any falls. No complaints of pain or discomfort. Towards the end of the shift pt became more confused and agitated    Barriers to Goals/Nursing Concerns:  No    New Patient or Family Concerns/Issues:  No    Shift Summary:   Significant Events & Communications to Providers (last 12 hours)       Last 5 Values    No documentation.                 Oxygen Usage (last 12 hours)       Last 5 Values    No documentation.                 Mobility (last 12 hours)       Last 5 Values       Row Name 23 0744 23 1556                Mobility    Patient Position Supine Supine                     Urethral Catheter       Active Urethral Catheter       None                  Active Lines       Active Central venous catheter / Peripherally inserted central catheter / Implantable Port / Hemodialysis catheter / Midline Catheter       None                  Infusing Medications   Medication Dose Last Rate     PRN Medications   Medication Dose Last Admin    QUEtiapine  25 mg 25 mg at 23 1615    sodium chloride  3 mL      sodium chloride  1 spray      sodium chloride-aloe vera  1 Application      acetaminophen  500 mg 500 mg at 23 1933    oxyCODONE  5 mg 5 mg at 23 1447    magnesium hydroxide  30 mL      ondansetron  4 mg      Or    ondansetron  4 mg

## 2023-12-25 NOTE — NURSING END OF SHIFT
Nursing End of Shift Summary:    Patient: Joseph Wong  MRN: [id number]  : 10/20/1934, Age: 89 y.o.    Location: [address] [place] [address]    Nursing Goals  Clinical Goals for the Shift: monitor vs, labs, falls    Narrative Summary of Progress Toward Clinical Goals:  No new events overnight, No new C/O pain or discomfort. Patient resting quietly.    Barriers to Goals/Nursing Concerns:  No    New Patient or Family Concerns/Issues:  No    Shift Summary:   Significant Events & Communications to Providers (last 12 hours)       Last 5 Values    No documentation.                 Oxygen Usage (last 12 hours)       Last 5 Values    No documentation.                 Mobility (last 12 hours)       Last 5 Values       Row Name 23 0744 23 1556                Mobility    Patient Position Supine Supine                     Urethral Catheter       Active Urethral Catheter       None                  Active Lines       Active Central venous catheter / Peripherally inserted central catheter / Implantable Port / Hemodialysis catheter / Midline Catheter       None                  Infusing Medications   Medication Dose Last Rate     PRN Medications   Medication Dose Last Admin    QUEtiapine  25 mg 25 mg at 23 1615    sodium chloride  3 mL      sodium chloride  1 spray      sodium chloride-aloe vera  1 Application      acetaminophen  500 mg 500 mg at 23 1933    oxyCODONE  5 mg 5 mg at 23 1447    magnesium hydroxide  30 mL      ondansetron  4 mg      Or    ondansetron  4 mg

## 2023-12-26 LAB
ANION GAP SERPL CALC-SCNC: 7 MMOL/L (ref 3–11)
BASOPHILS # BLD AUTO: 0.1 10*3/UL
BASOPHILS NFR BLD AUTO: 0.5 % (ref 0–2)
BUN SERPL-MCNC: 30 MG/DL (ref 7–25)
CALCIUM SERPL-MCNC: 8.8 MG/DL (ref 8.6–10.3)
CHLORIDE SERPL-SCNC: 102 MMOL/L (ref 98–107)
CO2 SERPL-SCNC: 25 MMOL/L (ref 21–32)
CREAT SERPL-MCNC: 1.24 MG/DL (ref 0.7–1.3)
EGFRCR SERPLBLD CKD-EPI 2021: 56 ML/MIN/1.73M*2
EOSINOPHIL # BLD AUTO: 0.1 10*3/UL
EOSINOPHIL NFR BLD AUTO: 1.2 % (ref 0–3)
ERYTHROCYTE [DISTWIDTH] IN BLOOD BY AUTOMATED COUNT: 14.2 % (ref 11.5–15)
GLUCOSE SERPL-MCNC: 149 MG/DL (ref 70–105)
HCT VFR BLD AUTO: 37 % (ref 38–50)
HGB BLD-MCNC: 13 G/DL (ref 13.2–17.2)
LYMPHOCYTES # BLD AUTO: 0.9 10*3/UL
LYMPHOCYTES NFR BLD AUTO: 9.2 % (ref 15–47)
MCH RBC QN AUTO: 32.5 PG (ref 29–34)
MCHC RBC AUTO-ENTMCNC: 35.1 G/DL (ref 32–36)
MCV RBC AUTO: 92.5 FL (ref 82–97)
MONOCYTES # BLD AUTO: 0.9 10*3/UL
MONOCYTES NFR BLD AUTO: 9.7 % (ref 5–13)
NEUTROPHILS # BLD AUTO: 7.3 10*3/UL
NEUTROPHILS NFR BLD AUTO: 79.4 % (ref 46–70)
PLATELET # BLD AUTO: 247 10*3/UL (ref 130–350)
PMV BLD AUTO: 7.3 FL (ref 6.9–10.8)
POTASSIUM SERPL-SCNC: 4.4 MMOL/L (ref 3.5–5.1)
RBC # BLD AUTO: 4 10*6/ΜL (ref 4.1–5.8)
SODIUM SERPL-SCNC: 134 MMOL/L (ref 135–145)
WBC # BLD AUTO: 9.2 10*3/UL (ref 3.7–9.6)

## 2023-12-26 PROCEDURE — 36415 COLL VENOUS BLD VENIPUNCTURE: CPT | Performed by: HOSPITALIST

## 2023-12-26 PROCEDURE — 6370000100 HC RX 637 (ALT 250 FOR IP): Performed by: FAMILY MEDICINE

## 2023-12-26 PROCEDURE — 6370000100 HC RX 637 (ALT 250 FOR IP): Performed by: HOSPITALIST

## 2023-12-26 PROCEDURE — 2590000100 HC RX 259: Performed by: HOSPITALIST

## 2023-12-26 PROCEDURE — 2590000100 HC RX 259: Performed by: FAMILY MEDICINE

## 2023-12-26 PROCEDURE — 85025 COMPLETE CBC W/AUTO DIFF WBC: CPT | Performed by: HOSPITALIST

## 2023-12-26 PROCEDURE — 99232 SBSQ HOSP IP/OBS MODERATE 35: CPT | Performed by: HOSPITALIST

## 2023-12-26 PROCEDURE — (BLANK) HC ROOM PRIVATE

## 2023-12-26 PROCEDURE — 80048 BASIC METABOLIC PNL TOTAL CA: CPT | Performed by: HOSPITALIST

## 2023-12-26 RX ADMIN — SENNOSIDES AND DOCUSATE SODIUM 1 TABLET: 50; 8.6 TABLET ORAL at 09:18

## 2023-12-26 RX ADMIN — METOPROLOL SUCCINATE 25 MG: 25 TABLET, EXTENDED RELEASE ORAL at 09:18

## 2023-12-26 RX ADMIN — FAMOTIDINE 10 MG: 20 TABLET, FILM COATED ORAL at 09:18

## 2023-12-26 RX ADMIN — ASPIRIN 81 MG: 81 TABLET ORAL at 09:18

## 2023-12-26 RX ADMIN — Medication 1 TABLET: at 09:18

## 2023-12-26 RX ADMIN — TAMSULOSIN HYDROCHLORIDE 0.4 MG: 0.4 CAPSULE ORAL at 09:18

## 2023-12-26 RX ADMIN — POLYETHYLENE GLYCOL 3350 17 G: 17 POWDER, FOR SOLUTION ORAL at 09:18

## 2023-12-26 RX ADMIN — QUETIAPINE FUMARATE 25 MG: 25 TABLET ORAL at 14:22

## 2023-12-26 RX ADMIN — SPIRONOLACTONE 25 MG: 25 TABLET ORAL at 09:18

## 2023-12-26 RX ADMIN — LOSARTAN POTASSIUM 50 MG: 50 TABLET, FILM COATED ORAL at 09:18

## 2023-12-26 RX ADMIN — QUETIAPINE FUMARATE 150 MG: 100 TABLET ORAL at 17:08

## 2023-12-26 RX ADMIN — LIDOCAINE 1 PATCH: 50 PATCH CUTANEOUS at 09:18

## 2023-12-26 RX ADMIN — AMLODIPINE BESYLATE 5 MG: 5 TABLET ORAL at 09:18

## 2023-12-26 NOTE — NURSING END OF SHIFT
Nursing End of Shift Summary:    Patient: Joseph Wong  MRN: [id number]  : 10/20/1934, Age: 89 y.o.    Location: [address] [place] [address]    Nursing Goals  Clinical Goals for the Shift: monitor VS, administer medications as scheduled, remain free from falls during shift, promote comfort and rest    Narrative Summary of Progress Toward Clinical Goals:  Pt is alert, disoriented to time, place, and situation;pt received medication as scheduled; pt denies pain; denies SOB; pt Lightheadedness and dizziness; remained free from falls during shift; pt slept through most of the night comfort and rest promoted    Barriers to Goals/Nursing Concerns:  Yes - Orders, labs, meds    New Patient or Family Concerns/Issues:  No    Shift Summary:   Significant Events & Communications to Providers (last 12 hours)       Last 5 Values    No documentation.                 Oxygen Usage (last 12 hours)       Last 5 Values    No documentation.                 Mobility (last 12 hours)       Last 5 Values       Row Name 23 2208 23 8736 23 0035             Mobility    Activity Commode -- Turn;Dangle      Level of Assistance -- -- Minimal assist, patient does 75% or more      Length of Time in Chair (min) 0 -- --      Distance Ambulated (feet) 3 Feet -- --      Distance Ambulated (Meters Calculated) 0.91 Meters -- --      Patient Position -- Supine --      Turning Turns self -- Turns self      Distance Ambulated (Meters Calculated)(Do Not Use) 0.91 Feet -- --                    Urethral Catheter       Active Urethral Catheter       None                  Active Lines       Active Central venous catheter / Peripherally inserted central catheter / Implantable Port / Hemodialysis catheter / Midline Catheter       None                  Infusing Medications   Medication Dose Last Rate     PRN Medications   Medication Dose Last Admin    QUEtiapine  25 mg 25 mg at 23 1615    sodium chloride  3 mL      sodium chloride  1 spray      sodium  chloride-aloe vera  1 Application      acetaminophen  500 mg 500 mg at 12/12/23 1933    oxyCODONE  5 mg 5 mg at 12/17/23 1447    magnesium hydroxide  30 mL      ondansetron  4 mg      Or    ondansetron  4 mg

## 2023-12-26 NOTE — INTERDISCIPLINARY/THERAPY
Case Management Progress Note    Phone # [phone]    Diagnosis: Weakness.    Discussed Discharge Topics/Narrative: CM called Philly at [facility], Cathleen at [facility], Alisha at [facility], and Blanche at [facility].  CM left a voicemail message to call the CM back.      CM missed a call from Blanche and Philly.  CM calls Philly back, and leaves another voicemail message.      CM speaks with Blanche.  It is this CM's recollection that Blanche explains that [facility] has no Long Term Care beds, but have them fill out the financial paperwork and they will take a look.  CM will talk to the patient's wife tomorrow.    will continue to follow.    Family updated: No.    RN updated: Yes.    Disposition: Placement.

## 2023-12-26 NOTE — PLAN OF CARE
Problem: Safety Adult - Fall  Goal: Free from fall injury  Description: INTERVENTIONS:    Inpatient - Please reference Cares/Safety Flowsheet under [facility] Fall Risk for interventions.  Pediatrics - Please reference Peds Daily Cares/Safety Flowsheet under [facility] Pediatric Fall Assessment Fall Bundle for interventions  LD/OB - Please reference OB Shift Screening Flowsheet under OB Fall Risk for interventions.  Outcome: Progressing     Problem: Pain - Adult  Goal: Verbalizes/displays adequate comfort level or baseline comfort level  Description: INTERVENTIONS:  1. Encourage patient to monitor pain and request interventions  2. Assess pain using the appropriate pain scale  3. Administer analgesics based on type and severity of pain and evaluate response  4. Educate/Implement non-pharmacological measures as appropriate and evaluate response  5. Consider cultural, developmental and social influences on pain and pain management  6. Notify Provider if interventions unsuccessful or patient reports new pain  Outcome: Progressing  Flowsheets (Taken 12/22/2023 0521 by Jesus Dong RN)  Verbalizes/displays adequate comfort level or baseline comfort level:   Encourage patient to monitor pain and request interventions   Assess pain using the appropriate pain scale   Administer analgesics based on type and severity of pain and evaluate response   Educate/Implement non-pharmacological measures as appropriate and evaluate response   Consider cultural, developmental and social influences on pain and pain management     Problem: Daily Care  Goal: Daily care needs are met  Description: INTERVENTIONS:   1. Assess and monitor skin integrity  2. Identify patients at risk for skin breakdown on admission and per policy  3. Assess and monitor ability to perform self care and identify potential discharge needs  4. Assess skin integrity/risk for skin breakdown  5. Assist patient with activities of daily living as needed  6. Encourage  independent activity per ability   7. Provide mouth care   8. Include patient/family/caregiver in decisions related to daily care   Outcome: Progressing  Flowsheets (Taken 12/22/2023 0521 by Jesus Dong, RN)  Daily care needs are met:   Assess and monitor skin integrity   Identify patients at risk for skin breakdown on admission and per policy   Assess and monitor ability to perform self care and identify potential discharge needs   Assess skin integrity/risk for skin breakdown   Assist patient with activities of daily living as needed   Encourage independent activity per ability   Provide mouth care   Include patient/family/caregiver in decisions related to daily care     Problem: Knowledge Deficit  Goal: Patient/family/caregiver demonstrates understanding of disease process, treatment plan, medications, and discharge instructions  Description: INTERVENTIONS:   1. Complete learning assessment and assess knowledge base  2. Provide teaching at level of understanding   3. Provide teaching via preferred learning methods  Outcome: Progressing  Flowsheets (Taken 12/22/2023 0521 by Jesus Dong, RN)  Patient/family/caregiver demonstrates understanding of disease process, treatment plan, medications, and discharge instructions:   Complete learning assessment and assess knowledge base   Provide teaching at level of understanding   Provide teaching via preferred learning methods     Problem: Discharge Barriers  Goal: Patient's discharge needs are met  Description: INTERVENTIONS:  1. Assess patient for self-management skills  2. Encourage participation in management  3. Identify potential discharge barriers on admission and throughout hospital stay  4. Involve patient/family/caregiver in discharge planning process  5. Collaborate with case management/ for discharge needs  Outcome: Progressing  Flowsheets (Taken 12/22/2023 0521 by Jesus Dong, RN)  Patient discharge needs are met:   Assess patient for  self-management skills   Encourage participation in management   Identify potential discharge barriers on admission and throughout hospital stay   Involve patient/family/caregiver in discharge planning process     Problem: Potential for Compromised Skin Integrity  Goal: Skin Integrity is Maintained or Improved  Description: INTERVENTIONS:  1. Assess and monitor skin integrity  2. Collaborate with interdisciplinary team and initiate plans and interventions as needed  3. Alternate a full bath with partial baths for elderly   4. Monitor patient's hygiene practices   5. Collaborate with wound, ostomy, and continence nurse  Outcome: Progressing  Flowsheets (Taken 12/22/2023 0521 by Jesus Dong, RN)  Skin integrity is maintained or improved:   Assess and monitor skin integrity   Alternate a full bath with partial baths for elderly   Collaborate with interdisciplinary team and initiate plans and interventions as needed   Monitor patient's hygiene practices  Goal: Nutritional status is improving  Description: INTERVENTIONS:  1. Monitor and assess patient for malnutrition (ex- brittle hair, bruises, dry skin, pale skin and conjunctiva, muscle wasting, smooth red tongue, and disorientation)  2. Monitor patient's weight and dietary intake as ordered or per policy  3. Determine patient's food preferences and provide high-protein, high-caloric foods as appropriate  4. Assist patient with eating   5. Allow adequate time for meals   6. Encourage patient to take dietary supplement as ordered   7. Collaborate with dietitian  8. Include patient/family/caregiver in decisions related to nutrition  Outcome: Progressing  Flowsheets (Taken 12/22/2023 0521 by Jeuss Dong, RN)  Nutritional status is improving:   Monitor patient's weight and dietary intake as ordered or per policy   Monitor and assess patient for malnutrition (ex- brittle hair, bruises, dry skin, pale skin and conjunctiva, muscle wasting, smooth red tongue, and  disorientation)   Determine patient's food preferences and provide high-protein, high-caloric foods as appropriate   Assist patient with eating   Allow adequate time for meals   Encourage patient to take dietary supplement as ordered  Goal: MOBILITY IS MAINTAINED OR IMPROVED  Description: INTERVENTIONS  1. Collaborate with interdisciplinary team and initiate plan and interventions as ordered (PT/OT)  2. Encourage ambulation  3. Up to chair for meals  4. Monitor for signs of deconditioning  Outcome: Progressing     Problem: Urinary Incontinence  Goal: Perineal skin integrity is maintained or improved  Description: INTERVENTIONS:  1. Assess genitourinary system, perineal skin, labs (urinalysis), and history of incontinence to include past management, aggravating, and alleviating factors  2. Collaborate with interdisciplinary team including wound, ostomy, and continence nurse and initiate plans and interventions as needed  4. Consider urine containment device  5. Apply skin protectant   6. Develop skin care regimen  7. Provide privacy when changing patient's incontinence device to maintain their dignity  Outcome: Progressing  Flowsheets (Taken 12/25/2023 6211)  Perineal skin integrity is maintained or improved:   Assess genitourinary system, perineal skin, labs (urinalysis), and history of incontinence to include past management, aggravating, and alleviating factors   Provide privacy when changing patient's incontinence device to maintain their dignity     Problem: Infection - Adult  Goal: Absence of infection during hospitalization  Description: INTERVENTIONS:  1. Assess and monitor for signs and symptoms of infection  2. Monitor lab/diagnostic results  3. Monitor all insertion sites/wounds/incisions  4. Monitor secretions for changes in amount and color  5. Administer medications as ordered  6. Educate and encourage patient and family to use good hand hygiene technique  7. Identify and educate in appropriate  isolation precautions for identified infection/condition  Outcome: Progressing  Flowsheets (Taken 12/22/2023 0521 by Jesus Dong RN)  Absence of infection during hospitalization:   Assess and monitor for signs and symptoms of infection   Monitor lab/diagnostic results   Monitor all insertion sites/wounds/incisions   Monitor secretions for changes in amount and colo   Administer medications as ordered   Educate and encourage patient and family to use good hand hygiene technique     Problem: Safety Adult  Goal: Patient will remain safe during hospitalization  Description: INTERVENTIONS    1. Assess patient for fall risk and implement interventions if needed  2. Use safe transport techniques  3. Assess patient using the appropriate Harvey skin assessment scale  4. Assess patient for risk of aspiration  5. Assess patient for risk of elopement  6. Assess patient for risk of suicide  Outcome: Progressing  Flowsheets (Taken 12/25/2023 1732)  Patient will remain safe durning hospitalization:   Assess patient for Fall Risk   Use safe transport   Assess Patient using the appropriate Harvey scale   Assess Patient for Aspirations   Assess Patient for Risk of Elopement     Problem: TRANSFERS  Goal: STG - Patient will perform bed mobility  Description: Supine<>sit with tactile cues for sequencing  Outcome: Progressing  Goal: LTG - Patient will demonstrate safe transfer techniques  Description: With min A for all transfers with use of walker   Outcome: Progressing     Problem: OT Misc  Goal: LTG - Misc 1  Description: Pt will complete toilet transfers with stand by assistance with assitive device as needed.   Outcome: Progressing     Problem: Genitourinary - Adult  Goal: Absence of urinary retention  Description: INTERVENTIONS:  1. Assess patient’s ability to void and empty bladder.  2. Monitor intake/output and perform bladder scan if indicated.  3. Place urinary catheter per order and initiate and maintain CAUTI bundle.  4.  Administer medications to alleviate retention as needed.  5. Discuss catheterization for long term situations as appropriate.    Outcome: Progressing  Flowsheets (Taken 12/25/2023 1732)  Absence of urinary retention:   Assess patient’s ability to void and empty bladder   Monitor intake/output and perform bladder scan if indicated     Problem: Elopement Risk  Goal: Minimize the risk of a patient leaving without authorization when departure presents a threat to the safety of patient or others.  Description: INTERVENTIONS:  1. Frequent re-orientation, using a calm voice and positive re-enforcement when interacting with the patient  2. Familiar objects and possessions placed in the room  3. Purposeful focused activities  4. Family or volunteer staying with the patient  5. Medication evaluation by Pharmacy for possible adjustments  6. Consider continual supervision while patient transported off unit  7. Ensure pain relief as necessary  8. Set limits and provide clear expectations as needed  9. Reduce noise/stimulation  Outcome: Progressing  Flowsheets (Taken 12/25/2023 1732)  Minimize the risk of patient leaving:   Frequent re-orientation, using a calm voice and positive re-enforcement when interacting with the patient   Family or volunteer staying with the patient   Familiar objects and possessions placed in the room

## 2023-12-26 NOTE — NURSING END OF SHIFT
Nursing End of Shift Summary:    Patient: Joseph Wong  MRN: [id number]  : 10/20/1934, Age: 89 y.o.    Location: [address] [place] [address]    Nursing Goals  Clinical Goals for the Shift: monitor vs, labs, falls    Narrative Summary of Progress Toward Clinical Goals:  Pt has been very sleepy today. Pt has slept all day. Pt is able to be woken up but falls right back to sleep .    Barriers to Goals/Nursing Concerns:  No    New Patient or Family Concerns/Issues:  No    Shift Summary:   Significant Events & Communications to Providers (last 12 hours)       Last 5 Values    No documentation.                 Oxygen Usage (last 12 hours)       Last 5 Values    No documentation.                 Mobility (last 12 hours)       Last 5 Values       Row Name 23 0800 23 1200 23 1617             Mobility    Activity Sleeping Sleeping --      Length of Time in Chair (min) 0 0 --      Distance Ambulated (feet) 0 Feet 0 Feet --      Distance Ambulated (Meters Calculated) 0 Meters 0 Meters --      Patient Position -- -- Supine      Turning Turns self Turns self --      Distance Ambulated (Meters Calculated)(Do Not Use) 0 Feet 0 Feet --                    Urethral Catheter       Active Urethral Catheter       None                  Active Lines       Active Central venous catheter / Peripherally inserted central catheter / Implantable Port / Hemodialysis catheter / Midline Catheter       None                  Infusing Medications   Medication Dose Last Rate     PRN Medications   Medication Dose Last Admin    QUEtiapine  25 mg 25 mg at 23 1615    sodium chloride  3 mL      sodium chloride  1 spray      sodium chloride-aloe vera  1 Application      acetaminophen  500 mg 500 mg at 23 1933    oxyCODONE  5 mg 5 mg at 23 1447    magnesium hydroxide  30 mL      ondansetron  4 mg      Or    ondansetron  4 mg

## 2023-12-26 NOTE — INTERDISCIPLINARY/THERAPY
NUTRITION ASSESSMENT/REASSESSMENT    PERTINENT MEDICAL DIAGNOSIS/PROBLEMS:     Principle problem/diagnosis/procedures:  extreme weakness, dementia with agitation(resolved), acute L knee pain, T2 & LR compression deformities/fractues    PMH:  L TKA 1/22, T12 and L4 compression deformities/fractures, Generalized weakness, Dementia, B12 and vitamin D deficiency, Type 2 DM, Neuropathy history, NARESH, GERD, CAD, HLD, BPH, TURP, Mood disorder history          NUTRITION FOCUSED PHYSICAL EXAM (NFPE):  Physical Exam  Physical Exam: Performed (MB, RD)  Date Performed: 12/18/23    Subcutaneous Fat Loss  Orbital Subcutaneous Fat Loss: Mild  Buccal Subcutaneous Fat Loss: Mild  Triceps Subcutaneous Fat Loss: Moderate  Ribs Subcutaneous Fat Loss: Not assessed  Iliac Crest Subcutaneous Fat Loss: Not assessed    Muscle Wasting  Holiness Region Wasting (Temporalis Muscle): Mild  Clavicle Region Wasting (Pectoralis Major, Trapezius Muscles): Moderate  Shoulder and Acromion Process Wasting (Deltoid Muscle): Moderate  Scapular Region Wasting (Trapezius, Supraspinatus, Infraspinatus Muscles): Moderate  Dorsal Hand Wasting (Interosseous Muscle): Severe  Thigh Region Wasting (Quadriceps Muscle): Severe  Calf Region Wasting (Gastrocnemius Muscle): Severe    Physical Findings  Hair: Negative  Eyes: Negative  Conjunctiva: Negative  Mouth/Lips/Tongue: Negative  Dentition: Negative  Nails: Negative  Skin: Negative    MONITORING/EVALUATION:    Labs:    Results from last 4 days   Lab Units 12/26/23  0833   POTASSIUM MMOL/L 4.4   CHLORIDE mmol/L 102   SODIUM mmol/L 134*   BUN mg/dL 30*   CREATININE mg/dL 1.24   CO2 mmol/L 25   ANION GAP mmol/L 7   GLUCOSE mg/dL 149*   CALCIUM mg/dL 8.8   EGFR mL/min/1.73m*2 56*        Lab Results   Component Value Date    HGBA1C 6.4 (H) 12/04/2023     Lab Results   Component Value Date    POCGLU 147 (H) 12/23/2023    POCGLU 209 (H) 12/14/2023    POCGLU 141 (H) 12/14/2023    POCGLU 139 (H) 12/13/2023    POCGLU 139 (H)  12/13/2023     Pertinent Meds: Norvasc, Lipitor, Pepcid, Cozaar, Toprol-XL, Glyoclax, Senna Plus, Aldactone, Flomax  MIVF: none  Neuro status: Alert & disoriented x4  Respiratory status:   RA  GI findings: Last BM: 12/26, soft brown small  Wounds:    none  Edema (per nursing documentation):  none  (no edema on admit)    ANTHROPOMETRICS:  Wt Change in hospital:  wt down 2.7kg from admit as of 12/21  Weights (Current Encounter Only) (last 180 days)       Date/Time Weight    12/21/23 0001 75.3 kg (166 lb)    12/08/23 0400 75.9 kg (167 lb 4.8 oz)    12/08/23 0000 75.8 kg (167 lb 1.6 oz)    12/06/23 0001 76.9 kg (169 lb 9.6 oz)    12/04/23 1505 78 kg (171 lb 14.4 oz)          Wt Readings from Last 10 Encounters:   12/21/23 75.3 kg (166 lb)   08/29/23 86.6 kg (191 lb)   08/07/23 86.8 kg (191 lb 5.8 oz)   04/21/23 86.8 kg (191 lb 5.8 oz)   02/26/23 86.8 kg (191 lb 5.8 oz)   01/14/23 80.3 kg (177 lb 0.5 oz)   09/02/22 82.6 kg (182 lb)   08/05/22 86.6 kg (191 lb)   03/21/22 86.6 kg (191 lb)   12/20/21 86.6 kg (191 lb)     Admit Weight: 78 kg (171 lb 14.4 oz) 12/4/2023  Admit BMI (kg/m2): 23.31  IBW/kg (Calculated) Male: 80.8 kg  Weight Category: Acceptable  Wt Change Hx: -8.6 kg weight loss (10%) x 3 months prior to admission (8/29/23- 12/04/23), clinically significant  Per wife, UBW: 190 lbs 6 months ago    ORAL/DENTAL STATUS:  Teeth: Missing teeth       FOOD ALLERGIES:    No Known Allergies  Synagogue/CULTURAL REQUESTS:      None  Cultural Requests During Hospitalization: none  Spiritual Requests During Hospitalization: none    Social Determinants of Health with Concerns     Tobacco Use: Medium Risk (12/4/2023)    Patient History     Smoking Tobacco Use: Former     Smokeless Tobacco Use: Never     Passive Exposure: Not on file   Alcohol Use: Not on file   Financial Resource Strain: Not on file   Physical Activity: Not on file   Stress: Not on file   Social Connections: Not on file   Depression: Not on file   Postpartum  Depression: Not on file   Wife unable to care for pt at home - placement at discharge  Hunger Screen: no issues        Meal/Supplement Intake:    Many meals not recorded, refused meals 12/25-? Food brought in; takes supplement but rarely documented; CNA states pt ate good lunch today, not sure if he drinks the supplements - pt confused and can't say  Average Percent Meals Eaten (%): 38.75 Avg %  Average Percent Supplement Eaten (%): 95 Avg %    NUTRITION PRESCRIPTION:       Dietary Orders   (From admission, onward)                 Start     Ordered    12/05/23 1642  Meal Tray Service  Continuous        Question:  Meal Tray Service:  Answer:  Automatic Tray    12/05/23 1641    12/05/23 0621  Diet Regular  Diet effective now        Comments: Automatic trays   Question Answer Comment   Nutrition Therapy Protocol (Dietitian May Adjust Diet and Nourishments) Yes    Diet type Regular        12/05/23 0621                  Estimated Needs:  Total Energy Estimated Needs: 1560- 1794 kcal/day (20-23 kcal/kg ABW)  Total Protein Estimated Needs: 78- 94 g/day (1-1.2 g/kg ABW)  Total Fluid Estimated Needs: 1950 mL/day (25 mL/kg ABW)      SUMMARY 12/5: Pt seen by Nutrition Services for +Consult to Nutrition Services. Pt presents with extremity weakness, LLE pain. Pt presents with failure to thrive, decreased appetite, poor nutrition and inactivity along with age related physical debility, malnutrition suspected per admission note. Per Care Everywhere data, pt with -8.6 kg weight loss (10%) x 3 months prior to admission (8/29/23- 12/04/23), clinically significant. Per discussion with patient's wife, he has had a decreased appetite over the past few months. She reports he drinks Ensure supplements at home, various flavors. Pt UBW: 190 lbs but has been gradually losing over the past few months. Intake had improved as of 12/25 averaging 83% meals and 67% supplements.      12/26:  Intake has dropped but ? Accurate documentation, ate  well at lunch today per CNP, not sure if taking supplement.   Disposition: awaiting nursing home placement,      ____________________________________________________________________  NUTRITION CARE PLAN     MALNUTRITION:  Malnutrition Present On Admission: Yes  Parameters for Malnutrition: Severe Lonkfnfpolro-Jebtotcnit-ifwvbmz (NC-4.1.1.2)  Etiology: decreased appetite/ intakes related to failure to thrive, advanced age with some confusion  Signs/Symptoms:  Weight Loss: -8.6 kg weight loss (10%) x 3 months prior to admission (8/29/23- 12/04/23)  Energy Intake: <75% compared with estimated needs for >1 month prior to admission  Body Fat Depletion: mod loss TSF  Muscle Mass Depletion: mod loss clavicle/shoulder/acromion process, scalpular regions, sev loss interosserous, quads, gastrocnemius per NFPE 12/18 (note partly due to advanced age)    Goals: Pt to consume >75% meals during admission, no weight loss below 78 kg  Progress: Pt meeting goal as of 12/18 with improved intakes; wt down 2.7kg from admit as of 12/21 (75.3kg)-no newer wt  Care Plan Documented:  yes    Interventions:  12/26:   -Diet: Regular  -Nutrition Supplementation: continue Medical Nutrition Suppl. (ND-3.1.1):  Boost Plus 8 oz TID with meal trays (14g PRO, 45g CHO, 360 Kehinde, 1gm fiber per 8 oz)  -Medical Nutrition Suppl. (ND-3.1.2): continue Magic Cup with lunch tray (9g PRO, 38g CHO, 290 Kehinde, no fiber per svg, honey thick)  -Continue daily multivitamin with minerals    Discharge nutrition recommendations: Regular diet with oral nutrition supplementation as needed for poor appetite.

## 2023-12-26 NOTE — PROGRESS NOTES
[address] [place] [address]  Daily Progress Note  Patient name: Joseph Wong  MRN: [id number]   LOS: 19 days     Subjective   Patient mildly sleepy this am / somnolent but able to sit up and eat breakfast  Objective   Vitals:Temp:  [36.4 °C (97.6 °F)-37.4 °C (99.3 °F)] 37.4 °C (99.3 °F)  Heart Rate:  [66-70] 66  Resp:  [18-20] 20  SpO2:  [87 %-93 %] 92 %  BP: (101-144)/(76-90) 101/76  Physical Exam:   HEENT: Pupils equal round and reactive to light and accommodation.  Extraocular movements are intact.  Oropharynx clear with no erythema or exudate.  Neck: Supple nontender without palpable lymphadenopathy JVD bruits or goiter appreciated  Lungs: Clear to auscultation bilaterally  Heart: Regular rate and rhythm with normal S1 and S2  Abdomen: Soft nontender. normoactive bowel sounds. no hepatosplenomegaly  Extremities: No clubbing, cyanosis, or edema.  Neurologic: Sleepy, dementia.  CN II through XII intact.  Nonfocal, generalized debility and weakness.     Results from last 4 days   Lab Units 12/26/23  0833 12/25/23  0509   WBC AUTO 10*3/uL 9.2 6.4   HEMOGLOBIN g/dL 13.0* 13.6   HEMATOCRIT % 37.0* 38.8   PLATELETS AUTO 10*3/uL 247 259     Results from last 4 days   Lab Units 12/26/23  0833   SODIUM mmol/L 134*   POTASSIUM MMOL/L 4.4   CHLORIDE mmol/L 102   CO2 mmol/L 25   BUN mg/dL 30*   CREATININE mg/dL 1.24   CALCIUM mg/dL 8.8   GLUCOSE mg/dL 149*         Assessment/Plan   Dementia with agitation (resolved)  Seroquel adjusted  CT head without IV contrast  Stable exam without evidence of acute intracranial injury   acute left knee pain and associated mild effusion  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  They will arrange follow-up in clinic approximately 4 weeks with repeat x-rays.  X-ray thoracolumbar junction  Similar T12 compression deformity.  No change in alignment  CT lumbar spine without contrast  1.  Compression deformities of the T12 and L4 vertebral bodies appear likely acute.  2.  Diffuse lumbar degenerative changes.  3.  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  Generalized weakness  Blood on tip of penis urethra opening  Likely due urethral trauma or prostrate and following  B12 and vitamin D deficiency  DM2 with neuropathy  GERD  CAD  BPH   Mood disorder   NARESH   VTE prophylaxis:  lovenox     Plan:     Hold morning dose of Seroquel with some sedation  Continue supportive care   Neurosurgery arranging 4-week follow-up with repeat x-rays, no lifting greater than 5 to 10 pounds and avoid significant bending or twisting.  No brace necessary.  Continue PT OT, await SNF placement       Electronically signed by: Tarik Aaron Jr., MD  12/26/2023  11:50 AM

## 2023-12-26 NOTE — PLAN OF CARE
Problem: Safety Adult - Fall  Goal: Free from fall injury  Description: INTERVENTIONS:    Inpatient - Please reference Cares/Safety Flowsheet under [facility] Fall Risk for interventions.  Pediatrics - Please reference Peds Daily Cares/Safety Flowsheet under [facility] Pediatric Fall Assessment Fall Bundle for interventions  LD/OB - Please reference OB Shift Screening Flowsheet under OB Fall Risk for interventions.  Outcome: Progressing     Problem: Pain - Adult  Goal: Verbalizes/displays adequate comfort level or baseline comfort level  Description: INTERVENTIONS:  1. Encourage patient to monitor pain and request interventions  2. Assess pain using the appropriate pain scale  3. Administer analgesics based on type and severity of pain and evaluate response  4. Educate/Implement non-pharmacological measures as appropriate and evaluate response  5. Consider cultural, developmental and social influences on pain and pain management  6. Notify Provider if interventions unsuccessful or patient reports new pain  Outcome: Progressing  Flowsheets (Taken 12/25/2023 3330)  Verbalizes/displays adequate comfort level or baseline comfort level:   Encourage patient to monitor pain and request interventions   Assess pain using the appropriate pain scale   Administer analgesics based on type and severity of pain and evaluate response   Consider cultural, developmental and social influences on pain and pain management   Educate/Implement non-pharmacological measures as appropriate and evaluate response   Notify Provider if interventions unsuccessful or patient reports new pain     Problem: Daily Care  Goal: Daily care needs are met  Description: INTERVENTIONS:   1. Assess and monitor skin integrity  2. Identify patients at risk for skin breakdown on admission and per policy  3. Assess and monitor ability to perform self care and identify potential discharge needs  4. Assess skin integrity/risk for skin breakdown  5. Assist patient with  activities of daily living as needed  6. Encourage independent activity per ability   7. Provide mouth care   8. Include patient/family/caregiver in decisions related to daily care   Outcome: Progressing  Flowsheets (Taken 12/25/2023 2355)  Daily care needs are met:   Assess and monitor skin integrity   Assess and monitor ability to perform self care and identify potential discharge needs   Identify patients at risk for skin breakdown on admission and per policy   Assess skin integrity/risk for skin breakdown   Assist patient with activities of daily living as needed   Encourage independent activity per ability   Include patient/family/caregiver in decisions related to daily care   Provide mouth care     Problem: Knowledge Deficit  Goal: Patient/family/caregiver demonstrates understanding of disease process, treatment plan, medications, and discharge instructions  Description: INTERVENTIONS:   1. Complete learning assessment and assess knowledge base  2. Provide teaching at level of understanding   3. Provide teaching via preferred learning methods  Outcome: Progressing  Flowsheets (Taken 12/25/2023 2355)  Patient/family/caregiver demonstrates understanding of disease process, treatment plan, medications, and discharge instructions:   Provide teaching via preferred learning methods   Complete learning assessment and assess knowledge base   Provide teaching at level of understanding     Problem: Discharge Barriers  Goal: Patient's discharge needs are met  Description: INTERVENTIONS:  1. Assess patient for self-management skills  2. Encourage participation in management  3. Identify potential discharge barriers on admission and throughout hospital stay  4. Involve patient/family/caregiver in discharge planning process  5. Collaborate with case management/ for discharge needs  Outcome: Progressing  Flowsheets (Taken 12/25/2023 2355)  Patient discharge needs are met:   Assess patient for self-management  skills   Encourage participation in management   Identify potential discharge barriers on admission and throughout hospital stay   Involve patient/family/caregiver in discharge planning process   Collaborate with case management/ for discharge needs     Problem: Potential for Compromised Skin Integrity  Goal: Skin Integrity is Maintained or Improved  Description: INTERVENTIONS:  1. Assess and monitor skin integrity  2. Collaborate with interdisciplinary team and initiate plans and interventions as needed  3. Alternate a full bath with partial baths for elderly   4. Monitor patient's hygiene practices   5. Collaborate with wound, ostomy, and continence nurse  Outcome: Progressing  Flowsheets (Taken 12/25/2023 2355)  Skin integrity is maintained or improved:   Assess and monitor skin integrity   Collaborate with wound, ostomy, and continence nurse   Alternate a full bath with partial baths for elderly   Collaborate with interdisciplinary team and initiate plans and interventions as needed   Monitor patient's hygiene practices  Goal: Nutritional status is improving  Description: INTERVENTIONS:  1. Monitor and assess patient for malnutrition (ex- brittle hair, bruises, dry skin, pale skin and conjunctiva, muscle wasting, smooth red tongue, and disorientation)  2. Monitor patient's weight and dietary intake as ordered or per policy  3. Determine patient's food preferences and provide high-protein, high-caloric foods as appropriate  4. Assist patient with eating   5. Allow adequate time for meals   6. Encourage patient to take dietary supplement as ordered   7. Collaborate with dietitian  8. Include patient/family/caregiver in decisions related to nutrition  Outcome: Progressing  Flowsheets (Taken 12/25/2023 2355)  Nutritional status is improving:   Monitor and assess patient for malnutrition (ex- brittle hair, bruises, dry skin, pale skin and conjunctiva, muscle wasting, smooth red tongue, and  disorientation)   Determine patient's food preferences and provide high-protein, high-caloric foods as appropriate   Assist patient with eating   Monitor patient's weight and dietary intake as ordered or per policy   Allow adequate time for meals   Encourage patient to take dietary supplement as ordered   Include patient/family/caregiver in decisions related to nutrition  Goal: MOBILITY IS MAINTAINED OR IMPROVED  Description: INTERVENTIONS  1. Collaborate with interdisciplinary team and initiate plan and interventions as ordered (PT/OT)  2. Encourage ambulation  3. Up to chair for meals  4. Monitor for signs of deconditioning  Outcome: Progressing  Flowsheets (Taken 12/25/2023 2351)  Mobility is Maintained or Improved:   Collaborate with interdisciplinary team and initiate plan and interventions as ordered (PT/OT)   Encourage ambulation   Up to chair for meals   Monitor for signs of deconditioning     Problem: Urinary Incontinence  Goal: Perineal skin integrity is maintained or improved  Description: INTERVENTIONS:  1. Assess genitourinary system, perineal skin, labs (urinalysis), and history of incontinence to include past management, aggravating, and alleviating factors  2. Collaborate with interdisciplinary team including wound, ostomy, and continence nurse and initiate plans and interventions as needed  4. Consider urine containment device  5. Apply skin protectant   6. Develop skin care regimen  7. Provide privacy when changing patient's incontinence device to maintain their dignity  Outcome: Progressing  Flowsheets (Taken 12/25/2023 2355)  Perineal skin integrity is maintained or improved:   Assess genitourinary system, perineal skin, labs (urinalysis), and history of incontinence to include past management, aggravating, and alleviating factors   Collaborate with interdisciplinary team including wound, ostomy, and continence nurse and initiate plans and interventions as needed   Consider urine containment  device   Apply skin protectant   Develop skin care regimen     Problem: Infection - Adult  Goal: Absence of infection during hospitalization  Description: INTERVENTIONS:  1. Assess and monitor for signs and symptoms of infection  2. Monitor lab/diagnostic results  3. Monitor all insertion sites/wounds/incisions  4. Monitor secretions for changes in amount and color  5. Administer medications as ordered  6. Educate and encourage patient and family to use good hand hygiene technique  7. Identify and educate in appropriate isolation precautions for identified infection/condition  Outcome: Progressing  Flowsheets (Taken 12/25/2023 2356)  Absence of infection during hospitalization:   Assess and monitor for signs and symptoms of infection   Monitor lab/diagnostic results   Monitor all insertion sites/wounds/incisions   Monitor secretions for changes in amount and colo   Educate and encourage patient and family to use good hand hygiene technique   Administer medications as ordered     Problem: Safety Adult  Goal: Patient will remain safe during hospitalization  Description: INTERVENTIONS    1. Assess patient for fall risk and implement interventions if needed  2. Use safe transport techniques  3. Assess patient using the appropriate Harvey skin assessment scale  4. Assess patient for risk of aspiration  5. Assess patient for risk of elopement  6. Assess patient for risk of suicide  Outcome: Progressing  Flowsheets (Taken 12/25/2023 2354)  Patient will remain safe durning hospitalization:   Assess patient for Fall Risk   Assess Patient for Aspirations   Use safe transport   Assess Patient for Risk of Elopement   Assess Patient using the appropriate Harvey scale   Assess Patient for Risk of Suicide     Problem: Elopement Risk  Goal: Minimize the risk of a patient leaving without authorization when departure presents a threat to the safety of patient or others.  Description: INTERVENTIONS:  1. Frequent re-orientation, using  a calm voice and positive re-enforcement when interacting with the patient  2. Familiar objects and possessions placed in the room  3. Purposeful focused activities  4. Family or volunteer staying with the patient  5. Medication evaluation by Pharmacy for possible adjustments  6. Consider continual supervision while patient transported off unit  7. Ensure pain relief as necessary  8. Set limits and provide clear expectations as needed  9. Reduce noise/stimulation  Outcome: Progressing  Flowsheets (Taken 12/25/2023 9177)  Minimize the risk of patient leaving: Frequent re-orientation, using a calm voice and positive re-enforcement when interacting with the patient

## 2023-12-26 NOTE — PLAN OF CARE
Problem: Safety Adult - Fall  Goal: Free from fall injury  Description: INTERVENTIONS:    Inpatient - Please reference Cares/Safety Flowsheet under Oneil Fall Risk for interventions.  Pediatrics - Please reference Peds Daily Cares/Safety Flowsheet under [facility] Pediatric Fall Assessment Fall Bundle for interventions  LD/OB - Please reference OB Shift Screening Flowsheet under OB Fall Risk for interventions.  Outcome: Progressing     Problem: Pain - Adult  Goal: Verbalizes/displays adequate comfort level or baseline comfort level  Description: INTERVENTIONS:  1. Encourage patient to monitor pain and request interventions  2. Assess pain using the appropriate pain scale  3. Administer analgesics based on type and severity of pain and evaluate response  4. Educate/Implement non-pharmacological measures as appropriate and evaluate response  5. Consider cultural, developmental and social influences on pain and pain management  6. Notify Provider if interventions unsuccessful or patient reports new pain  Outcome: Progressing  Flowsheets (Taken 12/25/2023 5573 by Jesus Dong RN)  Verbalizes/displays adequate comfort level or baseline comfort level:   Encourage patient to monitor pain and request interventions   Assess pain using the appropriate pain scale   Administer analgesics based on type and severity of pain and evaluate response   Consider cultural, developmental and social influences on pain and pain management   Educate/Implement non-pharmacological measures as appropriate and evaluate response   Notify Provider if interventions unsuccessful or patient reports new pain     Problem: Daily Care  Goal: Daily care needs are met  Description: INTERVENTIONS:   1. Assess and monitor skin integrity  2. Identify patients at risk for skin breakdown on admission and per policy  3. Assess and monitor ability to perform self care and identify potential discharge needs  4. Assess skin integrity/risk for skin  breakdown  5. Assist patient with activities of daily living as needed  6. Encourage independent activity per ability   7. Provide mouth care   8. Include patient/family/caregiver in decisions related to daily care   Outcome: Progressing  Flowsheets (Taken 12/25/2023 2355 by Jesus Dong, RN)  Daily care needs are met:   Assess and monitor skin integrity   Assess and monitor ability to perform self care and identify potential discharge needs   Identify patients at risk for skin breakdown on admission and per policy   Assess skin integrity/risk for skin breakdown   Assist patient with activities of daily living as needed   Encourage independent activity per ability   Include patient/family/caregiver in decisions related to daily care   Provide mouth care     Problem: Knowledge Deficit  Goal: Patient/family/caregiver demonstrates understanding of disease process, treatment plan, medications, and discharge instructions  Description: INTERVENTIONS:   1. Complete learning assessment and assess knowledge base  2. Provide teaching at level of understanding   3. Provide teaching via preferred learning methods  Outcome: Progressing  Flowsheets (Taken 12/25/2023 2355 by Jesus Dong, RN)  Patient/family/caregiver demonstrates understanding of disease process, treatment plan, medications, and discharge instructions:   Provide teaching via preferred learning methods   Complete learning assessment and assess knowledge base   Provide teaching at level of understanding     Problem: Discharge Barriers  Goal: Patient's discharge needs are met  Description: INTERVENTIONS:  1. Assess patient for self-management skills  2. Encourage participation in management  3. Identify potential discharge barriers on admission and throughout hospital stay  4. Involve patient/family/caregiver in discharge planning process  5. Collaborate with case management/ for discharge needs  Outcome: Progressing  Flowsheets (Taken  12/25/2023 2355 by Jesus Dong, RN)  Patient discharge needs are met:   Assess patient for self-management skills   Encourage participation in management   Identify potential discharge barriers on admission and throughout hospital stay   Involve patient/family/caregiver in discharge planning process   Collaborate with case management/ for discharge needs     Problem: Potential for Compromised Skin Integrity  Goal: Skin Integrity is Maintained or Improved  Description: INTERVENTIONS:  1. Assess and monitor skin integrity  2. Collaborate with interdisciplinary team and initiate plans and interventions as needed  3. Alternate a full bath with partial baths for elderly   4. Monitor patient's hygiene practices   5. Collaborate with wound, ostomy, and continence nurse  Outcome: Progressing  Flowsheets (Taken 12/25/2023 2355 by Jesus Dong, RN)  Skin integrity is maintained or improved:   Assess and monitor skin integrity   Collaborate with wound, ostomy, and continence nurse   Alternate a full bath with partial baths for elderly   Collaborate with interdisciplinary team and initiate plans and interventions as needed   Monitor patient's hygiene practices     Problem: Urinary Incontinence  Goal: Perineal skin integrity is maintained or improved  Description: INTERVENTIONS:  1. Assess genitourinary system, perineal skin, labs (urinalysis), and history of incontinence to include past management, aggravating, and alleviating factors  2. Collaborate with interdisciplinary team including wound, ostomy, and continence nurse and initiate plans and interventions as needed  4. Consider urine containment device  5. Apply skin protectant   6. Develop skin care regimen  7. Provide privacy when changing patient's incontinence device to maintain their dignity  Outcome: Progressing  Flowsheets (Taken 12/25/2023 2355 by Jesus Dong, RN)  Perineal skin integrity is maintained or improved:   Assess genitourinary  system, perineal skin, labs (urinalysis), and history of incontinence to include past management, aggravating, and alleviating factors   Collaborate with interdisciplinary team including wound, ostomy, and continence nurse and initiate plans and interventions as needed   Consider urine containment device   Apply skin protectant   Develop skin care regimen     Problem: Infection - Adult  Goal: Absence of infection during hospitalization  Description: INTERVENTIONS:  1. Assess and monitor for signs and symptoms of infection  2. Monitor lab/diagnostic results  3. Monitor all insertion sites/wounds/incisions  4. Monitor secretions for changes in amount and color  5. Administer medications as ordered  6. Educate and encourage patient and family to use good hand hygiene technique  7. Identify and educate in appropriate isolation precautions for identified infection/condition  Outcome: Progressing  Flowsheets (Taken 12/25/2023 2355 by Jesus Dong, RN)  Absence of infection during hospitalization:   Assess and monitor for signs and symptoms of infection   Monitor lab/diagnostic results   Monitor all insertion sites/wounds/incisions   Monitor secretions for changes in amount and colo   Educate and encourage patient and family to use good hand hygiene technique   Administer medications as ordered     Problem: Safety Adult  Goal: Patient will remain safe during hospitalization  Description: INTERVENTIONS    1. Assess patient for fall risk and implement interventions if needed  2. Use safe transport techniques  3. Assess patient using the appropriate Harvey skin assessment scale  4. Assess patient for risk of aspiration  5. Assess patient for risk of elopement  6. Assess patient for risk of suicide  Outcome: Progressing  Flowsheets (Taken 12/25/2023 2355 by Jesus Dong, RN)  Patient will remain safe durning hospitalization:   Assess patient for Fall Risk   Assess Patient for Aspirations   Use safe transport   Assess  Patient for Risk of Elopement   Assess Patient using the appropriate Harvey scale   Assess Patient for Risk of Suicide     Problem: TRANSFERS  Goal: STG - Patient will perform bed mobility  Description: Supine<>sit with tactile cues for sequencing  Outcome: Progressing  Goal: LTG - Patient will demonstrate safe transfer techniques  Description: With min A for all transfers with use of walker   Outcome: Progressing     Problem: OT Misc  Goal: LTG - Misc 1  Description: Pt will complete toilet transfers with stand by assistance with assitive device as needed.   Outcome: Progressing     Problem: Genitourinary - Adult  Goal: Absence of urinary retention  Description: INTERVENTIONS:  1. Assess patient’s ability to void and empty bladder.  2. Monitor intake/output and perform bladder scan if indicated.  3. Place urinary catheter per order and initiate and maintain CAUTI bundle.  4. Administer medications to alleviate retention as needed.  5. Discuss catheterization for long term situations as appropriate.    Outcome: Progressing     Problem: Elopement Risk  Goal: Minimize the risk of a patient leaving without authorization when departure presents a threat to the safety of patient or others.  Description: INTERVENTIONS:  1. Frequent re-orientation, using a calm voice and positive re-enforcement when interacting with the patient  2. Familiar objects and possessions placed in the room  3. Purposeful focused activities  4. Family or volunteer staying with the patient  5. Medication evaluation by Pharmacy for possible adjustments  6. Consider continual supervision while patient transported off unit  7. Ensure pain relief as necessary  8. Set limits and provide clear expectations as needed  9. Reduce noise/stimulation  Outcome: Progressing

## 2023-12-27 LAB
B PARAP IS1001 DNA NPH QL NAA+NON-PROBE: NEGATIVE
B PERT.PT PRMT NPH QL NAA+NON-PROBE: NEGATIVE
C PNEUM DNA NPH QL NAA+NON-PROBE: NEGATIVE
FLUAV RNA NPH QL NAA+NON-PROBE: NEGATIVE
FLUBV RNA NPH QL NAA+NON-PROBE: NEGATIVE
HADV DNA NPH QL NAA+NON-PROBE: NEGATIVE
HCOV 229E RNA NPH QL NAA+NON-PROBE: NEGATIVE
HCOV HKU1 RNA NPH QL NAA+NON-PROBE: NEGATIVE
HCOV NL63 RNA NPH QL NAA+NON-PROBE: NEGATIVE
HCOV OC43 RNA NPH QL NAA+NON-PROBE: NEGATIVE
HMPV RNA NPH QL NAA+NON-PROBE: NEGATIVE
HPIV1 RNA NPH QL NAA+NON-PROBE: NEGATIVE
HPIV2 RNA NPH QL NAA+NON-PROBE: NEGATIVE
HPIV3 RNA NPH QL NAA+NON-PROBE: NEGATIVE
HPIV4 RNA NPH QL NAA+NON-PROBE: NEGATIVE
M PNEUMO DNA NPH QL NAA+NON-PROBE: NEGATIVE
PROCALCITONIN SERPL-MCNC: 0.11 NG/ML
RSV RNA NPH QL NAA+NON-PROBE: NEGATIVE
RV+EV RNA NPH QL NAA+NON-PROBE: NEGATIVE
SARS-COV-2 RNA NPH QL NAA+NON-PROBE: NEGATIVE

## 2023-12-27 PROCEDURE — 2590000100 HC RX 259: Performed by: HOSPITALIST

## 2023-12-27 PROCEDURE — 84145 PROCALCITONIN (PCT): CPT | Performed by: HOSPITALIST

## 2023-12-27 PROCEDURE — 36415 COLL VENOUS BLD VENIPUNCTURE: CPT | Performed by: HOSPITALIST

## 2023-12-27 PROCEDURE — 97530 THERAPEUTIC ACTIVITIES: CPT | Mod: GP

## 2023-12-27 PROCEDURE — (BLANK) HC ROOM PRIVATE

## 2023-12-27 PROCEDURE — 87635 SARS-COV-2 COVID-19 AMP PRB: CPT | Performed by: HOSPITALIST

## 2023-12-27 PROCEDURE — 6370000100 HC RX 637 (ALT 250 FOR IP): Performed by: HOSPITALIST

## 2023-12-27 PROCEDURE — 6370000100 HC RX 637 (ALT 250 FOR IP): Performed by: FAMILY MEDICINE

## 2023-12-27 PROCEDURE — 2590000100 HC RX 259: Performed by: FAMILY MEDICINE

## 2023-12-27 PROCEDURE — 99232 SBSQ HOSP IP/OBS MODERATE 35: CPT | Performed by: HOSPITALIST

## 2023-12-27 RX ORDER — GUAIFENESIN 600 MG/1
600 TABLET, EXTENDED RELEASE ORAL 2 TIMES DAILY
Status: DISCONTINUED | OUTPATIENT
Start: 2023-12-27 | End: 2024-01-09 | Stop reason: HOSPADM

## 2023-12-27 RX ADMIN — GUAIFENESIN 600 MG: 600 TABLET, EXTENDED RELEASE ORAL at 14:00

## 2023-12-27 RX ADMIN — LOSARTAN POTASSIUM 50 MG: 50 TABLET, FILM COATED ORAL at 08:56

## 2023-12-27 RX ADMIN — SPIRONOLACTONE 25 MG: 25 TABLET ORAL at 08:56

## 2023-12-27 RX ADMIN — ASPIRIN 81 MG: 81 TABLET ORAL at 08:56

## 2023-12-27 RX ADMIN — METOPROLOL SUCCINATE 25 MG: 25 TABLET, EXTENDED RELEASE ORAL at 08:56

## 2023-12-27 RX ADMIN — QUETIAPINE FUMARATE 150 MG: 100 TABLET ORAL at 17:11

## 2023-12-27 RX ADMIN — SENNOSIDES AND DOCUSATE SODIUM 1 TABLET: 50; 8.6 TABLET ORAL at 08:53

## 2023-12-27 RX ADMIN — AMLODIPINE BESYLATE 5 MG: 5 TABLET ORAL at 08:56

## 2023-12-27 RX ADMIN — TAMSULOSIN HYDROCHLORIDE 0.4 MG: 0.4 CAPSULE ORAL at 08:56

## 2023-12-27 RX ADMIN — POLYETHYLENE GLYCOL 3350 17 G: 17 POWDER, FOR SOLUTION ORAL at 09:00

## 2023-12-27 RX ADMIN — Medication 1 TABLET: at 08:56

## 2023-12-27 RX ADMIN — FAMOTIDINE 10 MG: 20 TABLET, FILM COATED ORAL at 08:56

## 2023-12-27 NOTE — PLAN OF CARE
Problem: Safety Adult - Fall  Goal: Free from fall injury  Description: INTERVENTIONS:    Inpatient - Please reference Cares/Safety Flowsheet under [facility] Fall Risk for interventions.  Pediatrics - Please reference Peds Daily Cares/Safety Flowsheet under [facility] Pediatric Fall Assessment Fall Bundle for interventions  LD/OB - Please reference OB Shift Screening Flowsheet under OB Fall Risk for interventions.  Outcome: Progressing     Problem: Pain - Adult  Goal: Verbalizes/displays adequate comfort level or baseline comfort level  Description: INTERVENTIONS:  1. Encourage patient to monitor pain and request interventions  2. Assess pain using the appropriate pain scale  3. Administer analgesics based on type and severity of pain and evaluate response  4. Educate/Implement non-pharmacological measures as appropriate and evaluate response  5. Consider cultural, developmental and social influences on pain and pain management  6. Notify Provider if interventions unsuccessful or patient reports new pain  Outcome: Progressing     Problem: Daily Care  Goal: Daily care needs are met  Description: INTERVENTIONS:   1. Assess and monitor skin integrity  2. Identify patients at risk for skin breakdown on admission and per policy  3. Assess and monitor ability to perform self care and identify potential discharge needs  4. Assess skin integrity/risk for skin breakdown  5. Assist patient with activities of daily living as needed  6. Encourage independent activity per ability   7. Provide mouth care   8. Include patient/family/caregiver in decisions related to daily care   Outcome: Progressing     Problem: Knowledge Deficit  Goal: Patient/family/caregiver demonstrates understanding of disease process, treatment plan, medications, and discharge instructions  Description: INTERVENTIONS:   1. Complete learning assessment and assess knowledge base  2. Provide teaching at level of understanding   3. Provide teaching via preferred  learning methods  Outcome: Not Progressing     Problem: Discharge Barriers  Goal: Patient's discharge needs are met  Description: INTERVENTIONS:  1. Assess patient for self-management skills  2. Encourage participation in management  3. Identify potential discharge barriers on admission and throughout hospital stay  4. Involve patient/family/caregiver in discharge planning process  5. Collaborate with case management/ for discharge needs  Outcome: Not Progressing     Problem: Infection - Adult  Goal: Absence of infection during hospitalization  Description: INTERVENTIONS:  1. Assess and monitor for signs and symptoms of infection  2. Monitor lab/diagnostic results  3. Monitor all insertion sites/wounds/incisions  4. Monitor secretions for changes in amount and color  5. Administer medications as ordered  6. Educate and encourage patient and family to use good hand hygiene technique  7. Identify and educate in appropriate isolation precautions for identified infection/condition  Outcome: Progressing     Problem: Safety Adult  Goal: Patient will remain safe during hospitalization  Description: INTERVENTIONS    1. Assess patient for fall risk and implement interventions if needed  2. Use safe transport techniques  3. Assess patient using the appropriate Harvey skin assessment scale  4. Assess patient for risk of aspiration  5. Assess patient for risk of elopement  6. Assess patient for risk of suicide  Outcome: Progressing     Problem: Potential for Compromised Skin Integrity  Goal: Skin Integrity is Maintained or Improved  Description: INTERVENTIONS:  1. Assess and monitor skin integrity  2. Collaborate with interdisciplinary team and initiate plans and interventions as needed  3. Alternate a full bath with partial baths for elderly   4. Monitor patient's hygiene practices   5. Collaborate with wound, ostomy, and continence nurse  Outcome: Progressing  Goal: Nutritional status is improving  Description:  INTERVENTIONS:  1. Monitor and assess patient for malnutrition (ex- brittle hair, bruises, dry skin, pale skin and conjunctiva, muscle wasting, smooth red tongue, and disorientation)  2. Monitor patient's weight and dietary intake as ordered or per policy  3. Determine patient's food preferences and provide high-protein, high-caloric foods as appropriate  4. Assist patient with eating   5. Allow adequate time for meals   6. Encourage patient to take dietary supplement as ordered   7. Collaborate with dietitian  8. Include patient/family/caregiver in decisions related to nutrition  Outcome: Progressing  Goal: MOBILITY IS MAINTAINED OR IMPROVED  Description: INTERVENTIONS  1. Collaborate with interdisciplinary team and initiate plan and interventions as ordered (PT/OT)  2. Encourage ambulation  3. Up to chair for meals  4. Monitor for signs of deconditioning  Outcome: Progressing     Problem: Urinary Incontinence  Goal: Perineal skin integrity is maintained or improved  Description: INTERVENTIONS:  1. Assess genitourinary system, perineal skin, labs (urinalysis), and history of incontinence to include past management, aggravating, and alleviating factors  2. Collaborate with interdisciplinary team including wound, ostomy, and continence nurse and initiate plans and interventions as needed  4. Consider urine containment device  5. Apply skin protectant   6. Develop skin care regimen  7. Provide privacy when changing patient's incontinence device to maintain their dignity  Outcome: Progressing     Problem: TRANSFERS  Goal: STG - Patient will perform bed mobility  Description: Supine<>sit with tactile cues for sequencing  Outcome: Progressing  Goal: LTG - Patient will demonstrate safe transfer techniques  Description: With min A for all transfers with use of walker   Outcome: Progressing     Problem: OT Misc  Goal: LTG - Misc 1  Description: Pt will complete toilet transfers with stand by assistance with assitive device  as needed.   Outcome: Progressing     Problem: Genitourinary - Adult  Goal: Absence of urinary retention  Description: INTERVENTIONS:  1. Assess patient’s ability to void and empty bladder.  2. Monitor intake/output and perform bladder scan if indicated.  3. Place urinary catheter per order and initiate and maintain CAUTI bundle.  4. Administer medications to alleviate retention as needed.  5. Discuss catheterization for long term situations as appropriate.    Outcome: Progressing     Problem: Elopement Risk  Goal: Minimize the risk of a patient leaving without authorization when departure presents a threat to the safety of patient or others.  Description: INTERVENTIONS:  1. Frequent re-orientation, using a calm voice and positive re-enforcement when interacting with the patient  2. Familiar objects and possessions placed in the room  3. Purposeful focused activities  4. Family or volunteer staying with the patient  5. Medication evaluation by Pharmacy for possible adjustments  6. Consider continual supervision while patient transported off unit  7. Ensure pain relief as necessary  8. Set limits and provide clear expectations as needed  9. Reduce noise/stimulation  Outcome: Progressing

## 2023-12-27 NOTE — PROGRESS NOTES
[address] [place] [address]  Daily Progress Note  Patient name: Joseph Wong  MRN: [id number]   LOS: 20 days     Subjective   Patient sitting up in bed and ate all of his breakfast this am.  Objective   Vitals:Temp:  [36.2 °C (97.1 °F)-36.3 °C (97.4 °F)] 36.2 °C (97.2 °F)  Heart Rate:  [65-67] 67  Resp:  [16] 16  SpO2:  [90 %-92 %] 90 %  BP: (112-136)/(76-87) 136/87  Physical Exam:   HEENT: Pupils equal round and reactive to light and accommodation.  Extraocular movements are intact.  Oropharynx clear with no erythema or exudate.  Neck: Supple nontender without palpable lymphadenopathy JVD bruits or goiter appreciated  Lungs: Clear to auscultation bilaterally  Heart: Regular rate and rhythm with normal S1 and S2  Abdomen: Soft nontender. normoactive bowel sounds. no hepatosplenomegaly  Extremities: No clubbing, cyanosis, or edema.  Neurologic: Dementia.  CN II through XII intact.  Nonfocal, generalized debility and weakness    Results from last 4 days   Lab Units 12/26/23  0833 12/25/23  0509   WBC AUTO 10*3/uL 9.2 6.4   HEMOGLOBIN g/dL 13.0* 13.6   HEMATOCRIT % 37.0* 38.8   PLATELETS AUTO 10*3/uL 247 259     Results from last 4 days   Lab Units 12/26/23  0833   SODIUM mmol/L 134*   POTASSIUM MMOL/L 4.4   CHLORIDE mmol/L 102   CO2 mmol/L 25   BUN mg/dL 30*   CREATININE mg/dL 1.24   CALCIUM mg/dL 8.8   GLUCOSE mg/dL 149*         Assessment/Plan   Dementia with agitation (resolved)  Seroquel adjusted  CT head without IV contrast  Stable exam without evidence of acute intracranial injury   acute left knee pain and associated mild effusion  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  They will arrange follow-up in clinic approximately 4 weeks with repeat x-rays.  X-ray thoracolumbar junction  Similar T12 compression deformity.  No change in alignment  CT lumbar spine without contrast  1.  Compression deformities of the T12 and L4 vertebral bodies appear likely acute.  2.  Diffuse lumbar degenerative changes.  3.  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  Generalized weakness  Blood on tip of penis urethra opening  Likely due urethral trauma or prostrate and following  B12 and vitamin D deficiency  DM2 with neuropathy  GERD  CAD  BPH   Mood disorder   NARESH   VTE prophylaxis:  lovenox     Plan:     Continue supportive care   Neurosurgery arranging 4-week follow-up with repeat x-rays, no lifting greater than 5 to 10 pounds and avoid significant bending or twisting.  No brace necessary.  PT OT  Await SNF placement       Electronically signed by: Tarik Aaron Jr., MD  12/27/2023  12:21 PM    Addendum:    RN calls with patient having phlegm.  Will check respiratory pathogen panel.  Check procalcitonin,  add mucinex

## 2023-12-27 NOTE — NURSING END OF SHIFT
Nursing End of Shift Summary:    Patient: Joseph Wong  MRN: [id number]  : 10/20/1934, Age: 89 y.o.    Location: [address] [place] [address]    Nursing Goals  Clinical Goals for the Shift: monitor VS, administer medications as scheduled, remain free from falls during shift, promote comfort and rest    Narrative Summary of Progress Toward Clinical Goals:  Patient refused all meds at the beginning of shift, otherwise no new events overnight. No new C/O pain or discomfort. Patient resting quietly.    Barriers to Goals/Nursing Concerns:  No    New Patient or Family Concerns/Issues:  No    Shift Summary:   Significant Events & Communications to Providers (last 12 hours)       Last 5 Values    No documentation.                 Oxygen Usage (last 12 hours)       Last 5 Values    No documentation.                 Mobility (last 12 hours)       Last 5 Values       Row Name 23 1200 23 1601 23 2151             Mobility    Activity Ambulate in room -- Sleeping      Length of Time in Chair (min) 120 -- 0      Distance Ambulated (feet) 10 Feet -- 0 Feet      Distance Ambulated (Meters Calculated) 3.05 Meters -- 0 Meters      Patient Position -- Supine --      Turning Turns self -- Turns self      Distance Ambulated (Meters Calculated)(Do Not Use) 3.05 Feet -- 0 Feet                    Urethral Catheter       Active Urethral Catheter       None                  Active Lines       Active Central venous catheter / Peripherally inserted central catheter / Implantable Port / Hemodialysis catheter / Midline Catheter       None                  Infusing Medications   Medication Dose Last Rate     PRN Medications   Medication Dose Last Admin    QUEtiapine  25 mg 25 mg at 23 1422    sodium chloride  3 mL      sodium chloride  1 spray      sodium chloride-aloe vera  1 Application      acetaminophen  500 mg 500 mg at 23 1933    oxyCODONE  5 mg 5 mg at 23 1447    magnesium hydroxide  30 mL      ondansetron  4 mg      Or     ondansetron  4 mg

## 2023-12-27 NOTE — NURSING END OF SHIFT
Nursing End of Shift Summary:    Patient: Joseph Wong  MRN: [id number]  : 10/20/1934, Age: 89 y.o.    Location: [address] [place] [address]    Nursing Goals  Clinical Goals for the Shift: monitor VS, administer medications as scheduled, remain free from falls during shift, promote comfort and rest    Narrative Summary of Progress Toward Clinical Goals:  Pt has been more awake today. He has been up in his chair for most of the afternoon. Pt has not complained or has shown any pain or discomfort.    Barriers to Goals/Nursing Concerns:  No    New Patient or Family Concerns/Issues:  No    Shift Summary:   Significant Events & Communications to Providers (last 12 hours)       Last 5 Values    No documentation.                 Oxygen Usage (last 12 hours)       Last 5 Values    No documentation.                 Mobility (last 12 hours)       Last 5 Values       Row Name 23 0631 23 0657 23 0800 23 1200 23 1601       Mobility    Activity Dangle -- Sleeping Ambulate in room --    Level of Assistance Independent Independent -- -- --    Length of Time in Chair (min) -- -- 0 120 --    Distance Ambulated (feet) -- -- 0 Feet 10 Feet --    Distance Ambulated (Meters Calculated) -- -- 0 Meters 3.05 Meters --    Patient Position -- -- -- -- Supine    Turning Turns self Turns self Turns self Turns self --    Distance Ambulated (Meters Calculated)(Do Not Use) -- -- 0 Feet 3.05 Feet --                  Urethral Catheter       Active Urethral Catheter       None                  Active Lines       Active Central venous catheter / Peripherally inserted central catheter / Implantable Port / Hemodialysis catheter / Midline Catheter       None                  Infusing Medications   Medication Dose Last Rate     PRN Medications   Medication Dose Last Admin    QUEtiapine  25 mg 25 mg at 23 1422    sodium chloride  3 mL      sodium chloride  1 spray      sodium chloride-aloe vera  1 Application      acetaminophen  500 mg  500 mg at 12/12/23 1933    oxyCODONE  5 mg 5 mg at 12/17/23 1447    magnesium hydroxide  30 mL      ondansetron  4 mg      Or    ondansetron  4 mg

## 2023-12-27 NOTE — INTERDISCIPLINARY/THERAPY
Case Management Progress Note    Phone # [phone]    Diagnosis: Weakness.    Discussed Discharge Topics/Narrative: JOSÉ MIGUEL gave the patient's wife Alessandra Wong the Pre-Admission profile skilled nursing or assisted living paper that the was request by Blanche at [facility].  Alessandra filled out the patient, and had a question for the CM.  The CM answered Alessandra's question to the best of the CM's ability.  CM received the paper for the CM to fax to Deena.  CM faxed the paperwork.    JOSÉ MIGUEL called Cathleen at [facility].  Cathleen explained that the building declined the patient.  Cathleen explained that they do not have the staff.  [facility] declined, due to no bed available.  [facility] declined, due to thought/behavior/social complexity.  CM called Philly at [facility], and left a message to call the CM back.  JOSÉ MIGUEL speaks with Alisha at [facility].  CM will follow up with [facility] to see if [facility] will take the patient tomorrow.    JOSÉ MIGUEL meets with Alessandra, and updates Alessandra.  JOSÉ MIGUEL explains that the time has come to think about expanding referrals.  JOSÉ MIGUEL will meet with Alessandra in the morning to see where she wants to expand referrals to.  Alessandra explains that if she does not like the facility then she will take the patient home.   will continue to foloow.    Family updated: Yes.    RN updated:     Disposition: Placement vs. Home plan depending on facility.

## 2023-12-27 NOTE — INTERDISCIPLINARY/THERAPY
[facility] [id number]  [phone]      Inpatient Physical Therapy Daily Treatment Note    Date of Service: 12/27/2023  Patient Name: Joseph Wong  Referring Provider: WYATT SRINIVASAN ROBERT  Completed Visit Number: [id number]  SOC Date: 12/06/23  Certification Period: 01/05/24    Medical Diagnosis:   Weakness [R53.1]  Unable to bear weight on left lower extremity [R26.89]  Lumbar compression fracture, closed, initial encounter (CMS/[facility]) [S32.000A]  Treatment Diagnoses:  Treatment Diagnosis: Decreased strength, Decreased endurance, Impaired balance, Decreased mobility  Subjective   Family/Caregiver Present  Family/Caregiver Present: Yes  Subjective Comments  RN Stated patient is medically cleared for therapy: Yes  Subjective Comments: Patient laying in bed upon PT arrival and ends session in the recliner with call light on hand, needs within reach, chair alarm set and spouse present.     Pain Assessment Scale  Pain Scale: 0-10  0-10 Pain Score: 0 - No pain  Oneil Fall Risk Score: 100         Objective    Precautions  Other Precautions: fall risk, w/c bound at baseline (ambulates short distance)  Is this a Co-Treatment?: No  Treatments:  Gait belt donned for all mobility. Pt left with call light in reach, needs met.     Cognition: impaired     Bed Mobility: Stephany with max cues for bed rail support. Unable to scoot forward in short sitting.    Transfers: min-modA x1 for sit to stands with front wheeled walker support, completes 5 sit to  session with 1 stand pivot transfer with Stephany     Ambulation: takes a few steps to ambulate to chair with front wheeled walker     Stairs: not attempted    Balance: completes 4 sit to stands from edge of chair with modA x1 to attain standing and worked on standing for 10-30 seconds each bout with focus on posture and balance.  He was retropulsive throughout all and required max cues for hand placement    Other Activities: education on the importance of  PT to strengthen him to get home, repeated multiple times in session    Activity Tolerance: limited    Time Calculation  Start Time: 1334  Stop Time: 1353  Time Calculation (min): 19 min  Therapeutic Interventions (Time Spent in Minutes)  Therapeutic Activity: 19       Assessment/Plan   Assessment:  Level of Function and Prognosis  Prognosis: Fair  Barriers to Discharge: Co-morbidities  Assessment: Requires max encouragement to participate in session and requires repeated education throughout session on the importance of therapy. Overall requires min-modA for sit to stands and has poor balance upon standing.  He will continue to benefit from skilled PT to address impairments in functional mobility, strength and balance in order to reduce his risk of falls and burden of care.  Recommendation  Recommendations for Therapy: Continue skilled therapy    Plan:  Plan  Plan for next treatment comment: 1-2 assist (chair follow): bed mobility min A, transfers mod A, gait 3-15 m with min-mod A, LE strengthening, standing balance/tolerance  Treatment duration will be through Certification Period: 01/05/24

## 2023-12-28 PROCEDURE — 2590000100 HC RX 259: Performed by: FAMILY MEDICINE

## 2023-12-28 PROCEDURE — 6370000100 HC RX 637 (ALT 250 FOR IP): Performed by: HOSPITALIST

## 2023-12-28 PROCEDURE — 6370000100 HC RX 637 (ALT 250 FOR IP): Performed by: FAMILY MEDICINE

## 2023-12-28 PROCEDURE — 2590000100 HC RX 259: Performed by: HOSPITALIST

## 2023-12-28 PROCEDURE — (BLANK) HC ROOM PRIVATE

## 2023-12-28 PROCEDURE — 99231 SBSQ HOSP IP/OBS SF/LOW 25: CPT | Performed by: HOSPITALIST

## 2023-12-28 RX ADMIN — GUAIFENESIN 600 MG: 600 TABLET, EXTENDED RELEASE ORAL at 19:55

## 2023-12-28 RX ADMIN — QUETIAPINE FUMARATE 25 MG: 25 TABLET ORAL at 21:48

## 2023-12-28 RX ADMIN — QUETIAPINE FUMARATE 150 MG: 100 TABLET ORAL at 17:08

## 2023-12-28 RX ADMIN — METOPROLOL SUCCINATE 25 MG: 25 TABLET, EXTENDED RELEASE ORAL at 07:52

## 2023-12-28 RX ADMIN — FAMOTIDINE 10 MG: 20 TABLET, FILM COATED ORAL at 07:52

## 2023-12-28 RX ADMIN — ASPIRIN 81 MG: 81 TABLET ORAL at 07:53

## 2023-12-28 RX ADMIN — FLUOXETINE HYDROCHLORIDE 40 MG: 20 CAPSULE ORAL at 19:55

## 2023-12-28 RX ADMIN — GUAIFENESIN 600 MG: 600 TABLET, EXTENDED RELEASE ORAL at 07:52

## 2023-12-28 RX ADMIN — FAMOTIDINE 10 MG: 20 TABLET, FILM COATED ORAL at 19:55

## 2023-12-28 RX ADMIN — POLYETHYLENE GLYCOL 3350 17 G: 17 POWDER, FOR SOLUTION ORAL at 07:53

## 2023-12-28 RX ADMIN — FINASTERIDE 5 MG: 5 TABLET, FILM COATED ORAL at 19:55

## 2023-12-28 RX ADMIN — AMLODIPINE BESYLATE 5 MG: 5 TABLET ORAL at 07:52

## 2023-12-28 RX ADMIN — LOSARTAN POTASSIUM 50 MG: 50 TABLET, FILM COATED ORAL at 07:53

## 2023-12-28 RX ADMIN — TAMSULOSIN HYDROCHLORIDE 0.4 MG: 0.4 CAPSULE ORAL at 07:53

## 2023-12-28 RX ADMIN — QUETIAPINE FUMARATE 25 MG: 25 TABLET ORAL at 18:00

## 2023-12-28 RX ADMIN — TAMSULOSIN HYDROCHLORIDE 0.4 MG: 0.4 CAPSULE ORAL at 19:55

## 2023-12-28 RX ADMIN — SPIRONOLACTONE 25 MG: 25 TABLET ORAL at 07:52

## 2023-12-28 RX ADMIN — SENNOSIDES AND DOCUSATE SODIUM 1 TABLET: 50; 8.6 TABLET ORAL at 07:53

## 2023-12-28 RX ADMIN — ATORVASTATIN CALCIUM 40 MG: 40 TABLET, FILM COATED ORAL at 19:55

## 2023-12-28 RX ADMIN — Medication 1 TABLET: at 07:52

## 2023-12-28 NOTE — NURSING END OF SHIFT
Nursing End of Shift Summary:    Patient: Joseph Wong  MRN: [id number]  : 10/20/1934, Age: 89 y.o.    Location: [address] [place] [address]    Nursing Goals  Clinical Goals for the Shift: monitor for agigation, full supervision on meals, comfort and safety, remain free from falls    Narrative Summary of Progress Toward Clinical Goals:  Pt remained calm and cooperative throughout the shift. Wife complained of phlegm. Respiratory PCR done-negative. pt has not shown pain or discomfort. Pt has been up to commode and on the chair. Pt is resting comfortably in bed. Bed alarm on.     Barriers to Goals/Nursing Concerns:  Yes - Placement    New Patient or Family Concerns/Issues:  Yes - phlegm    Shift Summary:   Significant Events & Communications to Providers (last 12 hours)       Last 5 Values    No documentation.                 Oxygen Usage (last 12 hours)       Last 5 Values       Row Name 23 0830                   Room Air or Baseline Oxygen Trial by Nursing    Is Patient on Room Air OR on the Same Amount of O2 as at Home? Yes                      Mobility (last 12 hours)       Last 5 Values       Row Name 23 0700 23 0800 23 0830 23 1000 23 1200       Mobility    Activity -- Chair position in bed -- -- Chair    Level of Assistance Independent -- -- Independent --    Length of Time in Chair (min) -- 0 -- -- 40    Distance Ambulated (feet) -- 0 Feet -- -- 10 Feet    Distance Ambulated (Meters Calculated) -- 0 Meters -- -- 3.05 Meters    Patient Position -- -- Supine -- --    Turning -- Turns self;Pillow support Pillow support Pillow support Turns self;Pillow support    Distance Ambulated (Meters Calculated)(Do Not Use) -- 0 Feet -- -- 3.05 Feet      Row Name 23 1400 23 1511 23 1600 23 1725          Mobility    Activity -- -- -- Back to bed     Length of Time in Chair (min) -- -- -- 0     Distance Ambulated (feet) -- -- -- 0 Feet     Distance Ambulated (Meters Calculated) -- --  -- 0 Meters     Patient Position -- Supine -- --     Turning Every 2 hours;Right lateral;Pillow support -- Refused by patient Turns self;Pillow support     Distance Ambulated (Meters Calculated)(Do Not Use) -- -- -- 0 Feet                   Urethral Catheter       Active Urethral Catheter       None                  Active Lines       Active Central venous catheter / Peripherally inserted central catheter / Implantable Port / Hemodialysis catheter / Midline Catheter       None                  Infusing Medications   Medication Dose Last Rate     PRN Medications   Medication Dose Last Admin    QUEtiapine  25 mg 25 mg at 12/26/23 1422    sodium chloride  3 mL      sodium chloride  1 spray      sodium chloride-aloe vera  1 Application      acetaminophen  500 mg 500 mg at 12/12/23 1933    oxyCODONE  5 mg 5 mg at 12/17/23 1447    magnesium hydroxide  30 mL      ondansetron  4 mg      Or    ondansetron  4 mg

## 2023-12-28 NOTE — INTERDISCIPLINARY/THERAPY
Case Management Progress Note    Phone # [phone]    Diagnosis: Weakness.    Discussed Discharge Topics/Narrative: CM talked to the patient's wife Alessandra Wong about expanding referrals.  Alessandra wanted a referral going to [facility].  CM talked to [facility].  [facility] explained that they are full in the memory care, but will take a referral.  JOSÉ MIGUEL confirms [facility] fax number with [employer] [phone].  JOSÉ MIGUEL asks DOMINGO Reynoso to send the referral to [facility].   will continue to follow.    Family updated: Yes.    RN updated: Yes.    Disposition: Placement.

## 2023-12-28 NOTE — PROGRESS NOTES
[address] [address]  Daily Progress Note  Patient name: Joseph Wong  MRN: [id number]   LOS: 21 days     Subjective   Patient resting comfortably.  Objective   Vitals:Temp:  [36.4 °C (97.5 °F)-37.1 °C (98.7 °F)] 36.4 °C (97.5 °F)  Heart Rate:  [68-73] 68  Resp:  [17-18] 18  SpO2:  [90 %-92 %] 90 %  BP: (106-146)/(80-89) 146/89  Physical Exam:  Alert, oriented to person, non-agitated  Review of Systems:  Negative  No results displayed because visit has over 200 results.           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  Patient appropriate for discharge.      Electronically signed by: Sami Olsen MD  12/28/2023  11:46 AM

## 2023-12-28 NOTE — INTERDISCIPLINARY/THERAPY
CMA Note  12/28/23  CM asked CMA to fax referral to [facility] at [phone].     CMA updated CM  AP

## 2023-12-28 NOTE — INTERDISCIPLINARY/THERAPY
Spiritual care services   12/28/23 1159   Clinical Encounter Type   Referral By: Rounds   Visited With Other Individuals Significant other/spouse   Visit Type Ongoing visit   Advent Affilation   Affiliated with Denominational/Emeli Group Yes   Current Denominational/Emeli Group Affiliation Worship   Spiritual/Emotional Distress   Level Unable to assess   Plan of Care   Spiritual Care Plan Initiated Provide supportive presence   Spiritual Assessment   Completed by    Spiritual Interventions   Emotional/Spiritual Interventions Family/loved one(s) supported   Family Spiritual Care Encounters   Caregiver-Patient Relationship Good family support     Offered supportive presence and reflective listening with spouse of patient  Services remain available

## 2023-12-28 NOTE — PLAN OF CARE
Problem: Safety Adult - Fall  Goal: Free from fall injury  Description: INTERVENTIONS:    Inpatient - Please reference Cares/Safety Flowsheet under Oneil Fall Risk for interventions.  Pediatrics - Please reference Peds Daily Cares/Safety Flowsheet under [facility] Pediatric Fall Assessment Fall Bundle for interventions  LD/OB - Please reference OB Shift Screening Flowsheet under OB Fall Risk for interventions.  Outcome: Progressing     Problem: Pain - Adult  Goal: Verbalizes/displays adequate comfort level or baseline comfort level  Description: INTERVENTIONS:  1. Encourage patient to monitor pain and request interventions  2. Assess pain using the appropriate pain scale  3. Administer analgesics based on type and severity of pain and evaluate response  4. Educate/Implement non-pharmacological measures as appropriate and evaluate response  5. Consider cultural, developmental and social influences on pain and pain management  6. Notify Provider if interventions unsuccessful or patient reports new pain  Outcome: Progressing     Problem: Daily Care  Goal: Daily care needs are met  Description: INTERVENTIONS:   1. Assess and monitor skin integrity  2. Identify patients at risk for skin breakdown on admission and per policy  3. Assess and monitor ability to perform self care and identify potential discharge needs  4. Assess skin integrity/risk for skin breakdown  5. Assist patient with activities of daily living as needed  6. Encourage independent activity per ability   7. Provide mouth care   8. Include patient/family/caregiver in decisions related to daily care   Outcome: Progressing     Problem: Knowledge Deficit  Goal: Patient/family/caregiver demonstrates understanding of disease process, treatment plan, medications, and discharge instructions  Description: INTERVENTIONS:   1. Complete learning assessment and assess knowledge base  2. Provide teaching at level of understanding   3. Provide teaching via preferred  learning methods  Outcome: Not Progressing     Problem: Discharge Barriers  Goal: Patient's discharge needs are met  Description: INTERVENTIONS:  1. Assess patient for self-management skills  2. Encourage participation in management  3. Identify potential discharge barriers on admission and throughout hospital stay  4. Involve patient/family/caregiver in discharge planning process  5. Collaborate with case management/ for discharge needs  Outcome: Progressing     Problem: Infection - Adult  Goal: Absence of infection during hospitalization  Description: INTERVENTIONS:  1. Assess and monitor for signs and symptoms of infection  2. Monitor lab/diagnostic results  3. Monitor all insertion sites/wounds/incisions  4. Monitor secretions for changes in amount and color  5. Administer medications as ordered  6. Educate and encourage patient and family to use good hand hygiene technique  7. Identify and educate in appropriate isolation precautions for identified infection/condition  Outcome: Progressing     Problem: Safety Adult  Goal: Patient will remain safe during hospitalization  Description: INTERVENTIONS    1. Assess patient for fall risk and implement interventions if needed  2. Use safe transport techniques  3. Assess patient using the appropriate Harvey skin assessment scale  4. Assess patient for risk of aspiration  5. Assess patient for risk of elopement  6. Assess patient for risk of suicide  Outcome: Progressing     Problem: Potential for Compromised Skin Integrity  Goal: Skin Integrity is Maintained or Improved  Description: INTERVENTIONS:  1. Assess and monitor skin integrity  2. Collaborate with interdisciplinary team and initiate plans and interventions as needed  3. Alternate a full bath with partial baths for elderly   4. Monitor patient's hygiene practices   5. Collaborate with wound, ostomy, and continence nurse  Outcome: Progressing  Goal: Nutritional status is improving  Description:  INTERVENTIONS:  1. Monitor and assess patient for malnutrition (ex- brittle hair, bruises, dry skin, pale skin and conjunctiva, muscle wasting, smooth red tongue, and disorientation)  2. Monitor patient's weight and dietary intake as ordered or per policy  3. Determine patient's food preferences and provide high-protein, high-caloric foods as appropriate  4. Assist patient with eating   5. Allow adequate time for meals   6. Encourage patient to take dietary supplement as ordered   7. Collaborate with dietitian  8. Include patient/family/caregiver in decisions related to nutrition  Outcome: Progressing  Goal: MOBILITY IS MAINTAINED OR IMPROVED  Description: INTERVENTIONS  1. Collaborate with interdisciplinary team and initiate plan and interventions as ordered (PT/OT)  2. Encourage ambulation  3. Up to chair for meals  4. Monitor for signs of deconditioning  Outcome: Progressing     Problem: Urinary Incontinence  Goal: Perineal skin integrity is maintained or improved  Description: INTERVENTIONS:  1. Assess genitourinary system, perineal skin, labs (urinalysis), and history of incontinence to include past management, aggravating, and alleviating factors  2. Collaborate with interdisciplinary team including wound, ostomy, and continence nurse and initiate plans and interventions as needed  4. Consider urine containment device  5. Apply skin protectant   6. Develop skin care regimen  7. Provide privacy when changing patient's incontinence device to maintain their dignity  Outcome: Progressing     Problem: TRANSFERS  Goal: STG - Patient will perform bed mobility  Description: Supine<>sit with tactile cues for sequencing  Outcome: Progressing  Goal: LTG - Patient will demonstrate safe transfer techniques  Description: With min A for all transfers with use of walker   Outcome: Progressing     Problem: OT Misc  Goal: LTG - Misc 1  Description: Pt will complete toilet transfers with stand by assistance with assitive device  as needed.   Outcome: Progressing     Problem: Genitourinary - Adult  Goal: Absence of urinary retention  Description: INTERVENTIONS:  1. Assess patient’s ability to void and empty bladder.  2. Monitor intake/output and perform bladder scan if indicated.  3. Place urinary catheter per order and initiate and maintain CAUTI bundle.  4. Administer medications to alleviate retention as needed.  5. Discuss catheterization for long term situations as appropriate.    Outcome: Progressing     Problem: Elopement Risk  Goal: Minimize the risk of a patient leaving without authorization when departure presents a threat to the safety of patient or others.  Description: INTERVENTIONS:  1. Frequent re-orientation, using a calm voice and positive re-enforcement when interacting with the patient  2. Familiar objects and possessions placed in the room  3. Purposeful focused activities  4. Family or volunteer staying with the patient  5. Medication evaluation by Pharmacy for possible adjustments  6. Consider continual supervision while patient transported off unit  7. Ensure pain relief as necessary  8. Set limits and provide clear expectations as needed  9. Reduce noise/stimulation  Outcome: Progressing     Problem: Infection Control  Goal: MINIMIZE THE ACQUISITION AND TRANSMISSION OF INFECTIOUS AGENTS  Description: INTERVENTIONS:  1. Isolate patient with suspected/diagnosed communicable disease  2. Place on designated isolation precautions  3. Maintain isolation techniques  4. Perform hand hygiene before and after each patient care activity  5. Star universal precautions  6. Wear PPE as directed for type of isolation  7. Administer antibiotic therapy as ordered  8. Clean the environment appropriately after each patient use  9. Clean patient care equipment after each patient use as it leaves the room  10. Limit number of visitors, as appropriate  Outcome: Completed

## 2023-12-28 NOTE — NURSING END OF SHIFT
Nursing End of Shift Summary:    Patient: Joseph Wong  MRN: [id number]  : 10/20/1934, Age: 89 y.o.    Location: [address] [place] [address]    Nursing Goals  Clinical Goals for the Shift: monitor for agigation, full supervision on meals, comfort and safety, remain free from falls    Narrative Summary of Progress Toward Clinical Goals:  No new events overnight, No new C/O pain or discomfort, Patient resting quietly.    Barriers to Goals/Nursing Concerns:  No    New Patient or Family Concerns/Issues:  No    Shift Summary:   Significant Events & Communications to Providers (last 12 hours)       Last 5 Values    No documentation.                 Oxygen Usage (last 12 hours)       Last 5 Values    No documentation.                 Mobility (last 12 hours)       Last 5 Values       Row Name 23 1000 23 1200 23 1400 23 1511 23 1600       Mobility    Activity -- Chair -- -- --    Level of Assistance Independent -- -- -- --    Length of Time in Chair (min) -- 40 -- -- --    Distance Ambulated (feet) -- 10 Feet -- -- --    Distance Ambulated (Meters Calculated) -- 3.05 Meters -- -- --    Patient Position -- -- -- Supine --    Turning Pillow support Turns self;Pillow support Every 2 hours;Right lateral;Pillow support -- Refused by patient    Distance Ambulated (Meters Calculated)(Do Not Use) -- 3.05 Feet -- -- --      Row Name 23 1725 23                Mobility    Activity Back to bed --       Length of Time in Chair (min) 0 --       Distance Ambulated (feet) 0 Feet --       Distance Ambulated (Meters Calculated) 0 Meters --       Turning Turns self;Pillow support Turns self       Distance Ambulated (Meters Calculated)(Do Not Use) 0 Feet --                     Urethral Catheter       Active Urethral Catheter       None                  Active Lines       Active Central venous catheter / Peripherally inserted central catheter / Implantable Port / Hemodialysis catheter / Midline Catheter       None                   Infusing Medications   Medication Dose Last Rate     PRN Medications   Medication Dose Last Admin    QUEtiapine  25 mg 25 mg at 12/26/23 1422    sodium chloride  3 mL      sodium chloride  1 spray      sodium chloride-aloe vera  1 Application      acetaminophen  500 mg 500 mg at 12/12/23 1933    oxyCODONE  5 mg 5 mg at 12/17/23 1447    magnesium hydroxide  30 mL      ondansetron  4 mg      Or    ondansetron  4 mg

## 2023-12-29 PROCEDURE — (BLANK) HC ROOM PRIVATE

## 2023-12-29 PROCEDURE — 99231 SBSQ HOSP IP/OBS SF/LOW 25: CPT | Performed by: HOSPITALIST

## 2023-12-29 PROCEDURE — 2590000100 HC RX 259: Performed by: FAMILY MEDICINE

## 2023-12-29 PROCEDURE — 97116 GAIT TRAINING THERAPY: CPT | Mod: GP,CQ

## 2023-12-29 PROCEDURE — 2590000100 HC RX 259: Performed by: HOSPITALIST

## 2023-12-29 PROCEDURE — 6370000100 HC RX 637 (ALT 250 FOR IP): Performed by: HOSPITALIST

## 2023-12-29 PROCEDURE — 6370000100 HC RX 637 (ALT 250 FOR IP): Performed by: FAMILY MEDICINE

## 2023-12-29 RX ADMIN — GUAIFENESIN 600 MG: 600 TABLET, EXTENDED RELEASE ORAL at 19:38

## 2023-12-29 RX ADMIN — ASPIRIN 81 MG: 81 TABLET ORAL at 09:18

## 2023-12-29 RX ADMIN — FAMOTIDINE 10 MG: 20 TABLET, FILM COATED ORAL at 19:38

## 2023-12-29 RX ADMIN — FINASTERIDE 5 MG: 5 TABLET, FILM COATED ORAL at 19:38

## 2023-12-29 RX ADMIN — TAMSULOSIN HYDROCHLORIDE 0.4 MG: 0.4 CAPSULE ORAL at 19:38

## 2023-12-29 RX ADMIN — AMLODIPINE BESYLATE 5 MG: 5 TABLET ORAL at 09:18

## 2023-12-29 RX ADMIN — QUETIAPINE FUMARATE 150 MG: 100 TABLET ORAL at 17:45

## 2023-12-29 RX ADMIN — Medication 1 TABLET: at 09:19

## 2023-12-29 RX ADMIN — LOSARTAN POTASSIUM 50 MG: 50 TABLET, FILM COATED ORAL at 09:18

## 2023-12-29 RX ADMIN — ATORVASTATIN CALCIUM 40 MG: 40 TABLET, FILM COATED ORAL at 19:38

## 2023-12-29 RX ADMIN — FAMOTIDINE 10 MG: 20 TABLET, FILM COATED ORAL at 09:18

## 2023-12-29 RX ADMIN — GUAIFENESIN 600 MG: 600 TABLET, EXTENDED RELEASE ORAL at 09:19

## 2023-12-29 RX ADMIN — SPIRONOLACTONE 25 MG: 25 TABLET ORAL at 09:18

## 2023-12-29 RX ADMIN — TAMSULOSIN HYDROCHLORIDE 0.4 MG: 0.4 CAPSULE ORAL at 09:18

## 2023-12-29 RX ADMIN — METOPROLOL SUCCINATE 25 MG: 25 TABLET, EXTENDED RELEASE ORAL at 09:19

## 2023-12-29 RX ADMIN — FLUOXETINE HYDROCHLORIDE 40 MG: 20 CAPSULE ORAL at 19:38

## 2023-12-29 RX ADMIN — SENNOSIDES AND DOCUSATE SODIUM 1 TABLET: 50; 8.6 TABLET ORAL at 09:18

## 2023-12-29 RX ADMIN — SENNOSIDES AND DOCUSATE SODIUM 1 TABLET: 50; 8.6 TABLET ORAL at 19:38

## 2023-12-29 NOTE — PLAN OF CARE
Problem: Pain - Adult  Goal: Verbalizes/displays adequate comfort level or baseline comfort level  Description: INTERVENTIONS:  1. Encourage patient to monitor pain and request interventions  2. Assess pain using the appropriate pain scale  3. Administer analgesics based on type and severity of pain and evaluate response  4. Educate/Implement non-pharmacological measures as appropriate and evaluate response  5. Consider cultural, developmental and social influences on pain and pain management  6. Notify Provider if interventions unsuccessful or patient reports new pain  Outcome: Progressing     Problem: Knowledge Deficit  Goal: Patient/family/caregiver demonstrates understanding of disease process, treatment plan, medications, and discharge instructions  Description: INTERVENTIONS:   1. Complete learning assessment and assess knowledge base  2. Provide teaching at level of understanding   3. Provide teaching via preferred learning methods  Outcome: Progressing     Problem: Discharge Barriers  Goal: Patient's discharge needs are met  Description: INTERVENTIONS:  1. Assess patient for self-management skills  2. Encourage participation in management  3. Identify potential discharge barriers on admission and throughout hospital stay  4. Involve patient/family/caregiver in discharge planning process  5. Collaborate with case management/ for discharge needs  Outcome: Progressing     Problem: Infection - Adult  Goal: Absence of infection during hospitalization  Description: INTERVENTIONS:  1. Assess and monitor for signs and symptoms of infection  2. Monitor lab/diagnostic results  3. Monitor all insertion sites/wounds/incisions  4. Monitor secretions for changes in amount and color  5. Administer medications as ordered  6. Educate and encourage patient and family to use good hand hygiene technique  7. Identify and educate in appropriate isolation precautions for identified infection/condition  Outcome:  Progressing     Problem: Safety Adult  Goal: Patient will remain safe during hospitalization  Description: INTERVENTIONS    1. Assess patient for fall risk and implement interventions if needed  2. Use safe transport techniques  3. Assess patient using the appropriate Harvey skin assessment scale  4. Assess patient for risk of aspiration  5. Assess patient for risk of elopement  6. Assess patient for risk of suicide  Outcome: Progressing     Problem: Potential for Compromised Skin Integrity  Goal: Skin Integrity is Maintained or Improved  Description: INTERVENTIONS:  1. Assess and monitor skin integrity  2. Collaborate with interdisciplinary team and initiate plans and interventions as needed  3. Alternate a full bath with partial baths for elderly   4. Monitor patient's hygiene practices   5. Collaborate with wound, ostomy, and continence nurse  Outcome: Progressing  Goal: Nutritional status is improving  Description: INTERVENTIONS:  1. Monitor and assess patient for malnutrition (ex- brittle hair, bruises, dry skin, pale skin and conjunctiva, muscle wasting, smooth red tongue, and disorientation)  2. Monitor patient's weight and dietary intake as ordered or per policy  3. Determine patient's food preferences and provide high-protein, high-caloric foods as appropriate  4. Assist patient with eating   5. Allow adequate time for meals   6. Encourage patient to take dietary supplement as ordered   7. Collaborate with dietitian  8. Include patient/family/caregiver in decisions related to nutrition  Outcome: Progressing  Goal: MOBILITY IS MAINTAINED OR IMPROVED  Description: INTERVENTIONS  1. Collaborate with interdisciplinary team and initiate plan and interventions as ordered (PT/OT)  2. Encourage ambulation  3. Up to chair for meals  4. Monitor for signs of deconditioning  Outcome: Progressing     Problem: Urinary Incontinence  Goal: Perineal skin integrity is maintained or improved  Description: INTERVENTIONS:  1.  Assess genitourinary system, perineal skin, labs (urinalysis), and history of incontinence to include past management, aggravating, and alleviating factors  2. Collaborate with interdisciplinary team including wound, ostomy, and continence nurse and initiate plans and interventions as needed  4. Consider urine containment device  5. Apply skin protectant   6. Develop skin care regimen  7. Provide privacy when changing patient's incontinence device to maintain their dignity  Outcome: Progressing     Problem: Genitourinary - Adult  Goal: Absence of urinary retention  Description: INTERVENTIONS:  1. Assess patient’s ability to void and empty bladder.  2. Monitor intake/output and perform bladder scan if indicated.  3. Place urinary catheter per order and initiate and maintain CAUTI bundle.  4. Administer medications to alleviate retention as needed.  5. Discuss catheterization for long term situations as appropriate.    Outcome: Progressing     Problem: Elopement Risk  Goal: Minimize the risk of a patient leaving without authorization when departure presents a threat to the safety of patient or others.  Description: INTERVENTIONS:  1. Frequent re-orientation, using a calm voice and positive re-enforcement when interacting with the patient  2. Familiar objects and possessions placed in the room  3. Purposeful focused activities  4. Family or volunteer staying with the patient  5. Medication evaluation by Pharmacy for possible adjustments  6. Consider continual supervision while patient transported off unit  7. Ensure pain relief as necessary  8. Set limits and provide clear expectations as needed  9. Reduce noise/stimulation  Outcome: Progressing

## 2023-12-29 NOTE — NURSING END OF SHIFT
Nursing End of Shift Summary:    Patient: Joseph Wong  MRN: [id number]  : 10/20/1934, Age: 89 y.o.    Location: [address] [place] [address]    Nursing Goals  Clinical Goals for the Shift: monitor for anxiety/agitation, safety and comfort    Narrative Summary of Progress Toward Clinical Goals:  Denies pain nor any discomfort. Patient anxious and restless at beginning of shift. Patient attempting to get out of bed. PRN Seroquel given. Patient finally went to sleep and calmed down at 0100. Fall bundles in place for safety. Kept safe.    Barriers to Goals/Nursing Concerns:  Yes - pending placement    New Patient or Family Concerns/Issues:  No    Shift Summary:   Significant Events & Communications to Providers (last 12 hours)       Last 5 Values    No documentation.                 Oxygen Usage (last 12 hours)       Last 5 Values       Row Name 23 0336                Room Air or Baseline Oxygen Trial by Nursing    Is Patient on Room Air OR on the Same Amount of O2 as at Home? Yes Yes                     Mobility (last 12 hours)       Last 5 Values       Row Name 23 [id number] 2218 23 2346 23 0418       Mobility    Activity -- Sleeping Turn -- Commode    Level of Assistance -- -- Standby assist, set-up cues, supervision of patient - no hands on -- Moderate assist, patient does 50-74%    Length of Time in Chair (min) -- 0 -- -- --    Distance Ambulated (feet) -- 0 Feet 0 Feet -- 25 Feet    Distance Ambulated (Meters Calculated) -- 0 Meters 0 Meters -- 7.62 Meters    Patient Position Supine -- -- Supine --    Turning Turns self Turns self Turns self -- Turns self    Distance Ambulated (Meters Calculated)(Do Not Use) -- 0 Feet 0 Feet -- 7.62 Feet                  Urethral Catheter       Active Urethral Catheter       None                  Active Lines       Active Central venous catheter / Peripherally inserted central catheter / Implantable Port / Hemodialysis catheter / Midline  Catheter       None                  Infusing Medications   Medication Dose Last Rate     PRN Medications   Medication Dose Last Admin    QUEtiapine  25 mg 25 mg at 12/28/23 2148    sodium chloride  3 mL      sodium chloride  1 spray      sodium chloride-aloe vera  1 Application      acetaminophen  500 mg 500 mg at 12/12/23 1933    oxyCODONE  5 mg 5 mg at 12/17/23 1447    magnesium hydroxide  30 mL      ondansetron  4 mg      Or    ondansetron  4 mg

## 2023-12-29 NOTE — INTERDISCIPLINARY/THERAPY
Case Management Progress Note    Phone # [phone]    Diagnosis: Weakness    Discussed Discharge Topics/Narrative: CM meet with the patient's wife Alessandra Wong.  CM went over the discharge options with Alessandra, and insurance requirements.  CM went over expanding referrals, and appealing the discharge options.  Alessandra choose to expand referrals.  CM expand referrals.  Alessandra wanted the CM to call Ruddy Turner [phone] at [facility].  Alessandra explained that the patient is 8th on the waiting list.  CM called Ruddy, and left a message to call the CM back.    Family updated: Yes.    RN updated: Yes,    Disposition: Placement.

## 2023-12-29 NOTE — PROGRESS NOTES
[address] [place] [address]  Daily Progress Note  Patient name: Joseph Wong  MRN: [id number]   LOS: 22 days     Subjective   Patient resting comfortably.  Objective   Vitals:Temp:  [36.1 °C (97 °F)-36.7 °C (98 °F)] 36.1 °C (97 °F)  Heart Rate:  [67-79] 70  Resp:  [14-24] 16  SpO2:  [90 %-94 %] 94 %  BP: (102-155)/(65-98) 102/65  Physical Exam:  HEENT: Negative  Neck: No neck vein distention  Lungs: Clear to auscultation  Heart: Regular  Abdomen: Soft  Genitourinary: Deferred  Extremities: No edema  Skin: No rash  Neurologic: Alert    Review of Systems:  Negative  No results displayed because visit has over 200 results.           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  Patient appropriate for discharge.) 12/28/23    Disposition planning.    Electronically signed by: Sami Olsen MD  12/29/2023  12:01 PM

## 2023-12-29 NOTE — INTERDISCIPLINARY/THERAPY
[address] [facility] [address]  [phone]      Inpatient Physical Therapy Daily Treatment Note    Date of Service: 12/29/2023  Patient Name: Joseph Wong  Referring Provider: WYATT SRINIVASAN ROBERT  Completed Visit Number: [id number]  SOC Date: 12/06/23  Certification Period: 01/05/24    Medical Diagnosis:   Weakness [R53.1]  Unable to bear weight on left lower extremity [R26.89]  Lumbar compression fracture, closed, initial encounter (CMS/[facility]) [S32.000A]  Treatment Diagnoses:  Treatment Diagnosis: Decreased strength, Decreased endurance, Impaired balance, Decreased mobility  Subjective     Subjective Comments  RN Stated patient is medically cleared for therapy: Yes  Subjective Comments: Pt found resting in recliner in room with RN in for cares. Agreeable to PT. Ends session seated back in recliner with call light in reach and chair alarm on.     Pain Assessment Scale  Pain Scale: 0-10  0-10 Pain Score: 0 - No pain  Oneil Fall Risk Score: 85         Objective    Precautions  Reinforced Precautions: Yes  Other Precautions: fall risk, w/c bound at baseline (ambulates short distance)  Weight Bearing Precautions: No  Is this a Co-Treatment?: No  Treatments:    Gait belt donned for all mobility. Pt left with call light in reach, needs met.     Cognition: confused with poor safety awareness    Bed Mobility: Not assessed- pt found up in recliner    Transfers: Min A   Sit<>stand 2x. Assist to boost to stand and control lower. Does not reach back for chair despite cueing. Poor anterior weight shift.     Ambulation: Min A   3m with Front Wheel Walker  6m with Front Wheel Walker   Retropulsive with assist to keep Front Wheel Walker in close proximity. Crouched gait pattern.     Balance: Requires Front Wheel Walker and min A for standing balance     Activity Tolerance: Vital Signs Stable      Ambulation 1  Ambulation Distance (meters): 3 meters  Ambulation 2  Ambulation Distance (meters): 6 meters  Time  Calculation  Start Time: 0920  Stop Time: 0931  Time Calculation (min): 11 min  Therapeutic Interventions (Time Spent in Minutes)  Gait/Mobility: 11       Assessment/Plan   Assessment:  Pt is likely close to baseline but continues to require min A for safety with transfers and short bouts of ambulation due to poor safety awareness.        Plan:    Plan  PT Frequency: 2-3x/wk  Plan for next treatment comment: 1-2 assist (chair follow), bed mobility, transfers min A, 5-15m with walker min A, LE strengthening, standing balance/tolerance  Treatment duration will be through Certification Period: 01/05/24

## 2023-12-30 PROCEDURE — 99231 SBSQ HOSP IP/OBS SF/LOW 25: CPT | Performed by: HOSPITALIST

## 2023-12-30 PROCEDURE — 6370000100 HC RX 637 (ALT 250 FOR IP): Performed by: HOSPITALIST

## 2023-12-30 PROCEDURE — (BLANK) HC ROOM PRIVATE

## 2023-12-30 PROCEDURE — 6370000100 HC RX 637 (ALT 250 FOR IP): Performed by: FAMILY MEDICINE

## 2023-12-30 PROCEDURE — 2590000100 HC RX 259: Performed by: FAMILY MEDICINE

## 2023-12-30 PROCEDURE — 2590000100 HC RX 259: Performed by: HOSPITALIST

## 2023-12-30 RX ADMIN — SENNOSIDES AND DOCUSATE SODIUM 1 TABLET: 50; 8.6 TABLET ORAL at 20:07

## 2023-12-30 RX ADMIN — TAMSULOSIN HYDROCHLORIDE 0.4 MG: 0.4 CAPSULE ORAL at 20:07

## 2023-12-30 RX ADMIN — FLUOXETINE HYDROCHLORIDE 40 MG: 20 CAPSULE ORAL at 20:08

## 2023-12-30 RX ADMIN — ASPIRIN 81 MG: 81 TABLET ORAL at 09:45

## 2023-12-30 RX ADMIN — TAMSULOSIN HYDROCHLORIDE 0.4 MG: 0.4 CAPSULE ORAL at 09:45

## 2023-12-30 RX ADMIN — SPIRONOLACTONE 25 MG: 25 TABLET ORAL at 09:45

## 2023-12-30 RX ADMIN — GUAIFENESIN 600 MG: 600 TABLET, EXTENDED RELEASE ORAL at 09:45

## 2023-12-30 RX ADMIN — METOPROLOL SUCCINATE 25 MG: 25 TABLET, EXTENDED RELEASE ORAL at 09:45

## 2023-12-30 RX ADMIN — GUAIFENESIN 600 MG: 600 TABLET, EXTENDED RELEASE ORAL at 20:08

## 2023-12-30 RX ADMIN — LOSARTAN POTASSIUM 50 MG: 50 TABLET, FILM COATED ORAL at 09:45

## 2023-12-30 RX ADMIN — FAMOTIDINE 10 MG: 20 TABLET, FILM COATED ORAL at 09:45

## 2023-12-30 RX ADMIN — AMLODIPINE BESYLATE 5 MG: 5 TABLET ORAL at 09:45

## 2023-12-30 RX ADMIN — Medication 1 TABLET: at 09:45

## 2023-12-30 RX ADMIN — FAMOTIDINE 10 MG: 20 TABLET, FILM COATED ORAL at 20:06

## 2023-12-30 RX ADMIN — QUETIAPINE FUMARATE 25 MG: 25 TABLET ORAL at 21:54

## 2023-12-30 RX ADMIN — QUETIAPINE FUMARATE 150 MG: 100 TABLET ORAL at 17:55

## 2023-12-30 RX ADMIN — ATORVASTATIN CALCIUM 40 MG: 40 TABLET, FILM COATED ORAL at 20:07

## 2023-12-30 RX ADMIN — FINASTERIDE 5 MG: 5 TABLET, FILM COATED ORAL at 20:08

## 2023-12-30 NOTE — PROGRESS NOTES
[address] [place] [address]  Daily Progress Note  Patient name: Joseph Wong  MRN: [id number]   LOS: 23 days     Subjective   Patient resting comfortably.  Objective   Vitals:Temp:  [36.1 °C (97 °F)-36.4 °C (97.5 °F)] 36.3 °C (97.3 °F)  Heart Rate:  [67-72] 67  Resp:  [16-20] 20  SpO2:  [90 %-94 %] 94 %  BP: (115-138)/(77-96) 138/96  Physical Exam:  HEENT: Negative  Neck: No neck vein distention  Lungs: Clear to auscultation  Heart: Regular  Abdomen: Soft  Genitourinary: Deferred  Extremities: No edema  Skin: No rash  Neurologic: Alert    Review of Systems:  Negative  No results displayed because visit has over 200 results.           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  Patient appropriate for discharge.) 12/28/23     (Disposition planning.) 12/29/23    Disposition planning.    Electronically signed by: Sami Olsen MD  12/30/2023  10:36 AM

## 2023-12-30 NOTE — NURSING END OF SHIFT
Nursing End of Shift Summary:    Patient: Joseph Wong  MRN: [id number]  : 10/20/1934, Age: 89 y.o.    Location: [address] [place] [address]    Nursing Goals  Clinical Goals for the Shift: monitor for agitation/anxiety, safety and comfort    Narrative Summary of Progress Toward Clinical Goals:  Patient able to sleep through the night. No agitation noted. Patient still confused but redirected. Fall bundles in place. Kept safe.    Barriers to Goals/Nursing Concerns:  Yes - pending placement    New Patient or Family Concerns/Issues:  No    Shift Summary:   Significant Events & Communications to Providers (last 12 hours)       Last 5 Values    No documentation.                 Oxygen Usage (last 12 hours)       Last 5 Values       Row Name 23 1900 23 0347                Room Air or Baseline Oxygen Trial by Nursing    Is Patient on Room Air OR on the Same Amount of O2 as at Home? Yes Yes                     Mobility (last 12 hours)       Last 5 Values       Row Name 23 1600 23 1604 23 1708 23 1935 23 2100       Mobility    Activity -- -- Ambulate in room -- Sleeping    Level of Assistance -- -- Moderate assist, patient does 50-74% -- Other (Comment)    Length of Time in Chair (min) -- -- 180 -- 0    Distance Ambulated (feet) -- -- 4 Feet -- 0 Feet    Distance Ambulated (Meters Calculated) -- -- 1.22 Meters -- 0 Meters    Patient Position -- Sitting -- Supine Supine    Turning Other (Comment)  Stand and reposition/back in recliner -- -- Turns self Turns self    Distance Ambulated (Meters Calculated)(Do Not Use) -- -- 1.22 Feet -- 0 Feet                  Urethral Catheter       Active Urethral Catheter       None                  Active Lines       Active Central venous catheter / Peripherally inserted central catheter / Implantable Port / Hemodialysis catheter / Midline Catheter       None                  Infusing Medications   Medication Dose Last Rate     PRN Medications   Medication Dose Last  Admin    QUEtiapine  25 mg 25 mg at 12/28/23 2148    sodium chloride  3 mL      sodium chloride  1 spray      sodium chloride-aloe vera  1 Application      acetaminophen  500 mg 500 mg at 12/12/23 1933    oxyCODONE  5 mg 5 mg at 12/17/23 1447    magnesium hydroxide  30 mL      ondansetron  4 mg      Or    ondansetron  4 mg

## 2023-12-30 NOTE — PLAN OF CARE
Problem: Pain - Adult  Goal: Verbalizes/displays adequate comfort level or baseline comfort level  Description: INTERVENTIONS:  1. Encourage patient to monitor pain and request interventions  2. Assess pain using the appropriate pain scale  3. Administer analgesics based on type and severity of pain and evaluate response  4. Educate/Implement non-pharmacological measures as appropriate and evaluate response  5. Consider cultural, developmental and social influences on pain and pain management  6. Notify Provider if interventions unsuccessful or patient reports new pain  Outcome: Progressing     Problem: Daily Care  Goal: Daily care needs are met  Description: INTERVENTIONS:   1. Assess and monitor skin integrity  2. Identify patients at risk for skin breakdown on admission and per policy  3. Assess and monitor ability to perform self care and identify potential discharge needs  4. Assess skin integrity/risk for skin breakdown  5. Assist patient with activities of daily living as needed  6. Encourage independent activity per ability   7. Provide mouth care   8. Include patient/family/caregiver in decisions related to daily care   Outcome: Progressing     Problem: Knowledge Deficit  Goal: Patient/family/caregiver demonstrates understanding of disease process, treatment plan, medications, and discharge instructions  Description: INTERVENTIONS:   1. Complete learning assessment and assess knowledge base  2. Provide teaching at level of understanding   3. Provide teaching via preferred learning methods  Outcome: Progressing     Problem: Discharge Barriers  Goal: Patient's discharge needs are met  Description: INTERVENTIONS:  1. Assess patient for self-management skills  2. Encourage participation in management  3. Identify potential discharge barriers on admission and throughout hospital stay  4. Involve patient/family/caregiver in discharge planning process  5. Collaborate with case management/ for  discharge needs  Outcome: Progressing     Problem: Infection - Adult  Goal: Absence of infection during hospitalization  Description: INTERVENTIONS:  1. Assess and monitor for signs and symptoms of infection  2. Monitor lab/diagnostic results  3. Monitor all insertion sites/wounds/incisions  4. Monitor secretions for changes in amount and color  5. Administer medications as ordered  6. Educate and encourage patient and family to use good hand hygiene technique  7. Identify and educate in appropriate isolation precautions for identified infection/condition  Outcome: Progressing     Problem: Safety Adult  Goal: Patient will remain safe during hospitalization  Description: INTERVENTIONS    1. Assess patient for fall risk and implement interventions if needed  2. Use safe transport techniques  3. Assess patient using the appropriate Harvey skin assessment scale  4. Assess patient for risk of aspiration  5. Assess patient for risk of elopement  6. Assess patient for risk of suicide  Outcome: Progressing     Problem: Potential for Compromised Skin Integrity  Goal: Skin Integrity is Maintained or Improved  Description: INTERVENTIONS:  1. Assess and monitor skin integrity  2. Collaborate with interdisciplinary team and initiate plans and interventions as needed  3. Alternate a full bath with partial baths for elderly   4. Monitor patient's hygiene practices   5. Collaborate with wound, ostomy, and continence nurse  Outcome: Progressing  Goal: Nutritional status is improving  Description: INTERVENTIONS:  1. Monitor and assess patient for malnutrition (ex- brittle hair, bruises, dry skin, pale skin and conjunctiva, muscle wasting, smooth red tongue, and disorientation)  2. Monitor patient's weight and dietary intake as ordered or per policy  3. Determine patient's food preferences and provide high-protein, high-caloric foods as appropriate  4. Assist patient with eating   5. Allow adequate time for meals   6. Encourage  patient to take dietary supplement as ordered   7. Collaborate with dietitian  8. Include patient/family/caregiver in decisions related to nutrition  Outcome: Progressing  Goal: MOBILITY IS MAINTAINED OR IMPROVED  Description: INTERVENTIONS  1. Collaborate with interdisciplinary team and initiate plan and interventions as ordered (PT/OT)  2. Encourage ambulation  3. Up to chair for meals  4. Monitor for signs of deconditioning  Outcome: Progressing     Problem: Urinary Incontinence  Goal: Perineal skin integrity is maintained or improved  Description: INTERVENTIONS:  1. Assess genitourinary system, perineal skin, labs (urinalysis), and history of incontinence to include past management, aggravating, and alleviating factors  2. Collaborate with interdisciplinary team including wound, ostomy, and continence nurse and initiate plans and interventions as needed  4. Consider urine containment device  5. Apply skin protectant   6. Develop skin care regimen  7. Provide privacy when changing patient's incontinence device to maintain their dignity  Outcome: Progressing     Problem: Elopement Risk  Goal: Minimize the risk of a patient leaving without authorization when departure presents a threat to the safety of patient or others.  Description: INTERVENTIONS:  1. Frequent re-orientation, using a calm voice and positive re-enforcement when interacting with the patient  2. Familiar objects and possessions placed in the room  3. Purposeful focused activities  4. Family or volunteer staying with the patient  5. Medication evaluation by Pharmacy for possible adjustments  6. Consider continual supervision while patient transported off unit  7. Ensure pain relief as necessary  8. Set limits and provide clear expectations as needed  9. Reduce noise/stimulation  Outcome: Progressing

## 2023-12-30 NOTE — NURSING END OF SHIFT
Nursing End of Shift Summary:    Patient: Joseph Wong  MRN: [id number]  : 10/20/1934, Age: 89 y.o.    Location: [address] [place] [address]    Nursing Goals  Clinical Goals for the Shift: monitor for anxiety/agitation, safety and comfort    Narrative Summary of Progress Toward Clinical Goals:  Patient intermittently oriented to place. He is oriented to self. Pivot with two person assist. Drowsy throughout the afternoon. Wife at bedside most of the shift.      Barriers to Goals/Nursing Concerns:  Yes - pending placement    New Patient or Family Concerns/Issues:  No    Shift Summary:   Significant Events & Communications to Providers (last 12 hours)       Last 5 Values    No documentation.                 Oxygen Usage (last 12 hours)       Last 5 Values    No documentation.                 Mobility (last 12 hours)       Last 5 Values       Row Name 23 0800 23 0900 23 1000 23 1138 23 1400       Mobility    Activity Turn Ambulate in room;Chair -- Commode;Chair --    Level of Assistance -- Moderate assist, patient does 50-74% -- -- --    Length of Time in Chair (min) 0 -- -- 90 --    Distance Ambulated (feet) 0 Feet 8 Feet -- 4 Feet --    Distance Ambulated (Meters Calculated) 0 Meters 2.44 Meters -- 1.22 Meters --    Turning Turns self Turns self Turns self Turns self Turns self    Distance Ambulated (Meters Calculated)(Do Not Use) 0 Feet 2.44 Feet -- 1.22 Feet --      Row Name 23 1600 23 1604 23 1708             Mobility    Activity -- -- Ambulate in room      Level of Assistance -- -- Moderate assist, patient does 50-74%      Length of Time in Chair (min) -- -- 180      Distance Ambulated (feet) -- -- 4 Feet      Distance Ambulated (Meters Calculated) -- -- 1.22 Meters      Patient Position -- Sitting --      Turning Other (Comment)  Stand and reposition/back in recliner -- --      Distance Ambulated (Meters Calculated)(Do Not Use) -- -- 1.22 Feet                    Urethral Catheter        Active Urethral Catheter       None                  Active Lines       Active Central venous catheter / Peripherally inserted central catheter / Implantable Port / Hemodialysis catheter / Midline Catheter       None                  Infusing Medications   Medication Dose Last Rate     PRN Medications   Medication Dose Last Admin    QUEtiapine  25 mg 25 mg at 12/28/23 2148    sodium chloride  3 mL      sodium chloride  1 spray      sodium chloride-aloe vera  1 Application      acetaminophen  500 mg 500 mg at 12/12/23 1933    oxyCODONE  5 mg 5 mg at 12/17/23 1447    magnesium hydroxide  30 mL      ondansetron  4 mg      Or    ondansetron  4 mg

## 2023-12-30 NOTE — PLAN OF CARE
Problem: Safety Adult - Fall  Goal: Free from fall injury  Description: INTERVENTIONS:    Inpatient - Please reference Cares/Safety Flowsheet under Oneil Fall Risk for interventions.  Pediatrics - Please reference Peds Daily Cares/Safety Flowsheet under [facility] Pediatric Fall Assessment Fall Bundle for interventions  LD/OB - Please reference OB Shift Screening Flowsheet under OB Fall Risk for interventions.  Outcome: Progressing     Problem: Daily Care  Goal: Daily care needs are met  Description: INTERVENTIONS:   1. Assess and monitor skin integrity  2. Identify patients at risk for skin breakdown on admission and per policy  3. Assess and monitor ability to perform self care and identify potential discharge needs  4. Assess skin integrity/risk for skin breakdown  5. Assist patient with activities of daily living as needed  6. Encourage independent activity per ability   7. Provide mouth care   8. Include patient/family/caregiver in decisions related to daily care   Outcome: Progressing     Problem: Knowledge Deficit  Goal: Patient/family/caregiver demonstrates understanding of disease process, treatment plan, medications, and discharge instructions  Description: INTERVENTIONS:   1. Complete learning assessment and assess knowledge base  2. Provide teaching at level of understanding   3. Provide teaching via preferred learning methods  Outcome: Progressing     Problem: Discharge Barriers  Goal: Patient's discharge needs are met  Description: INTERVENTIONS:  1. Assess patient for self-management skills  2. Encourage participation in management  3. Identify potential discharge barriers on admission and throughout hospital stay  4. Involve patient/family/caregiver in discharge planning process  5. Collaborate with case management/ for discharge needs  Outcome: Not Progressing

## 2023-12-31 LAB — GLUCOSE BLDC GLUCOMTR-MCNC: 142 MG/DL (ref 70–105)

## 2023-12-31 PROCEDURE — 99231 SBSQ HOSP IP/OBS SF/LOW 25: CPT | Performed by: HOSPITALIST

## 2023-12-31 PROCEDURE — 2590000100 HC RX 259: Performed by: FAMILY MEDICINE

## 2023-12-31 PROCEDURE — 6370000100 HC RX 637 (ALT 250 FOR IP): Performed by: FAMILY MEDICINE

## 2023-12-31 PROCEDURE — 6370000100 HC RX 637 (ALT 250 FOR IP): Performed by: HOSPITALIST

## 2023-12-31 PROCEDURE — (BLANK) HC ROOM PRIVATE

## 2023-12-31 PROCEDURE — 2590000100 HC RX 259: Performed by: HOSPITALIST

## 2023-12-31 PROCEDURE — 82947 ASSAY GLUCOSE BLOOD QUANT: CPT | Mod: QW

## 2023-12-31 RX ADMIN — GUAIFENESIN 600 MG: 600 TABLET, EXTENDED RELEASE ORAL at 09:47

## 2023-12-31 RX ADMIN — POLYETHYLENE GLYCOL 3350 17 G: 17 POWDER, FOR SOLUTION ORAL at 09:00

## 2023-12-31 RX ADMIN — SENNOSIDES AND DOCUSATE SODIUM 1 TABLET: 50; 8.6 TABLET ORAL at 09:00

## 2023-12-31 RX ADMIN — LOSARTAN POTASSIUM 50 MG: 50 TABLET, FILM COATED ORAL at 09:47

## 2023-12-31 RX ADMIN — ASPIRIN 81 MG: 81 TABLET ORAL at 09:47

## 2023-12-31 RX ADMIN — AMLODIPINE BESYLATE 5 MG: 5 TABLET ORAL at 09:47

## 2023-12-31 RX ADMIN — FAMOTIDINE 10 MG: 20 TABLET, FILM COATED ORAL at 09:47

## 2023-12-31 RX ADMIN — QUETIAPINE FUMARATE 25 MG: 25 TABLET ORAL at 05:14

## 2023-12-31 RX ADMIN — Medication 1 TABLET: at 09:47

## 2023-12-31 RX ADMIN — TAMSULOSIN HYDROCHLORIDE 0.4 MG: 0.4 CAPSULE ORAL at 09:47

## 2023-12-31 RX ADMIN — QUETIAPINE FUMARATE 150 MG: 100 TABLET ORAL at 16:53

## 2023-12-31 RX ADMIN — METOPROLOL SUCCINATE 25 MG: 25 TABLET, EXTENDED RELEASE ORAL at 09:47

## 2023-12-31 RX ADMIN — SPIRONOLACTONE 25 MG: 25 TABLET ORAL at 09:47

## 2023-12-31 NOTE — PLAN OF CARE
Problem: Safety Adult - Fall  Goal: Free from fall injury  Description: INTERVENTIONS:    Inpatient - Please reference Cares/Safety Flowsheet under Oneil Fall Risk for interventions.  Pediatrics - Please reference Peds Daily Cares/Safety Flowsheet under [facility] Pediatric Fall Assessment Fall Bundle for interventions  LD/OB - Please reference OB Shift Screening Flowsheet under OB Fall Risk for interventions.  Outcome: Progressing     Problem: Pain - Adult  Goal: Verbalizes/displays adequate comfort level or baseline comfort level  Description: INTERVENTIONS:  1. Encourage patient to monitor pain and request interventions  2. Assess pain using the appropriate pain scale  3. Administer analgesics based on type and severity of pain and evaluate response  4. Educate/Implement non-pharmacological measures as appropriate and evaluate response  5. Consider cultural, developmental and social influences on pain and pain management  6. Notify Provider if interventions unsuccessful or patient reports new pain  Outcome: Progressing     Problem: Daily Care  Goal: Daily care needs are met  Description: INTERVENTIONS:   1. Assess and monitor skin integrity  2. Identify patients at risk for skin breakdown on admission and per policy  3. Assess and monitor ability to perform self care and identify potential discharge needs  4. Assess skin integrity/risk for skin breakdown  5. Assist patient with activities of daily living as needed  6. Encourage independent activity per ability   7. Provide mouth care   8. Include patient/family/caregiver in decisions related to daily care   Outcome: Progressing     Problem: Infection - Adult  Goal: Absence of infection during hospitalization  Description: INTERVENTIONS:  1. Assess and monitor for signs and symptoms of infection  2. Monitor lab/diagnostic results  3. Monitor all insertion sites/wounds/incisions  4. Monitor secretions for changes in amount and color  5. Administer medications as  ordered  6. Educate and encourage patient and family to use good hand hygiene technique  7. Identify and educate in appropriate isolation precautions for identified infection/condition  Outcome: Progressing     Problem: Safety Adult  Goal: Patient will remain safe during hospitalization  Description: INTERVENTIONS    1. Assess patient for fall risk and implement interventions if needed  2. Use safe transport techniques  3. Assess patient using the appropriate Harvey skin assessment scale  4. Assess patient for risk of aspiration  5. Assess patient for risk of elopement  6. Assess patient for risk of suicide  Outcome: Progressing     Problem: Potential for Compromised Skin Integrity  Goal: Skin Integrity is Maintained or Improved  Description: INTERVENTIONS:  1. Assess and monitor skin integrity  2. Collaborate with interdisciplinary team and initiate plans and interventions as needed  3. Alternate a full bath with partial baths for elderly   4. Monitor patient's hygiene practices   5. Collaborate with wound, ostomy, and continence nurse  Outcome: Progressing     Problem: Genitourinary - Adult  Goal: Absence of urinary retention  Description: INTERVENTIONS:  1. Assess patient’s ability to void and empty bladder.  2. Monitor intake/output and perform bladder scan if indicated.  3. Place urinary catheter per order and initiate and maintain CAUTI bundle.  4. Administer medications to alleviate retention as needed.  5. Discuss catheterization for long term situations as appropriate.    Outcome: Progressing

## 2023-12-31 NOTE — PROGRESS NOTES
[address] [address]  Daily Progress Note  Patient name: Joseph Wong  MRN: [id number]   LOS: 24 days     Subjective   Patient resting comfortably.  Objective   Vitals:Temp:  [36.4 °C (97.6 °F)-37.1 °C (98.7 °F)] 36.6 °C (97.8 °F)  Heart Rate:  [63-70] 63  Resp:  [18-19] 19  SpO2:  [92 %-94 %] 94 %  BP: (111-161)/() 161/96  Physical Exam:  HEENT: Negative  Neck: No neck vein distention  Lungs: Clear to auscultation  Heart: Regular  Abdomen: Soft  Genitourinary: Deferred  Extremities: No edema  Skin: No rash  Neurologic: Alert    Review of Systems:  Negative  No results displayed because visit has over 200 results.           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  Patient appropriate for discharge.) 12/28/23     (Disposition planning.) 12/29/23     (Disposition planning.) 12/30/23    Disposition planning.    Electronically signed by: Sami Olsen MD  12/31/2023  10:10 AM

## 2023-12-31 NOTE — NURSING END OF SHIFT
Nursing End of Shift Summary:    Patient: Joseph Wong  MRN: [id number]  : 10/20/1934, Age: 89 y.o.    Location: [address] [place] [address]    Nursing Goals  Clinical Goals for the Shift: monitor for agitation, maintain safety and comfort    Narrative Summary of Progress Toward Clinical Goals:  Pt was agitated last night, tried to get out of bed twice. Redirection tried but could not work, PRN Seroquel given at 2154. Pt was able to fall asleep after 0100. Around 0430 he woke up and he could not remember anything not even his name. Tried to reorient him but he was getting agitated so I gave him another dose of Seroquel at 0514, DANDRE was paged but has not called yet. VSS, safety and comfort maintained.     Barriers to Goals/Nursing Concerns:  Yes - increased agitation     New Patient or Family Concerns/Issues:  No    Shift Summary:   Significant Events & Communications to Providers (last 12 hours)       Last 5 Values       Row Name 23 0418                   Provider Notification    Reason for Communication Review case  pt with increased confusion  -AS        Provider Name Alejandra Dean  -AS                  User Key  (r) = Recorded By, (t) = Taken By, (c) = Cosigned By      Initials Name    AS Alycia Vinson, SAUL                  Oxygen Usage (last 12 hours)       Last 5 Values       Row Name 23                   Room Air or Baseline Oxygen Trial by Nursing    Is Patient on Room Air OR on the Same Amount of O2 as at Home? Yes                      Mobility (last 12 hours)       Last 5 Values       Row Name 23 1945 23 22023 23023 0536          Mobility    Activity -- Chair position in bed -- --     Length of Time in Chair (min) -- 0 -- --     Distance Ambulated (feet) -- 0 Feet -- --     Distance Ambulated (Meters Calculated) -- 0 Meters -- --     Patient Position Supine -- Supine Supine     Turning Turns self Turns self -- --     Distance Ambulated (Meters Calculated)(Do Not Use) -- 0 Feet  -- --                   Urethral Catheter       Active Urethral Catheter       None                  Active Lines       Active Central venous catheter / Peripherally inserted central catheter / Implantable Port / Hemodialysis catheter / Midline Catheter       None                  Infusing Medications   Medication Dose Last Rate     PRN Medications   Medication Dose Last Admin    QUEtiapine  25 mg 25 mg at 12/31/23 0514    sodium chloride  3 mL      sodium chloride  1 spray      sodium chloride-aloe vera  1 Application      acetaminophen  500 mg 500 mg at 12/12/23 1933    oxyCODONE  5 mg 5 mg at 12/17/23 1447    magnesium hydroxide  30 mL      ondansetron  4 mg      Or    ondansetron  4 mg

## 2023-12-31 NOTE — PLAN OF CARE
Problem: Safety Adult - Fall  Goal: Free from fall injury  Description: INTERVENTIONS:    Inpatient - Please reference Cares/Safety Flowsheet under Oneil Fall Risk for interventions.  Pediatrics - Please reference Peds Daily Cares/Safety Flowsheet under [facility] Pediatric Fall Assessment Fall Bundle for interventions  LD/OB - Please reference OB Shift Screening Flowsheet under OB Fall Risk for interventions.  12/31/2023 1351 by Aurora Veras RN  Outcome: Progressing  12/31/2023 1351 by Aurora Veras RN  Outcome: Progressing     Problem: Pain - Adult  Goal: Verbalizes/displays adequate comfort level or baseline comfort level  Description: INTERVENTIONS:  1. Encourage patient to monitor pain and request interventions  2. Assess pain using the appropriate pain scale  3. Administer analgesics based on type and severity of pain and evaluate response  4. Educate/Implement non-pharmacological measures as appropriate and evaluate response  5. Consider cultural, developmental and social influences on pain and pain management  6. Notify Provider if interventions unsuccessful or patient reports new pain  12/31/2023 1351 by Aurora Veras RN  Outcome: Progressing  12/31/2023 1351 by Aurora Veras RN  Outcome: Progressing     Problem: Daily Care  Goal: Daily care needs are met  Description: INTERVENTIONS:   1. Assess and monitor skin integrity  2. Identify patients at risk for skin breakdown on admission and per policy  3. Assess and monitor ability to perform self care and identify potential discharge needs  4. Assess skin integrity/risk for skin breakdown  5. Assist patient with activities of daily living as needed  6. Encourage independent activity per ability   7. Provide mouth care   8. Include patient/family/caregiver in decisions related to daily care   12/31/2023 1351 by Aurora Veras RN  Outcome: Progressing  12/31/2023 1351 by Aurora Veras RN  Outcome: Progressing     Problem: Knowledge Deficit  Goal:  Patient/family/caregiver demonstrates understanding of disease process, treatment plan, medications, and discharge instructions  Description: INTERVENTIONS:   1. Complete learning assessment and assess knowledge base  2. Provide teaching at level of understanding   3. Provide teaching via preferred learning methods  12/31/2023 1351 by Aurora Veras RN  Outcome: Progressing  12/31/2023 1351 by Aurora Veras RN  Outcome: Progressing     Problem: Discharge Barriers  Goal: Patient's discharge needs are met  Description: INTERVENTIONS:  1. Assess patient for self-management skills  2. Encourage participation in management  3. Identify potential discharge barriers on admission and throughout hospital stay  4. Involve patient/family/caregiver in discharge planning process  5. Collaborate with case management/ for discharge needs  12/31/2023 1351 by Aurora Veras RN  Outcome: Progressing  12/31/2023 1351 by Aurora Veras RN  Outcome: Progressing     Problem: Potential for Compromised Skin Integrity  Goal: Skin Integrity is Maintained or Improved  Description: INTERVENTIONS:  1. Assess and monitor skin integrity  2. Collaborate with interdisciplinary team and initiate plans and interventions as needed  3. Alternate a full bath with partial baths for elderly   4. Monitor patient's hygiene practices   5. Collaborate with wound, ostomy, and continence nurse  12/31/2023 1351 by Aurora Veras RN  Outcome: Progressing  12/31/2023 1351 by Aurora Veras RN  Outcome: Progressing  Goal: Nutritional status is improving  Description: INTERVENTIONS:  1. Monitor and assess patient for malnutrition (ex- brittle hair, bruises, dry skin, pale skin and conjunctiva, muscle wasting, smooth red tongue, and disorientation)  2. Monitor patient's weight and dietary intake as ordered or per policy  3. Determine patient's food preferences and provide high-protein, high-caloric foods as appropriate  4. Assist patient with eating   5.  Allow adequate time for meals   6. Encourage patient to take dietary supplement as ordered   7. Collaborate with dietitian  8. Include patient/family/caregiver in decisions related to nutrition  12/31/2023 1351 by Aurora Veras RN  Outcome: Progressing  12/31/2023 1351 by Aurora Veras RN  Outcome: Progressing  Goal: MOBILITY IS MAINTAINED OR IMPROVED  Description: INTERVENTIONS  1. Collaborate with interdisciplinary team and initiate plan and interventions as ordered (PT/OT)  2. Encourage ambulation  3. Up to chair for meals  4. Monitor for signs of deconditioning  12/31/2023 1351 by Aurora Veras RN  Outcome: Progressing  12/31/2023 1351 by Aurora Veras RN  Outcome: Progressing     Problem: Urinary Incontinence  Goal: Perineal skin integrity is maintained or improved  Description: INTERVENTIONS:  1. Assess genitourinary system, perineal skin, labs (urinalysis), and history of incontinence to include past management, aggravating, and alleviating factors  2. Collaborate with interdisciplinary team including wound, ostomy, and continence nurse and initiate plans and interventions as needed  4. Consider urine containment device  5. Apply skin protectant   6. Develop skin care regimen  7. Provide privacy when changing patient's incontinence device to maintain their dignity  12/31/2023 1351 by Aurora Veras RN  Outcome: Progressing  12/31/2023 1351 by Aurora Veras RN  Outcome: Progressing     Problem: Infection - Adult  Goal: Absence of infection during hospitalization  Description: INTERVENTIONS:  1. Assess and monitor for signs and symptoms of infection  2. Monitor lab/diagnostic results  3. Monitor all insertion sites/wounds/incisions  4. Monitor secretions for changes in amount and color  5. Administer medications as ordered  6. Educate and encourage patient and family to use good hand hygiene technique  7. Identify and educate in appropriate isolation precautions for identified infection/condition  12/31/2023  1351 by Naypa, Aurora, RN  Outcome: Progressing  12/31/2023 1351 by Aurora Veras RN  Outcome: Progressing     Problem: Safety Adult  Goal: Patient will remain safe during hospitalization  Description: INTERVENTIONS    1. Assess patient for fall risk and implement interventions if needed  2. Use safe transport techniques  3. Assess patient using the appropriate Harvey skin assessment scale  4. Assess patient for risk of aspiration  5. Assess patient for risk of elopement  6. Assess patient for risk of suicide  12/31/2023 1351 by Aurora Veras RN  Outcome: Progressing  12/31/2023 1351 by Aurora Veras RN  Outcome: Progressing     Problem: TRANSFERS  Goal: STG - Patient will perform bed mobility  Description: Supine<>sit with tactile cues for sequencing  12/31/2023 1351 by Aurora Veras RN  Outcome: Progressing  12/31/2023 1351 by Aurora Veras RN  Outcome: Progressing  Goal: LTG - Patient will demonstrate safe transfer techniques  Description: With min A for all transfers with use of walker   12/31/2023 1351 by Aurora Veras RN  Outcome: Progressing  12/31/2023 1351 by Aurora Veras RN  Outcome: Progressing     Problem: OT Misc  Goal: LTG - Misc 1  Description: Pt will complete toilet transfers with stand by assistance with assitive device as needed.   12/31/2023 1351 by Aurora Veras RN  Outcome: Progressing  12/31/2023 1351 by Aurora Veras RN  Outcome: Progressing     Problem: Genitourinary - Adult  Goal: Absence of urinary retention  Description: INTERVENTIONS:  1. Assess patient’s ability to void and empty bladder.  2. Monitor intake/output and perform bladder scan if indicated.  3. Place urinary catheter per order and initiate and maintain CAUTI bundle.  4. Administer medications to alleviate retention as needed.  5. Discuss catheterization for long term situations as appropriate.    12/31/2023 1351 by Aurora Veras RN  Outcome: Progressing  12/31/2023 1351 by Aurora Veras RN  Outcome: Progressing     Problem:  Elopement Risk  Goal: Minimize the risk of a patient leaving without authorization when departure presents a threat to the safety of patient or others.  Description: INTERVENTIONS:  1. Frequent re-orientation, using a calm voice and positive re-enforcement when interacting with the patient  2. Familiar objects and possessions placed in the room  3. Purposeful focused activities  4. Family or volunteer staying with the patient  5. Medication evaluation by Pharmacy for possible adjustments  6. Consider continual supervision while patient transported off unit  7. Ensure pain relief as necessary  8. Set limits and provide clear expectations as needed  9. Reduce noise/stimulation  12/31/2023 1351 by Aurora Veras, RN  Outcome: Progressing  12/31/2023 1351 by Aurora Veras, RN  Outcome: Progressing

## 2023-12-31 NOTE — NURSING END OF SHIFT
Nursing End of Shift Summary:    Patient: Joseph Wong  MRN: [id number]  : 10/20/1934, Age: 89 y.o.    Location: [address] [place] [address]    Nursing Goals  Clinical Goals for the Shift: safety and comfort, monitor for agitation, full supervision during meals    Narrative Summary of Progress Toward Clinical Goals:  Pt started the day a little drowsy. His mood was better when the wife came to visit. Able to ambulate to a commode. Able to go on a wheelchair and have a trip around the pod. Meals taken. Denies pain, SOB, dyspnea. Monitored for agitation. Kept safe and comfortable.     Barriers to Goals/Nursing Concerns:  Yes - Placement    New Patient or Family Concerns/Issues:  Yes - Drowsiness    Shift Summary:   Significant Events & Communications to Providers (last 12 hours)       Last 5 Values       Row Name 23 1058                   Provider Notification    Reason for Communication Review case  px is drowsy and sleeping most of the time. Can we decrease the Seroquel?  -AN        Provider Name Dr. Olsen  -AN                  User Key  (r) = Recorded By, (t) = Taken By, (c) = Cosigned By      Initials Name    AN Aurora Veras, SAUL                  Oxygen Usage (last 12 hours)       Last 5 Values       Row Name 23 0930                   Room Air or Baseline Oxygen Trial by Nursing    Is Patient on Room Air OR on the Same Amount of O2 as at Home? Yes                      Mobility (last 12 hours)       Last 5 Values       Row Name 23 0745 23 0800 23 1200 23 1355 23 1521       Mobility    Activity -- Sleeping Commode Turn --    Length of Time in Chair (min) -- 0 0 -- --    Distance Ambulated (feet) -- 0 Feet 5 Feet -- --    Distance Ambulated (Meters Calculated) -- 0 Meters 1.52 Meters -- --    Patient Position Supine -- -- -- Supine    Turning -- Turns self;Pillow support Turns self;Pillow support -- --    Distance Ambulated (Meters Calculated)(Do Not Use) -- 0 Feet 1.52 Feet -- --                   Urethral Catheter       Active Urethral Catheter       None                  Active Lines       Active Central venous catheter / Peripherally inserted central catheter / Implantable Port / Hemodialysis catheter / Midline Catheter       None                  Infusing Medications   Medication Dose Last Rate     PRN Medications   Medication Dose Last Admin    QUEtiapine  25 mg 25 mg at 12/28/23 2148    sodium chloride  3 mL      sodium chloride  1 spray      sodium chloride-aloe vera  1 Application      acetaminophen  500 mg 500 mg at 12/12/23 1933    oxyCODONE  5 mg 5 mg at 12/17/23 1447    magnesium hydroxide  30 mL      ondansetron  4 mg      Or    ondansetron  4 mg

## 2024-01-01 PROCEDURE — 2590000100 HC RX 259: Performed by: FAMILY MEDICINE

## 2024-01-01 PROCEDURE — (BLANK) HC ROOM PRIVATE

## 2024-01-01 PROCEDURE — 2590000100 HC RX 259: Performed by: HOSPITALIST

## 2024-01-01 PROCEDURE — 6370000100 HC RX 637 (ALT 250 FOR IP): Performed by: FAMILY MEDICINE

## 2024-01-01 PROCEDURE — 99231 SBSQ HOSP IP/OBS SF/LOW 25: CPT | Performed by: HOSPITALIST

## 2024-01-01 PROCEDURE — 6370000100 HC RX 637 (ALT 250 FOR IP): Performed by: HOSPITALIST

## 2024-01-01 RX ADMIN — QUETIAPINE FUMARATE 150 MG: 100 TABLET ORAL at 16:55

## 2024-01-01 RX ADMIN — FAMOTIDINE 10 MG: 20 TABLET, FILM COATED ORAL at 09:52

## 2024-01-01 RX ADMIN — AMLODIPINE BESYLATE 5 MG: 5 TABLET ORAL at 09:52

## 2024-01-01 RX ADMIN — TAMSULOSIN HYDROCHLORIDE 0.4 MG: 0.4 CAPSULE ORAL at 18:15

## 2024-01-01 RX ADMIN — Medication 1 TABLET: at 09:52

## 2024-01-01 RX ADMIN — SPIRONOLACTONE 25 MG: 25 TABLET ORAL at 09:52

## 2024-01-01 RX ADMIN — SENNOSIDES AND DOCUSATE SODIUM 1 TABLET: 50; 8.6 TABLET ORAL at 18:15

## 2024-01-01 RX ADMIN — GUAIFENESIN 600 MG: 600 TABLET, EXTENDED RELEASE ORAL at 09:52

## 2024-01-01 RX ADMIN — METOPROLOL SUCCINATE 25 MG: 25 TABLET, EXTENDED RELEASE ORAL at 09:52

## 2024-01-01 RX ADMIN — FLUOXETINE HYDROCHLORIDE 40 MG: 20 CAPSULE ORAL at 18:15

## 2024-01-01 RX ADMIN — FINASTERIDE 5 MG: 5 TABLET, FILM COATED ORAL at 18:15

## 2024-01-01 RX ADMIN — ATORVASTATIN CALCIUM 40 MG: 40 TABLET, FILM COATED ORAL at 18:15

## 2024-01-01 RX ADMIN — TAMSULOSIN HYDROCHLORIDE 0.4 MG: 0.4 CAPSULE ORAL at 09:52

## 2024-01-01 RX ADMIN — FAMOTIDINE 10 MG: 20 TABLET, FILM COATED ORAL at 18:15

## 2024-01-01 RX ADMIN — GUAIFENESIN 600 MG: 600 TABLET, EXTENDED RELEASE ORAL at 18:15

## 2024-01-01 RX ADMIN — LOSARTAN POTASSIUM 50 MG: 50 TABLET, FILM COATED ORAL at 09:52

## 2024-01-01 RX ADMIN — ASPIRIN 81 MG: 81 TABLET ORAL at 09:52

## 2024-01-01 RX ADMIN — POLYETHYLENE GLYCOL 3350 17 G: 17 POWDER, FOR SOLUTION ORAL at 09:53

## 2024-01-01 RX ADMIN — SENNOSIDES AND DOCUSATE SODIUM 1 TABLET: 50; 8.6 TABLET ORAL at 09:52

## 2024-01-01 NOTE — NURSING END OF SHIFT
Nursing End of Shift Summary:    Patient: Joseph Wong  MRN: [id number]  : 10/20/1934, Age: 89 y.o.    Location: [address] [place] [address]    Nursing Goals  Clinical Goals for the Shift: comfort and safety, meal supervision, ambulation, monitor agitation    Narrative Summary of Progress Toward Clinical Goals:  Pt is a bit drowsy in the beginning of the shift. Appetite good for breakfast. Constipation in the afternoon. Laxatives given. Able to defecate a large amount of bowel. Slight agitation occurred when wife left. Pt tries to get out of bed in the afternoon. Due Seroquel given. Calm and restful environment maintained.     Barriers to Goals/Nursing Concerns:  Yes - Placement    New Patient or Family Concerns/Issues:  No    Shift Summary:   Significant Events & Communications to Providers (last 12 hours)       Last 5 Values    No documentation.                 Oxygen Usage (last 12 hours)       Last 5 Values       Row Name 23 0930                   Room Air or Baseline Oxygen Trial by Nursing    Is Patient on Room Air OR on the Same Amount of O2 as at Home? Yes                      Mobility (last 12 hours)       Last 5 Values       Row Name 23 0536 23 0738 23 0800 23 1124 23 1200       Mobility    Activity -- -- Turn;Chair position in bed Ambulate in room;Chair Commode    Level of Assistance -- -- -- Moderate assist, patient does 50-74% --    Length of Time in Chair (min) -- -- 0 -- 120    Distance Ambulated (feet) -- -- 0 Feet -- 5 Feet    Distance Ambulated (Meters Calculated) -- -- 0 Meters -- 1.52 Meters    Patient Position Supine Supine -- -- --    Turning -- -- Turns self;Pillow support Turns self Pillow support    Distance Ambulated (Meters Calculated)(Do Not Use) -- -- 0 Feet -- 1.52 Feet      Row Name 23 1248 23 1423 23 1512             Mobility    Activity Commode Commode  commode and then back to chair --      Level of Assistance Moderate assist, patient does 50-74%  Maximum assist, patient does 25-49% --      Distance Ambulated (feet) -- 5 Feet --      Distance Ambulated (Meters Calculated) -- 1.52 Meters --      Patient Position -- -- Up in chair      Turning Turns self Pillow support  Patient up in chair --      Distance Ambulated (Meters Calculated)(Do Not Use) -- 1.52 Feet --                    Urethral Catheter       Active Urethral Catheter       None                  Active Lines       Active Central venous catheter / Peripherally inserted central catheter / Implantable Port / Hemodialysis catheter / Midline Catheter       None                  Infusing Medications   Medication Dose Last Rate     PRN Medications   Medication Dose Last Admin    QUEtiapine  25 mg 25 mg at 12/31/23 0514    sodium chloride  3 mL      sodium chloride  1 spray      sodium chloride-aloe vera  1 Application      acetaminophen  500 mg 500 mg at 12/12/23 1933    oxyCODONE  5 mg 5 mg at 12/17/23 1447    magnesium hydroxide  30 mL      ondansetron  4 mg      Or    ondansetron  4 mg

## 2024-01-01 NOTE — PLAN OF CARE
Problem: Safety Adult - Fall  Goal: Free from fall injury  Description: INTERVENTIONS:    Inpatient - Please reference Cares/Safety Flowsheet under [facility] Fall Risk for interventions.  Pediatrics - Please reference Peds Daily Cares/Safety Flowsheet under [facility] Pediatric Fall Assessment Fall Bundle for interventions  LD/OB - Please reference OB Shift Screening Flowsheet under OB Fall Risk for interventions.  Outcome: Progressing     Problem: Pain - Adult  Goal: Verbalizes/displays adequate comfort level or baseline comfort level  Description: INTERVENTIONS:  1. Encourage patient to monitor pain and request interventions  2. Assess pain using the appropriate pain scale  3. Administer analgesics based on type and severity of pain and evaluate response  4. Educate/Implement non-pharmacological measures as appropriate and evaluate response  5. Consider cultural, developmental and social influences on pain and pain management  6. Notify Provider if interventions unsuccessful or patient reports new pain  Outcome: Progressing     Problem: Daily Care  Goal: Daily care needs are met  Description: INTERVENTIONS:   1. Assess and monitor skin integrity  2. Identify patients at risk for skin breakdown on admission and per policy  3. Assess and monitor ability to perform self care and identify potential discharge needs  4. Assess skin integrity/risk for skin breakdown  5. Assist patient with activities of daily living as needed  6. Encourage independent activity per ability   7. Provide mouth care   8. Include patient/family/caregiver in decisions related to daily care   Outcome: Progressing     Problem: Knowledge Deficit  Goal: Patient/family/caregiver demonstrates understanding of disease process, treatment plan, medications, and discharge instructions  Description: INTERVENTIONS:   1. Complete learning assessment and assess knowledge base  2. Provide teaching at level of understanding   3. Provide teaching via preferred  learning methods  Outcome: Progressing     Problem: Infection - Adult  Goal: Absence of infection during hospitalization  Description: INTERVENTIONS:  1. Assess and monitor for signs and symptoms of infection  2. Monitor lab/diagnostic results  3. Monitor all insertion sites/wounds/incisions  4. Monitor secretions for changes in amount and color  5. Administer medications as ordered  6. Educate and encourage patient and family to use good hand hygiene technique  7. Identify and educate in appropriate isolation precautions for identified infection/condition  Outcome: Progressing     Problem: Safety Adult  Goal: Patient will remain safe during hospitalization  Description: INTERVENTIONS    1. Assess patient for fall risk and implement interventions if needed  2. Use safe transport techniques  3. Assess patient using the appropriate Harvey skin assessment scale  4. Assess patient for risk of aspiration  5. Assess patient for risk of elopement  6. Assess patient for risk of suicide  Outcome: Progressing

## 2024-01-01 NOTE — NURSING END OF SHIFT
"Nursing End of Shift Summary:    Patient: Joseph Wong  MRN: [id number]  : 10/20/1934, Age: 89 y.o.    Location: [address] [place] [address]    Nursing Goals  Clinical Goals for the Shift: monitor for agitation, maintain safety and comfort    Narrative Summary of Progress Toward Clinical Goals:  Pt was combative and very agitated when I just reported. He tried to get out of bed several times and was not getting redirected. When I went to give him his night meds, he refused them all. NA Rehan went in and told him not to get out of bed, he verbally threatened her saying \" if you come near me, I will punch your face\". The aid called security who came and talked to him, he became a little calm but he had a sitter with him throughout the night. DANDRE was informed and said we monitor him for now since he had calmed down. He was able to sleep past midnight. Safety and comfort maintained.    Barriers to Goals/Nursing Concerns:  Yes - pt's increased agitation and being combative    New Patient or Family Concerns/Issues:  No    Shift Summary:   Significant Events & Communications to Providers (last 12 hours)       Last 5 Values       Row Name 23                   Provider Notification    Reason for Communication Review case  increased agitation  -AS        Provider Name Alejandra Dean  -AS                  User Key  (r) = Recorded By, (t) = Taken By, (c) = Cosigned By      Initials Name    AS Alycia Vinson, SAUL                  Oxygen Usage (last 12 hours)       Last 5 Values    No documentation.                 Mobility (last 12 hours)       Last 5 Values       Row Name 23                Mobility    Activity Chair position in bed --       Level of Assistance Maximum assist, patient does 25-49% --       Length of Time in Chair (min) 0 --       Distance Ambulated (feet) 0 Feet --       Distance Ambulated (Meters Calculated) 0 Meters --       Patient Position Supine Supine       Turning Turns self --       " Distance Ambulated (Meters Calculated)(Do Not Use) 0 Feet --                     Urethral Catheter       Active Urethral Catheter       None                  Active Lines       Active Central venous catheter / Peripherally inserted central catheter / Implantable Port / Hemodialysis catheter / Midline Catheter       None                  Infusing Medications   Medication Dose Last Rate     PRN Medications   Medication Dose Last Admin    QUEtiapine  25 mg 25 mg at 12/31/23 0514    sodium chloride  3 mL      sodium chloride  1 spray      sodium chloride-aloe vera  1 Application      acetaminophen  500 mg 500 mg at 12/12/23 1933    oxyCODONE  5 mg 5 mg at 12/17/23 1447    magnesium hydroxide  30 mL      ondansetron  4 mg      Or    ondansetron  4 mg

## 2024-01-02 PROCEDURE — 99231 SBSQ HOSP IP/OBS SF/LOW 25: CPT | Performed by: HOSPITALIST

## 2024-01-02 PROCEDURE — 6370000100 HC RX 637 (ALT 250 FOR IP): Performed by: HOSPITALIST

## 2024-01-02 PROCEDURE — 2590000100 HC RX 259: Performed by: FAMILY MEDICINE

## 2024-01-02 PROCEDURE — 2590000100 HC RX 259: Performed by: HOSPITALIST

## 2024-01-02 PROCEDURE — 6370000100 HC RX 637 (ALT 250 FOR IP): Performed by: FAMILY MEDICINE

## 2024-01-02 PROCEDURE — (BLANK) HC ROOM PRIVATE

## 2024-01-02 RX ADMIN — GUAIFENESIN 600 MG: 600 TABLET, EXTENDED RELEASE ORAL at 08:05

## 2024-01-02 RX ADMIN — SENNOSIDES AND DOCUSATE SODIUM 1 TABLET: 50; 8.6 TABLET ORAL at 17:32

## 2024-01-02 RX ADMIN — METOPROLOL SUCCINATE 25 MG: 25 TABLET, EXTENDED RELEASE ORAL at 08:06

## 2024-01-02 RX ADMIN — FLUOXETINE HYDROCHLORIDE 40 MG: 20 CAPSULE ORAL at 17:32

## 2024-01-02 RX ADMIN — TAMSULOSIN HYDROCHLORIDE 0.4 MG: 0.4 CAPSULE ORAL at 17:32

## 2024-01-02 RX ADMIN — ASPIRIN 81 MG: 81 TABLET ORAL at 08:06

## 2024-01-02 RX ADMIN — LOSARTAN POTASSIUM 50 MG: 50 TABLET, FILM COATED ORAL at 08:05

## 2024-01-02 RX ADMIN — SPIRONOLACTONE 25 MG: 25 TABLET ORAL at 08:06

## 2024-01-02 RX ADMIN — ATORVASTATIN CALCIUM 40 MG: 40 TABLET, FILM COATED ORAL at 17:32

## 2024-01-02 RX ADMIN — FINASTERIDE 5 MG: 5 TABLET, FILM COATED ORAL at 17:32

## 2024-01-02 RX ADMIN — FAMOTIDINE 10 MG: 20 TABLET, FILM COATED ORAL at 08:05

## 2024-01-02 RX ADMIN — POLYETHYLENE GLYCOL 3350 17 G: 17 POWDER, FOR SOLUTION ORAL at 08:05

## 2024-01-02 RX ADMIN — FAMOTIDINE 10 MG: 20 TABLET, FILM COATED ORAL at 17:32

## 2024-01-02 RX ADMIN — QUETIAPINE FUMARATE 150 MG: 100 TABLET ORAL at 16:46

## 2024-01-02 RX ADMIN — AMLODIPINE BESYLATE 5 MG: 5 TABLET ORAL at 08:06

## 2024-01-02 RX ADMIN — SENNOSIDES AND DOCUSATE SODIUM 1 TABLET: 50; 8.6 TABLET ORAL at 08:05

## 2024-01-02 RX ADMIN — Medication 1 TABLET: at 08:06

## 2024-01-02 RX ADMIN — TAMSULOSIN HYDROCHLORIDE 0.4 MG: 0.4 CAPSULE ORAL at 08:06

## 2024-01-02 RX ADMIN — GUAIFENESIN 600 MG: 600 TABLET, EXTENDED RELEASE ORAL at 17:32

## 2024-01-02 NOTE — PLAN OF CARE
Problem: Safety Adult - Fall  Goal: Free from fall injury  Description: INTERVENTIONS:    Inpatient - Please reference Cares/Safety Flowsheet under [facility] Fall Risk for interventions.  Pediatrics - Please reference Peds Daily Cares/Safety Flowsheet under [facility] Pediatric Fall Assessment Fall Bundle for interventions  LD/OB - Please reference OB Shift Screening Flowsheet under OB Fall Risk for interventions.  Outcome: Progressing     Problem: Daily Care  Goal: Daily care needs are met  Description: INTERVENTIONS:   1. Assess and monitor skin integrity  2. Identify patients at risk for skin breakdown on admission and per policy  3. Assess and monitor ability to perform self care and identify potential discharge needs  4. Assess skin integrity/risk for skin breakdown  5. Assist patient with activities of daily living as needed  6. Encourage independent activity per ability   7. Provide mouth care   8. Include patient/family/caregiver in decisions related to daily care   Outcome: Progressing     Problem: Knowledge Deficit  Goal: Patient/family/caregiver demonstrates understanding of disease process, treatment plan, medications, and discharge instructions  Description: INTERVENTIONS:   1. Complete learning assessment and assess knowledge base  2. Provide teaching at level of understanding   3. Provide teaching via preferred learning methods  Outcome: Progressing     Problem: Infection - Adult  Goal: Absence of infection during hospitalization  Description: INTERVENTIONS:  1. Assess and monitor for signs and symptoms of infection  2. Monitor lab/diagnostic results  3. Monitor all insertion sites/wounds/incisions  4. Monitor secretions for changes in amount and color  5. Administer medications as ordered  6. Educate and encourage patient and family to use good hand hygiene technique  7. Identify and educate in appropriate isolation precautions for identified infection/condition  Outcome: Progressing     Problem: Safety  Adult  Goal: Patient will remain safe during hospitalization  Description: INTERVENTIONS    1. Assess patient for fall risk and implement interventions if needed  2. Use safe transport techniques  3. Assess patient using the appropriate Harvey skin assessment scale  4. Assess patient for risk of aspiration  5. Assess patient for risk of elopement  6. Assess patient for risk of suicide  Outcome: Progressing

## 2024-01-02 NOTE — PROGRESS NOTES
[address] [address]  Daily Progress Note  Patient name: Joseph Wong  MRN: [id number]   LOS: 26 days     Subjective   Patient resting comfortably.  Objective   Vitals:Temp:  [36.8 °C (98.3 °F)-37.3 °C (99.1 °F)] 36.9 °C (98.4 °F)  Heart Rate:  [66-83] 74  Resp:  [18-20] 18  SpO2:  [90 %-93 %] 92 %  BP: (136-137)/(84-99) 136/84  Physical Exam:  HEENT: Negative  Neck: No neck vein distention  Lungs: Clear  Heart: Regular  Abdomen: Soft  Genitourinary: Deferred  Extremities: No significant edema  Skin: No jaundice  Neurologic: Alert, oriented to person    Review of Systems:  Negative  No results displayed because visit has over 200 results.           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  Patient appropriate for discharge.) 12/28/23     (Disposition planning.) 12/29/23     (Disposition planning.) 12/30/23     (Disposition planning.) 12/31/23     (Disposition planning.)    Disposition planning.       Electronically signed by: Sami Olsen MD  1/2/2024  12:19 PM

## 2024-01-02 NOTE — NURSING END OF SHIFT
Nursing End of Shift Summary:    Patient: Joseph Wong  MRN: [id number]  : 10/20/1934, Age: 89 y.o.    Location: [address] [place] [address]    Nursing Goals  Clinical Goals for the Shift: monitor for agitation, promote safety and comfort    Narrative Summary of Progress Toward Clinical Goals:  Pt slept majority of the night, no behaviors were observed throughout the night. He had a bowel movement and passing urine well. Safety and comfort maintained.    Barriers to Goals/Nursing Concerns:  No    New Patient or Family Concerns/Issues:  No    Shift Summary:   Significant Events & Communications to Providers (last 12 hours)       Last 5 Values    No documentation.                 Oxygen Usage (last 12 hours)       Last 5 Values       Row Name 24 0420                   Room Air or Baseline Oxygen Trial by Nursing    Is Patient on Room Air OR on the Same Amount of O2 as at Home? Yes                      Mobility (last 12 hours)       Last 5 Values       Row Name 24 1910 24 2200 24 2340             Mobility    Activity Turn Sleeping Turn      Level of Assistance Minimal assist, patient does 75% or more -- Minimal assist, patient does 75% or more      Length of Time in Chair (min) -- 0 --      Distance Ambulated (feet) 0 Feet 0 Feet 0 Feet      Distance Ambulated (Meters Calculated) 0 Meters 0 Meters 0 Meters      Patient Position Supine -- Supine      Turning Turns self;Pillow support;Back Turns self Turns self      Distance Ambulated (Meters Calculated)(Do Not Use) 0 Feet 0 Feet 0 Feet                    Urethral Catheter       Active Urethral Catheter       None                  Active Lines       Active Central venous catheter / Peripherally inserted central catheter / Implantable Port / Hemodialysis catheter / Midline Catheter       None                  Infusing Medications   Medication Dose Last Rate     PRN Medications   Medication Dose Last Admin    QUEtiapine  25 mg 25 mg at 23 0514    sodium  chloride  3 mL      sodium chloride  1 spray      sodium chloride-aloe vera  1 Application      acetaminophen  500 mg 500 mg at 12/12/23 1933    oxyCODONE  5 mg 5 mg at 12/17/23 1447    magnesium hydroxide  30 mL      ondansetron  4 mg      Or    ondansetron  4 mg

## 2024-01-02 NOTE — NURSING END OF SHIFT
Nursing End of Shift Summary:    Patient: Joseph Wong  MRN: [id number]  : 10/20/1934, Age: 89 y.o.    Location: [address] [place] [address]    Nursing Goals  Clinical Goals for the Shift: ambulation, turning, comfort and safety, monitor for agitation    Narrative Summary of Progress Toward Clinical Goals:  Pt appetite is excellent in the morning. Able to ambulate to the commode and chair with 2 assists. Monitored for agitation. Started getting agitated after wife left. Asked pharmacy to reschedule medications to an earlier time. Pt refused medications at first even with redirection. Had to call wife to talk to pt to take medications. Wife came back to persuade pt to take medications . Pt finally took medications.  Calm and restful environment maintained.    Barriers to Goals/Nursing Concerns:  Yes - Placement    New Patient or Family Concerns/Issues:  Yes - Increased agitation at night.     Shift Summary:   Significant Events & Communications to Providers (last 12 hours)       Last 5 Values    No documentation.                 Oxygen Usage (last 12 hours)       Last 5 Values       Row Name 24 0900                   Room Air or Baseline Oxygen Trial by Nursing    Is Patient on Room Air OR on the Same Amount of O2 as at Home? Yes                      Mobility (last 12 hours)       Last 5 Values       Row Name 24 0800 24 0904 24 1118 24 1200 24 1514       Mobility    Activity Turn -- Dangle;Commode;Chair Chair --    Level of Assistance -- -- Moderate assist, patient does 50-74% -- --    Length of Time in Chair (min) 0 -- -- 60 --    Distance Ambulated (feet) 0 Feet -- 3 Feet 3 Feet --    Distance Ambulated (Meters Calculated) 0 Meters -- 0.91 Meters 0.91 Meters --    Patient Position -- Supine -- -- Supine    Turning Pillow support -- Pillow support Pillow support --    Distance Ambulated (Meters Calculated)(Do Not Use) 0 Feet -- 0.91 Feet 0.91 Feet --      Row Name 24 1523 24 2626                 Mobility    Activity Turn Back to bed       Length of Time in Chair (min) -- 120       Distance Ambulated (feet) -- 3 Feet       Distance Ambulated (Meters Calculated) -- 0.91 Meters       Turning Turns self Turns self;Pillow support       Distance Ambulated (Meters Calculated)(Do Not Use) -- 0.91 Feet                     Urethral Catheter       Active Urethral Catheter       None                  Active Lines       Active Central venous catheter / Peripherally inserted central catheter / Implantable Port / Hemodialysis catheter / Midline Catheter       None                  Infusing Medications   Medication Dose Last Rate     PRN Medications   Medication Dose Last Admin    QUEtiapine  25 mg 25 mg at 12/31/23 0514    sodium chloride  3 mL      sodium chloride  1 spray      sodium chloride-aloe vera  1 Application      acetaminophen  500 mg 500 mg at 12/12/23 1933    oxyCODONE  5 mg 5 mg at 12/17/23 1447    magnesium hydroxide  30 mL      ondansetron  4 mg      Or    ondansetron  4 mg

## 2024-01-02 NOTE — PLAN OF CARE
Problem: Safety Adult - Fall  Goal: Free from fall injury  Description: INTERVENTIONS:    Inpatient - Please reference Cares/Safety Flowsheet under [facility] Fall Risk for interventions.  Pediatrics - Please reference Peds Daily Cares/Safety Flowsheet under [facility] Pediatric Fall Assessment Fall Bundle for interventions  LD/OB - Please reference OB Shift Screening Flowsheet under OB Fall Risk for interventions.  Outcome: Progressing     Problem: Pain - Adult  Goal: Verbalizes/displays adequate comfort level or baseline comfort level  Description: INTERVENTIONS:  1. Encourage patient to monitor pain and request interventions  2. Assess pain using the appropriate pain scale  3. Administer analgesics based on type and severity of pain and evaluate response  4. Educate/Implement non-pharmacological measures as appropriate and evaluate response  5. Consider cultural, developmental and social influences on pain and pain management  6. Notify Provider if interventions unsuccessful or patient reports new pain  Outcome: Progressing     Problem: Daily Care  Goal: Daily care needs are met  Description: INTERVENTIONS:   1. Assess and monitor skin integrity  2. Identify patients at risk for skin breakdown on admission and per policy  3. Assess and monitor ability to perform self care and identify potential discharge needs  4. Assess skin integrity/risk for skin breakdown  5. Assist patient with activities of daily living as needed  6. Encourage independent activity per ability   7. Provide mouth care   8. Include patient/family/caregiver in decisions related to daily care   Outcome: Progressing     Problem: Infection - Adult  Goal: Absence of infection during hospitalization  Description: INTERVENTIONS:  1. Assess and monitor for signs and symptoms of infection  2. Monitor lab/diagnostic results  3. Monitor all insertion sites/wounds/incisions  4. Monitor secretions for changes in amount and color  5. Administer medications as  ordered  6. Educate and encourage patient and family to use good hand hygiene technique  7. Identify and educate in appropriate isolation precautions for identified infection/condition  Outcome: Progressing     Problem: Safety Adult  Goal: Patient will remain safe during hospitalization  Description: INTERVENTIONS    1. Assess patient for fall risk and implement interventions if needed  2. Use safe transport techniques  3. Assess patient using the appropriate Harvey skin assessment scale  4. Assess patient for risk of aspiration  5. Assess patient for risk of elopement  6. Assess patient for risk of suicide  Outcome: Progressing     Problem: Potential for Compromised Skin Integrity  Goal: Skin Integrity is Maintained or Improved  Description: INTERVENTIONS:  1. Assess and monitor skin integrity  2. Collaborate with interdisciplinary team and initiate plans and interventions as needed  3. Alternate a full bath with partial baths for elderly   4. Monitor patient's hygiene practices   5. Collaborate with wound, ostomy, and continence nurse  Outcome: Progressing  Goal: Nutritional status is improving  Description: INTERVENTIONS:  1. Monitor and assess patient for malnutrition (ex- brittle hair, bruises, dry skin, pale skin and conjunctiva, muscle wasting, smooth red tongue, and disorientation)  2. Monitor patient's weight and dietary intake as ordered or per policy  3. Determine patient's food preferences and provide high-protein, high-caloric foods as appropriate  4. Assist patient with eating   5. Allow adequate time for meals   6. Encourage patient to take dietary supplement as ordered   7. Collaborate with dietitian  8. Include patient/family/caregiver in decisions related to nutrition  Outcome: Progressing     Problem: Genitourinary - Adult  Goal: Absence of urinary retention  Description: INTERVENTIONS:  1. Assess patient’s ability to void and empty bladder.  2. Monitor intake/output and perform bladder scan if  indicated.  3. Place urinary catheter per order and initiate and maintain CAUTI bundle.  4. Administer medications to alleviate retention as needed.  5. Discuss catheterization for long term situations as appropriate.    Outcome: Progressing     Problem: Elopement Risk  Goal: Minimize the risk of a patient leaving without authorization when departure presents a threat to the safety of patient or others.  Description: INTERVENTIONS:  1. Frequent re-orientation, using a calm voice and positive re-enforcement when interacting with the patient  2. Familiar objects and possessions placed in the room  3. Purposeful focused activities  4. Family or volunteer staying with the patient  5. Medication evaluation by Pharmacy for possible adjustments  6. Consider continual supervision while patient transported off unit  7. Ensure pain relief as necessary  8. Set limits and provide clear expectations as needed  9. Reduce noise/stimulation  Outcome: Progressing

## 2024-01-03 PROCEDURE — 97535 SELF CARE MNGMENT TRAINING: CPT

## 2024-01-03 PROCEDURE — 6370000100 HC RX 637 (ALT 250 FOR IP): Performed by: HOSPITALIST

## 2024-01-03 PROCEDURE — 99231 SBSQ HOSP IP/OBS SF/LOW 25: CPT | Performed by: HOSPITALIST

## 2024-01-03 PROCEDURE — 97116 GAIT TRAINING THERAPY: CPT | Mod: GP | Performed by: PHYSICAL THERAPIST

## 2024-01-03 PROCEDURE — (BLANK) HC ROOM PRIVATE

## 2024-01-03 PROCEDURE — 2590000100 HC RX 259: Performed by: HOSPITALIST

## 2024-01-03 PROCEDURE — 2590000100 HC RX 259: Performed by: FAMILY MEDICINE

## 2024-01-03 PROCEDURE — 6370000100 HC RX 637 (ALT 250 FOR IP): Performed by: FAMILY MEDICINE

## 2024-01-03 RX ADMIN — GUAIFENESIN 600 MG: 600 TABLET, EXTENDED RELEASE ORAL at 08:01

## 2024-01-03 RX ADMIN — TAMSULOSIN HYDROCHLORIDE 0.4 MG: 0.4 CAPSULE ORAL at 08:02

## 2024-01-03 RX ADMIN — SENNOSIDES AND DOCUSATE SODIUM 1 TABLET: 50; 8.6 TABLET ORAL at 08:01

## 2024-01-03 RX ADMIN — ATORVASTATIN CALCIUM 40 MG: 40 TABLET, FILM COATED ORAL at 17:56

## 2024-01-03 RX ADMIN — FAMOTIDINE 10 MG: 20 TABLET, FILM COATED ORAL at 08:00

## 2024-01-03 RX ADMIN — FINASTERIDE 5 MG: 5 TABLET, FILM COATED ORAL at 17:56

## 2024-01-03 RX ADMIN — FLUOXETINE HYDROCHLORIDE 40 MG: 20 CAPSULE ORAL at 17:56

## 2024-01-03 RX ADMIN — Medication 1 TABLET: at 08:00

## 2024-01-03 RX ADMIN — FAMOTIDINE 10 MG: 20 TABLET, FILM COATED ORAL at 17:56

## 2024-01-03 RX ADMIN — TAMSULOSIN HYDROCHLORIDE 0.4 MG: 0.4 CAPSULE ORAL at 17:56

## 2024-01-03 RX ADMIN — GUAIFENESIN 600 MG: 600 TABLET, EXTENDED RELEASE ORAL at 17:56

## 2024-01-03 RX ADMIN — SPIRONOLACTONE 25 MG: 25 TABLET ORAL at 08:00

## 2024-01-03 RX ADMIN — QUETIAPINE FUMARATE 150 MG: 100 TABLET ORAL at 16:36

## 2024-01-03 RX ADMIN — POLYETHYLENE GLYCOL 3350 17 G: 17 POWDER, FOR SOLUTION ORAL at 07:59

## 2024-01-03 RX ADMIN — ASPIRIN 81 MG: 81 TABLET ORAL at 08:00

## 2024-01-03 RX ADMIN — LOSARTAN POTASSIUM 50 MG: 50 TABLET, FILM COATED ORAL at 08:00

## 2024-01-03 RX ADMIN — SENNOSIDES AND DOCUSATE SODIUM 1 TABLET: 50; 8.6 TABLET ORAL at 17:56

## 2024-01-03 RX ADMIN — AMLODIPINE BESYLATE 5 MG: 5 TABLET ORAL at 08:00

## 2024-01-03 RX ADMIN — METOPROLOL SUCCINATE 25 MG: 25 TABLET, EXTENDED RELEASE ORAL at 08:01

## 2024-01-03 NOTE — NURSING END OF SHIFT
Nursing End of Shift Summary:    Patient: Joseph Wong  MRN: [id number]  : 10/20/1934, Age: 89 y.o.    Location: [address] [place] [address]    Nursing Goals  Clinical Goals for the Shift: Maintain comfort and safety    Narrative Summary of Progress Toward Clinical Goals:  Pt comfort and safety maintained throughout the night. No complaints of pain. Pt had a bowel movement during the shift and is voiding well. Pt cooperative with cares and medications today. No behaviors noted throughout the shift.     Barriers to Goals/Nursing Concerns:  No    New Patient or Family Concerns/Issues:  No    Shift Summary:   Significant Events & Communications to Providers (last 12 hours)       Last 5 Values    No documentation.                 Oxygen Usage (last 12 hours)       Last 5 Values       Row Name 24 0800                   Room Air or Baseline Oxygen Trial by Nursing    Is Patient on Room Air OR on the Same Amount of O2 as at Home? Yes                      Mobility (last 12 hours)       Last 5 Values       Row Name 24 0757 24 0842 24 1200 24 1315 24 1344       Mobility    Activity -- Turn Ambulate in room;Other (Comment)  wheelchair Chair Commode;Chair    Level of Assistance -- Minimal assist, patient does 75% or more -- Moderate assist, patient does 50-74% Moderate assist, patient does 50-74%    Length of Time in Chair (min) -- -- 60 -- --    Distance Ambulated (feet) -- -- 5 Feet 3 Feet 3 Feet    Distance Ambulated (Meters Calculated) -- -- 1.52 Meters 0.91 Meters 0.91 Meters    Patient Position Supine -- -- -- --    Turning -- -- Turns self -- Turns self    Distance Ambulated (Meters Calculated)(Do Not Use) -- -- 1.52 Feet 0.91 Feet 0.91 Feet      Row Name 24 1524 24 1715                Mobility    Activity -- Commode;Back to bed       Length of Time in Chair (min) -- 0       Distance Ambulated (feet) -- 3 Feet       Distance Ambulated (Meters Calculated) -- 0.91 Meters       Patient  Position Supine --       Turning -- Turns self       Distance Ambulated (Meters Calculated)(Do Not Use) -- 0.91 Feet                     Urethral Catheter       Active Urethral Catheter       None                  Active Lines       Active Central venous catheter / Peripherally inserted central catheter / Implantable Port / Hemodialysis catheter / Midline Catheter       None                  Infusing Medications   Medication Dose Last Rate     PRN Medications   Medication Dose Last Admin    QUEtiapine  25 mg 25 mg at 12/31/23 0514    sodium chloride  3 mL      sodium chloride  1 spray      sodium chloride-aloe vera  1 Application      acetaminophen  500 mg 500 mg at 12/12/23 1933    oxyCODONE  5 mg 5 mg at 12/17/23 1447    magnesium hydroxide  30 mL      ondansetron  4 mg      Or    ondansetron  4 mg

## 2024-01-03 NOTE — PLAN OF CARE
Problem: Safety Adult - Fall  Goal: Free from fall injury  Description: INTERVENTIONS:    Inpatient - Please reference Cares/Safety Flowsheet under [facility] Fall Risk for interventions.  Pediatrics - Please reference Peds Daily Cares/Safety Flowsheet under [facility] Pediatric Fall Assessment Fall Bundle for interventions  LD/OB - Please reference OB Shift Screening Flowsheet under OB Fall Risk for interventions.  Outcome: Progressing     Problem: Pain - Adult  Goal: Verbalizes/displays adequate comfort level or baseline comfort level  Description: INTERVENTIONS:  1. Encourage patient to monitor pain and request interventions  2. Assess pain using the appropriate pain scale  3. Administer analgesics based on type and severity of pain and evaluate response  4. Educate/Implement non-pharmacological measures as appropriate and evaluate response  5. Consider cultural, developmental and social influences on pain and pain management  6. Notify Provider if interventions unsuccessful or patient reports new pain  Outcome: Progressing     Problem: Daily Care  Goal: Daily care needs are met  Description: INTERVENTIONS:   1. Assess and monitor skin integrity  2. Identify patients at risk for skin breakdown on admission and per policy  3. Assess and monitor ability to perform self care and identify potential discharge needs  4. Assess skin integrity/risk for skin breakdown  5. Assist patient with activities of daily living as needed  6. Encourage independent activity per ability   7. Provide mouth care   8. Include patient/family/caregiver in decisions related to daily care   Outcome: Progressing     Problem: Knowledge Deficit  Goal: Patient/family/caregiver demonstrates understanding of disease process, treatment plan, medications, and discharge instructions  Description: INTERVENTIONS:   1. Complete learning assessment and assess knowledge base  2. Provide teaching at level of understanding   3. Provide teaching via preferred  learning methods  Outcome: Progressing     Problem: Discharge Barriers  Goal: Patient's discharge needs are met  Description: INTERVENTIONS:  1. Assess patient for self-management skills  2. Encourage participation in management  3. Identify potential discharge barriers on admission and throughout hospital stay  4. Involve patient/family/caregiver in discharge planning process  5. Collaborate with case management/ for discharge needs  Outcome: Progressing     Problem: Infection - Adult  Goal: Absence of infection during hospitalization  Description: INTERVENTIONS:  1. Assess and monitor for signs and symptoms of infection  2. Monitor lab/diagnostic results  3. Monitor all insertion sites/wounds/incisions  4. Monitor secretions for changes in amount and color  5. Administer medications as ordered  6. Educate and encourage patient and family to use good hand hygiene technique  7. Identify and educate in appropriate isolation precautions for identified infection/condition  Outcome: Progressing     Problem: Potential for Compromised Skin Integrity  Goal: Skin Integrity is Maintained or Improved  Description: INTERVENTIONS:  1. Assess and monitor skin integrity  2. Collaborate with interdisciplinary team and initiate plans and interventions as needed  3. Alternate a full bath with partial baths for elderly   4. Monitor patient's hygiene practices   5. Collaborate with wound, ostomy, and continence nurse  Outcome: Progressing  Goal: Nutritional status is improving  Description: INTERVENTIONS:  1. Monitor and assess patient for malnutrition (ex- brittle hair, bruises, dry skin, pale skin and conjunctiva, muscle wasting, smooth red tongue, and disorientation)  2. Monitor patient's weight and dietary intake as ordered or per policy  3. Determine patient's food preferences and provide high-protein, high-caloric foods as appropriate  4. Assist patient with eating   5. Allow adequate time for meals   6. Encourage  patient to take dietary supplement as ordered   7. Collaborate with dietitian  8. Include patient/family/caregiver in decisions related to nutrition  Outcome: Progressing  Goal: MOBILITY IS MAINTAINED OR IMPROVED  Description: INTERVENTIONS  1. Collaborate with interdisciplinary team and initiate plan and interventions as ordered (PT/OT)  2. Encourage ambulation  3. Up to chair for meals  4. Monitor for signs of deconditioning  Outcome: Progressing     Problem: Urinary Incontinence  Goal: Perineal skin integrity is maintained or improved  Description: INTERVENTIONS:  1. Assess genitourinary system, perineal skin, labs (urinalysis), and history of incontinence to include past management, aggravating, and alleviating factors  2. Collaborate with interdisciplinary team including wound, ostomy, and continence nurse and initiate plans and interventions as needed  4. Consider urine containment device  5. Apply skin protectant   6. Develop skin care regimen  7. Provide privacy when changing patient's incontinence device to maintain their dignity  Outcome: Progressing     Problem: Elopement Risk  Goal: Minimize the risk of a patient leaving without authorization when departure presents a threat to the safety of patient or others.  Description: INTERVENTIONS:  1. Frequent re-orientation, using a calm voice and positive re-enforcement when interacting with the patient  2. Familiar objects and possessions placed in the room  3. Purposeful focused activities  4. Family or volunteer staying with the patient  5. Medication evaluation by Pharmacy for possible adjustments  6. Consider continual supervision while patient transported off unit  7. Ensure pain relief as necessary  8. Set limits and provide clear expectations as needed  9. Reduce noise/stimulation  Outcome: Progressing      Statement Selected

## 2024-01-03 NOTE — INTERDISCIPLINARY/THERAPY
Case Management Progress Note    Phone # [phone]    Diagnosis: Weakness    Discussed Discharge Topics/Narrative: CM meet with the patient's wife Alessandra Wong.  CM asked Alessandra to extend referrals state wide.  Alessandra explained that she has a son that lives in [place], and wants referrals being sent to SNFs in [place] within a 25 mile radius.      CM secures chats [facility] Justina: Can you please send referrals for Long Term Care Private Pay to [facility] in [place] every nursing home wtihin a 25 miles radius.  The patient's wife is choosing this because the patient's son lives in [place] and will take over the patient.     CM calls [facility] Justina.  Justina explains that she has found places, and will call them referrals.   will continue to follow.     CM meets with Alessandra again.  CM speaks with Alessandra that if [place] referrals do not work then we are going to have extend referrals statewide.  Alessandra has not objections.  It is this CM's recollection that the CM talked to Alessandra about the nursing homes calling the patient's son.  Alessandra is okay with the nursing homes speaking with the patient's son.  Alessandra gives the CM the son's name, and number.  Kaleb Wong [phone].    Family updated: Please see above.    RN updated: No.    Disposition: Placement.

## 2024-01-03 NOTE — INTERDISCIPLINARY/THERAPY
[address] [facility] [address]  [phone]      Inpatient Occupational Therapy Daily Treatment Note    Date of Service: 1/3/2024  Patient Name: Joseph Wong  Referring Provider: WYATT SRINIVASAN ROBERT  Completed Visit Number: 3  SOC Date: 12/12/23  Certification Date: 01/11/24    Medical Diagnosis:    Weakness [R53.1]  Unable to bear weight on left lower extremity [R26.89]  Lumbar compression fracture, closed, initial encounter (CMS/[facility]) [S32.000A]  Treatment Diagnoses:  Treatment Diagnosis: Decreased ADL status, Decreased upper extremity strength, Decreased cognition, Decreased endurance, Decreased functional mobility, Decreased safe judgment during ADL  Subjective   Family/Caregiver Present  Family/Caregiver Present: No  Subjective Comments  RN Stated patient is medically cleared for therapy: Yes  Subjective Comments: Pt. supine in bed upon arrival, agreeable to treatment   Current Assistive or Adaptive Equipment  Equipment: Front wheeled walker  Pain Assessment Scale  Pain Scale: 0-10  0-10 Pain Score: 0 - No pain           Objective    Precautions  Reinforced Precautions: Yes  Other Precautions: Dementia, lines, fall  Weight Bearing Precautions: No  Is this a Co-Treatment?: No  Treatment:  Other Precautions: Dementia, lines, fall    Cognition: Patient alert and pleasant this morning. Able to follow simple commands. Requires increased time for processing at times.     Bed mobility: stand by assistance supine to Edge of Bed with head of bed elevated.     Transfers: Sit to and from stand contact guard assistance using front wheeled walker     Ambulation: Ambulated 2 meters using front wheeled walker contact guard assistance, flexed posture requiring Verbal cues to correct     ADLs:   Dressing: Total assistance to don bilateral socks from Edge of Bed (0/2). Setup assistance to don shirt Edge of Bed (4/4)  Grooming: Setup assistance sitting Edge of Bed.   Toileting: contact guard assist  utilizing urinal Edge of Bed   Bathing: N/A  Feeding: Setup assistance     Balance: Requiring contact guard assist/close supervision Edge of Bed. Minimum assistance/ contact guard assist while standing and completing mobility     Activity tolerance: Vital signs stable       Time Calculation  Start Time: 0750  Stop Time: 0809  Time Calculation (min): 19 min  Therapeutic Interventions (Time Spent in Minutes)  ADL Selfcare Home Managment: 19        Assessment/Plan   Assessment:    Level of Function and Prognosis  Assessment: Pt. continues to increase overall activity tolerance and ability to participate in functional tasks. Cognition continues to be limiting factor- would benefit from placement/memory care facility to maintain safety during ADL's and mobility  Plan:    Plan  Plan for next treatment comment: Transfers/mobility, functional cognition, grooming at sink  Treatment Interventions: ADL retraining, Cognitive skills development, Compensatory technique education, Endurance training, Equipment evaluation/education, Fine motor coordination activities, Patient/family training, Physical Performance Test/Measure, Self-Care Home Management, Therapeutic activity, Sensory integration, Therapeutic exercise, UE strengthening/ROM  Recommendation  Recommendation: Full supervision/memory care placement  Recommendations for Therapy: Continue skilled therapy  Treatment duration will be through Certification Date: 01/11/24

## 2024-01-03 NOTE — NURSING END OF SHIFT
Nursing End of Shift Summary:    Patient: Joseph Wong  MRN: [id number]  : 10/20/1934, Age: 89 y.o.    Location: [address] [place] [address]    Nursing Goals  Clinical Goals for the Shift: safety and comfort    Narrative Summary of Progress Toward Clinical Goals:  Denies pain nor any discomfort. Patient still confused and impulsive. Fall bundles in place. Bed alarm on at all times. Kept safe.    Barriers to Goals/Nursing Concerns:  Yes - pending placement    New Patient or Family Concerns/Issues:  No    Shift Summary:   Significant Events & Communications to Providers (last 12 hours)       Last 5 Values    No documentation.                 Oxygen Usage (last 12 hours)       Last 5 Values       Row Name 24 1900 24 0412                Room Air or Baseline Oxygen Trial by Nursing    Is Patient on Room Air OR on the Same Amount of O2 as at Home? Yes Yes                     Mobility (last 12 hours)       Last 5 Values       Row Name 24 1715 24             Mobility    Activity Commode;Back to bed Turn Sleeping      Level of Assistance -- Moderate assist, patient does 50-74% --      Length of Time in Chair (min) 0 -- 0      Distance Ambulated (feet) 3 Feet 0 Feet 0 Feet      Distance Ambulated (Meters Calculated) 0.91 Meters 0 Meters 0 Meters      Patient Position -- -- Supine      Turning Turns self Turns self Turns self      Distance Ambulated (Meters Calculated)(Do Not Use) 0.91 Feet 0 Feet 0 Feet                    Urethral Catheter       Active Urethral Catheter       None                  Active Lines       Active Central venous catheter / Peripherally inserted central catheter / Implantable Port / Hemodialysis catheter / Midline Catheter       None                  Infusing Medications   Medication Dose Last Rate     PRN Medications   Medication Dose Last Admin    QUEtiapine  25 mg 25 mg at 23 0514    sodium chloride  3 mL      sodium chloride  1 spray      sodium chloride-aloe vera   1 Application      acetaminophen  500 mg 500 mg at 12/12/23 1933    oxyCODONE  5 mg 5 mg at 12/17/23 1447    magnesium hydroxide  30 mL      ondansetron  4 mg      Or    ondansetron  4 mg

## 2024-01-03 NOTE — PROGRESS NOTES
[address] [place] [address]  Daily Progress Note  Patient name: Joseph Wong  MRN: [id number]   LOS: 27 days     Subjective   Patient sitting in chair and doing good this am.  Objective   Vitals:Temp:  [36.2 °C (97.1 °F)-36.9 °C (98.4 °F)] 36.3 °C (97.4 °F)  Heart Rate:  [62-75] 74  Resp:  [16-18] 18  SpO2:  [92 %-94 %] 94 %  BP: (114-150)/() 130/97  Physical Exam:   HEENT: Pupils equal round and reactive to light and accommodation.  Extraocular movements are intact.  Oropharynx clear with no erythema or exudate.  Neck: Supple nontender without palpable lymphadenopathy JVD bruits or goiter appreciated  Lungs: Clear to auscultation bilaterally  Heart: Regular rate and rhythm with normal S1 and S2  Abdomen: Soft nontender. normoactive bowel sounds. no hepatosplenomegaly  Extremities: No clubbing, cyanosis, or edema.  Neurologic: Alert.  CN II through XII intact.  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  They will arrange follow-up in clinic approximately 4 weeks with repeat x-rays.  X-ray thoracolumbar junction  Similar T12 compression deformity.  No change in alignment  CT lumbar spine without contrast  1.  Compression deformities of the T12 and L4 vertebral bodies appear likely acute.  2.  Diffuse lumbar degenerative changes.  3.  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  Generalized weakness  Blood on tip of penis urethra opening  Likely due urethral trauma or prostrate and following  B12 and vitamin D deficiency  DM2 with neuropathy  GERD  CAD  BPH   Mood disorder   NARESH   VTE prophylaxis:  lovenox     Plan:     Continue supportive care   DC lidocaine patch, patient refusing and not in pain  Neurosurgery arranging 4-week follow-up with repeat x-rays, no lifting greater than 5 to 10 pounds and avoid significant bending or twisting.  No brace necessary.  PT OT    Await SNF placementlectronically signed by: Tarik Aaron Jr., MD  1/3/2024  11:26 AM

## 2024-01-04 PROCEDURE — 99231 SBSQ HOSP IP/OBS SF/LOW 25: CPT | Performed by: HOSPITALIST

## 2024-01-04 PROCEDURE — 2590000100 HC RX 259: Performed by: FAMILY MEDICINE

## 2024-01-04 PROCEDURE — 6370000100 HC RX 637 (ALT 250 FOR IP): Performed by: HOSPITALIST

## 2024-01-04 PROCEDURE — (BLANK) HC ROOM PRIVATE

## 2024-01-04 PROCEDURE — 6370000100 HC RX 637 (ALT 250 FOR IP): Performed by: FAMILY MEDICINE

## 2024-01-04 PROCEDURE — 97116 GAIT TRAINING THERAPY: CPT | Mod: GP | Performed by: PHYSICAL THERAPIST

## 2024-01-04 PROCEDURE — 2590000100 HC RX 259: Performed by: HOSPITALIST

## 2024-01-04 RX ADMIN — FLUOXETINE HYDROCHLORIDE 40 MG: 20 CAPSULE ORAL at 17:40

## 2024-01-04 RX ADMIN — METOPROLOL SUCCINATE 25 MG: 25 TABLET, EXTENDED RELEASE ORAL at 11:31

## 2024-01-04 RX ADMIN — SENNOSIDES AND DOCUSATE SODIUM 1 TABLET: 50; 8.6 TABLET ORAL at 11:31

## 2024-01-04 RX ADMIN — QUETIAPINE FUMARATE 150 MG: 100 TABLET ORAL at 17:40

## 2024-01-04 RX ADMIN — TAMSULOSIN HYDROCHLORIDE 0.4 MG: 0.4 CAPSULE ORAL at 11:31

## 2024-01-04 RX ADMIN — ASPIRIN 81 MG: 81 TABLET ORAL at 11:31

## 2024-01-04 RX ADMIN — TAMSULOSIN HYDROCHLORIDE 0.4 MG: 0.4 CAPSULE ORAL at 17:41

## 2024-01-04 RX ADMIN — AMLODIPINE BESYLATE 5 MG: 5 TABLET ORAL at 11:31

## 2024-01-04 RX ADMIN — FAMOTIDINE 10 MG: 20 TABLET, FILM COATED ORAL at 11:31

## 2024-01-04 RX ADMIN — LOSARTAN POTASSIUM 50 MG: 50 TABLET, FILM COATED ORAL at 11:31

## 2024-01-04 RX ADMIN — SPIRONOLACTONE 25 MG: 25 TABLET ORAL at 11:31

## 2024-01-04 RX ADMIN — FINASTERIDE 5 MG: 5 TABLET, FILM COATED ORAL at 17:41

## 2024-01-04 RX ADMIN — Medication 1 TABLET: at 11:31

## 2024-01-04 RX ADMIN — POLYETHYLENE GLYCOL 3350 17 G: 17 POWDER, FOR SOLUTION ORAL at 11:31

## 2024-01-04 RX ADMIN — GUAIFENESIN 600 MG: 600 TABLET, EXTENDED RELEASE ORAL at 17:41

## 2024-01-04 RX ADMIN — ATORVASTATIN CALCIUM 40 MG: 40 TABLET, FILM COATED ORAL at 17:41

## 2024-01-04 RX ADMIN — SENNOSIDES AND DOCUSATE SODIUM 1 TABLET: 50; 8.6 TABLET ORAL at 17:42

## 2024-01-04 RX ADMIN — FAMOTIDINE 10 MG: 20 TABLET, FILM COATED ORAL at 17:41

## 2024-01-04 RX ADMIN — GUAIFENESIN 600 MG: 600 TABLET, EXTENDED RELEASE ORAL at 11:31

## 2024-01-04 NOTE — NURSING END OF SHIFT
Nursing End of Shift Summary:    Patient: Joseph Wong  MRN: [id number]  : 10/20/1934, Age: 89 y.o.    Location: [address] [place] [address]    Nursing Goals  Clinical Goals for the Shift: Safety and comfort    Narrative Summary of Progress Toward Clinical Goals:  Patient ate 100% of breakfast and lunch; up with PT today; Used the bedside commode multiple times; voiding well; maintained safety and comfort throughout shift.    Barriers to Goals/Nursing Concerns:  Yes - Placement    New Patient or Family Concerns/Issues:  No    Shift Summary:   Significant Events & Communications to Providers (last 12 hours)       Last 5 Values    No documentation.                 Oxygen Usage (last 12 hours)       Last 5 Values       Row Name 24 0800                   Room Air or Baseline Oxygen Trial by Nursing    Is Patient on Room Air OR on the Same Amount of O2 as at Home? Yes                      Mobility (last 12 hours)       Last 5 Values       Row Name 24 0549 24 0731 24 0800 24 1031 24 1200       Mobility    Activity -- -- Ambulate in room;Chair Ambulate in room;Commode;Chair Chair    Level of Assistance -- -- Moderate assist, patient does 50-74% Moderate assist, patient does 50-74% --    Length of Time in Chair (min) -- -- 20 5 120    Distance Ambulated (feet) -- -- 5 Feet 5 Feet 0 Feet    Distance Ambulated (Meters Calculated) -- -- 1.52 Meters 1.52 Meters 0 Meters    Patient Position Supine Supine -- -- --    Turning Turns self -- Turns self;Every 2 hours Turns self --    Distance Ambulated (Meters Calculated)(Do Not Use) -- -- 1.52 Feet 1.52 Feet 0 Feet      Row Name 24 1300 24 1623 24 1731             Mobility    Activity Commode;Chair -- Stand at bedside;Chair      Level of Assistance Moderate assist, patient does 50-74% -- --      Length of Time in Chair (min) 60 -- --      Distance Ambulated (feet) 10 Feet -- --      Distance Ambulated (Meters Calculated) 3.05 Meters -- --       Patient Position -- Supine --      Turning Turns self -- Turns self      Distance Ambulated (Meters Calculated)(Do Not Use) 3.05 Feet -- --                    Urethral Catheter       Active Urethral Catheter       None                  Active Lines       Active Central venous catheter / Peripherally inserted central catheter / Implantable Port / Hemodialysis catheter / Midline Catheter       None                  Infusing Medications   Medication Dose Last Rate     PRN Medications   Medication Dose Last Admin    QUEtiapine  25 mg 25 mg at 12/31/23 0514    sodium chloride  3 mL      sodium chloride  1 spray      sodium chloride-aloe vera  1 Application      acetaminophen  500 mg 500 mg at 12/12/23 1933    oxyCODONE  5 mg 5 mg at 12/17/23 1447    magnesium hydroxide  30 mL      ondansetron  4 mg      Or    ondansetron  4 mg

## 2024-01-04 NOTE — INTERDISCIPLINARY/THERAPY
[address] [facility] [address]  [phone]      Inpatient Physical Therapy Progress Note    Date of Service: 01/04/24  Patient Name: Joseph Wong  Referring Provider: WYATT SRINIVASAN ROBERT  Medicare or Medicaid note, cosigner has been added.: N/A  Completed Visit Number: 10  SOC Date: 12/06/23  Certification Period: 02/03/24    Medical Diagnosis:   Weakness [R53.1]  Unable to bear weight on left lower extremity [R26.89]  Lumbar compression fracture, closed, initial encounter (CMS/[facility]) [S32.000A]  Treatment Diagnoses:  Treatment Diagnosis: Decreased strength, Decreased endurance, Impaired balance, Decreased mobility  Subjective   Family/Caregiver Present  Family/Caregiver Present: No  Subjective Comments  Subjective Comments: Pt in bed in upon arrival and agreeable to therapy. Pt ended session seated in chair with call light within reach and chair alarm engaged.   Activities limited by patient complaint: Transfers, Prolonged sitting, Prolonged standing, Walking/Mobility      Pain Assessment Scale  Pain Scale: 0-10  0-10 Pain Score: 0 - No pain          Past Medical History:   Diagnosis Date    CAD (coronary artery disease)     Elevated cholesterol     GERD (gastroesophageal reflux disease)     Hiatal hernia     History of shingles     Hypertension     Macular degeneration     Metabolic syndrome     Vertigo        Current Facility-Administered Medications:     guaiFENesin (MUCINEX) 12 hr tablet 600 mg, 600 mg, oral, 2x daily, Tarik Aaron MD, 600 mg at 01/04/24 1131    QUEtiapine (SEROquel) tablet 150 mg, 150 mg, oral, Daily with dinner, Tarik Aaron MD, 150 mg at 01/03/24 1636    QUEtiapine (SEROquel) tablet 25 mg, 25 mg, oral, q4h PRN, Sami Olsen MD, 25 mg at 12/31/23 0514    multivitamin with minerals tablet 1 tablet, 1 tablet, oral, Daily, Saim Olsen MD, 1 tablet at 01/04/24 1131    Maintain IV access, , , Until discontinued **AND** Saline lock IV, , , Once **AND** sodium  chloride flush 3 mL, 3 mL, intravenous, PRN, Tanya Larry MD    sodium chloride (OCEAN) 0.65 % nasal spray 1 spray, 1 spray, Each Nostril, PRN, Tanya Larry MD    sodium chloride-aloe vera (AYR) nasal gel 1 Application, 1 Application, Each Nostril, PRN, Tanya Larry MD    acetaminophen (TYLENOL) tablet 500 mg, 500 mg, oral, q6h PRN, Tanya Larry MD, 500 mg at 12/12/23 1933    oxyCODONE (ROXICODONE) immediate release tablet 5 mg, 5 mg, oral, q4h PRN, Tanya Larry MD, 5 mg at 12/17/23 1447    sennosides-docusate sodium (SENNA PLUS) 8.6-50 mg 1 tablet, 1 tablet, oral, 2x daily, Tanya Larry MD, 1 tablet at 01/04/24 1131    magnesium hydroxide (MILK OF MAGNESIA) 400 mg/5 mL oral suspension 30 mL, 30 mL, oral, Daily PRN, Tanya Larry MD    polyethylene glycol (GLYCOLAX) powder 17 g, 17 g, oral, Daily, Tanya Larry MD, 17 g at 01/04/24 1131    ondansetron (ZOFRAN) tablet 4 mg, 4 mg, oral, q6h PRN **OR** ondansetron (ZOFRAN) injection 4 mg, 4 mg, intravenous, q6h PRN, Tanya Larry MD    famotidine (PEPCID) tablet 10 mg, 10 mg, oral, 2x daily, Tanya Larry MD, 10 mg at 01/04/24 1131    amLODIPine (NORVASC) tablet 5 mg, 5 mg, oral, q AM, Tanya Larry MD, 5 mg at 01/04/24 1131    aspirin EC tablet 81 mg, 81 mg, oral, q AM, Tanya Larry MD, 81 mg at 01/04/24 1131    atorvastatin (LIPITOR) tablet 40 mg, 40 mg, oral, q PM, Tanya Larry MD, 40 mg at 01/03/24 1756    finasteride (PROSCAR) tablet 5 mg, 5 mg, oral, q PM, Tanya Larry MD, 5 mg at 01/03/24 1756    FLUoxetine (PROzac) capsule 40 mg, 40 mg, oral, q PM, Tanya Larry MD, 40 mg at 01/03/24 1756    losartan (COZAAR) tablet 50 mg, 50 mg, oral, q AM, Tanya Larry MD, 50 mg at 01/04/24 1131    metoprolol succinate XL (TOPROL-XL) 24 hr tablet 25 mg, 25 mg, oral, q AM, Tanya Larry MD, 25 mg at 01/04/24 1131    spironolactone (ALDACTONE) tablet 25 mg, 25 mg, oral, q AM, Tanya Larry MD, 25 mg at 01/04/24  1131    tamsulosin (FLOMAX) 24 hr capsule 0.4 mg, 0.4 mg, oral, 2x daily, Tanya Larry MD, 0.4 mg at 01/04/24 1131  No Known Allergies       Objective    Precautions  Reinforced Precautions: Yes  Other Precautions: fall risk  Weight Bearing Precautions: No  Is this a Co-Treatment?: No  Findings:       Gait belt donned for all mobility. Pt left with call light in reach, needs met.     Cognition: Pt is alert and appropriate in conversation. Pt is cooperative with therapy    Bed Mobility: Supine to sit at edge of bed with use of bed rail Min A x 1    Transfers: Sit to stand from edge of bed to Front Wheeled Walker with Min A x 1 for balance.    Ambulation: 10m with Front Wheeled Walker Min A  x 1. Pt demonstrates slow gait with decreased step length and times of R LE giving way. Fatigues quickly with gait.    Stairs: Not assessed    Balance: impaired    Activity Tolerance: Fatigues quickly with gait    Ambulation 1  Ambulation Distance (meters): 10 meters      Time Calculation  Start Time: 1143  Stop Time: 1200  Time Calculation (min): 17 min  Therapeutic Interventions (Time Spent in Minutes)  Gait/Mobility: 17       Assessment/Plan   Assessment:      Prognosis: Fair  Assessment: Pt has mad gradual progress since onset of therapy. Initially he was needing Mod A x 2 for bed mobility and transfers. He is now Min A for bed mobility, and ambulating 10m with front wheeled walker CGA-MIN A x 1. He is unsteady  in gait and needs verbal cues for safety. With cont progress we will recert to cont PT up to 5 x per week x 30 days.  Multi-Disciplinary Problems (from Physical Therapy)      Active Problems       Problem: TRANSFERS  Start Date: 12/06/23      Goal Start Date Expected End Date End Date    STG - Patient will perform bed mobility 12/06/23 02/03/24 --    Goal Details: Supine<>sit with tactile cues for sequencing  1/4/24 Pt requiring Min A x 1 for bed mobility, progressing        Goal Start Date Expected End Date End  Date    LTG - Patient will demonstrate safe transfer techniques 12/06/23 02/03/24 --    Goal Details: With min A for all transfers with use of walker   1/4/24 pt needing Min A to stand from bed, Mod A to stand from chair  progressing                Problem: Mobility  Start Date: 01/04/24      Goal Start Date Expected End Date End Date    LTG - Patient will ambulate household distance 01/04/24 02/03/24 --    Goal Details: With least retrictive assistive device CONTACT GUARD ASSIST x 1  1/4/24 Currently needing Min A x 1 for safety in gait due to LE weakness and unsteadiness                            Recommendation  Recommendations for Therapy: Continue skilled therapy  Equipment Recommended: Front wheeled walker  DME Justification (Front Wheel Walker): Patient has a mobility limitation that considerably impairs the ability to participate in [facility] in the home. The patient is able to safely use and the mobility deficit is resolved by the use of a front wheeled walker., Patient does not demonstrate safe mobility with cane, cruthces or standard walker.  Plan:  Plan  PT Frequency: 2-3x/wk  Plan for next treatment comment: 1-2 assist, bed mobility, transfers, standing ex, amb ~10m with walker and min assist, tends to retropulse.  Treatment duration will be through Certification Period: 02/03/24

## 2024-01-04 NOTE — PROGRESS NOTES
[facility]  [address] [place] [address]  Daily Progress Note  Patient name: Joseph Wong  MRN: [id number]   LOS: 28 days     Subjective   Patient sleeping this am  Objective   Vitals:Temp:  [37.1 °C (98.8 °F)] 37.1 °C (98.8 °F)  Heart Rate:  [70] 70  Resp:  [16] 16  SpO2:  [95 %] 95 %  BP: (107)/(79) 107/79  Physical Exam:   HEENT: Pupils equal round and reactive to light and accommodation.  Extraocular movements are intact.  Oropharynx clear with no erythema or exudate.  Neck: Supple nontender without palpable lymphadenopathy JVD bruits or goiter appreciated  Lungs: Clear to auscultation bilaterally  Heart: Regular rate and rhythm with normal S1 and S2  Abdomen: Soft nontender. normoactive bowel sounds. no hepatosplenomegaly  Extremities: No clubbing, cyanosis, or edema.  Neurologic: sleeping.  CN II through XII intact.  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  They will arrange follow-up in clinic approximately 4 weeks with repeat x-rays.  X-ray thoracolumbar junction  Similar T12 compression deformity.  No change in alignment  CT lumbar spine without contrast  1.  Compression deformities of the T12 and L4 vertebral bodies appear likely acute.  2.  Diffuse lumbar degenerative changes.  3.  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  Generalized weakness  Blood on tip of penis urethra opening  Likely due  urethral trauma or prostrate and following  B12 and vitamin D deficiency  DM2 with neuropathy  GERD  CAD  BPH   Mood disorder   NARESH   VTE prophylaxis:  lovenox     Plan:     Neurosurgery arranging 4-week follow-up with repeat x-rays, no lifting greater than 5 to 10 pounds and avoid significant bending or twisting.  No brace necessary.  PT OT  Await possible SNF placement    Electronically signed by: Tarik Aaron Jr., MD  1/4/2024  11:26 AM

## 2024-01-04 NOTE — INTERDISCIPLINARY/THERAPY
[address] [facility] [address]  [phone]      Inpatient Physical Therapy Daily Treatment Note    Date of Service: 1/3/2024  Patient Name: Joseph Wong  Referring Provider: WYATT SRINIVASAN ROBERT  Completed Visit Number: 9  SOC Date: 12/06/23  Certification Period: 01/05/24    Medical Diagnosis:   Weakness [R53.1]  Unable to bear weight on left lower extremity [R26.89]  Lumbar compression fracture, closed, initial encounter (CMS/[facility]) [S32.000A]  Treatment Diagnoses:  Treatment Diagnosis: Decreased strength, Decreased endurance, Impaired balance, Decreased mobility  Subjective   Family/Caregiver Present  Family/Caregiver Present: No  Subjective Comments  RN Stated patient is medically cleared for therapy: Yes  Subjective Comments: Found resting in recliner, agreeable to activity.  Ends session in recliner, call light and tray table in reach, seat reclined, chair alarm on   Current Assistive or Adaptive Equipment  Equipment: Front wheeled walker  Pain Assessment Scale  Pain Scale: 0-10  0-10 Pain Score: 0 - No pain  Oneil Fall Risk Score: 100         Objective    Precautions  Other Precautions: fall risk  Weight Bearing Precautions: No  Is this a Co-Treatment?: No  Treatments:    Transfers sit to stand with a walker mod assist out of his recliner, up to a walker.  Takes extra time to find his Balance once up, tends to retropulse.    Ambulation with a front wheeled walker ~10 meters in his room, is a rather small crowded space which did not help, a couple loss of balance with min assist to prevent a fall, quite slow pace, shuffling, tends to have knees quite flexed, intermittent assist to steer the walker in our crowded area.    Standing leg exercises included trial of toe risers which he was unable to do with weakness, mini squats x10, marching in place which was stopped after ~5 reps when his legs were becoming weak / unsteady.    Endurance: he does become winded quickly with standing  activity, needed 2-3 minutes of seated rest between standing activities to recover.    Time Calculation  Start Time: 1640  Stop Time: 1656  Time Calculation (min): 16 min  Therapeutic Interventions (Time Spent in Minutes)  Gait/Mobility: 11  Therapeutic ex: 5       Assessment/Plan   Assessment:    Level of Function and Prognosis  Current Level of Function: sit to stand mod assist, walked a short distance but is very unsteady, min assist when he loses his balance, becomes somewhat winded with activity.  Prognosis: Fair  Assessment: sit to stand mod assist, walked a short distance but is very unsteady, min assist when he loses his balance, becomes somewhat winded with activity.  Should be assisted with any attempts to stand / transfer / ambulate  Recommendation  Recommendations for Therapy: Continue skilled therapy  Plan:    Plan  PT Frequency: 2-3x/wk  Plan for next treatment comment: 1-2 assist, bed mobility, transfers, standing ex, amb ~10m with walker and min assist, tends to retropulse.  Treatment duration will be through Certification Period: 01/05/24

## 2024-01-04 NOTE — NURSING END OF SHIFT
Nursing End of Shift Summary:    Patient: Joseph Wong  MRN: [id number]  : 10/20/1934, Age: 89 y.o.    Location: [address] [place] [address]    Nursing Goals  Clinical Goals for the Shift: safety and comfort    Narrative Summary of Progress Toward Clinical Goals:  Patient still confused and needs constant redirection. Fall bundles in place. Patient able to sleep through the night. Kept safe.    Barriers to Goals/Nursing Concerns:  Yes - placement    New Patient or Family Concerns/Issues:  No    Shift Summary:   Significant Events & Communications to Providers (last 12 hours)       Last 5 Values    No documentation.                 Oxygen Usage (last 12 hours)       Last 5 Values       Row Name 24 1900 24 0347                Room Air or Baseline Oxygen Trial by Nursing    Is Patient on Room Air OR on the Same Amount of O2 as at Home? Yes Yes                     Mobility (last 12 hours)       Last 5 Values       Row Name 24 1731 24 1900                Mobility    Activity Stand at bedside;Chair Back to bed       Level of Assistance -- Moderate assist, patient does 50-74%       Length of Time in Chair (min) -- 120       Distance Ambulated (feet) -- 5 Feet       Distance Ambulated (Meters Calculated) -- 1.52 Meters       Patient Position -- Supine       Turning Turns self Turns self       Distance Ambulated (Meters Calculated)(Do Not Use) -- 1.52 Feet                     Urethral Catheter       Active Urethral Catheter       None                  Active Lines       Active Central venous catheter / Peripherally inserted central catheter / Implantable Port / Hemodialysis catheter / Midline Catheter       None                  Infusing Medications   Medication Dose Last Rate     PRN Medications   Medication Dose Last Admin    QUEtiapine  25 mg 25 mg at 23 0514    sodium chloride  3 mL      sodium chloride  1 spray      sodium chloride-aloe vera  1 Application      acetaminophen  500 mg 500 mg at 23     oxyCODONE  5 mg 5 mg at 12/17/23 1447    magnesium hydroxide  30 mL      ondansetron  4 mg      Or    ondansetron  4 mg

## 2024-01-05 PROCEDURE — 6370000100 HC RX 637 (ALT 250 FOR IP): Performed by: HOSPITALIST

## 2024-01-05 PROCEDURE — 2590000100 HC RX 259: Performed by: HOSPITALIST

## 2024-01-05 PROCEDURE — (BLANK) HC ROOM PRIVATE

## 2024-01-05 PROCEDURE — 99231 SBSQ HOSP IP/OBS SF/LOW 25: CPT | Performed by: HOSPITALIST

## 2024-01-05 PROCEDURE — 6370000100 HC RX 637 (ALT 250 FOR IP): Performed by: FAMILY MEDICINE

## 2024-01-05 PROCEDURE — 2590000100 HC RX 259: Performed by: FAMILY MEDICINE

## 2024-01-05 PROCEDURE — 97530 THERAPEUTIC ACTIVITIES: CPT | Mod: GP

## 2024-01-05 RX ADMIN — QUETIAPINE FUMARATE 25 MG: 25 TABLET ORAL at 21:16

## 2024-01-05 RX ADMIN — SPIRONOLACTONE 25 MG: 25 TABLET ORAL at 09:12

## 2024-01-05 RX ADMIN — FLUOXETINE HYDROCHLORIDE 40 MG: 20 CAPSULE ORAL at 17:31

## 2024-01-05 RX ADMIN — TAMSULOSIN HYDROCHLORIDE 0.4 MG: 0.4 CAPSULE ORAL at 17:30

## 2024-01-05 RX ADMIN — ATORVASTATIN CALCIUM 40 MG: 40 TABLET, FILM COATED ORAL at 17:31

## 2024-01-05 RX ADMIN — SENNOSIDES AND DOCUSATE SODIUM 1 TABLET: 50; 8.6 TABLET ORAL at 17:31

## 2024-01-05 RX ADMIN — TAMSULOSIN HYDROCHLORIDE 0.4 MG: 0.4 CAPSULE ORAL at 09:12

## 2024-01-05 RX ADMIN — METOPROLOL SUCCINATE 25 MG: 25 TABLET, EXTENDED RELEASE ORAL at 09:12

## 2024-01-05 RX ADMIN — Medication 1 TABLET: at 09:12

## 2024-01-05 RX ADMIN — AMLODIPINE BESYLATE 5 MG: 5 TABLET ORAL at 09:12

## 2024-01-05 RX ADMIN — ASPIRIN 81 MG: 81 TABLET ORAL at 09:12

## 2024-01-05 RX ADMIN — QUETIAPINE FUMARATE 150 MG: 100 TABLET ORAL at 17:19

## 2024-01-05 RX ADMIN — QUETIAPINE FUMARATE 150 MG: 100 TABLET ORAL at 17:31

## 2024-01-05 RX ADMIN — GUAIFENESIN 600 MG: 600 TABLET, EXTENDED RELEASE ORAL at 09:12

## 2024-01-05 RX ADMIN — GUAIFENESIN 600 MG: 600 TABLET, EXTENDED RELEASE ORAL at 17:31

## 2024-01-05 RX ADMIN — SENNOSIDES AND DOCUSATE SODIUM 1 TABLET: 50; 8.6 TABLET ORAL at 09:14

## 2024-01-05 RX ADMIN — FAMOTIDINE 10 MG: 20 TABLET, FILM COATED ORAL at 17:31

## 2024-01-05 RX ADMIN — LOSARTAN POTASSIUM 50 MG: 50 TABLET, FILM COATED ORAL at 09:12

## 2024-01-05 RX ADMIN — FINASTERIDE 5 MG: 5 TABLET, FILM COATED ORAL at 17:31

## 2024-01-05 RX ADMIN — FAMOTIDINE 10 MG: 20 TABLET, FILM COATED ORAL at 09:12

## 2024-01-05 NOTE — INTERDISCIPLINARY/THERAPY
Case Management Progress Note    Phone # [phone]    Diagnosis: Weakness    Discussed Discharge Topics/Narrative: JOSÉ MIGUEL received a voicemail from Bush[facility] at [facility] that [facility] can take the patient on Tuesday, and the patient will have a Semiprivate room.  JOSÉ MIGUEL speaks with Ashlee Roberts, to see if [facility] [place] has looked at the patient.  It is this CM's recollection that [facility] has not looked at the patient, and will get back with the CM.  [facility]  JOSÉ MIGUEL calls Kendal at [facility].  CM confirms with Kendal that the patient was a no.      JOSÉ MIGUEL meets with with the patient's wife Alessandra Wong.  CM updates Alessandra.  JOSÉ MIGUEL gets permission from Alessandra to tell [facility] that Alessandra is leaning towards yes, but to see if Alessandra can make her final decision on Monday.  JOSÉ MIGUEL calls [facility], and [facility] will give Alessandra until Monday to make her final decision.  JOSÉ MIGUEL asks [facility] about the possibility of having a private room.  It is this CM's recollection that [facility] explains to the CM that the patient would be put on the wait list, and this is last bed available.  JOSÉ MIGUEL asks Bushra where the patient would be on the wait list.  It is this CM's understanding that the patient would be first on the wait list, and with resident movement could possibly get a private room.  CM updates Alessandra.   will continue to follow.    Family updated: Yes.    RN updated: Yes.    Disposition: Placement.    JOSÉ MIGUEL updated the patient's attending physician, Lead CM, and JOSÉ MIGUEL Fall on the patient's acceptance through secure chat.  Please see the secure chat.

## 2024-01-05 NOTE — NURSING END OF SHIFT
Nursing End of Shift Summary:    Patient: Joseph Wong  MRN: [id number]  : 10/20/1934, Age: 89 y.o.    Location: [address] [place] [address]    Nursing Goals  Clinical Goals for the Shift: maintain safety and comfort    Narrative Summary of Progress Toward Clinical Goals:  VSS. Pt denies any pain. Pt up to chair this afternoon with heavy 2 assist. Pt eating well via feed. BM today. U/o good. Still pending placement. Wife at bedside today and updated on plan of care.    Barriers to Goals/Nursing Concerns:  No    New Patient or Family Concerns/Issues:  No    Shift Summary:   Significant Events & Communications to Providers (last 12 hours)       Last 5 Values    No documentation.                 Oxygen Usage (last 12 hours)       Last 5 Values    No documentation.                 Mobility (last 12 hours)       Last 5 Values       Row Name 24 0800 24 1200 24 1800             Mobility    Activity Sleeping Chair Chair      Length of Time in Chair (min) 0 120 250      Distance Ambulated (feet) 0 Feet 5 Feet 5 Feet      Distance Ambulated (Meters Calculated) 0 Meters 1.52 Meters 1.52 Meters      Turning Other (Comment);Turns self  sleeping Every 2 hours Every 2 hours      Distance Ambulated (Meters Calculated)(Do Not Use) 0 Feet 1.52 Feet 1.52 Feet                    Urethral Catheter       Active Urethral Catheter       None                  Active Lines       Active Central venous catheter / Peripherally inserted central catheter / Implantable Port / Hemodialysis catheter / Midline Catheter       None                  Infusing Medications   Medication Dose Last Rate     PRN Medications   Medication Dose Last Admin    QUEtiapine  25 mg 25 mg at 23 0514    sodium chloride  3 mL      sodium chloride  1 spray      sodium chloride-aloe vera  1 Application      acetaminophen  500 mg 500 mg at 23    oxyCODONE  5 mg 5 mg at 23 1447    magnesium hydroxide  30 mL      ondansetron  4 mg      Or    ondansetron   4 mg

## 2024-01-05 NOTE — INTERDISCIPLINARY/THERAPY
[address] [facility] [address]  [phone]      Inpatient Physical Therapy Daily Treatment Note    Date of Service: 1/5/2024  Patient Name: Joseph Wong  Referring Provider: WYATT SRINIVASAN ROBERT  Completed Visit Number: 11  SOC Date: 12/06/23  Certification Period: 02/03/24    Medical Diagnosis:   Weakness [R53.1]  Unable to bear weight on left lower extremity [R26.89]  Lumbar compression fracture, closed, initial encounter (CMS/[facility]) [S32.000A]  Treatment Diagnoses:  Treatment Diagnosis: Decreased strength, Decreased endurance, Impaired balance, Decreased mobility  Subjective   Family/Caregiver Present  Family/Caregiver Present: No  Subjective Comments  RN Stated patient is medically cleared for therapy: Yes  Subjective Comments: Patient was in semi snow position in bed and ended session sitting in chair with legs elevated, call light in reach and chair alarm engaged. He was agreeable to participate in PT session and complained of dizziness.     Pain Assessment Scale  Pain Scale: 0-10  0-10 Pain Score: 0 - No pain (complained of dizziness only)           Objective    Precautions  Reinforced Precautions: Yes  Other Precautions: fallrisk, monitor blood pressure  Weight Bearing Precautions: No  Is this a Co-Treatment?: No  Treatments:  Ambulation 1  Ambulation Distance (meters): (P) 1 meters  Time Calculation  Start Time: 1124  Stop Time: 1137  Time Calculation (min): 13 min  Therapeutic Interventions (Time Spent in Minutes)  Therapeutic Activity: 13     Co-treat Reasoning: not applicable     Gait belt donned for all mobility. Pt left with call light in reach, needs met.     Cognition: some what distracted     Bed Mobility: standby assistance of one  He exited bed with standby assistance of one with use of bed rail.     Transfers: Minimal assistance of one  He stood from bed with increased time with Front Wheeled Walker support and increased time to reach upright position.   He performed  sit<>stand x 2 reps and initially had immediate sit with decreased control.   Ambulation: Minimal assistance of one  He was directed to turn to chair with Front Wheeled Walker,, ambulating with Minimal assistance of one and flexed trunk.   Stairs: not tested     Balance: impaired, required assistance and Front Wheeled Walker support.     Other Activities: not applicable     Activity Tolerance: blood pressure at start of session was 121/89, upon sitting was 99/80, after standing was 97/70 and after ambulation was 108/72.     Assessment/Plan   Assessment:    Level of Function and Prognosis  Assessment: Patient was limited by dizziness and instability. He had lower blood pressures when assessed. He will benefit from progressive skilled physical therapy to address mobility deficits.  Recommendation  Recommendations for Therapy: Continue skilled therapy  Plan:    Plan  PT Frequency: 2-3x/wk  Plan for next treatment comment: 1-2 assist, bed mobility, transfers, standing ex, amb ~10m with walker and min assist, tends to retropulse.  Treatment duration will be through Certification Period: 02/03/24

## 2024-01-05 NOTE — NURSING END OF SHIFT
Nursing End of Shift Summary:    Patient: Joseph Wong  MRN: [id number]  : 10/20/1934, Age: 89 y.o.    Location: [address] [place] [address]    Nursing Goals  Clinical Goals for the Shift: safety and comfort    Narrative Summary of Progress Toward Clinical Goals:  Denies pain nor discomfort. Patient still confused but redirected. Patient slept well through the night. Kept safe.    Barriers to Goals/Nursing Concerns:  Yes - placement    New Patient or Family Concerns/Issues:  No    Shift Summary:   Significant Events & Communications to Providers (last 12 hours)       Last 5 Values    No documentation.                 Oxygen Usage (last 12 hours)       Last 5 Values       Row Name 24 1900 24 0338                Room Air or Baseline Oxygen Trial by Nursing    Is Patient on Room Air OR on the Same Amount of O2 as at Home? Yes Yes                     Mobility (last 12 hours)       Last 5 Values       Row Name 24 1800 24 1900                Mobility    Activity Chair --       Length of Time in Chair (min) 250 --       Distance Ambulated (feet) 5 Feet --       Distance Ambulated (Meters Calculated) 1.52 Meters --       Patient Position -- Supine       Turning Every 2 hours Turns self       Distance Ambulated (Meters Calculated)(Do Not Use) 1.52 Feet --                     Urethral Catheter       Active Urethral Catheter       None                  Active Lines       Active Central venous catheter / Peripherally inserted central catheter / Implantable Port / Hemodialysis catheter / Midline Catheter       None                  Infusing Medications   Medication Dose Last Rate     PRN Medications   Medication Dose Last Admin    QUEtiapine  25 mg 25 mg at 23 0514    sodium chloride  3 mL      sodium chloride  1 spray      sodium chloride-aloe vera  1 Application      acetaminophen  500 mg 500 mg at 23    oxyCODONE  5 mg 5 mg at 23 1447    magnesium hydroxide  30 mL      ondansetron  4 mg      Or     ondansetron  4 mg

## 2024-01-05 NOTE — PROGRESS NOTES
[address] [place] [address]  Daily Progress Note  Patient name: Joseph Wong  MRN: [id number]   LOS: 29 days     Subjective   Patient doing well again today other than bored in hospital.  Objective   Vitals:Temp:  [36.4 °C (97.5 °F)] 36.4 °C (97.5 °F)  Heart Rate:  [66-68] 66  Resp:  [18] 18  SpO2:  [92 %-94 %] 94 %  BP: (121-127)/(80-84) 121/84  Physical Exam:   HEENT: Pupils equal round and reactive to light and accommodation.  Extraocular movements are intact.  Oropharynx clear with no erythema or exudate.  Neck: Supple nontender without palpable lymphadenopathy JVD bruits or goiter appreciated  Lungs: Clear to auscultation bilaterally  Heart: Regular rate and rhythm with normal S1 and S2  Abdomen: Soft nontender. normoactive bowel sounds. no hepatosplenomegaly  Extremities: No clubbing, cyanosis, or edema.  Neurologic: dementia.  CN II through XII intact.  Nonfocal, generalized debility and weakness.    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  They will arrange follow-up in clinic approximately 4 weeks with repeat x-rays.  X-ray thoracolumbar junction  Similar T12 compression deformity.  No change in alignment  CT lumbar spine without contrast  1.  Compression deformities of the T12 and L4 vertebral bodies appear likely acute.  2.  Diffuse lumbar degenerative changes.  3.  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  Generalized weakness  Blood on tip of penis urethra opening  Likely due urethral trauma or prostrate and following  B12 and vitamin D deficiency  DM2 with neuropathy  GERD  CAD  BPH   Mood disorder   NARESH   VTE prophylaxis:  lovenox     Plan:    Continue supportive care, PT OT, and await SNF placement  Neurosurgery arranging 4-week follow-up with repeat x-rays, no lifting greater than 5 to 10 pounds and avoid significant bending or twisting.  No brace necessary.    Electronically signed by: Tarik Aaron Jr., MD  1/5/2024  11:40 AM

## 2024-01-06 PROCEDURE — 6370000100 HC RX 637 (ALT 250 FOR IP): Performed by: HOSPITALIST

## 2024-01-06 PROCEDURE — 2590000100 HC RX 259: Performed by: HOSPITALIST

## 2024-01-06 PROCEDURE — (BLANK) HC ROOM PRIVATE

## 2024-01-06 PROCEDURE — 99231 SBSQ HOSP IP/OBS SF/LOW 25: CPT | Performed by: HOSPITALIST

## 2024-01-06 PROCEDURE — 6370000100 HC RX 637 (ALT 250 FOR IP): Performed by: FAMILY MEDICINE

## 2024-01-06 PROCEDURE — 2590000100 HC RX 259: Performed by: FAMILY MEDICINE

## 2024-01-06 RX ADMIN — AMLODIPINE BESYLATE 5 MG: 5 TABLET ORAL at 09:51

## 2024-01-06 RX ADMIN — QUETIAPINE FUMARATE 150 MG: 100 TABLET ORAL at 18:08

## 2024-01-06 RX ADMIN — TAMSULOSIN HYDROCHLORIDE 0.4 MG: 0.4 CAPSULE ORAL at 09:51

## 2024-01-06 RX ADMIN — FAMOTIDINE 10 MG: 20 TABLET, FILM COATED ORAL at 09:51

## 2024-01-06 RX ADMIN — GUAIFENESIN 600 MG: 600 TABLET, EXTENDED RELEASE ORAL at 18:07

## 2024-01-06 RX ADMIN — SPIRONOLACTONE 25 MG: 25 TABLET ORAL at 09:51

## 2024-01-06 RX ADMIN — SENNOSIDES AND DOCUSATE SODIUM 1 TABLET: 50; 8.6 TABLET ORAL at 09:51

## 2024-01-06 RX ADMIN — LOSARTAN POTASSIUM 50 MG: 50 TABLET, FILM COATED ORAL at 09:51

## 2024-01-06 RX ADMIN — FINASTERIDE 5 MG: 5 TABLET, FILM COATED ORAL at 18:10

## 2024-01-06 RX ADMIN — POLYETHYLENE GLYCOL 3350 17 G: 17 POWDER, FOR SOLUTION ORAL at 09:51

## 2024-01-06 RX ADMIN — TAMSULOSIN HYDROCHLORIDE 0.4 MG: 0.4 CAPSULE ORAL at 19:00

## 2024-01-06 RX ADMIN — Medication 1 TABLET: at 09:51

## 2024-01-06 RX ADMIN — SENNOSIDES AND DOCUSATE SODIUM 1 TABLET: 50; 8.6 TABLET ORAL at 18:08

## 2024-01-06 RX ADMIN — QUETIAPINE FUMARATE 25 MG: 25 TABLET ORAL at 18:09

## 2024-01-06 RX ADMIN — METOPROLOL SUCCINATE 25 MG: 25 TABLET, EXTENDED RELEASE ORAL at 09:51

## 2024-01-06 RX ADMIN — FLUOXETINE HYDROCHLORIDE 40 MG: 20 CAPSULE ORAL at 18:07

## 2024-01-06 RX ADMIN — ATORVASTATIN CALCIUM 40 MG: 40 TABLET, FILM COATED ORAL at 18:07

## 2024-01-06 RX ADMIN — FAMOTIDINE 10 MG: 20 TABLET, FILM COATED ORAL at 18:10

## 2024-01-06 RX ADMIN — GUAIFENESIN 600 MG: 600 TABLET, EXTENDED RELEASE ORAL at 09:51

## 2024-01-06 RX ADMIN — ASPIRIN 81 MG: 81 TABLET ORAL at 09:51

## 2024-01-06 RX ADMIN — QUETIAPINE FUMARATE 25 MG: 25 TABLET ORAL at 11:57

## 2024-01-06 NOTE — NURSING END OF SHIFT
Nursing End of Shift Summary:    Patient: Joseph Wong  MRN: [id number]  : 10/20/1934, Age: 89 y.o.    Location: [address] [place] [address]    Nursing Goals  Clinical Goals for the Shift: safety and comfort    Narrative Summary of Progress Toward Clinical Goals:  Patient confused and restless at beginning of shift. Kept trying to get out of bed. PRN Seroquel given. Patient was able to sleep at around 2200. Bed alarms on. Kept safe.    Barriers to Goals/Nursing Concerns:  Yes - placement    New Patient or Family Concerns/Issues:  No    Shift Summary:   Significant Events & Communications to Providers (last 12 hours)       Last 5 Values    No documentation.                 Oxygen Usage (last 12 hours)       Last 5 Values       Row Name 24 0350                Room Air or Baseline Oxygen Trial by Nursing    Is Patient on Room Air OR on the Same Amount of O2 as at Home? Yes Yes                     Mobility (last 12 hours)       Last 5 Values       Row Name 24 1757 24                Mobility    Activity Back to bed --       Length of Time in Chair (min) 60 --       Distance Ambulated (feet) 2 Feet --       Distance Ambulated (Meters Calculated) 0.61 Meters --       Patient Position -- Supine       Turning Every 2 hours Turns self       Distance Ambulated (Meters Calculated)(Do Not Use) 0.61 Feet --                     Urethral Catheter       Active Urethral Catheter       None                  Active Lines       Active Central venous catheter / Peripherally inserted central catheter / Implantable Port / Hemodialysis catheter / Midline Catheter       None                  Infusing Medications   Medication Dose Last Rate     PRN Medications   Medication Dose Last Admin    QUEtiapine  25 mg 25 mg at 24    sodium chloride  3 mL      sodium chloride  1 spray      sodium chloride-aloe vera  1 Application      acetaminophen  500 mg 500 mg at 23    oxyCODONE  5 mg 5 mg at 23 5327     magnesium hydroxide  30 mL      ondansetron  4 mg      Or    ondansetron  4 mg

## 2024-01-06 NOTE — PROGRESS NOTES
[address] [place] [address]  Daily Progress Note  Patient name: Joseph Wong  MRN: [id number]   LOS: 30 days     Subjective   Patient doing ok this am and slept well.  Objective   Vitals:Temp:  [36.3 °C (97.4 °F)-36.6 °C (97.8 °F)] 36.3 °C (97.4 °F)  Heart Rate:  [68-75] 68  Resp:  [18] 18  SpO2:  [91 %-96 %] 96 %  BP: (120-143)/(73-92) 120/73  Physical Exam:   HEENT: Pupils equal round and reactive to light and accommodation.  Extraocular movements are intact.  Oropharynx clear with no erythema or exudate.  Neck: Supple nontender without palpable lymphadenopathy JVD bruits or goiter appreciated  Lungs: Clear to auscultation bilaterally  Heart: Regular rate and rhythm with normal S1 and S2  Abdomen: Soft nontender. normoactive bowel sounds. no hepatosplenomegaly  Extremities: No clubbing, cyanosis, or edema.  Neurologic: Alert.  CN II through XII intact.  Nonfocal, generalized debility and weakness.     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  They will arrange follow-up in clinic approximately 4 weeks with repeat x-rays.  X-ray thoracolumbar junction  Similar T12 compression deformity.  No change in alignment  CT lumbar spine without contrast  1.  Compression deformities of the T12 and L4 vertebral bodies appear likely acute.  2.  Diffuse lumbar degenerative changes.  3.  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  Generalized weakness  Blood on tip of penis urethra opening  Likely due urethral trauma or prostrate and following  B12 and vitamin D deficiency  DM2 with neuropathy  GERD  CAD  BPH   Mood disorder   NARESH   VTE prophylaxis:  lovenox     Plan:     Continue supportive care, PT OT, and await possible [facility] placement early next week per case management.  Neurosurgery arranging 4-week follow-up with repeat x-rays, no lifting greater than 5 to 10 pounds and avoid significant bending or twisting.  No brace necessary.    Electronically signed by: Tarik Aaron Jr., MD  1/6/2024  12:03 PM

## 2024-01-07 PROCEDURE — 6370000100 HC RX 637 (ALT 250 FOR IP): Performed by: HOSPITALIST

## 2024-01-07 PROCEDURE — (BLANK) HC ROOM PRIVATE

## 2024-01-07 PROCEDURE — 6370000100 HC RX 637 (ALT 250 FOR IP): Performed by: FAMILY MEDICINE

## 2024-01-07 PROCEDURE — 2590000100 HC RX 259: Performed by: HOSPITALIST

## 2024-01-07 PROCEDURE — 99231 SBSQ HOSP IP/OBS SF/LOW 25: CPT | Performed by: HOSPITALIST

## 2024-01-07 PROCEDURE — 2590000100 HC RX 259: Performed by: FAMILY MEDICINE

## 2024-01-07 RX ADMIN — TAMSULOSIN HYDROCHLORIDE 0.4 MG: 0.4 CAPSULE ORAL at 18:01

## 2024-01-07 RX ADMIN — TAMSULOSIN HYDROCHLORIDE 0.4 MG: 0.4 CAPSULE ORAL at 11:28

## 2024-01-07 RX ADMIN — GUAIFENESIN 600 MG: 600 TABLET, EXTENDED RELEASE ORAL at 18:01

## 2024-01-07 RX ADMIN — FINASTERIDE 5 MG: 5 TABLET, FILM COATED ORAL at 18:01

## 2024-01-07 RX ADMIN — SENNOSIDES AND DOCUSATE SODIUM 1 TABLET: 50; 8.6 TABLET ORAL at 18:01

## 2024-01-07 RX ADMIN — FAMOTIDINE 10 MG: 20 TABLET, FILM COATED ORAL at 18:01

## 2024-01-07 RX ADMIN — POLYETHYLENE GLYCOL 3350 17 G: 17 POWDER, FOR SOLUTION ORAL at 11:28

## 2024-01-07 RX ADMIN — SPIRONOLACTONE 25 MG: 25 TABLET ORAL at 11:27

## 2024-01-07 RX ADMIN — METOPROLOL SUCCINATE 25 MG: 25 TABLET, EXTENDED RELEASE ORAL at 11:28

## 2024-01-07 RX ADMIN — Medication 1 TABLET: at 11:28

## 2024-01-07 RX ADMIN — FAMOTIDINE 10 MG: 20 TABLET, FILM COATED ORAL at 11:27

## 2024-01-07 RX ADMIN — LOSARTAN POTASSIUM 50 MG: 50 TABLET, FILM COATED ORAL at 11:27

## 2024-01-07 RX ADMIN — GUAIFENESIN 600 MG: 600 TABLET, EXTENDED RELEASE ORAL at 11:28

## 2024-01-07 RX ADMIN — ASPIRIN 81 MG: 81 TABLET ORAL at 11:28

## 2024-01-07 RX ADMIN — AMLODIPINE BESYLATE 5 MG: 5 TABLET ORAL at 11:28

## 2024-01-07 RX ADMIN — QUETIAPINE FUMARATE 150 MG: 100 TABLET ORAL at 18:01

## 2024-01-07 RX ADMIN — FLUOXETINE HYDROCHLORIDE 40 MG: 20 CAPSULE ORAL at 18:01

## 2024-01-07 RX ADMIN — SENNOSIDES AND DOCUSATE SODIUM 1 TABLET: 50; 8.6 TABLET ORAL at 11:28

## 2024-01-07 RX ADMIN — ATORVASTATIN CALCIUM 40 MG: 40 TABLET, FILM COATED ORAL at 18:01

## 2024-01-07 NOTE — NURSING END OF SHIFT
Nursing End of Shift Summary:    Patient: Joseph Wong  MRN: [id number]  : 10/20/1934, Age: 89 y.o.    Location: [address] [place] [address]    Nursing Goals  Clinical Goals for the Shift: safety and comfort    Narrative Summary of Progress Toward Clinical Goals:  Pt Alert and disoriented x 4. Scheduled seroquel given. Pt was a bit restless earlier but settled down afterwards. No BM today. Urinary incontinence. No other issues, concerns.     Barriers to Goals/Nursing Concerns:  placement    New Patient or Family Concerns/Issues:  none    Shift Summary:   Significant Events & Communications to Providers (last 12 hours)       Last 5 Values    No documentation.                 Oxygen Usage (last 12 hours)       Last 5 Values    No documentation.                 Mobility (last 12 hours)       Last 5 Values       Row Name 24 0907 24 1300 24 1534 24 1552          Mobility    Activity -- Chair Back to bed --     Level of Assistance -- Moderate assist, patient does 50-74% Moderate assist, patient does 50-74% --     Length of Time in Chair (min) -- -- 120 --     Distance Ambulated (feet) -- 2 Feet 2 Feet --     Distance Ambulated (Meters Calculated) -- 0.61 Meters 0.61 Meters --     Patient Position Supine -- -- Supine     Turning -- Turns self Turns self --     Distance Ambulated (Meters Calculated)(Do Not Use) -- 0.61 Feet 0.61 Feet --                   Urethral Catheter       Active Urethral Catheter       None                  Active Lines       Active Central venous catheter / Peripherally inserted central catheter / Implantable Port / Hemodialysis catheter / Midline Catheter       None                  Infusing Medications   Medication Dose Last Rate     PRN Medications   Medication Dose Last Admin    QUEtiapine  25 mg 25 mg at 24 1157    sodium chloride  3 mL      sodium chloride  1 spray      sodium chloride-aloe vera  1 Application      acetaminophen  500 mg 500 mg at 23    oxyCODONE  5 mg 5  mg at 12/17/23 1447    magnesium hydroxide  30 mL      ondansetron  4 mg      Or    ondansetron  4 mg

## 2024-01-07 NOTE — NURSING END OF SHIFT
Nursing End of Shift Summary:    Patient: Joseph Wong  MRN: [id number]  : 10/20/1934, Age: 89 y.o.    Location: [address] [place] [address]    Nursing Goals  Clinical Goals for the Shift: comfort and safety    Narrative Summary of Progress Toward Clinical Goals:  Comfort and safety maintained, diet tolerated well, turns self. Incontinent of both urine and stools. No complaints of pain during the shift. Slept most of the night.     Barriers to Goals/Nursing Concerns:  Yes - placement    New Patient or Family Concerns/Issues:  No    Shift Summary:   Significant Events & Communications to Providers (last 12 hours)       Last 5 Values    No documentation.                 Oxygen Usage (last 12 hours)       Last 5 Values       Row Name 24 0400                Room Air or Baseline Oxygen Trial by Nursing    Is Patient on Room Air OR on the Same Amount of O2 as at Home? Yes Yes                     Mobility (last 12 hours)       Last 5 Values       Row Name 24 2157                Mobility    Activity -- Sleeping       Length of Time in Chair (min) -- 0       Distance Ambulated (feet) -- 0 Feet       Distance Ambulated (Meters Calculated) -- 0 Meters       Patient Position Supine --       Turning Turns self Turns self       Distance Ambulated (Meters Calculated)(Do Not Use) -- 0 Feet                     Urethral Catheter       Active Urethral Catheter       None                  Active Lines       Active Central venous catheter / Peripherally inserted central catheter / Implantable Port / Hemodialysis catheter / Midline Catheter       None                  Infusing Medications   Medication Dose Last Rate     PRN Medications   Medication Dose Last Admin    QUEtiapine  25 mg 25 mg at 24 1809    sodium chloride  3 mL      sodium chloride  1 spray      sodium chloride-aloe vera  1 Application      acetaminophen  500 mg 500 mg at 23 1933    oxyCODONE  5 mg 5 mg at 23 1447    magnesium  hydroxide  30 mL      ondansetron  4 mg      Or    ondansetron  4 mg

## 2024-01-07 NOTE — PLAN OF CARE
Problem: Safety Adult - Fall  Goal: Free from fall injury  Description: INTERVENTIONS:    Inpatient - Please reference Cares/Safety Flowsheet under [facility] Fall Risk for interventions.  Pediatrics - Please reference Peds Daily Cares/Safety Flowsheet under [facility] Pediatric Fall Assessment Fall Bundle for interventions  LD/OB - Please reference OB Shift Screening Flowsheet under OB Fall Risk for interventions.  Outcome: Progressing     Problem: Pain - Adult  Goal: Verbalizes/displays adequate comfort level or baseline comfort level  Description: INTERVENTIONS:  1. Encourage patient to monitor pain and request interventions  2. Assess pain using the appropriate pain scale  3. Administer analgesics based on type and severity of pain and evaluate response  4. Educate/Implement non-pharmacological measures as appropriate and evaluate response  5. Consider cultural, developmental and social influences on pain and pain management  6. Notify Provider if interventions unsuccessful or patient reports new pain  Outcome: Progressing

## 2024-01-08 LAB
ALBUMIN SERPL-MCNC: 3.2 G/DL (ref 3.5–5.3)
ANION GAP SERPL CALC-SCNC: 8 MMOL/L (ref 3–11)
BASOPHILS # BLD AUTO: 0.1 10*3/UL
BASOPHILS NFR BLD AUTO: 1 % (ref 0–2)
BUN SERPL-MCNC: 36 MG/DL (ref 7–25)
CALCIUM ALBUM COR SERPL-MCNC: 9.5 MG/DL (ref 8.6–10.3)
CALCIUM SERPL-MCNC: 8.9 MG/DL (ref 8.6–10.3)
CHLORIDE SERPL-SCNC: 108 MMOL/L (ref 98–107)
CO2 SERPL-SCNC: 23 MMOL/L (ref 21–32)
CREAT SERPL-MCNC: 1.05 MG/DL (ref 0.7–1.3)
EGFRCR SERPLBLD CKD-EPI 2021: 68 ML/MIN/1.73M*2
EOSINOPHIL # BLD AUTO: 0.3 10*3/UL
EOSINOPHIL NFR BLD AUTO: 4.6 % (ref 0–3)
ERYTHROCYTE [DISTWIDTH] IN BLOOD BY AUTOMATED COUNT: 14.7 % (ref 11.5–15)
GLUCOSE BLDC GLUCOMTR-MCNC: 133 MG/DL (ref 70–105)
GLUCOSE BLDC GLUCOMTR-MCNC: 142 MG/DL (ref 70–105)
GLUCOSE BLDC GLUCOMTR-MCNC: 167 MG/DL (ref 70–105)
GLUCOSE SERPL-MCNC: 129 MG/DL (ref 70–105)
HCT VFR BLD AUTO: 37.1 % (ref 38–50)
HGB BLD-MCNC: 12.8 G/DL (ref 13.2–17.2)
LYMPHOCYTES # BLD AUTO: 1.7 10*3/UL
LYMPHOCYTES NFR BLD AUTO: 24.8 % (ref 15–47)
MAGNESIUM SERPL-MCNC: 2.3 MG/DL (ref 1.8–2.4)
MCH RBC QN AUTO: 32.7 PG (ref 29–34)
MCHC RBC AUTO-ENTMCNC: 34.4 G/DL (ref 32–36)
MCV RBC AUTO: 95.2 FL (ref 82–97)
MONOCYTES # BLD AUTO: 0.6 10*3/UL
MONOCYTES NFR BLD AUTO: 8.9 % (ref 5–13)
NEUTROPHILS # BLD AUTO: 4.1 10*3/UL
NEUTROPHILS NFR BLD AUTO: 60.7 % (ref 46–70)
PHOSPHATE SERPL-MCNC: 3.7 MG/DL (ref 2.5–4.9)
PLATELET # BLD AUTO: 230 10*3/UL (ref 130–350)
PMV BLD AUTO: 7.6 FL (ref 6.9–10.8)
POTASSIUM SERPL-SCNC: 4.3 MMOL/L (ref 3.5–5.1)
RBC # BLD AUTO: 3.9 10*6/ΜL (ref 4.1–5.8)
SODIUM SERPL-SCNC: 139 MMOL/L (ref 135–145)
WBC # BLD AUTO: 6.7 10*3/UL (ref 3.7–9.6)

## 2024-01-08 PROCEDURE — 82947 ASSAY GLUCOSE BLOOD QUANT: CPT | Mod: QW

## 2024-01-08 PROCEDURE — 2590000100 HC RX 259: Performed by: FAMILY MEDICINE

## 2024-01-08 PROCEDURE — 99231 SBSQ HOSP IP/OBS SF/LOW 25: CPT | Performed by: HOSPITALIST

## 2024-01-08 PROCEDURE — 97530 THERAPEUTIC ACTIVITIES: CPT | Mod: GP,CQ

## 2024-01-08 PROCEDURE — 6370000100 HC RX 637 (ALT 250 FOR IP): Performed by: FAMILY MEDICINE

## 2024-01-08 PROCEDURE — 6370000100 HC RX 637 (ALT 250 FOR IP): Performed by: HOSPITALIST

## 2024-01-08 PROCEDURE — 2590000100 HC RX 259: Performed by: HOSPITALIST

## 2024-01-08 PROCEDURE — 36415 COLL VENOUS BLD VENIPUNCTURE: CPT | Performed by: HOSPITALIST

## 2024-01-08 PROCEDURE — 83735 ASSAY OF MAGNESIUM: CPT | Performed by: HOSPITALIST

## 2024-01-08 PROCEDURE — 85025 COMPLETE CBC W/AUTO DIFF WBC: CPT | Performed by: HOSPITALIST

## 2024-01-08 PROCEDURE — (BLANK) HC ROOM PRIVATE

## 2024-01-08 PROCEDURE — 80069 RENAL FUNCTION PANEL: CPT | Performed by: HOSPITALIST

## 2024-01-08 RX ADMIN — SENNOSIDES AND DOCUSATE SODIUM 1 TABLET: 50; 8.6 TABLET ORAL at 09:28

## 2024-01-08 RX ADMIN — ATORVASTATIN CALCIUM 40 MG: 40 TABLET, FILM COATED ORAL at 17:43

## 2024-01-08 RX ADMIN — LOSARTAN POTASSIUM 50 MG: 50 TABLET, FILM COATED ORAL at 09:28

## 2024-01-08 RX ADMIN — FINASTERIDE 5 MG: 5 TABLET, FILM COATED ORAL at 17:43

## 2024-01-08 RX ADMIN — TAMSULOSIN HYDROCHLORIDE 0.4 MG: 0.4 CAPSULE ORAL at 09:28

## 2024-01-08 RX ADMIN — QUETIAPINE FUMARATE 25 MG: 25 TABLET ORAL at 13:22

## 2024-01-08 RX ADMIN — FLUOXETINE HYDROCHLORIDE 40 MG: 20 CAPSULE ORAL at 17:43

## 2024-01-08 RX ADMIN — METOPROLOL SUCCINATE 25 MG: 25 TABLET, EXTENDED RELEASE ORAL at 09:28

## 2024-01-08 RX ADMIN — QUETIAPINE FUMARATE 150 MG: 100 TABLET ORAL at 17:14

## 2024-01-08 RX ADMIN — POLYETHYLENE GLYCOL 3350 17 G: 17 POWDER, FOR SOLUTION ORAL at 09:28

## 2024-01-08 RX ADMIN — Medication 1 TABLET: at 09:28

## 2024-01-08 RX ADMIN — AMLODIPINE BESYLATE 5 MG: 5 TABLET ORAL at 09:28

## 2024-01-08 RX ADMIN — ASPIRIN 81 MG: 81 TABLET ORAL at 09:28

## 2024-01-08 RX ADMIN — GUAIFENESIN 600 MG: 600 TABLET, EXTENDED RELEASE ORAL at 09:28

## 2024-01-08 RX ADMIN — SPIRONOLACTONE 25 MG: 25 TABLET ORAL at 09:28

## 2024-01-08 RX ADMIN — SENNOSIDES AND DOCUSATE SODIUM 1 TABLET: 50; 8.6 TABLET ORAL at 17:44

## 2024-01-08 RX ADMIN — GUAIFENESIN 600 MG: 600 TABLET, EXTENDED RELEASE ORAL at 17:44

## 2024-01-08 RX ADMIN — FAMOTIDINE 10 MG: 20 TABLET, FILM COATED ORAL at 09:28

## 2024-01-08 RX ADMIN — FAMOTIDINE 10 MG: 20 TABLET, FILM COATED ORAL at 17:44

## 2024-01-08 RX ADMIN — TAMSULOSIN HYDROCHLORIDE 0.4 MG: 0.4 CAPSULE ORAL at 17:44

## 2024-01-08 NOTE — INTERDISCIPLINARY/THERAPY
1/8/24-Patient will leave via PHT tomorrow, 1/9/24 at 12:00 pm. will bring wheelchair to patient's room for pickup.    Please contact CM with any questions.     ([phone]): Felicita    Received transportation request from:[facility]    Verified with CM the following:     Patient name/room #: Joseph Wong [id number]   Discharge date: 1/9/2024  Suggested mode:  PHT  Discharge disposition: [facility] [address], [place] [address]  Suggested  time: 1200  Ambulatory: No  Wheelchair:  Standard 162lb 6'  Able to sit up for transport with good trunk control? yes  Escort required? No  Reason? N/A  Confirmed person at home if not ambulatory or needs assistance: N/A  Does patient have keys to get into home? N/A  DME: No  NEW O2 set up? No  02 rate:   N/A  Precautions: No  Type: N/A                           Called and spoke with  [facility] at [phone] and arranged transportation-they can accommodate tomorrow at 12:00 pm.  Sent form to [facility] [employer].     Notes:  Added note under Treatment Team Sticky Notes. Completed “Ticket to Ride” form and emailed to CM - CM/DCA to print and provide to floor staff.     Updated Transportation tracker.

## 2024-01-08 NOTE — INTERDISCIPLINARY/THERAPY
Case Management Progress Note    Phone # [phone]    Diagnosis:    Discussed Discharge Topics/Narrative: CM meet with the patient's wife Alessandra Wong.  Alessandra agreed to the patient going to [facility].  CM called Bushra at [facility] to tell Bushra that Alessandra said yes.  Bushra explains to the CM that Dr. Nunes is going to be the doctor, and gives the CM the Dr. Nunes's number.  CM confirms Dr. Nunes's number with Bushra.  Bushra gave the CM the nurse to nurse report number.  CM confirmed the nurse to nurse number with Bushra. Bushra explained that we are going to have to provide the transport.      CM secure chat's CMA Nohelia, and Tarah the  to see if [facility] can take the patient.  Tarah lets that CM know that [facility] can take the patient at noon.  Please see Nohelia, and Tarah's note for more detail.    CM sent the PASRR to the [facility].    CM changed the pharmacy to what the CM believes is [facility] pharmacy.    CM called Bushra back, and left a voicemail message.  Bushra called the CM back.  The CM notified Bushra that the patient will be leaving here at noon, and confirmed the correct pharmacy with Bushra.    Family updated: Yes.    RN updated:     Disposition: [facility].

## 2024-01-08 NOTE — NURSING END OF SHIFT
Nursing End of Shift Summary:    Patient: Jospeh Wong  MRN: [id number]  : 10/20/1934, Age: 89 y.o.    Location: [address] [place] [address]    Nursing Goals  Clinical Goals for the Shift: comfort and safety    Narrative Summary of Progress Toward Clinical Goals:  VSS. Pt offering no c/o pain. Pt eating fair. BM today. U/o good. Plan for placement in [place] on Tuesday. Wife at bedside and updated on plan of care.    Barriers to Goals/Nursing Concerns:  No    New Patient or Family Concerns/Issues:  No    Shift Summary:   Significant Events & Communications to Providers (last 12 hours)       Last 5 Values    No documentation.                 Oxygen Usage (last 12 hours)       Last 5 Values    No documentation.                 Mobility (last 12 hours)       Last 5 Values       Row Name 24 0800 24 1200 24 1800             Mobility    Activity Chair position in bed Chair Back to bed      Length of Time in Chair (min) 120 120 121      Distance Ambulated (feet) 0 Feet 2 Feet 2 Feet      Distance Ambulated (Meters Calculated) 0 Meters 0.61 Meters 0.61 Meters      Turning Every 2 hours Every 2 hours Every 2 hours      Distance Ambulated (Meters Calculated)(Do Not Use) 0 Feet 0.61 Feet 0.61 Feet                    Urethral Catheter       Active Urethral Catheter       None                  Active Lines       Active Central venous catheter / Peripherally inserted central catheter / Implantable Port / Hemodialysis catheter / Midline Catheter       None                  Infusing Medications   Medication Dose Last Rate     PRN Medications   Medication Dose Last Admin    QUEtiapine  25 mg 25 mg at 24 1809    sodium chloride  3 mL      sodium chloride  1 spray      sodium chloride-aloe vera  1 Application      acetaminophen  500 mg 500 mg at 23 1933    oxyCODONE  5 mg 5 mg at 23 1447    magnesium hydroxide  30 mL      ondansetron  4 mg      Or    ondansetron  4 mg

## 2024-01-08 NOTE — INTERDISCIPLINARY/THERAPY
NUTRITION ASSESSMENT/REASSESSMENT    PERTINENT MEDICAL DIAGNOSIS/PROBLEMS:     Principle problem/diagnosis/procedures:  extreme weakness, dementia with agitation(resolved), acute L knee pain, T2 & LR compression deformities/fractues    PMH:  L TKA 1/22, T12 and L4 compression deformities/fractures, Generalized weakness, Dementia, B12 and vitamin D deficiency, Type 2 DM, Neuropathy history, NARESH, GERD, CAD, HLD, BPH, TURP, Mood disorder history          NUTRITION FOCUSED PHYSICAL EXAM (NFPE):  Physical Exam  Physical Exam: Performed (MB, RD)  Date Performed: 12/18/23    Subcutaneous Fat Loss  Orbital Subcutaneous Fat Loss: Mild  Buccal Subcutaneous Fat Loss: Mild  Triceps Subcutaneous Fat Loss: Moderate  Ribs Subcutaneous Fat Loss: Not assessed  Iliac Crest Subcutaneous Fat Loss: Not assessed    Muscle Wasting  Sabianist Region Wasting (Temporalis Muscle): Mild  Clavicle Region Wasting (Pectoralis Major, Trapezius Muscles): Moderate  Shoulder and Acromion Process Wasting (Deltoid Muscle): Moderate  Scapular Region Wasting (Trapezius, Supraspinatus, Infraspinatus Muscles): Moderate  Dorsal Hand Wasting (Interosseous Muscle): Severe  Thigh Region Wasting (Quadriceps Muscle): Severe  Calf Region Wasting (Gastrocnemius Muscle): Severe    Physical Findings  Hair: Negative  Eyes: Negative  Conjunctiva: Negative  Mouth/Lips/Tongue: Negative  Dentition: Negative  Nails: Negative  Skin: Negative    MONITORING/EVALUATION:    Labs:    Results from last 4 days   Lab Units 01/08/24  0449   POTASSIUM MMOL/L 4.3   CHLORIDE mmol/L 108*   SODIUM mmol/L 139   BUN mg/dL 36*   CREATININE mg/dL 1.05   CO2 mmol/L 23   ANION GAP mmol/L 8   GLUCOSE mg/dL 129*   CALCIUM mg/dL 8.9   ALBUMIN g/dL 3.2*   EGFR mL/min/1.73m*2 68   PHOSPHORUS mg/dL 3.7   MAGNESIUM mg/dL 2.3          Lab Results   Component Value Date    HGBA1C 6.4 (H) 12/04/2023     Lab Results   Component Value Date    POCGLU 167 (H) 01/08/2024    POCGLU 133 (H) 01/08/2024    POCGLU  142 (H) 12/31/2023    POCGLU 147 (H) 12/23/2023    POCGLU 209 (H) 12/14/2023     Pertinent Meds:   guaiFENesin, 600 mg, oral, 2x daily  QUEtiapine, 150 mg, oral, Daily with dinner  multivitamin with minerals, 1 tablet, oral, Daily  sennosides-docusate sodium, 1 tablet, oral, 2x daily  polyethylene glycol, 17 g, oral, Daily  famotidine, 10 mg, oral, 2x daily  amLODIPine, 5 mg, oral, q AM  aspirin, 81 mg, oral, q AM  atorvastatin, 40 mg, oral, q PM  finasteride, 5 mg, oral, q PM  FLUoxetine, 40 mg, oral, q PM  losartan, 50 mg, oral, q AM  metoprolol succinate XL, 25 mg, oral, q AM  spironolactone, 25 mg, oral, q AM  tamsulosin, 0.4 mg, oral, 2x daily      Neuro status: Alert & disoriented x4  Respiratory status:   RA  GI findings: Last BM: 1/6, soft brown small/smear  Wounds:    none  Edema (per nursing documentation):  none  (no edema on admit)    ANTHROPOMETRICS:  Wt Change in hospital:  wt down 2.7kg from admit as of 12/21  Weights (Current Encounter Only) (last 180 days)       Date/Time Weight    01/04/24 0500 73.5 kg (162 lb)    01/03/24 0549 74.8 kg (165 lb)    01/02/24 0005 75.3 kg (166 lb)    01/01/24 2340 75.3 kg (166 lb)    12/29/23 0000 77 kg (169 lb 11.2 oz)    12/28/23 2346 77 kg (169 lb 11.2 oz)    12/27/23 0000 76.9 kg (169 lb 9.6 oz)    12/21/23 0001 75.3 kg (166 lb)    12/08/23 0400 75.9 kg (167 lb 4.8 oz)    12/08/23 0000 75.8 kg (167 lb 1.6 oz)    12/06/23 0001 76.9 kg (169 lb 9.6 oz)    12/04/23 1505 78 kg (171 lb 14.4 oz)          Wt Readings from Last 10 Encounters:   01/04/24 73.5 kg (162 lb)   08/29/23 86.6 kg (191 lb)   08/07/23 86.8 kg (191 lb 5.8 oz)   04/21/23 86.8 kg (191 lb 5.8 oz)   02/26/23 86.8 kg (191 lb 5.8 oz)   01/14/23 80.3 kg (177 lb 0.5 oz)   09/02/22 82.6 kg (182 lb)   08/05/22 86.6 kg (191 lb)   03/21/22 86.6 kg (191 lb)   12/20/21 86.6 kg (191 lb)     Admit Weight: 78 kg (171 lb 14.4 oz) 12/4/2023  Admit BMI (kg/m2): 23.31  IBW/kg (Calculated) Male: 80.8 kg  Weight Category:  Acceptable  Wt Change Hx: -8.6 kg weight loss (10%) x 3 months prior to admission (8/29/23- 12/04/23), clinically significant  Per wife, UBW: 190 lbs 6 months ago    ORAL/DENTAL STATUS:  Teeth: Missing teeth       FOOD ALLERGIES:    No Known Allergies  Mormon/CULTURAL REQUESTS:      None  Cultural Requests During Hospitalization: none  Spiritual Requests During Hospitalization: none    Social Determinants of Health with Concerns     Tobacco Use: Medium Risk (12/4/2023)    Patient History     Smoking Tobacco Use: Former     Smokeless Tobacco Use: Never     Passive Exposure: Not on file   Alcohol Use: Not on file   Financial Resource Strain: Not on file   Physical Activity: Not on file   Stress: Not on file   Social Connections: Not on file   Depression: Not on file   Postpartum Depression: Not on file   Wife unable to care for pt at home - placement at discharge  Hunger Screen: no issues        Meal/Supplement Intake:    Average Percent Meals Eaten (%): 56.25 Avg %  Average Percent Supplement Eaten (%): 52.22 Avg %    NUTRITION PRESCRIPTION:       Dietary Orders   (From admission, onward)                 Start     Ordered    12/05/23 1642  Meal Tray Service  Continuous        Question:  Meal Tray Service:  Answer:  Automatic Tray    12/05/23 1641    12/05/23 0621  Diet Regular  Diet effective now        Comments: Automatic trays   Question Answer Comment   Nutrition Therapy Protocol (Dietitian May Adjust Diet and Nourishments) Yes    Diet type Regular        12/05/23 0621                  Estimated Needs:  Total Energy Estimated Needs: 1560- 1794 kcal/day (20-23 kcal/kg ABW)  Total Protein Estimated Needs: 78- 94 g/day (1-1.2 g/kg ABW)  Total Fluid Estimated Needs: 1950 mL/day (25 mL/kg ABW)      SUMMARY 12/5: Pt seen by Nutrition Services for +Consult to Nutrition Services. Pt presents with extremity weakness, LLE pain. Pt presents with failure to thrive, decreased appetite, poor nutrition and inactivity along  with age related physical debility, malnutrition suspected per admission note. Per Care Everywhere data, pt with -8.6 kg weight loss (10%) x 3 months prior to admission (8/29/23- 12/04/23), clinically significant. Per discussion with patient's wife, he has had a decreased appetite over the past few months. She reports he drinks Ensure supplements at home, various flavors. Pt UBW: 190 lbs but has been gradually losing over the past few months. Intake had improved as of 12/25 averaging 83% meals and 67% supplements.      1/8:  Patient with fair appetite, consuming 56% of meals, 52% of supplements on average  Disposition: awaiting nursing home placement.    ____________________________________________________________________  NUTRITION CARE PLAN     MALNUTRITION:  Malnutrition Present On Admission: Yes  Parameters for Malnutrition: Severe Uxqzmgtclnke-Qsyzgvyqff-qyqfbxg (NC-4.1.1.2)  Etiology: decreased appetite/ intakes related to failure to thrive, advanced age with some confusion  Signs/Symptoms:  Weight Loss: -8.6 kg weight loss (10%) x 3 months prior to admission (8/29/23- 12/04/23)  Energy Intake: <75% compared with estimated needs for >1 month prior to admission  Body Fat Depletion: mod loss TSF  Muscle Mass Depletion: mod loss clavicle/shoulder/acromion process, scalpular regions, sev loss interosserous, quads, gastrocnemius per NFPE 12/18 (note partly due to advanced age)    Goals: Pt to consume >75% meals during admission, no weight loss below 78 kg  Progress: Pt meeting goal as of 12/18 with improved intakes; wt down 9 lb from admit as of 1/3 (73.5kg)  Care Plan Documented:  yes    Interventions:  1/8:   -Diet: Regular  -Nutrition Supplementation: continue Medical Nutrition Suppl. (ND-3.1.1):  Boost Plus 8 oz TID with meal trays (14g PRO, 45g CHO, 360 Kehinde, 1gm fiber per 8 oz)  -Medical Nutrition Suppl. (ND-3.1.2): continue Magic Cup with lunch tray (9g PRO, 38g CHO, 290 Kehinde, no fiber per svg, honey  thick)  -Continue daily multivitamin with minerals    Discharge nutrition recommendations: Regular diet with oral nutrition supplementation as needed for poor appetite.

## 2024-01-08 NOTE — INTERDISCIPLINARY/THERAPY
[address] [facility] [address]  [phone]      Inpatient Physical Therapy Daily Treatment Note    Date of Service: 1/8/2024  Patient Name: Joseph Wong  Referring Provider: WYATT SRINIVASAN ROBERT  Completed Visit Number: 12  SOC Date: 12/06/23  Certification Period: 02/03/24    Medical Diagnosis:   Weakness [R53.1]  Unable to bear weight on left lower extremity [R26.89]  Lumbar compression fracture, closed, initial encounter (CMS/[facility]) [S32.000A]  Treatment Diagnoses:  Treatment Diagnosis: Decreased strength, Decreased endurance, Impaired balance, Decreased mobility  Subjective   Family/Caregiver Present  Family/Caregiver Present: Yes  Caregiver: Spouse (spouse present initially but left during the session.)  Subjective Comments  RN Stated patient is medically cleared for therapy: Yes  Subjective Comments: Pt was in bed and agreed to therapy. Left in the chair with call light in reach and chair alarm on.   Current Assistive or Adaptive Equipment  Equipment: Front wheeled walker  Pain Assessment Scale  Pain Scale: 0-10  0-10 Pain Score: 0 - No pain  Oneil Fall Risk Score: 100         Objective    Precautions  Other Precautions: fallrisk, monitor blood pressure  Weight Bearing Precautions: No  Is this a Co-Treatment?: No    Gait belt donned for all mobility. Pt left with call light in reach, needs met.     Cognition: pleasantly confused     Bed Mobility: CONTACT GUARD ASSIST for supine to sit with pt needing verbal cues for sequencing and increased time.     Transfers: Mod assist of 1 to stand from the edge of bed 2x with the FRONT WHEELED WALKER and pivoting to the chair on the second stand. Bilateral knees were buckling throughout the transfer.     Ambulation: Not assessed  due to weakness and dizziness.     Stairs: Not assessed     Balance: standby assist to CONTACT GUARD ASSIST for sitting balance. Min/mod assist for standing balance with the walker due to LE weakness    Other Activities:  Sat edge of bed about 5 minutes. Stood about 1 minute.     Activity Tolerance: limited by dizziness and weakness.       Time Calculation  Start Time: 1435  Stop Time: 1448  Time Calculation (min): 13 min  Therapeutic Interventions (Time Spent in Minutes)  Therapeutic Activity: 13       Assessment/Plan   Assessment:    Level of Function and Prognosis  Assessment: Pt was able to get up to the chair with moderate assist of 1 of but was unable to progress ambulation today due to dizziness and LE weakness. His blood presssure was within functional limits.     Plan:    Plan  PT Frequency: 2-3x/wk  Plan for next treatment comment: 1-2 assist, bed mobility, transfers, standing ex, amb ~10m with walker and min assist, tends to retropulse.  Treatment duration will be through Certification Period: 02/03/24            1

## 2024-01-08 NOTE — PROGRESS NOTES
[address] [address]  Daily Progress Note  Patient name: Joseph Wong  MRN: [id number]   LOS: 32 days     Subjective   Patient doing well this am.  Objective   Vitals:Temp:  [36.2 °C (97.1 °F)-36.6 °C (97.8 °F)] 36.6 °C (97.8 °F)  Heart Rate:  [66-70] 67  Resp:  [18] 18  SpO2:  [90 %-91 %] 90 %  BP: (112-141)/(75-89) 141/89  Physical Exam:   HEENT: Pupils equal round and reactive to light and accommodation.  Extraocular movements are intact.  Oropharynx clear with no erythema or exudate.  Neck: Supple nontender without palpable lymphadenopathy JVD bruits or goiter appreciated  Lungs: Clear to auscultation bilaterally  Heart: Regular rate and rhythm with normal S1 and S2  Abdomen: Soft nontender. normoactive bowel sounds. no hepatosplenomegaly  Extremities: No clubbing, cyanosis, or edema.  Neurologic: Alert, dementia.  CN II through XII intact.  Nonfocal, generalized debility and weakness       Results from last 4 days   Lab Units 01/08/24  0449   WBC AUTO 10*3/uL 6.7   HEMOGLOBIN g/dL 12.8*   HEMATOCRIT % 37.1*   PLATELETS AUTO 10*3/uL 230     Results from last 4 days   Lab Units 01/08/24  0449   SODIUM mmol/L 139   POTASSIUM MMOL/L 4.3   CHLORIDE mmol/L 108*   CO2 mmol/L 23   BUN mg/dL 36*   CREATININE mg/dL 1.05   CALCIUM mg/dL 8.9   MAGNESIUM mg/dL 2.3   PHOSPHORUS mg/dL 3.7   ALBUMIN g/dL 3.2*   GLUCOSE mg/dL 129*         Assessment/Plan   Dementia with agitation (resolved)  Seroquel adjusted  CT head without IV contrast  Stable exam without evidence of acute intracranial injury   acute left knee pain and associated mild effusion  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  They will arrange follow-up in clinic approximately 4 weeks with repeat x-rays.  X-ray thoracolumbar junction  Similar T12 compression deformity.  No change in alignment  CT lumbar spine without contrast  1.  Compression deformities of the T12 and L4 vertebral bodies appear likely acute.  2.  Diffuse lumbar degenerative changes.  3.  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  Generalized weakness  Blood on tip of penis urethra opening  Likely due urethral trauma or prostrate and following  B12 and vitamin D deficiency  DM2 with neuropathy  GERD  CAD  BPH   Mood disorder   NARESH   VTE prophylaxis:  lovenox     Plan:  Attempted to update Dr. Emerson ([phone]) about [facility] placement on Tuesday per case management.  Neurosurgery arranging 4-week follow-up with repeat x-rays, no lifting greater than 5 to 10 pounds and avoid significant bending or twisting.  No brace necessary.       Electronically signed by: Tarik Aaron Jr., MD  1/8/2024  12:17 PM

## 2024-01-08 NOTE — NURSING END OF SHIFT
Nursing End of Shift Summary:    Patient: Joseph Wong  MRN: [id number]  : 10/20/1934, Age: 89 y.o.    Location: [address] [place] [address]    Nursing Goals  Clinical Goals for the Shift: comfort and safety    Narrative Summary of Progress Toward Clinical Goals:  Comfort and safety maintained, diet tolerated well, incontinent of both bowel and urine. No complaints of pain during the shift. Slept most of the night.    Barriers to Goals/Nursing Concerns:  Yes - placement    New Patient or Family Concerns/Issues:  No    Shift Summary:   Significant Events & Communications to Providers (last 12 hours)       Last 5 Values    No documentation.                 Oxygen Usage (last 12 hours)       Last 5 Values       Row Name 24 1923 24 0350                Room Air or Baseline Oxygen Trial by Nursing    Is Patient on Room Air OR on the Same Amount of O2 as at Home? Yes Yes                     Mobility (last 12 hours)       Last 5 Values       Row Name 24 1800 24 1924                Mobility    Activity Back to bed --       Length of Time in Chair (min) 121 --       Distance Ambulated (feet) 2 Feet --       Distance Ambulated (Meters Calculated) 0.61 Meters --       Patient Position -- Supine       Turning Every 2 hours Turns self       Distance Ambulated (Meters Calculated)(Do Not Use) 0.61 Feet --                     Urethral Catheter       Active Urethral Catheter       None                  Active Lines       Active Central venous catheter / Peripherally inserted central catheter / Implantable Port / Hemodialysis catheter / Midline Catheter       None                  Infusing Medications   Medication Dose Last Rate     PRN Medications   Medication Dose Last Admin    QUEtiapine  25 mg 25 mg at 24 1809    sodium chloride  3 mL      sodium chloride  1 spray      sodium chloride-aloe vera  1 Application      acetaminophen  500 mg 500 mg at 23    oxyCODONE  5 mg 5 mg at 23 1447    magnesium  hydroxide  30 mL      ondansetron  4 mg      Or    ondansetron  4 mg

## 2024-01-09 VITALS
BODY MASS INDEX: 21.94 KG/M2 | SYSTOLIC BLOOD PRESSURE: 143 MMHG | DIASTOLIC BLOOD PRESSURE: 100 MMHG | OXYGEN SATURATION: 91 % | HEIGHT: 72 IN | RESPIRATION RATE: 16 BRPM | HEART RATE: 70 BPM | WEIGHT: 162 LBS | TEMPERATURE: 98.4 F

## 2024-01-09 LAB — GLUCOSE BLDC GLUCOMTR-MCNC: 124 MG/DL (ref 70–105)

## 2024-01-09 PROCEDURE — 99239 HOSP IP/OBS DSCHRG MGMT >30: CPT | Performed by: HOSPITALIST

## 2024-01-09 PROCEDURE — 82947 ASSAY GLUCOSE BLOOD QUANT: CPT | Mod: QW

## 2024-01-09 PROCEDURE — 2590000100 HC RX 259: Performed by: FAMILY MEDICINE

## 2024-01-09 PROCEDURE — 6370000100 HC RX 637 (ALT 250 FOR IP): Performed by: FAMILY MEDICINE

## 2024-01-09 PROCEDURE — 6370000100 HC RX 637 (ALT 250 FOR IP): Performed by: HOSPITALIST

## 2024-01-09 PROCEDURE — 2590000100 HC RX 259: Performed by: HOSPITALIST

## 2024-01-09 RX ORDER — AMLODIPINE BESYLATE 5 MG/1
5 TABLET ORAL EVERY MORNING
Qty: 30 TABLET | Refills: 0 | Status: SHIPPED | OUTPATIENT
Start: 2024-01-09 | End: 2024-04-25 | Stop reason: WASHOUT

## 2024-01-09 RX ORDER — AMOXICILLIN 250 MG
1 CAPSULE ORAL 2 TIMES DAILY
Qty: 60 TABLET | Refills: 11 | Status: SHIPPED | OUTPATIENT
Start: 2024-01-09 | End: 2025-01-17 | Stop reason: ALTCHOICE

## 2024-01-09 RX ORDER — ATORVASTATIN CALCIUM 40 MG/1
40 TABLET, FILM COATED ORAL EVERY EVENING
Qty: 30 TABLET | Refills: 0 | Status: SHIPPED | OUTPATIENT
Start: 2024-01-09 | End: 2024-04-25 | Stop reason: WASHOUT

## 2024-01-09 RX ORDER — TAMSULOSIN HYDROCHLORIDE 0.4 MG/1
0.8 CAPSULE ORAL
Qty: 60 CAPSULE | Refills: 0 | Status: SHIPPED | OUTPATIENT
Start: 2024-01-09 | End: 2024-04-25 | Stop reason: WASHOUT

## 2024-01-09 RX ORDER — ASPIRIN 81 MG/1
81 TABLET ORAL EVERY MORNING
Qty: 30 TABLET | Refills: 0 | Status: SHIPPED | OUTPATIENT
Start: 2024-01-09 | End: 2024-04-25 | Stop reason: WASHOUT

## 2024-01-09 RX ORDER — QUETIAPINE FUMARATE 150 MG/1
150 TABLET, FILM COATED ORAL
Qty: 30 TABLET | Refills: 0 | Status: SHIPPED | OUTPATIENT
Start: 2024-01-09 | End: 2024-01-10 | Stop reason: ENTERED-IN-ERROR

## 2024-01-09 RX ORDER — SPIRONOLACTONE 25 MG/1
25 TABLET ORAL EVERY MORNING
Qty: 30 TABLET | Refills: 0 | Status: SHIPPED | OUTPATIENT
Start: 2024-01-09 | End: 2024-04-25 | Stop reason: WASHOUT

## 2024-01-09 RX ORDER — METOPROLOL SUCCINATE 25 MG/1
25 TABLET, EXTENDED RELEASE ORAL EVERY MORNING
Qty: 30 TABLET | Refills: 0 | Status: SHIPPED | OUTPATIENT
Start: 2024-01-09 | End: 2024-04-25 | Stop reason: WASHOUT

## 2024-01-09 RX ORDER — FAMOTIDINE 10 MG/1
10 TABLET ORAL 2 TIMES DAILY
Qty: 60 TABLET | Refills: 11 | Status: SHIPPED | OUTPATIENT
Start: 2024-01-09 | End: 2024-06-24 | Stop reason: ENTERED-IN-ERROR

## 2024-01-09 RX ORDER — POLYETHYLENE GLYCOL 3350 17 G/17G
17 POWDER, FOR SOLUTION ORAL DAILY
Qty: 510 G | Refills: 0 | Status: SHIPPED | OUTPATIENT
Start: 2024-01-09 | End: 2025-01-22 | Stop reason: ALTCHOICE

## 2024-01-09 RX ORDER — FINASTERIDE 5 MG/1
5 TABLET, FILM COATED ORAL EVERY EVENING
Qty: 30 TABLET | Refills: 0 | Status: SHIPPED | OUTPATIENT
Start: 2024-01-09 | End: 2024-04-25 | Stop reason: WASHOUT

## 2024-01-09 RX ORDER — LOSARTAN POTASSIUM 50 MG/1
50 TABLET ORAL EVERY MORNING
Qty: 30 TABLET | Refills: 0 | Status: SHIPPED | OUTPATIENT
Start: 2024-01-09 | End: 2024-04-25 | Stop reason: WASHOUT

## 2024-01-09 RX ORDER — ACETAMINOPHEN 325 MG/1
650 TABLET ORAL EVERY 4 HOURS PRN
Qty: 100 TABLET | Refills: 0 | Status: SHIPPED | OUTPATIENT
Start: 2024-01-09

## 2024-01-09 RX ORDER — FLUOXETINE HYDROCHLORIDE 40 MG/1
40 CAPSULE ORAL EVERY EVENING
Qty: 30 CAPSULE | Refills: 0 | Status: SHIPPED | OUTPATIENT
Start: 2024-01-09 | End: 2024-01-10 | Stop reason: ALTCHOICE

## 2024-01-09 RX ADMIN — METOPROLOL SUCCINATE 25 MG: 25 TABLET, EXTENDED RELEASE ORAL at 08:37

## 2024-01-09 RX ADMIN — FAMOTIDINE 10 MG: 20 TABLET, FILM COATED ORAL at 08:36

## 2024-01-09 RX ADMIN — POLYETHYLENE GLYCOL 3350 17 G: 17 POWDER, FOR SOLUTION ORAL at 08:36

## 2024-01-09 RX ADMIN — ASPIRIN 81 MG: 81 TABLET ORAL at 08:37

## 2024-01-09 RX ADMIN — Medication 1 TABLET: at 08:37

## 2024-01-09 RX ADMIN — SPIRONOLACTONE 25 MG: 25 TABLET ORAL at 08:36

## 2024-01-09 RX ADMIN — SENNOSIDES AND DOCUSATE SODIUM 1 TABLET: 50; 8.6 TABLET ORAL at 08:37

## 2024-01-09 RX ADMIN — TAMSULOSIN HYDROCHLORIDE 0.4 MG: 0.4 CAPSULE ORAL at 08:36

## 2024-01-09 RX ADMIN — GUAIFENESIN 600 MG: 600 TABLET, EXTENDED RELEASE ORAL at 08:37

## 2024-01-09 RX ADMIN — LOSARTAN POTASSIUM 50 MG: 50 TABLET, FILM COATED ORAL at 08:36

## 2024-01-09 RX ADMIN — AMLODIPINE BESYLATE 5 MG: 5 TABLET ORAL at 08:37

## 2024-01-09 NOTE — DISCHARGE SUMMARY
[address] [place] [address]  Discharge Summary    Patient name: Joseph Wong  MRN: [id number]    Admission Date: 12/4/2023       Discharge Date: 1/9/2024    Admitting Provider: Sami Olsen MD  Discharge Provider: Tarik Aaron MD  Primary Care Physician at Discharge: Malena Mcclain [phone]     Discharge Disposition  03 - Medicare Certified Skilled Nursing Facility  Code Status at Discharge: Full Code    Outpatient Follow-Up  Future Appointments   Date Time Provider Department Center   1/9/2024 10:00 AM [facility] OUTR [facility], PROVIDER FROUTR[facility] HS   1/18/2024  2:15 PM Sami Wade, CNP [facility]CD NSUR RC   1/18/2024  2:15 PM [facility] XR 1 [facility]CD RAD RC   2/28/2024  2:00 PM Jessica Elise CNP [id number] UROL RC       Discharge Diagnosis    Dementia with agitation (resolved)  Seroquel adjusted  CT head without IV contrast  Stable exam without evidence of acute intracranial injury   acute left knee pain and associated mild effusion  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  They will arrange follow-up in clinic approximately 4 weeks with repeat x-rays.  X-ray thoracolumbar junction  Similar T12 compression deformity.  No change in alignment  CT lumbar spine without contrast  1.  Compression deformities of the T12 and L4 vertebral bodies appear likely acute.  2.  Diffuse lumbar degenerative changes.  3.  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  Generalized weakness  Blood on tip of penis urethra opening  Likely due urethral trauma or prostrate and following  B12 and vitamin D deficiency  DM2 with neuropathy  GERD  CAD  BPH   Mood disorder   NARESH         Discharge medication list        START taking these medications        Instructions   famotidine 10 mg tablet  Commonly known as: PEPCID   Take 1 tablet (10 mg total) by mouth 2 (two) times a day     multivitamin with minerals 9 mg iron-400 mcg tablet tablet  Start taking on: January 10, 2024   Take 1 tablet by mouth daily     polyethylene glycol 17 gram/dose powder  Commonly known as: GLYCOLAX   Take 17 g by mouth daily     QUEtiapine 150 mg tablet   Take 150 mg by mouth daily with dinner     sennosides-docusate sodium 8.6-50 mg  Commonly known as: SENNA PLUS   Take 1 tablet by mouth 2 (two) times a day            CHANGE how you take these medications        Instructions   acetaminophen 325 mg tablet  Commonly known as: TYLENOL  What changed: reasons to take this   Take 2 tablets (650 mg total) by mouth every 4 (four) hours as needed for pain scale 1-3/10, fever or headaches     tamsulosin 0.4 mg capsule  Commonly known as: FLOMAX  What changed:   how much to take  when to take this   Take 2 capsules (0.8 mg total) by mouth daily with dinner            CONTINUE taking these medications        Instructions   amLODIPine 5 mg tablet  Commonly known as: NORVASC   Take 1 tablet (5 mg total) by mouth every morning     aspirin 81 mg EC tablet   Take 1 tablet (81 mg total) by mouth every morning     atorvastatin 40 mg tablet  Commonly known as: LIPITOR   Take 1 tablet (40 mg total) by mouth every evening     finasteride 5 mg tablet  Commonly known as: PROSCAR   Take 1 tablet (5 mg total) by mouth every evening     FLUoxetine 40 mg capsule  Commonly known as: PROzac   Take 1 capsule (40 mg total) by mouth every evening     losartan 50 mg tablet  Commonly known as: COZAAR   Take 1 tablet (50 mg total) by mouth every morning     metoprolol succinate XL 25 mg 24 hr tablet  Commonly known as: TOPROL-XL   Take 1 tablet (25 mg total) by mouth every morning     spironolactone 25 mg tablet  Commonly known as: ALDACTONE   Take 1  tablet (25 mg total) by mouth every morning            STOP taking these medications      KRILL OIL ORAL     metFORMIN 500 mg tablet  Commonly known as: GLUCOPHAGE     potassium chloride 10 mEq CR tablet               Where to Get Your Medications        These medications were sent to [facility] [address] [facility] [address] [facility]  [address] [facility] [address]      Phone: [phone]   acetaminophen 325 mg tablet  amLODIPine 5 mg tablet  aspirin 81 mg EC tablet  atorvastatin 40 mg tablet  famotidine 10 mg tablet  finasteride 5 mg tablet  FLUoxetine 40 mg capsule  losartan 50 mg tablet  metoprolol succinate XL 25 mg 24 hr tablet  multivitamin with minerals 9 mg iron-400 mcg tablet tablet  polyethylene glycol 17 gram/dose powder  QUEtiapine 150 mg tablet  sennosides-docusate sodium 8.6-50 mg  spironolactone 25 mg tablet  tamsulosin 0.4 mg capsule         Hospital Course  Patient admitted to [facility] with dementia and agitation.  Patient titrated up on Seroquel at bedtime with resolution of his agitation and sleeping well.  He initially did have some acute left knee pain and back pain that resolved.  Patient with generalized debility and weakness with PT OT and now recommended skilled nursing home placement and  now going to [facility] today.  Patient recommend follow-up neurosurgery 1 month for T12 and L4 compression deformities/fractures with repeat x-rays no lifting more than 5-10 pounds and avoid significant bending or twisting.  No brace necessary.    Physical Exam at Discharge   Discharge Condition: good  /100 (BP Location: Right arm, Patient Position: Supine, Cuff Size: Regular Adult)   Pulse 70   Temp 36.9 °C (98.4 °F) (Temporal)   Resp 16   Ht 1.829 m (6')   Wt 73.5 kg (162 lb)   SpO2 91%   BMI 21.97 kg/m²   Weight: 73.5 kg (162 lb)  HEENT:  PERRLA. Extra ocular movements are intact. Oral pharynx clear without erythema of  exudate.   Neck:  Supple. Nontender.  No palpable lymphadenopathy, JVD, or goiter.  Lungs: Clear to auscultation bilaterally without adventitious sounds appreciated  Heart: Regular rate and rhythm with normal S1, S2  Abdomen: Soft.  Nontender.  Normal active bowel sounds.  No hepatosplenomegaly or other masses appreciated.  Extremities: No clubbing, cyanosis, or edema.  Neurologic: Dementia.  CN II through XII intact.  Nonfocal, generalized debility or weakness.      Time spent coordinating discharge: 35 min    Electronically signed by: Tarik Aaron Jr., MD  1/9/2024  9:23 AM

## 2024-01-09 NOTE — NURSING END OF SHIFT
Nursing End of Shift Summary:    Patient: Joseph Wong  MRN: [id number]  : 10/20/1934, Age: 89 y.o.    Location: [address] [place] [address]    Nursing Goals  Clinical Goals for the Shift: maintain comfort and safety    Narrative Summary of Progress Toward Clinical Goals:  Patient comfort and safety maintained, incontinent of urine this shift.  No complaints of pain. Patient rested most of the night.    Barriers to Goals/Nursing Concerns:  No    New Patient or Family Concerns/Issues:  No    Shift Summary:   Significant Events & Communications to Providers (last 12 hours)       Last 5 Values    No documentation.                 Oxygen Usage (last 12 hours)       Last 5 Values       Row Name 24                   Room Air or Baseline Oxygen Trial by Nursing    Is Patient on Room Air OR on the Same Amount of O2 as at Home? Yes                      Mobility (last 12 hours)       Last 5 Values       Row Name 24 0007             Mobility    Activity -- Turn --      Patient Position Supine -- Supine      Turning Turns self Turns self --                    Urethral Catheter       Active Urethral Catheter       None                  Active Lines       Active Central venous catheter / Peripherally inserted central catheter / Implantable Port / Hemodialysis catheter / Midline Catheter       None                  Infusing Medications   Medication Dose Last Rate     PRN Medications   Medication Dose Last Admin    QUEtiapine  25 mg 25 mg at 24 1322    sodium chloride  3 mL      sodium chloride  1 spray      sodium chloride-aloe vera  1 Application      acetaminophen  500 mg 500 mg at 23 1933    oxyCODONE  5 mg 5 mg at 23 1447    magnesium hydroxide  30 mL      ondansetron  4 mg      Or    ondansetron  4 mg

## 2024-01-09 NOTE — NURSING END OF SHIFT
Nursing End of Shift Summary:    Patient: Joseph Wong  MRN: [id number]  : 10/20/1934, Age: 89 y.o.    Location: [address] [place] [address]    Nursing Goals  Clinical Goals for the Shift: Maintain safety and comfort    Narrative Summary of Progress Toward Clinical Goals:  VSS. Pt remains confused but alert to self. Pt offers no c/o pain. BM today. U/o adequate. Pt eating fair. Wife at bedside and plan for transfer to [place] tomorrow.    Barriers to Goals/Nursing Concerns:  No    New Patient or Family Concerns/Issues:  No    Shift Summary:   Significant Events & Communications to Providers (last 12 hours)       Last 5 Values    No documentation.                 Oxygen Usage (last 12 hours)       Last 5 Values    No documentation.                 Mobility (last 12 hours)       Last 5 Values       Row Name 24 0749 24 0800 24 1200 24 1554 24 1718       Mobility    Activity -- Chair position in bed Chair position in bed -- Back to bed    Length of Time in Chair (min) -- 120 120 -- 120    Distance Ambulated (feet) -- 0 Feet 0 Feet -- 3 Feet    Distance Ambulated (Meters Calculated) -- 0 Meters 0 Meters -- 0.91 Meters    Patient Position Supine -- -- Up in chair --    Turning -- Every 2 hours Turns self -- Every 2 hours    Distance Ambulated (Meters Calculated)(Do Not Use) -- 0 Feet 0 Feet -- 0.91 Feet                  Urethral Catheter       Active Urethral Catheter       None                  Active Lines       Active Central venous catheter / Peripherally inserted central catheter / Implantable Port / Hemodialysis catheter / Midline Catheter       None                  Infusing Medications   Medication Dose Last Rate     PRN Medications   Medication Dose Last Admin    QUEtiapine  25 mg 25 mg at 24 1322    sodium chloride  3 mL      sodium chloride  1 spray      sodium chloride-aloe vera  1 Application      acetaminophen  500 mg 500 mg at 23    oxyCODONE  5 mg 5 mg at 23 1447     magnesium hydroxide  30 mL      ondansetron  4 mg      Or    ondansetron  4 mg

## 2024-01-09 NOTE — INTERDISCIPLINARY/THERAPY
Case Management Facility Discharge Note    Phone # [phone]    Accepting Facility: [facility].  Accepting Physician:  Dr. Nunes  Report number provided to: The patient's attending physician.  PASSR: PASSR, and the original screen emailed to Bushra at [facility].  Pharmacy: Please see yesterday's note.  Orders faxed: N/A.  Transportation: [employer].  RN given number for report: Yes.  Support System Notified: Yes.    CM briefly meet with the patient's wife Alessandra Wong.  CM briefly went over the the discharge process.

## 2024-01-09 NOTE — INTERDISCIPLINARY/THERAPY
Spiritual Care Services:    SHABBIR Graves visited pt and offered pastoral support.  Service will remain available.

## 2024-01-09 NOTE — PLAN OF CARE
Problem: Safety Adult - Fall  Goal: Free from fall injury  Description: INTERVENTIONS:    Inpatient - Please reference Cares/Safety Flowsheet under [facility] Fall Risk for interventions.  Pediatrics - Please reference Peds Daily Cares/Safety Flowsheet under [facility] Pediatric Fall Assessment Fall Bundle for interventions  LD/OB - Please reference OB Shift Screening Flowsheet under OB Fall Risk for interventions.  Outcome: Progressing     Problem: Pain - Adult  Goal: Verbalizes/displays adequate comfort level or baseline comfort level  Description: INTERVENTIONS:  1. Encourage patient to monitor pain and request interventions  2. Assess pain using the appropriate pain scale  3. Administer analgesics based on type and severity of pain and evaluate response  4. Educate/Implement non-pharmacological measures as appropriate and evaluate response  5. Consider cultural, developmental and social influences on pain and pain management  6. Notify Provider if interventions unsuccessful or patient reports new pain  Outcome: Progressing  Flowsheets (Taken 1/2/2024 0926 by Trudy Guan, RN)  Verbalizes/displays adequate comfort level or baseline comfort level: Assess pain using the appropriate pain scale     Problem: Daily Care  Goal: Daily care needs are met  Description: INTERVENTIONS:   1. Assess and monitor skin integrity  2. Identify patients at risk for skin breakdown on admission and per policy  3. Assess and monitor ability to perform self care and identify potential discharge needs  4. Assess skin integrity/risk for skin breakdown  5. Assist patient with activities of daily living as needed  6. Encourage independent activity per ability   7. Provide mouth care   8. Include patient/family/caregiver in decisions related to daily care   Outcome: Progressing  Flowsheets (Taken 12/25/2023 1957 by Jesus Dong, RN)  Daily care needs are met:   Assess and monitor skin integrity   Assess and monitor ability to perform self care  and identify potential discharge needs   Identify patients at risk for skin breakdown on admission and per policy   Assess skin integrity/risk for skin breakdown   Assist patient with activities of daily living as needed   Encourage independent activity per ability   Include patient/family/caregiver in decisions related to daily care   Provide mouth care     Problem: Knowledge Deficit  Goal: Patient/family/caregiver demonstrates understanding of disease process, treatment plan, medications, and discharge instructions  Description: INTERVENTIONS:   1. Complete learning assessment and assess knowledge base  2. Provide teaching at level of understanding   3. Provide teaching via preferred learning methods  Outcome: Progressing  Flowsheets (Taken 12/25/2023 2355 by Jesus Dong, RN)  Patient/family/caregiver demonstrates understanding of disease process, treatment plan, medications, and discharge instructions:   Provide teaching via preferred learning methods   Complete learning assessment and assess knowledge base   Provide teaching at level of understanding     Problem: Discharge Barriers  Goal: Patient's discharge needs are met  Description: INTERVENTIONS:  1. Assess patient for self-management skills  2. Encourage participation in management  3. Identify potential discharge barriers on admission and throughout hospital stay  4. Involve patient/family/caregiver in discharge planning process  5. Collaborate with case management/ for discharge needs  Outcome: Progressing  Flowsheets (Taken 12/25/2023 2355 by Jesus Dong RN)  Patient discharge needs are met:   Assess patient for self-management skills   Encourage participation in management   Identify potential discharge barriers on admission and throughout hospital stay   Involve patient/family/caregiver in discharge planning process   Collaborate with case management/ for discharge needs     Problem: Infection - Adult  Goal:  Absence of infection during hospitalization  Description: INTERVENTIONS:  1. Assess and monitor for signs and symptoms of infection  2. Monitor lab/diagnostic results  3. Monitor all insertion sites/wounds/incisions  4. Monitor secretions for changes in amount and color  5. Administer medications as ordered  6. Educate and encourage patient and family to use good hand hygiene technique  7. Identify and educate in appropriate isolation precautions for identified infection/condition  Outcome: Progressing  Flowsheets (Taken 12/25/2023 2355 by Jesus Dong, RN)  Absence of infection during hospitalization:   Assess and monitor for signs and symptoms of infection   Monitor lab/diagnostic results   Monitor all insertion sites/wounds/incisions   Monitor secretions for changes in amount and colo   Educate and encourage patient and family to use good hand hygiene technique   Administer medications as ordered     Problem: Safety Adult  Goal: Patient will remain safe during hospitalization  Description: INTERVENTIONS    1. Assess patient for fall risk and implement interventions if needed  2. Use safe transport techniques  3. Assess patient using the appropriate Harvey skin assessment scale  4. Assess patient for risk of aspiration  5. Assess patient for risk of elopement  6. Assess patient for risk of suicide  Outcome: Progressing  Flowsheets (Taken 12/25/2023 2355 by Jesus Dong, RN)  Patient will remain safe durning hospitalization:   Assess patient for Fall Risk   Assess Patient for Aspirations   Use safe transport   Assess Patient for Risk of Elopement   Assess Patient using the appropriate Harvey scale   Assess Patient for Risk of Suicide     Problem: Potential for Compromised Skin Integrity  Goal: Skin Integrity is Maintained or Improved  Description: INTERVENTIONS:  1. Assess and monitor skin integrity  2. Collaborate with interdisciplinary team and initiate plans and interventions as needed  3. Alternate a full  bath with partial baths for elderly   4. Monitor patient's hygiene practices   5. Collaborate with wound, ostomy, and continence nurse  Outcome: Progressing  Flowsheets (Taken 12/25/2023 2355 by Jesus Dong, RN)  Skin integrity is maintained or improved:   Assess and monitor skin integrity   Collaborate with wound, ostomy, and continence nurse   Alternate a full bath with partial baths for elderly   Collaborate with interdisciplinary team and initiate plans and interventions as needed   Monitor patient's hygiene practices  Goal: Nutritional status is improving  Description: INTERVENTIONS:  1. Monitor and assess patient for malnutrition (ex- brittle hair, bruises, dry skin, pale skin and conjunctiva, muscle wasting, smooth red tongue, and disorientation)  2. Monitor patient's weight and dietary intake as ordered or per policy  3. Determine patient's food preferences and provide high-protein, high-caloric foods as appropriate  4. Assist patient with eating   5. Allow adequate time for meals   6. Encourage patient to take dietary supplement as ordered   7. Collaborate with dietitian  8. Include patient/family/caregiver in decisions related to nutrition  Outcome: Progressing  Flowsheets (Taken 12/25/2023 2355 by Jesus Dong, RN)  Nutritional status is improving:   Monitor and assess patient for malnutrition (ex- brittle hair, bruises, dry skin, pale skin and conjunctiva, muscle wasting, smooth red tongue, and disorientation)   Determine patient's food preferences and provide high-protein, high-caloric foods as appropriate   Assist patient with eating   Monitor patient's weight and dietary intake as ordered or per policy   Allow adequate time for meals   Encourage patient to take dietary supplement as ordered   Include patient/family/caregiver in decisions related to nutrition  Goal: MOBILITY IS MAINTAINED OR IMPROVED  Description: INTERVENTIONS  1. Collaborate with interdisciplinary team and initiate plan and  interventions as ordered (PT/OT)  2. Encourage ambulation  3. Up to chair for meals  4. Monitor for signs of deconditioning  Outcome: Progressing  Flowsheets (Taken 12/25/2023 2355 by Jesus Dong, RN)  Mobility is Maintained or Improved:   Collaborate with interdisciplinary team and initiate plan and interventions as ordered (PT/OT)   Encourage ambulation   Up to chair for meals   Monitor for signs of deconditioning     Problem: Urinary Incontinence  Goal: Perineal skin integrity is maintained or improved  Description: INTERVENTIONS:  1. Assess genitourinary system, perineal skin, labs (urinalysis), and history of incontinence to include past management, aggravating, and alleviating factors  2. Collaborate with interdisciplinary team including wound, ostomy, and continence nurse and initiate plans and interventions as needed  4. Consider urine containment device  5. Apply skin protectant   6. Develop skin care regimen  7. Provide privacy when changing patient's incontinence device to maintain their dignity  Outcome: Progressing  Flowsheets (Taken 12/25/2023 2355 by Jesus Dong, RN)  Perineal skin integrity is maintained or improved:   Assess genitourinary system, perineal skin, labs (urinalysis), and history of incontinence to include past management, aggravating, and alleviating factors   Collaborate with interdisciplinary team including wound, ostomy, and continence nurse and initiate plans and interventions as needed   Consider urine containment device   Apply skin protectant   Develop skin care regimen     Problem: Genitourinary - Adult  Goal: Absence of urinary retention  Description: INTERVENTIONS:  1. Assess patient’s ability to void and empty bladder.  2. Monitor intake/output and perform bladder scan if indicated.  3. Place urinary catheter per order and initiate and maintain CAUTI bundle.  4. Administer medications to alleviate retention as needed.  5. Discuss catheterization for long term  situations as appropriate.    Outcome: Progressing  Flowsheets (Taken 12/25/2023 1732 by Gigi Albert, RN)  Absence of urinary retention:   Assess patient’s ability to void and empty bladder   Monitor intake/output and perform bladder scan if indicated     Problem: Elopement Risk  Goal: Minimize the risk of a patient leaving without authorization when departure presents a threat to the safety of patient or others.  Description: INTERVENTIONS:  1. Frequent re-orientation, using a calm voice and positive re-enforcement when interacting with the patient  2. Familiar objects and possessions placed in the room  3. Purposeful focused activities  4. Family or volunteer staying with the patient  5. Medication evaluation by Pharmacy for possible adjustments  6. Consider continual supervision while patient transported off unit  7. Ensure pain relief as necessary  8. Set limits and provide clear expectations as needed  9. Reduce noise/stimulation  Outcome: Progressing  Flowsheets (Taken 12/25/2023 8404 by Jesus Dong, RN)  Minimize the risk of patient leaving: Frequent re-orientation, using a calm voice and positive re-enforcement when interacting with the patient

## 2024-01-09 NOTE — NURSING NOTE
PIV removed. Report called and given to Marlee HUGHES at [facility] in [place]. Concerns discussed and questions answered. Discharge papers given to wife to share with [place]. Personal belongings returned to wife.

## 2024-01-11 ENCOUNTER — TELEPHONE (OUTPATIENT)
Dept: NEUROSURGERY | Facility: CLINIC | Age: 89
End: 2024-01-11
Payer: MEDICARE

## 2024-01-11 NOTE — TELEPHONE ENCOUNTER
I spoke with Pt's wife, she cx his up coming appt. He has been moved to a nursing home. I asked if she wanted to r/s or wanted me to cx referral. She said he has been moved to [place] and will not be coming home.

## 2024-02-07 PROBLEM — G47.33 OBSTRUCTIVE SLEEP APNEA SYNDROME: Chronic | Status: ACTIVE | Noted: 2018-07-24

## 2024-02-07 PROBLEM — F03.93 DEMENTIA WITH MOOD DISTURBANCE (CMS/HCC): Status: ACTIVE | Noted: 2024-02-07

## 2024-02-14 PROBLEM — R60.9 DEPENDENT EDEMA: Status: ACTIVE | Noted: 2024-02-14

## 2024-02-21 ENCOUNTER — TELEPHONE (OUTPATIENT)
Dept: UROLOGY | Facility: CLINIC | Age: 89
End: 2024-02-21
Payer: MEDICARE

## 2024-02-21 NOTE — TELEPHONE ENCOUNTER
This is a documented call only.    Patient: Joseph Wong    Caller Name (Last and first, relation/role): Alessandra Wong (spouse)    Additional Info: Pt's wife called stating pt is in nursing home in [place] and will no longer be coming to this appt. Cancelled in Epic.    Appt Type: Established Patient

## 2024-08-01 PROBLEM — R29.6 FALLS: Status: ACTIVE | Noted: 2024-08-01

## 2024-08-20 PROBLEM — R73.03 PREDIABETES: Status: ACTIVE | Noted: 2024-08-20

## 2024-10-09 PROBLEM — M25.562 LEFT KNEE PAIN: Status: RESOLVED | Noted: 2021-05-12 | Resolved: 2024-10-09

## 2024-10-09 PROBLEM — N30.00 ACUTE CYSTITIS WITHOUT HEMATURIA: Status: RESOLVED | Noted: 2023-01-16 | Resolved: 2024-10-09

## 2024-10-09 PROBLEM — S32.000A LUMBAR COMPRESSION FRACTURE, CLOSED, INITIAL ENCOUNTER (CMS/HCC): Status: RESOLVED | Noted: 2023-12-07 | Resolved: 2024-10-09

## 2024-10-09 PROBLEM — I25.10 ATHEROSCLEROSIS OF CORONARY ARTERY: Status: RESOLVED | Noted: 2023-12-04 | Resolved: 2024-10-09

## 2024-10-09 PROBLEM — N39.0 UTI (URINARY TRACT INFECTION): Status: RESOLVED | Noted: 2023-02-25 | Resolved: 2024-10-09

## 2024-10-09 NOTE — INTERDISCIPLINARY/THERAPY
Case Management Progress Note    Phone # [phone]    Diagnosis: Weakness.    Discussed Discharge Topics/Narrative: JOSÉ MIGUEL received a call from Bushra at [facility].  CM answered Bushra's questions to the best of the CM's ability.  Bushra explained to the CM that they are looking at the patient for their new memory care unit.  Bushra explained that she has to run it by the DON and New .  CM updated Lead CM, JOSÉ MIGUEL Fall, and the patient's nurse.    CM called Monse at [facility].  CM asked if [facility] will take another quick look at the patient.  JOSÉ MIGUEL and Monse talked about why [facility] has not accepted the patient. Monse will take another look at the patient tomorrow.      CM updated the patient's wife Alessandra Wong.     will continue to follow.    Family updated: Yes.    RN updated: Yes.    Disposition: Placement.     Procedure:  EGD    Relevant Problems   CARDIO   (+) Benign essential HTN      ENDO   (+) Acquired hypothyroidism      MUSCULOSKELETAL   (+) DDD (degenerative disc disease), lumbar   (+) Lumbar spondylosis   (+) Primary generalized (osteo)arthritis   (+) Primary osteoarthritis of left hip   (+) Primary osteoarthritis of right knee        Physical Exam    Airway    Mallampati score: III  TM Distance: >3 FB  Neck ROM: full     Dental   No notable dental hx     Cardiovascular      Pulmonary      Other Findings  post-pubertal.      Anesthesia Plan  ASA Score- 2     Anesthesia Type- IV sedation with anesthesia with ASA Monitors.         Additional Monitors:     Airway Plan:            Plan Factors-Exercise tolerance (METS): >4 METS.    Chart reviewed. EKG reviewed.  Existing labs reviewed. Patient summary reviewed.    Patient is not a current smoker.              Induction- intravenous.    Postoperative Plan-         Informed Consent- Anesthetic plan and risks discussed with patient.  I personally reviewed this patient with the CRNA. Discussed and agreed on the Anesthesia Plan with the CRNA..

## 2024-12-04 PROBLEM — R53.1 WEAKNESS: Status: RESOLVED | Noted: 2023-12-04 | Resolved: 2024-12-04

## 2024-12-04 PROBLEM — F03.93 DEMENTIA WITH MOOD DISTURBANCE (CMS/HCC): Status: RESOLVED | Noted: 2024-02-07 | Resolved: 2024-12-04

## 2024-12-05 PROBLEM — R48.9 SYMBOLIC DYSFUNCTION: Status: ACTIVE | Noted: 2024-12-05

## 2024-12-10 PROBLEM — F03.B3: Status: ACTIVE | Noted: 2024-12-10

## 2024-12-10 PROBLEM — R73.01 IMPAIRED FASTING GLUCOSE: Status: RESOLVED | Noted: 2023-12-04 | Resolved: 2024-12-10

## 2024-12-10 PROBLEM — E78.5 DYSLIPIDEMIA: Status: RESOLVED | Noted: 2023-12-04 | Resolved: 2024-12-10

## 2025-06-27 NOTE — PROGRESS NOTES
Subjective   Per discussion with Jessica Elise, NATE,  Joseph and his wife Alessandra , has verbally consented to be treated via a telephone based visit: Yes. A total of 15 minutes were required for this telephone based visit.   Patient Location: Home  Provider Location: Clinic  Technology used by Provider: Phone    HPI  Joseph Wong is a 88 y.o. male who presents for follow-up regarding BPH and elevated PSA.    Patient was last seen in the urology clinic on 8/7/2023 where his AUA-SS was 16.  He was continued on finasteride and Flomax 0.4 mg daily.    Alessandra reports that for the last week patient has been voiding more often and going every 2 hours during the day and every hour at night.  Joseph does not feel as though he is emptying his bladder all the way.  He has no other urinary symptoms or complaints and she does not believe it is related to UTI.  He has a baseline history of dizziness but this was not worsened when he started on the Flomax.    His most recent PSA was 1.97, or 3.94 when corrected for finasteride.     Latest Reference Range & Units 12/20/21 10:12 03/17/22 10:56 04/17/23 07:41 09/18/23 11:28   PSA 0.00 - 4.00 ng/mL 8.54 (H) 7.75 (H) 2.79 1.97   (H): Data is abnormally high    The following have been reviewed and updated as appropriate in this visit:    No Known Allergies  Current Outpatient Medications   Medication Sig Dispense Refill    losartan (COZAAR) 50 mg tablet Take 1 tablet (50 mg total) by mouth daily      potassium chloride 10 mEq CR tablet Take 2 tablets (20 mEq total) by mouth 2 (two) times a day      atorvastatin (LIPITOR) 10 mg tablet Take 2 tablets (20 mg total) by mouth daily 60 tablet 0    aspirin 81 mg EC tablet Take 1 tablet (81 mg total) by mouth daily 30 tablet 0    finasteride (PROSCAR) 5 mg tablet Take 1 tablet (5 mg total) by mouth daily 30 tablet 0    spironolactone (ALDACTONE) 25 mg tablet Take 1 tablet (25 mg total) by mouth daily 30 tablet 0    metFORMIN (GLUCOPHAGE) 500 mg  Upon chart review, pt instructed to hold xarelto for 2 not 3 days (which is required per anesthesia for spinal anesthesia) prior to surgery. PAT had messaged Dr. Barnhart who stated that they would change anesthesia plan from spinal to general.  Posting still lists pt as spinal anesthesia.  Called Dr. Barnhart's office, s/w Yola w/ above information. Yola will notify surgery scheduler.  DOS 7/1/2025   tablet Take 1 tablet (500 mg total) by mouth 2 (two) times a day with meals      metoprolol succinate XL (TOPROL-XL) 25 mg 24 hr tablet Take 1 tablet (25 mg total) by mouth daily      KRILL OIL ORAL Take 1 tab/cap by mouth daily      amLODIPine (NORVASC) 5 mg tablet Take 1 tablet (5 mg total) by mouth daily 90 0    tamsulosin (FLOMAX) 0.4 mg capsule Take 2 capsules (0.8 mg total) by mouth daily with dinner 60 capsule 11    FLUoxetine (PROzac) 20 mg capsule Take 1 capsule (20 mg total) by mouth daily       No current facility-administered medications for this visit.     Past Medical History:   Diagnosis Date    CAD (coronary artery disease)     Elevated cholesterol     GERD (gastroesophageal reflux disease)     Hiatal hernia     History of shingles     Hypertension     Macular degeneration     Metabolic syndrome     Vertigo      Past Surgical History:   Procedure Laterality Date    OTHER SURGICAL HISTORY Left 2016    quad tendon repair    QUADRICEPS REPAIR Left     TONSILLECTOMY      TOTAL ANKLE ARTHROPLASTY Left     TOTAL HIP ARTHROPLASTY Right     TOTAL HIP ARTHROPLASTY Left     TOTAL KNEE ARTHROPLASTY  [id number]    TRANSURETHRAL RESECTION OF PROSTATE       Family History   Problem Relation Age of Onset    Hypertension Father     Stroke Father     Sleep apnea Brother      Social History     Socioeconomic History    Marital status:    Tobacco Use    Smoking status: Former     Types: Cigarettes     Quit date: 1965     Years since quittin.7    Smokeless tobacco: Never   Vaping Use    Vaping Use: Never used   Substance and Sexual Activity    Alcohol use: Yes     Alcohol/week: 1.0 standard drink of alcohol     Types: 1 Shots of liquor per week    Drug use: Never    Sexual activity: Defer     Social Determinants of Health     Tobacco Use: Medium Risk (2023)    Patient History     Smoking Tobacco Use: Former     Smokeless Tobacco Use: Never           Assessment/Plan   Diagnoses and all  orders for this visit:    Weak urinary stream  -     tamsulosin (FLOMAX) 0.4 mg capsule; Take 2 capsules (0.8 mg total) by mouth daily with dinner    We discussed checking a urinalysis and a bladder scan PVR however wife does not believe this is related to a urinary tract infection and patient believes it is because he is not emptying his bladder all the way.  We discussed increasing his Flomax to 2 capsules/day.  We discussed his dizziness as the concern was that increasing his Flomax could make this worse, however his wife reports that this has been a longstanding issue for him and has not been made worse by the initiation of the Flomax.  We discussed the side effects this medication and to stop and or decrease back down to 1 capsule if he has any worsening of his dizziness/lightheadedness.  We discussed that if he is not improving after a week or 2 on the increased dose of Flomax or should his symptoms worsen or he start running fevers, he should be evaluated for urinary tract infection and likewise if he develops acute urinary retention, he should be further evaluated.    If no improvement, he will come in for nurse visit for urinalysis and PVR.    Otherwise, follow-up in 4 to 5 months with AUA and PVR in clinic.    Jessica Elise, CNP